# Patient Record
Sex: FEMALE | Race: WHITE | NOT HISPANIC OR LATINO | URBAN - METROPOLITAN AREA
[De-identification: names, ages, dates, MRNs, and addresses within clinical notes are randomized per-mention and may not be internally consistent; named-entity substitution may affect disease eponyms.]

---

## 2017-01-19 ENCOUNTER — OUTPATIENT (OUTPATIENT)
Dept: OUTPATIENT SERVICES | Facility: HOSPITAL | Age: 67
LOS: 1 days | Discharge: HOME | End: 2017-01-19

## 2017-01-19 DIAGNOSIS — C50.919 MALIGNANT NEOPLASM OF UNSPECIFIED SITE OF UNSPECIFIED FEMALE BREAST: ICD-10-CM

## 2017-01-20 ENCOUNTER — APPOINTMENT (OUTPATIENT)
Dept: HEMATOLOGY ONCOLOGY | Facility: CLINIC | Age: 67
End: 2017-01-20

## 2017-01-20 VITALS
SYSTOLIC BLOOD PRESSURE: 170 MMHG | TEMPERATURE: 97.5 F | RESPIRATION RATE: 16 BRPM | OXYGEN SATURATION: 100 % | HEIGHT: 63.58 IN | HEART RATE: 84 BPM | BODY MASS INDEX: 48.42 KG/M2 | DIASTOLIC BLOOD PRESSURE: 85 MMHG | WEIGHT: 276.68 LBS

## 2017-01-20 DIAGNOSIS — M10.9 GOUT, UNSPECIFIED: ICD-10-CM

## 2017-01-20 DIAGNOSIS — F41.9 ANXIETY DISORDER, UNSPECIFIED: ICD-10-CM

## 2017-01-20 DIAGNOSIS — Z80.3 FAMILY HISTORY OF MALIGNANT NEOPLASM OF BREAST: ICD-10-CM

## 2017-01-20 DIAGNOSIS — Z87.09 PERSONAL HISTORY OF OTHER DISEASES OF THE RESPIRATORY SYSTEM: ICD-10-CM

## 2017-01-20 DIAGNOSIS — Z78.9 OTHER SPECIFIED HEALTH STATUS: ICD-10-CM

## 2017-01-21 PROBLEM — Z87.09 HISTORY OF ASTHMA: Status: RESOLVED | Noted: 2017-01-21 | Resolved: 2017-01-21

## 2017-01-21 PROBLEM — M10.9 GOUT, ARTHRITIS: Status: RESOLVED | Noted: 2017-01-21 | Resolved: 2017-01-21

## 2017-01-21 PROBLEM — F41.9 ANXIETY: Status: RESOLVED | Noted: 2017-01-21 | Resolved: 2017-01-21

## 2017-01-21 PROBLEM — Z80.3 FAMILY HISTORY OF MALIGNANT NEOPLASM OF BREAST: Status: ACTIVE | Noted: 2017-01-21

## 2017-01-21 PROBLEM — Z78.9 ALCOHOL INGESTION: Status: ACTIVE | Noted: 2017-01-21

## 2017-01-23 ENCOUNTER — FORM ENCOUNTER (OUTPATIENT)
Age: 67
End: 2017-01-23

## 2017-01-24 ENCOUNTER — APPOINTMENT (OUTPATIENT)
Dept: NUCLEAR MEDICINE | Facility: CLINIC | Age: 67
End: 2017-01-24

## 2017-01-24 ENCOUNTER — OUTPATIENT (OUTPATIENT)
Dept: OUTPATIENT SERVICES | Facility: HOSPITAL | Age: 67
LOS: 1 days | End: 2017-01-24
Payer: MEDICARE

## 2017-01-24 DIAGNOSIS — C50.919 MALIGNANT NEOPLASM OF UNSPECIFIED SITE OF UNSPECIFIED FEMALE BREAST: ICD-10-CM

## 2017-01-24 PROCEDURE — 78815 PET IMAGE W/CT SKULL-THIGH: CPT

## 2017-01-24 PROCEDURE — A9552: CPT

## 2017-01-25 ENCOUNTER — RESULT REVIEW (OUTPATIENT)
Age: 67
End: 2017-01-25

## 2017-01-26 ENCOUNTER — APPOINTMENT (OUTPATIENT)
Dept: MRI IMAGING | Facility: IMAGING CENTER | Age: 67
End: 2017-01-26

## 2017-01-26 ENCOUNTER — OUTPATIENT (OUTPATIENT)
Dept: OUTPATIENT SERVICES | Facility: HOSPITAL | Age: 67
LOS: 1 days | End: 2017-01-26
Payer: MEDICARE

## 2017-01-26 ENCOUNTER — APPOINTMENT (OUTPATIENT)
Dept: ULTRASOUND IMAGING | Facility: IMAGING CENTER | Age: 67
End: 2017-01-26

## 2017-01-26 DIAGNOSIS — Z00.00 ENCOUNTER FOR GENERAL ADULT MEDICAL EXAMINATION WITHOUT ABNORMAL FINDINGS: ICD-10-CM

## 2017-01-26 PROCEDURE — 77065 DX MAMMO INCL CAD UNI: CPT

## 2017-01-26 PROCEDURE — 19083 BX BREAST 1ST LESION US IMAG: CPT | Mod: RT

## 2017-01-26 PROCEDURE — 88305 TISSUE EXAM BY PATHOLOGIST: CPT

## 2017-01-26 PROCEDURE — A4648: CPT

## 2017-01-26 PROCEDURE — 19085 BX BREAST 1ST LESION MR IMAG: CPT | Mod: LT

## 2017-01-26 PROCEDURE — G0206: CPT | Mod: 26,LT

## 2017-01-26 PROCEDURE — A9585: CPT

## 2017-01-26 PROCEDURE — 19083 BX BREAST 1ST LESION US IMAG: CPT

## 2017-01-26 PROCEDURE — 19085 BX BREAST 1ST LESION MR IMAG: CPT

## 2017-01-26 PROCEDURE — 88305 TISSUE EXAM BY PATHOLOGIST: CPT | Mod: 26

## 2017-02-01 ENCOUNTER — OTHER (OUTPATIENT)
Age: 67
End: 2017-02-01

## 2017-02-09 ENCOUNTER — APPOINTMENT (OUTPATIENT)
Dept: CT IMAGING | Facility: HOSPITAL | Age: 67
End: 2017-02-09

## 2017-02-15 ENCOUNTER — RESULT REVIEW (OUTPATIENT)
Age: 67
End: 2017-02-15

## 2017-02-15 ENCOUNTER — FORM ENCOUNTER (OUTPATIENT)
Age: 67
End: 2017-02-15

## 2017-02-16 ENCOUNTER — APPOINTMENT (OUTPATIENT)
Dept: CT IMAGING | Facility: HOSPITAL | Age: 67
End: 2017-02-16

## 2017-02-16 ENCOUNTER — OUTPATIENT (OUTPATIENT)
Dept: OUTPATIENT SERVICES | Facility: HOSPITAL | Age: 67
LOS: 1 days | End: 2017-02-16
Payer: MEDICARE

## 2017-02-16 DIAGNOSIS — C50.919 MALIGNANT NEOPLASM OF UNSPECIFIED SITE OF UNSPECIFIED FEMALE BREAST: ICD-10-CM

## 2017-02-16 PROCEDURE — 88342 IMHCHEM/IMCYTCHM 1ST ANTB: CPT | Mod: 26

## 2017-02-16 PROCEDURE — 88341 IMHCHEM/IMCYTCHM EA ADD ANTB: CPT | Mod: 26

## 2017-02-16 PROCEDURE — 88173 CYTOPATH EVAL FNA REPORT: CPT | Mod: 26

## 2017-02-16 PROCEDURE — 88342 IMHCHEM/IMCYTCHM 1ST ANTB: CPT

## 2017-02-16 PROCEDURE — 88305 TISSUE EXAM BY PATHOLOGIST: CPT

## 2017-02-16 PROCEDURE — 77012 CT SCAN FOR NEEDLE BIOPSY: CPT | Mod: 26

## 2017-02-16 PROCEDURE — 77012 CT SCAN FOR NEEDLE BIOPSY: CPT

## 2017-02-16 PROCEDURE — 88305 TISSUE EXAM BY PATHOLOGIST: CPT | Mod: 26

## 2017-02-16 PROCEDURE — 20225 BONE BIOPSY TROCAR/NDL DEEP: CPT

## 2017-02-16 PROCEDURE — 88173 CYTOPATH EVAL FNA REPORT: CPT

## 2017-02-16 PROCEDURE — 88172 CYTP DX EVAL FNA 1ST EA SITE: CPT

## 2017-02-16 PROCEDURE — 88341 IMHCHEM/IMCYTCHM EA ADD ANTB: CPT

## 2017-02-16 PROCEDURE — C1830: CPT

## 2017-02-22 DIAGNOSIS — Z85.3 PERSONAL HISTORY OF MALIGNANT NEOPLASM OF BREAST: ICD-10-CM

## 2017-02-22 DIAGNOSIS — M89.9 DISORDER OF BONE, UNSPECIFIED: ICD-10-CM

## 2017-02-22 LAB — NON-GYNECOLOGICAL CYTOLOGY STUDY: SIGNIFICANT CHANGE UP

## 2017-03-01 ENCOUNTER — OUTPATIENT (OUTPATIENT)
Dept: OUTPATIENT SERVICES | Facility: HOSPITAL | Age: 67
LOS: 1 days | Discharge: HOME | End: 2017-03-01
Payer: MEDICARE

## 2017-03-01 DIAGNOSIS — C50.919 MALIGNANT NEOPLASM OF UNSPECIFIED SITE OF UNSPECIFIED FEMALE BREAST: ICD-10-CM

## 2017-03-02 ENCOUNTER — RESULT REVIEW (OUTPATIENT)
Age: 67
End: 2017-03-02

## 2017-03-03 ENCOUNTER — APPOINTMENT (OUTPATIENT)
Dept: HEMATOLOGY ONCOLOGY | Facility: CLINIC | Age: 67
End: 2017-03-03

## 2017-03-03 VITALS
OXYGEN SATURATION: 98 % | RESPIRATION RATE: 16 BRPM | SYSTOLIC BLOOD PRESSURE: 156 MMHG | TEMPERATURE: 97.5 F | DIASTOLIC BLOOD PRESSURE: 90 MMHG | HEART RATE: 92 BPM

## 2017-03-03 LAB
BASOPHILS # BLD AUTO: 0 K/UL — SIGNIFICANT CHANGE UP (ref 0–0.2)
BASOPHILS NFR BLD AUTO: 0.4 % — SIGNIFICANT CHANGE UP (ref 0–2)
EOSINOPHIL # BLD AUTO: 0.3 K/UL — SIGNIFICANT CHANGE UP (ref 0–0.5)
EOSINOPHIL NFR BLD AUTO: 4 % — SIGNIFICANT CHANGE UP (ref 0–6)
HCT VFR BLD CALC: 40.4 % — SIGNIFICANT CHANGE UP (ref 34.5–45)
HGB BLD-MCNC: 14 G/DL — SIGNIFICANT CHANGE UP (ref 11.5–15.5)
LYMPHOCYTES # BLD AUTO: 1.7 K/UL — SIGNIFICANT CHANGE UP (ref 1–3.3)
LYMPHOCYTES # BLD AUTO: 25.5 % — SIGNIFICANT CHANGE UP (ref 13–44)
MCHC RBC-ENTMCNC: 32.4 PG — SIGNIFICANT CHANGE UP (ref 27–34)
MCHC RBC-ENTMCNC: 34.8 G/DL — SIGNIFICANT CHANGE UP (ref 32–36)
MCV RBC AUTO: 93.2 FL — SIGNIFICANT CHANGE UP (ref 80–100)
MONOCYTES # BLD AUTO: 0.7 K/UL — SIGNIFICANT CHANGE UP (ref 0–0.9)
MONOCYTES NFR BLD AUTO: 10 % — SIGNIFICANT CHANGE UP (ref 2–14)
NEUTROPHILS # BLD AUTO: 4.1 K/UL — SIGNIFICANT CHANGE UP (ref 1.8–7.4)
NEUTROPHILS NFR BLD AUTO: 60 % — SIGNIFICANT CHANGE UP (ref 43–77)
PLATELET # BLD AUTO: 243 K/UL — SIGNIFICANT CHANGE UP (ref 150–400)
RBC # BLD: 4.33 M/UL — SIGNIFICANT CHANGE UP (ref 3.8–5.2)
RBC # FLD: 11.9 % — SIGNIFICANT CHANGE UP (ref 10.3–14.5)
WBC # BLD: 6.8 K/UL — SIGNIFICANT CHANGE UP (ref 3.8–10.5)
WBC # FLD AUTO: 6.8 K/UL — SIGNIFICANT CHANGE UP (ref 3.8–10.5)

## 2017-03-05 ENCOUNTER — FORM ENCOUNTER (OUTPATIENT)
Age: 67
End: 2017-03-05

## 2017-03-06 ENCOUNTER — APPOINTMENT (OUTPATIENT)
Age: 67
End: 2017-03-06

## 2017-03-14 ENCOUNTER — RESULT REVIEW (OUTPATIENT)
Age: 67
End: 2017-03-14

## 2017-03-15 ENCOUNTER — OTHER (OUTPATIENT)
Age: 67
End: 2017-03-15

## 2017-03-15 ENCOUNTER — LABORATORY RESULT (OUTPATIENT)
Age: 67
End: 2017-03-15

## 2017-03-15 ENCOUNTER — APPOINTMENT (OUTPATIENT)
Dept: INFUSION THERAPY | Facility: HOSPITAL | Age: 67
End: 2017-03-15

## 2017-03-15 LAB
BASOPHILS # BLD AUTO: 0.1 K/UL — SIGNIFICANT CHANGE UP (ref 0–0.2)
EOSINOPHIL # BLD AUTO: 0 K/UL — SIGNIFICANT CHANGE UP (ref 0–0.5)
EOSINOPHIL NFR BLD AUTO: 2 % — SIGNIFICANT CHANGE UP (ref 0–6)
HCT VFR BLD CALC: 39.9 % — SIGNIFICANT CHANGE UP (ref 34.5–45)
HGB BLD-MCNC: 13.6 G/DL — SIGNIFICANT CHANGE UP (ref 11.5–15.5)
LYMPHOCYTES # BLD AUTO: 1.8 K/UL — SIGNIFICANT CHANGE UP (ref 1–3.3)
LYMPHOCYTES # BLD AUTO: 27 % — SIGNIFICANT CHANGE UP (ref 13–44)
MCHC RBC-ENTMCNC: 31.9 PG — SIGNIFICANT CHANGE UP (ref 27–34)
MCHC RBC-ENTMCNC: 34.1 G/DL — SIGNIFICANT CHANGE UP (ref 32–36)
MCV RBC AUTO: 93.6 FL — SIGNIFICANT CHANGE UP (ref 80–100)
MONOCYTES # BLD AUTO: 0.6 K/UL — SIGNIFICANT CHANGE UP (ref 0–0.9)
MONOCYTES NFR BLD AUTO: 7 % — SIGNIFICANT CHANGE UP (ref 2–14)
NEUTROPHILS # BLD AUTO: 4.3 K/UL — SIGNIFICANT CHANGE UP (ref 1.8–7.4)
NEUTROPHILS NFR BLD AUTO: 64 % — SIGNIFICANT CHANGE UP (ref 43–77)
PLAT MORPH BLD: NORMAL — SIGNIFICANT CHANGE UP
PLATELET # BLD AUTO: 215 K/UL — SIGNIFICANT CHANGE UP (ref 150–400)
RBC # BLD: 4.26 M/UL — SIGNIFICANT CHANGE UP (ref 3.8–5.2)
RBC # FLD: 12.1 % — SIGNIFICANT CHANGE UP (ref 10.3–14.5)
RBC BLD AUTO: NORMAL — SIGNIFICANT CHANGE UP
WBC # BLD: 6.8 K/UL — SIGNIFICANT CHANGE UP (ref 3.8–10.5)
WBC # FLD AUTO: 6.8 K/UL — SIGNIFICANT CHANGE UP (ref 3.8–10.5)

## 2017-03-15 PROCEDURE — 93010 ELECTROCARDIOGRAM REPORT: CPT

## 2017-03-16 DIAGNOSIS — R11.2 NAUSEA WITH VOMITING, UNSPECIFIED: ICD-10-CM

## 2017-03-16 DIAGNOSIS — Z51.89 ENCOUNTER FOR OTHER SPECIFIED AFTERCARE: ICD-10-CM

## 2017-03-16 DIAGNOSIS — Z51.11 ENCOUNTER FOR ANTINEOPLASTIC CHEMOTHERAPY: ICD-10-CM

## 2017-03-23 ENCOUNTER — RESULT REVIEW (OUTPATIENT)
Age: 67
End: 2017-03-23

## 2017-03-24 ENCOUNTER — APPOINTMENT (OUTPATIENT)
Dept: HEMATOLOGY ONCOLOGY | Facility: CLINIC | Age: 67
End: 2017-03-24

## 2017-03-24 VITALS
WEIGHT: 272.49 LBS | HEART RATE: 105 BPM | DIASTOLIC BLOOD PRESSURE: 85 MMHG | TEMPERATURE: 97.7 F | OXYGEN SATURATION: 99 % | RESPIRATION RATE: 16 BRPM | SYSTOLIC BLOOD PRESSURE: 131 MMHG | BODY MASS INDEX: 47.39 KG/M2

## 2017-03-24 LAB
EOSINOPHIL # BLD AUTO: 0 K/UL — SIGNIFICANT CHANGE UP (ref 0–0.5)
EOSINOPHIL NFR BLD AUTO: 5 % — SIGNIFICANT CHANGE UP (ref 0–6)
HCT VFR BLD CALC: 38.8 % — SIGNIFICANT CHANGE UP (ref 34.5–45)
HGB BLD-MCNC: 13.2 G/DL — SIGNIFICANT CHANGE UP (ref 11.5–15.5)
LYMPHOCYTES # BLD AUTO: 0.7 K/UL — LOW (ref 1–3.3)
LYMPHOCYTES # BLD AUTO: 45 % — HIGH (ref 13–44)
MCHC RBC-ENTMCNC: 31.8 PG — SIGNIFICANT CHANGE UP (ref 27–34)
MCHC RBC-ENTMCNC: 34 G/DL — SIGNIFICANT CHANGE UP (ref 32–36)
MCV RBC AUTO: 93.4 FL — SIGNIFICANT CHANGE UP (ref 80–100)
MONOCYTES # BLD AUTO: 0.1 K/UL — SIGNIFICANT CHANGE UP (ref 0–0.9)
MONOCYTES NFR BLD AUTO: 4 % — SIGNIFICANT CHANGE UP (ref 2–14)
NEUTROPHILS # BLD AUTO: 0.7 K/UL — LOW (ref 1.8–7.4)
NEUTROPHILS NFR BLD AUTO: 46 % — SIGNIFICANT CHANGE UP (ref 43–77)
PLAT MORPH BLD: NORMAL — SIGNIFICANT CHANGE UP
PLATELET # BLD AUTO: 162 K/UL — SIGNIFICANT CHANGE UP (ref 150–400)
RBC # BLD: 4.15 M/UL — SIGNIFICANT CHANGE UP (ref 3.8–5.2)
RBC # FLD: 11.5 % — SIGNIFICANT CHANGE UP (ref 10.3–14.5)
RBC BLD AUTO: SIGNIFICANT CHANGE UP
WBC # BLD: 1.5 K/UL — LOW (ref 3.8–10.5)
WBC # FLD AUTO: 1.5 K/UL — LOW (ref 3.8–10.5)

## 2017-03-26 ENCOUNTER — TRANSCRIPTION ENCOUNTER (OUTPATIENT)
Age: 67
End: 2017-03-26

## 2017-03-27 RX ORDER — PROCHLORPERAZINE MALEATE 10 MG/1
10 TABLET ORAL
Qty: 30 | Refills: 0 | Status: DISCONTINUED | COMMUNITY
Start: 2017-03-09 | End: 2017-03-27

## 2017-03-28 ENCOUNTER — RESULT REVIEW (OUTPATIENT)
Age: 67
End: 2017-03-28

## 2017-03-28 RX ORDER — METOCLOPRAMIDE 10 MG/1
10 TABLET ORAL EVERY 8 HOURS
Qty: 90 | Refills: 0 | Status: DISCONTINUED | COMMUNITY
Start: 2017-03-27 | End: 2017-03-28

## 2017-03-29 ENCOUNTER — APPOINTMENT (OUTPATIENT)
Dept: INFUSION THERAPY | Facility: HOSPITAL | Age: 67
End: 2017-03-29

## 2017-03-29 ENCOUNTER — LABORATORY RESULT (OUTPATIENT)
Age: 67
End: 2017-03-29

## 2017-03-29 LAB
BASOPHILS # BLD AUTO: 0 K/UL — SIGNIFICANT CHANGE UP (ref 0–0.2)
BASOPHILS NFR BLD AUTO: 3 % — HIGH (ref 0–2)
EOSINOPHIL # BLD AUTO: 0.2 K/UL — SIGNIFICANT CHANGE UP (ref 0–0.5)
EOSINOPHIL NFR BLD AUTO: 4 % — SIGNIFICANT CHANGE UP (ref 0–6)
HCT VFR BLD CALC: 36.6 % — SIGNIFICANT CHANGE UP (ref 34.5–45)
HGB BLD-MCNC: 12.5 G/DL — SIGNIFICANT CHANGE UP (ref 11.5–15.5)
LYMPHOCYTES # BLD AUTO: 0.8 K/UL — LOW (ref 1–3.3)
LYMPHOCYTES # BLD AUTO: 59 % — HIGH (ref 13–44)
MCHC RBC-ENTMCNC: 31.7 PG — SIGNIFICANT CHANGE UP (ref 27–34)
MCHC RBC-ENTMCNC: 34.1 G/DL — SIGNIFICANT CHANGE UP (ref 32–36)
MCV RBC AUTO: 92.8 FL — SIGNIFICANT CHANGE UP (ref 80–100)
METAMYELOCYTES # FLD: 1 % — HIGH (ref 0–0)
MONOCYTES # BLD AUTO: 0.6 K/UL — SIGNIFICANT CHANGE UP (ref 0–0.9)
MONOCYTES NFR BLD AUTO: 22 % — HIGH (ref 2–14)
NEUTROPHILS # BLD AUTO: 0.4 K/UL — LOW (ref 1.8–7.4)
NEUTROPHILS NFR BLD AUTO: 10 % — LOW (ref 43–77)
PLAT MORPH BLD: NORMAL — SIGNIFICANT CHANGE UP
PLATELET # BLD AUTO: 243 K/UL — SIGNIFICANT CHANGE UP (ref 150–400)
PROMYELOCYTES # FLD: 1 % — HIGH (ref 0–0)
RBC # BLD: 3.94 M/UL — SIGNIFICANT CHANGE UP (ref 3.8–5.2)
RBC # FLD: 12.2 % — SIGNIFICANT CHANGE UP (ref 10.3–14.5)
RBC BLD AUTO: SIGNIFICANT CHANGE UP
WBC # BLD: 2 K/UL — LOW (ref 3.8–10.5)
WBC # FLD AUTO: 2 K/UL — LOW (ref 3.8–10.5)

## 2017-04-04 ENCOUNTER — RESULT REVIEW (OUTPATIENT)
Age: 67
End: 2017-04-04

## 2017-04-04 ENCOUNTER — OUTPATIENT (OUTPATIENT)
Dept: OUTPATIENT SERVICES | Facility: HOSPITAL | Age: 67
LOS: 1 days | Discharge: HOME | End: 2017-04-04
Payer: MEDICARE

## 2017-04-04 DIAGNOSIS — C50.919 MALIGNANT NEOPLASM OF UNSPECIFIED SITE OF UNSPECIFIED FEMALE BREAST: ICD-10-CM

## 2017-04-05 ENCOUNTER — LABORATORY RESULT (OUTPATIENT)
Age: 67
End: 2017-04-05

## 2017-04-05 ENCOUNTER — APPOINTMENT (OUTPATIENT)
Dept: INFUSION THERAPY | Facility: HOSPITAL | Age: 67
End: 2017-04-05

## 2017-04-05 LAB
BASOPHILS # BLD AUTO: 0 K/UL — SIGNIFICANT CHANGE UP (ref 0–0.2)
BASOPHILS NFR BLD AUTO: 0.3 % — SIGNIFICANT CHANGE UP (ref 0–2)
EOSINOPHIL # BLD AUTO: 0 K/UL — SIGNIFICANT CHANGE UP (ref 0–0.5)
EOSINOPHIL NFR BLD AUTO: 0 % — SIGNIFICANT CHANGE UP (ref 0–6)
HCT VFR BLD CALC: 38.6 % — SIGNIFICANT CHANGE UP (ref 34.5–45)
HGB BLD-MCNC: 12.9 G/DL — SIGNIFICANT CHANGE UP (ref 11.5–15.5)
LYMPHOCYTES # BLD AUTO: 1.2 K/UL — SIGNIFICANT CHANGE UP (ref 1–3.3)
LYMPHOCYTES # BLD AUTO: 12.7 % — LOW (ref 13–44)
MCHC RBC-ENTMCNC: 31.3 PG — SIGNIFICANT CHANGE UP (ref 27–34)
MCHC RBC-ENTMCNC: 33.5 G/DL — SIGNIFICANT CHANGE UP (ref 32–36)
MCV RBC AUTO: 93.5 FL — SIGNIFICANT CHANGE UP (ref 80–100)
MONOCYTES # BLD AUTO: 1.1 K/UL — HIGH (ref 0–0.9)
MONOCYTES NFR BLD AUTO: 11.4 % — SIGNIFICANT CHANGE UP (ref 2–14)
NEUTROPHILS # BLD AUTO: 7.2 K/UL — SIGNIFICANT CHANGE UP (ref 1.8–7.4)
NEUTROPHILS NFR BLD AUTO: 75.6 % — SIGNIFICANT CHANGE UP (ref 43–77)
PLATELET # BLD AUTO: 330 K/UL — SIGNIFICANT CHANGE UP (ref 150–400)
RBC # BLD: 4.13 M/UL — SIGNIFICANT CHANGE UP (ref 3.8–5.2)
RBC # FLD: 12.6 % — SIGNIFICANT CHANGE UP (ref 10.3–14.5)
WBC # BLD: 9.5 K/UL — SIGNIFICANT CHANGE UP (ref 3.8–10.5)
WBC # FLD AUTO: 9.5 K/UL — SIGNIFICANT CHANGE UP (ref 3.8–10.5)

## 2017-04-06 ENCOUNTER — APPOINTMENT (OUTPATIENT)
Dept: INFUSION THERAPY | Facility: HOSPITAL | Age: 67
End: 2017-04-06

## 2017-04-06 DIAGNOSIS — R11.2 NAUSEA WITH VOMITING, UNSPECIFIED: ICD-10-CM

## 2017-04-06 DIAGNOSIS — Z51.11 ENCOUNTER FOR ANTINEOPLASTIC CHEMOTHERAPY: ICD-10-CM

## 2017-04-07 DIAGNOSIS — Z51.89 ENCOUNTER FOR OTHER SPECIFIED AFTERCARE: ICD-10-CM

## 2017-04-13 ENCOUNTER — RESULT REVIEW (OUTPATIENT)
Age: 67
End: 2017-04-13

## 2017-04-14 ENCOUNTER — APPOINTMENT (OUTPATIENT)
Dept: HEMATOLOGY ONCOLOGY | Facility: CLINIC | Age: 67
End: 2017-04-14

## 2017-04-14 ENCOUNTER — LABORATORY RESULT (OUTPATIENT)
Age: 67
End: 2017-04-14

## 2017-04-14 LAB
BASOPHILS # BLD AUTO: 0 K/UL — SIGNIFICANT CHANGE UP (ref 0–0.2)
BASOPHILS NFR BLD AUTO: 0.2 % — SIGNIFICANT CHANGE UP (ref 0–2)
EOSINOPHIL # BLD AUTO: 0.1 K/UL — SIGNIFICANT CHANGE UP (ref 0–0.5)
EOSINOPHIL NFR BLD AUTO: 1.6 % — SIGNIFICANT CHANGE UP (ref 0–6)
HCT VFR BLD CALC: 33.5 % — LOW (ref 34.5–45)
HGB BLD-MCNC: 11.4 G/DL — LOW (ref 11.5–15.5)
LYMPHOCYTES # BLD AUTO: 0.4 K/UL — LOW (ref 1–3.3)
LYMPHOCYTES # BLD AUTO: 11.2 % — LOW (ref 13–44)
MCHC RBC-ENTMCNC: 31.9 PG — SIGNIFICANT CHANGE UP (ref 27–34)
MCHC RBC-ENTMCNC: 34 G/DL — SIGNIFICANT CHANGE UP (ref 32–36)
MCV RBC AUTO: 93.9 FL — SIGNIFICANT CHANGE UP (ref 80–100)
MONOCYTES # BLD AUTO: 0.3 K/UL — SIGNIFICANT CHANGE UP (ref 0–0.9)
MONOCYTES NFR BLD AUTO: 8.8 % — SIGNIFICANT CHANGE UP (ref 2–14)
NEUTROPHILS # BLD AUTO: 2.6 K/UL — SIGNIFICANT CHANGE UP (ref 1.8–7.4)
NEUTROPHILS NFR BLD AUTO: 78.3 % — HIGH (ref 43–77)
PLATELET # BLD AUTO: 91 K/UL — LOW (ref 150–400)
RBC # BLD: 3.56 M/UL — LOW (ref 3.8–5.2)
RBC # FLD: 12.2 % — SIGNIFICANT CHANGE UP (ref 10.3–14.5)
WBC # BLD: 3.4 K/UL — LOW (ref 3.8–10.5)
WBC # FLD AUTO: 3.4 K/UL — LOW (ref 3.8–10.5)

## 2017-04-14 PROCEDURE — 93010 ELECTROCARDIOGRAM REPORT: CPT

## 2017-04-18 ENCOUNTER — APPOINTMENT (OUTPATIENT)
Dept: CV DIAGNOSITCS | Facility: HOSPITAL | Age: 67
End: 2017-04-18

## 2017-04-18 ENCOUNTER — OUTPATIENT (OUTPATIENT)
Dept: OUTPATIENT SERVICES | Facility: HOSPITAL | Age: 67
LOS: 1 days | End: 2017-04-18

## 2017-04-18 ENCOUNTER — RESULT REVIEW (OUTPATIENT)
Age: 67
End: 2017-04-18

## 2017-04-18 ENCOUNTER — APPOINTMENT (OUTPATIENT)
Dept: HEMATOLOGY ONCOLOGY | Facility: CLINIC | Age: 67
End: 2017-04-18

## 2017-04-18 VITALS
DIASTOLIC BLOOD PRESSURE: 85 MMHG | WEIGHT: 269.4 LBS | RESPIRATION RATE: 16 BRPM | HEART RATE: 116 BPM | TEMPERATURE: 98.7 F | OXYGEN SATURATION: 93 % | BODY MASS INDEX: 46.86 KG/M2 | SYSTOLIC BLOOD PRESSURE: 134 MMHG

## 2017-04-18 DIAGNOSIS — C50.919 MALIGNANT NEOPLASM OF UNSPECIFIED SITE OF UNSPECIFIED FEMALE BREAST: ICD-10-CM

## 2017-04-18 PROCEDURE — 93306 TTE W/DOPPLER COMPLETE: CPT | Mod: 26

## 2017-04-19 ENCOUNTER — INPATIENT (INPATIENT)
Facility: HOSPITAL | Age: 67
LOS: 7 days | Discharge: NURSING, REHAB OR LONG-TERM CARE | End: 2017-04-27
Attending: HOSPITALIST | Admitting: HOSPITALIST
Payer: MEDICARE

## 2017-04-19 ENCOUNTER — APPOINTMENT (OUTPATIENT)
Dept: INFUSION THERAPY | Facility: HOSPITAL | Age: 67
End: 2017-04-19

## 2017-04-19 ENCOUNTER — APPOINTMENT (OUTPATIENT)
Dept: HEMATOLOGY ONCOLOGY | Facility: CLINIC | Age: 67
End: 2017-04-19

## 2017-04-19 VITALS
DIASTOLIC BLOOD PRESSURE: 62 MMHG | WEIGHT: 220.02 LBS | OXYGEN SATURATION: 98 % | HEIGHT: 64 IN | RESPIRATION RATE: 16 BRPM | HEART RATE: 116 BPM | SYSTOLIC BLOOD PRESSURE: 91 MMHG | TEMPERATURE: 98 F

## 2017-04-19 VITALS — TEMPERATURE: 98 F | WEIGHT: 269.4 LBS | HEART RATE: 125 BPM | OXYGEN SATURATION: 99 % | BODY MASS INDEX: 46.86 KG/M2

## 2017-04-19 DIAGNOSIS — Z98.890 OTHER SPECIFIED POSTPROCEDURAL STATES: Chronic | ICD-10-CM

## 2017-04-19 DIAGNOSIS — Z41.8 ENCOUNTER FOR OTHER PROCEDURES FOR PURPOSES OTHER THAN REMEDYING HEALTH STATE: ICD-10-CM

## 2017-04-19 DIAGNOSIS — N17.9 ACUTE KIDNEY FAILURE, UNSPECIFIED: ICD-10-CM

## 2017-04-19 DIAGNOSIS — I10 ESSENTIAL (PRIMARY) HYPERTENSION: ICD-10-CM

## 2017-04-19 DIAGNOSIS — R50.9 FEVER, UNSPECIFIED: ICD-10-CM

## 2017-04-19 DIAGNOSIS — R65.10 SYSTEMIC INFLAMMATORY RESPONSE SYNDROME (SIRS) OF NON-INFECTIOUS ORIGIN WITHOUT ACUTE ORGAN DYSFUNCTION: ICD-10-CM

## 2017-04-19 DIAGNOSIS — C50.919 MALIGNANT NEOPLASM OF UNSPECIFIED SITE OF UNSPECIFIED FEMALE BREAST: ICD-10-CM

## 2017-04-19 LAB
ALBUMIN SERPL ELPH-MCNC: 3.1 G/DL — LOW (ref 3.3–5)
ALP SERPL-CCNC: 130 U/L — HIGH (ref 40–120)
ALT FLD-CCNC: 57 U/L — HIGH (ref 4–33)
APPEARANCE UR: CLEAR — SIGNIFICANT CHANGE UP
AST SERPL-CCNC: 45 U/L — HIGH (ref 4–32)
B PERT DNA SPEC QL NAA+PROBE: SIGNIFICANT CHANGE UP
BACTERIA # UR AUTO: HIGH
BASE EXCESS BLDV CALC-SCNC: 2.7 MMOL/L — SIGNIFICANT CHANGE UP
BASOPHILS NFR SPEC: 0 % — SIGNIFICANT CHANGE UP (ref 0–2)
BILIRUB SERPL-MCNC: 0.5 MG/DL — SIGNIFICANT CHANGE UP (ref 0.2–1.2)
BILIRUB UR-MCNC: NEGATIVE — SIGNIFICANT CHANGE UP
BLOOD GAS VENOUS - CREATININE: 1.47 MG/DL — HIGH (ref 0.5–1.3)
BLOOD UR QL VISUAL: NEGATIVE — SIGNIFICANT CHANGE UP
BUN SERPL-MCNC: 17 MG/DL — SIGNIFICANT CHANGE UP (ref 7–23)
C PNEUM DNA SPEC QL NAA+PROBE: NOT DETECTED — SIGNIFICANT CHANGE UP
CALCIUM SERPL-MCNC: 8.8 MG/DL — SIGNIFICANT CHANGE UP (ref 8.4–10.5)
CHLORIDE BLDV-SCNC: 98 MMOL/L — SIGNIFICANT CHANGE UP (ref 96–108)
CHLORIDE SERPL-SCNC: 92 MMOL/L — LOW (ref 98–107)
CO2 SERPL-SCNC: 22 MMOL/L — SIGNIFICANT CHANGE UP (ref 22–31)
COLOR SPEC: SIGNIFICANT CHANGE UP
CREAT SERPL-MCNC: 1.42 MG/DL — HIGH (ref 0.5–1.3)
EOSINOPHIL # BLD AUTO: 0.1 K/UL — SIGNIFICANT CHANGE UP (ref 0–0.5)
EOSINOPHIL NFR BLD AUTO: 1 % — SIGNIFICANT CHANGE UP (ref 0–6)
EOSINOPHIL NFR FLD: 0 % — SIGNIFICANT CHANGE UP (ref 0–6)
FLUAV H1 2009 PAND RNA SPEC QL NAA+PROBE: NOT DETECTED — SIGNIFICANT CHANGE UP
FLUAV H1 RNA SPEC QL NAA+PROBE: NOT DETECTED — SIGNIFICANT CHANGE UP
FLUAV H3 RNA SPEC QL NAA+PROBE: NOT DETECTED — SIGNIFICANT CHANGE UP
FLUAV SUBTYP SPEC NAA+PROBE: SIGNIFICANT CHANGE UP
FLUBV RNA SPEC QL NAA+PROBE: NOT DETECTED — SIGNIFICANT CHANGE UP
GAS PNL BLDV: 135 MMOL/L — LOW (ref 136–146)
GLUCOSE BLDV-MCNC: 135 — HIGH (ref 70–99)
GLUCOSE SERPL-MCNC: 124 MG/DL — HIGH (ref 70–99)
GLUCOSE UR-MCNC: NEGATIVE — SIGNIFICANT CHANGE UP
HADV DNA SPEC QL NAA+PROBE: NOT DETECTED — SIGNIFICANT CHANGE UP
HCO3 BLDV-SCNC: 27 MMOL/L — SIGNIFICANT CHANGE UP (ref 20–27)
HCOV 229E RNA SPEC QL NAA+PROBE: NOT DETECTED — SIGNIFICANT CHANGE UP
HCOV HKU1 RNA SPEC QL NAA+PROBE: NOT DETECTED — SIGNIFICANT CHANGE UP
HCOV NL63 RNA SPEC QL NAA+PROBE: NOT DETECTED — SIGNIFICANT CHANGE UP
HCOV OC43 RNA SPEC QL NAA+PROBE: NOT DETECTED — SIGNIFICANT CHANGE UP
HCT VFR BLD CALC: 31.3 % — LOW (ref 34.5–45)
HCT VFR BLD CALC: 32.1 % — LOW (ref 34.5–45)
HCT VFR BLDV CALC: 32.9 % — LOW (ref 34.5–45)
HGB BLD-MCNC: 10.6 G/DL — LOW (ref 11.5–15.5)
HGB BLD-MCNC: 10.7 G/DL — LOW (ref 11.5–15.5)
HGB BLDV-MCNC: 10.7 G/DL — LOW (ref 11.5–15.5)
HMPV RNA SPEC QL NAA+PROBE: NOT DETECTED — SIGNIFICANT CHANGE UP
HPIV1 RNA SPEC QL NAA+PROBE: NOT DETECTED — SIGNIFICANT CHANGE UP
HPIV2 RNA SPEC QL NAA+PROBE: NOT DETECTED — SIGNIFICANT CHANGE UP
HPIV3 RNA SPEC QL NAA+PROBE: NOT DETECTED — SIGNIFICANT CHANGE UP
HPIV4 RNA SPEC QL NAA+PROBE: NOT DETECTED — SIGNIFICANT CHANGE UP
KETONES UR-MCNC: NEGATIVE — SIGNIFICANT CHANGE UP
LACTATE BLDV-MCNC: 1.6 MMOL/L — SIGNIFICANT CHANGE UP (ref 0.5–2)
LEUKOCYTE ESTERASE UR-ACNC: NEGATIVE — SIGNIFICANT CHANGE UP
LYMPHOCYTES # BLD AUTO: 0.5 K/UL — LOW (ref 1–3.3)
LYMPHOCYTES # BLD AUTO: 7 % — LOW (ref 13–44)
LYMPHOCYTES NFR SPEC AUTO: 0 % — LOW (ref 13–44)
M PNEUMO DNA SPEC QL NAA+PROBE: NOT DETECTED — SIGNIFICANT CHANGE UP
MANUAL SMEAR VERIFICATION: SIGNIFICANT CHANGE UP
MCHC RBC-ENTMCNC: 30.5 PG — SIGNIFICANT CHANGE UP (ref 27–34)
MCHC RBC-ENTMCNC: 31.7 PG — SIGNIFICANT CHANGE UP (ref 27–34)
MCHC RBC-ENTMCNC: 33 % — SIGNIFICANT CHANGE UP (ref 32–36)
MCHC RBC-ENTMCNC: 34.1 G/DL — SIGNIFICANT CHANGE UP (ref 32–36)
MCV RBC AUTO: 92.2 FL — SIGNIFICANT CHANGE UP (ref 80–100)
MCV RBC AUTO: 92.9 FL — SIGNIFICANT CHANGE UP (ref 80–100)
MONOCYTES # BLD AUTO: 1.7 K/UL — HIGH (ref 0–0.9)
MONOCYTES NFR BLD AUTO: 13 % — SIGNIFICANT CHANGE UP (ref 2–14)
MONOCYTES NFR BLD: 10 % — HIGH (ref 2–9)
NEUTROPHIL AB SER-ACNC: 83 % — HIGH (ref 43–77)
NEUTROPHILS # BLD AUTO: 20.8 K/UL — HIGH (ref 1.8–7.4)
NEUTROPHILS NFR BLD AUTO: 78 % — HIGH (ref 43–77)
NEUTS BAND # BLD: 1 % — SIGNIFICANT CHANGE UP (ref 0–8)
NEUTS BAND # BLD: 7 % — HIGH (ref 0–6)
NITRITE UR-MCNC: NEGATIVE — SIGNIFICANT CHANGE UP
NON-SQ EPI CELLS # UR AUTO: <1 — SIGNIFICANT CHANGE UP
PCO2 BLDV: 41 MMHG — SIGNIFICANT CHANGE UP (ref 41–51)
PH BLDV: 7.43 PH — SIGNIFICANT CHANGE UP (ref 7.32–7.43)
PH UR: 6 — SIGNIFICANT CHANGE UP (ref 4.6–8)
PLAT MORPH BLD: NORMAL — SIGNIFICANT CHANGE UP
PLATELET # BLD AUTO: 253 K/UL — SIGNIFICANT CHANGE UP (ref 150–400)
PLATELET # BLD AUTO: 273 K/UL — SIGNIFICANT CHANGE UP (ref 150–400)
PMV BLD: 10.1 FL — SIGNIFICANT CHANGE UP (ref 7–13)
PO2 BLDV: 47 MMHG — HIGH (ref 35–40)
POLYCHROMASIA BLD QL SMEAR: SLIGHT — SIGNIFICANT CHANGE UP
POTASSIUM BLDV-SCNC: 3 MMOL/L — LOW (ref 3.4–4.5)
POTASSIUM SERPL-MCNC: 3.3 MMOL/L — LOW (ref 3.5–5.3)
POTASSIUM SERPL-SCNC: 3.3 MMOL/L — LOW (ref 3.5–5.3)
PROT SERPL-MCNC: 6.5 G/DL — SIGNIFICANT CHANGE UP (ref 6–8.3)
PROT UR-MCNC: 10 — SIGNIFICANT CHANGE UP
RBC # BLD: 3.37 M/UL — LOW (ref 3.8–5.2)
RBC # BLD: 3.48 M/UL — LOW (ref 3.8–5.2)
RBC # FLD: 13 % — SIGNIFICANT CHANGE UP (ref 10.3–14.5)
RBC # FLD: 14.8 % — HIGH (ref 10.3–14.5)
RBC BLD AUTO: SIGNIFICANT CHANGE UP
RBC CASTS # UR COMP ASSIST: SIGNIFICANT CHANGE UP (ref 0–?)
RSV RNA SPEC QL NAA+PROBE: NOT DETECTED — SIGNIFICANT CHANGE UP
RV+EV RNA SPEC QL NAA+PROBE: NOT DETECTED — SIGNIFICANT CHANGE UP
SAO2 % BLDV: 79 % — SIGNIFICANT CHANGE UP (ref 60–85)
SODIUM SERPL-SCNC: 135 MMOL/L — SIGNIFICANT CHANGE UP (ref 135–145)
SP GR SPEC: 1.01 — SIGNIFICANT CHANGE UP (ref 1–1.03)
SQUAMOUS # UR AUTO: SIGNIFICANT CHANGE UP
TOXIC GRANULES BLD QL SMEAR: PRESENT — SIGNIFICANT CHANGE UP
UROBILINOGEN FLD QL: NORMAL E.U. — SIGNIFICANT CHANGE UP (ref 0.1–0.2)
WBC # BLD: 23.2 K/UL — HIGH (ref 3.8–10.5)
WBC # BLD: 26.37 K/UL — HIGH (ref 3.8–10.5)
WBC # FLD AUTO: 23.2 K/UL — HIGH (ref 3.8–10.5)
WBC # FLD AUTO: 26.37 K/UL — HIGH (ref 3.8–10.5)
WBC UR QL: SIGNIFICANT CHANGE UP (ref 0–?)

## 2017-04-19 PROCEDURE — 71020: CPT | Mod: 26

## 2017-04-19 RX ORDER — SODIUM CHLORIDE 9 MG/ML
1000 INJECTION INTRAMUSCULAR; INTRAVENOUS; SUBCUTANEOUS ONCE
Qty: 0 | Refills: 0 | Status: DISCONTINUED | OUTPATIENT
Start: 2017-04-19 | End: 2017-04-19

## 2017-04-19 RX ORDER — VANCOMYCIN HCL 1 G
1000 VIAL (EA) INTRAVENOUS ONCE
Qty: 0 | Refills: 0 | Status: COMPLETED | OUTPATIENT
Start: 2017-04-19 | End: 2017-04-19

## 2017-04-19 RX ORDER — ONDANSETRON 8 MG/1
4 TABLET, FILM COATED ORAL EVERY 6 HOURS
Qty: 0 | Refills: 0 | Status: DISCONTINUED | OUTPATIENT
Start: 2017-04-19 | End: 2017-04-27

## 2017-04-19 RX ORDER — SODIUM CHLORIDE 9 MG/ML
1000 INJECTION INTRAMUSCULAR; INTRAVENOUS; SUBCUTANEOUS ONCE
Qty: 0 | Refills: 0 | Status: COMPLETED | OUTPATIENT
Start: 2017-04-19 | End: 2017-04-19

## 2017-04-19 RX ORDER — VANCOMYCIN HCL 1 G
1000 VIAL (EA) INTRAVENOUS EVERY 24 HOURS
Qty: 0 | Refills: 0 | Status: DISCONTINUED | OUTPATIENT
Start: 2017-04-20 | End: 2017-04-21

## 2017-04-19 RX ORDER — HEPARIN SODIUM 5000 [USP'U]/ML
5000 INJECTION INTRAVENOUS; SUBCUTANEOUS EVERY 8 HOURS
Qty: 0 | Refills: 0 | Status: DISCONTINUED | OUTPATIENT
Start: 2017-04-19 | End: 2017-04-27

## 2017-04-19 RX ORDER — ACETAMINOPHEN 500 MG
650 TABLET ORAL EVERY 6 HOURS
Qty: 0 | Refills: 0 | Status: DISCONTINUED | OUTPATIENT
Start: 2017-04-19 | End: 2017-04-27

## 2017-04-19 RX ORDER — AZTREONAM 2 G
1000 VIAL (EA) INJECTION EVERY 8 HOURS
Qty: 0 | Refills: 0 | Status: DISCONTINUED | OUTPATIENT
Start: 2017-04-20 | End: 2017-04-25

## 2017-04-19 RX ORDER — AZTREONAM 2 G
1000 VIAL (EA) INJECTION ONCE
Qty: 0 | Refills: 0 | Status: COMPLETED | OUTPATIENT
Start: 2017-04-19 | End: 2017-04-19

## 2017-04-19 RX ORDER — SODIUM CHLORIDE 9 MG/ML
1000 INJECTION INTRAMUSCULAR; INTRAVENOUS; SUBCUTANEOUS
Qty: 0 | Refills: 0 | Status: DISCONTINUED | OUTPATIENT
Start: 2017-04-19 | End: 2017-04-21

## 2017-04-19 RX ADMIN — SODIUM CHLORIDE 1000 MILLILITER(S): 9 INJECTION INTRAMUSCULAR; INTRAVENOUS; SUBCUTANEOUS at 14:54

## 2017-04-19 RX ADMIN — Medication 50 MILLIGRAM(S): at 17:00

## 2017-04-19 RX ADMIN — Medication 250 MILLIGRAM(S): at 17:39

## 2017-04-19 RX ADMIN — Medication 650 MILLIGRAM(S): at 21:00

## 2017-04-19 RX ADMIN — SODIUM CHLORIDE 1000 MILLILITER(S): 9 INJECTION INTRAMUSCULAR; INTRAVENOUS; SUBCUTANEOUS at 19:29

## 2017-04-19 NOTE — H&P ADULT. - ATTENDING COMMENTS
66-year-old female with history left breast infiltrating lobular carcinoma diagnosed in 2003  (ER+/MO+/HER2-) s/p lumpectomy, RT/CT (Adriamycin/Cytoxin), found to have R breast triple + ductal carcinoma in 2016, started on Adriamycin/cytoxan and was due for cycle #3 today, presented to [facility] and was transferred to [facility] instead due to fever and shaking chills.  Per [employer] notes, patient is also on growth factor support (last Neulasta appears to have been administered on April 6, 2017) with no increase in leukocytes on CBC from 4/14/17.  Leukocytosis may be in part related to delayed response to neulasta however given fever and tachycardia, cannot rule out current infection.  Patient denies sick contacts, recent travel, URI symptoms, rashes, cough, chest pain, abdominal pain, dysuria or hematuria.  Non-focal exam.  CXR is clear, RVP negative, UA negative.  Blood cultures are pending.  Will obtain cultures from port as well.  Tylenol PRN fever.  Will treat with broad spectrum antibiotics for now and c/w gentle IVF.  If diarrhea recurs will check C. diff and stool cultures.  Will follow up onc recs in AM.  CAROL unclear etiology at present, BUN wnl, will check urine lytes, hold ARB, gently hydrate and if no improvement will check renal ultrasound.     Will need to verify med list with patient in AM.    Agree with resident H&P/plan above as edited.

## 2017-04-19 NOTE — ED PROVIDER NOTE - MEDICAL DECISION MAKING DETAILS
66F with fever, on chemo. Will evaluate for neutropenic fever vs infectious etiology. Will obtain cbc, cmp, blood and urine cultures, ua, CXR, RVP (as within window to start tamiflu if positive), discuss with oncology when results back.

## 2017-04-19 NOTE — H&P ADULT. - NEGATIVE GASTROINTESTINAL SYMPTOMS
no abdominal pain/no diarrhea/no nausea/no constipation/no change in bowel habits/no flatulence/no steatorrhea/no vomiting/no hematochezia/no melena no abdominal pain/no steatorrhea/no flatulence/no nausea/no change in bowel habits/no hematochezia/no melena/no vomiting/no constipation

## 2017-04-19 NOTE — ED PROVIDER NOTE - NOTES
Agreeable to admission or d/c with rapid f/u. pt still tachy/hypotensive so will admit. Blood cx results pending.

## 2017-04-19 NOTE — H&P ADULT. - PROBLEM SELECTOR PLAN 3
Recurrent breast cancer, recently restarted on chemotherapy with adriamycin/cytoxan  - follow-up oncology recs in the morning  - c/w zofran PRN nausea Baseline Cr appears to be ~0.7-0.8 based on labs in [employer] from 4/2017, admitted with Cr=1.42, likely prerenal in setting of SIRS with potential sepsis with occult infection, received 2L IVF in the ED  - f/u repeat Cr on AM labs  - c/w IVF hydration at 75cc/hr  - check urine electrolytes  - if Cr continuing to uptrend, will check renal US

## 2017-04-19 NOTE — H&P ADULT. - PROBLEM SELECTOR PROBLEM 4
Essential hypertension Malignant neoplasm of female breast, unspecified laterality, unspecified site of breast

## 2017-04-19 NOTE — H&P ADULT. - LYMPHATIC
posterior cervical R/supraclavicular L/posterior cervical L/anterior cervical R/supraclavicular R/anterior cervical L

## 2017-04-19 NOTE — H&P ADULT. - PROBLEM SELECTOR PLAN 1
Patient with reported fever at home, found to be tachycardic, hypotensive with leukocytosis on arrival to ED. Patient has not had recorded fever since arrival. UA, CXR and RVP negative, but given patient currently on chemo and therefore immunosuppresed, remain concerned for underlying occult infection.   - s/p aztreonam and vancomycin in ED, will continue with broad empiric coverage for now  - f/u UCx and BCx   - Tylenol PRN fever  - continue with IV NS @ 75cc/hr for hydration, already received 2L boluses in ED  - zofran PRN nausea  - patient endorsing one episode loose stool at home this morning, after consuming milk, will monitor bowel habits, if further episodes of diarrhea will consider checking C. diff Patient with reported fever at home, found to be tachycardic, hypotensive with leukocytosis on arrival to ED. UA, CXR and RVP negative, but given patient currently on chemo and therefore immunosuppressed, remain concerned for underlying occult infection.   - s/p aztreonam and vancomycin in ED, will continue with broad empiric coverage for now  - f/u UCx and BCx   - Tylenol PRN fever  - continue with IV NS @ 75cc/hr for hydration, already received 2L boluses in ED  - zofran PRN nausea  - patient endorsing one episode loose stool at home this morning, after consuming milk, will monitor bowel habits, if further episodes of diarrhea will consider checking C. diff, stool cultures

## 2017-04-19 NOTE — H&P ADULT. - HISTORY OF PRESENT ILLNESS
67 y/o female with h/o HTN and breast cancer, diagnosed and treated in 2003 and in remission until 12/2016, recently started on chemotherapy, last infusion 2 weeks ago (adriamycin/cytoxan) presenting from [facility] for evaluation of 1 day of fevers, [id number]=[id number] at home, with rigors and fatigue. Per patient, she has had fatigue for the last month since starting chemotherapy, and otherwise had been doing well until this morning. When she woke up she was rigoring and took her temperature, noted it to be 101F. She took tylenol and went to [facility] for anticipated chemotherapy. On arrival to [facility], vitals were checked, patient noted to be tachycardic and advised to proceed to ED for evaluation with concern for infection given fever this morning. Patient denies any cough, rhinorrhea, sore throat, nausea or vomiting. Endorses 1 episode of watery stool this morning, but this was after drinking a milk-based protein drink and she is lactose intolerant. Denies any sick contacts or recent travel.     On arrival to ED, vitals: 98.2, 116, 91/62, 16, 98%. Labs revealed wbc=26.4, Hgb=10.6, Cr=1.42 (baseline Cr=0.7 on Allscripts). UA and RVP were negative and CXR showed clear lungs. Patient was given 2L NS, aztreonam and vancomycin and admitted for management and evaluation of sepsis with unclear etiology, complicated by CAROL.

## 2017-04-19 NOTE — H&P ADULT. - PROBLEM SELECTOR PLAN 4
Goal BP<140/90  - given patient with SIRS and concern for occult infection, holding all anti-HTN for now, will restart as clinically indicated. Recurrent breast cancer, recently restarted on chemotherapy with adriamycin/cytoxan  - follow-up oncology recs in the morning  - c/w zofran PRN nausea

## 2017-04-19 NOTE — H&P ADULT. - MUSCULOSKELETAL
details… detailed exam no calf tenderness/no joint warmth/normal/ROM intact/no joint swelling/no joint erythema

## 2017-04-19 NOTE — H&P ADULT. - NEGATIVE CARDIOVASCULAR SYMPTOMS
no peripheral edema/no orthopnea/no palpitations/no dyspnea on exertion/no chest pain/no paroxysmal nocturnal dyspnea

## 2017-04-19 NOTE — H&P ADULT. - NEUROLOGICAL DETAILS
responds to verbal commands/sensation intact/normal strength/alert and oriented x 3/responds to pain/cranial nerves intact/deep reflexes intact responds to verbal commands/cranial nerves intact/alert and oriented x 3/sensation intact/normal strength

## 2017-04-19 NOTE — H&P ADULT. - EKG AND INTERPRETATION
EKG reviewed and interpreted by me, significant for sinus tachycardia with QW=214, IFs=192. EKG reviewed and interpreted by me, significant for sinus tachycardia with UD=423, SMe=111, LVH.

## 2017-04-19 NOTE — H&P ADULT. - PROBLEM SELECTOR PLAN 5
HSQ for DVT PPX HSQ for DVT PPX given CAROL Goal BP<140/90  - given patient with SIRS and concern for occult infection, holding all anti-HTN for now, will restart as clinically indicated.

## 2017-04-19 NOTE — H&P ADULT. - LAB RESULTS AND INTERPRETATION
Labs reviewed and interpreted by me, significant for wbc=26.4, Cr=1.42 (up from baseline 0.7 from labs 4/5/17 on [employer]), UA and RVP negative. Labs reviewed and interpreted by me, significant for wbc=26.4, Cr=1.42 (up from baseline 0.7 from labs 4/5/17 on [employer], Cr 1.2 on 4/14/17 in Allscripts), UA and RVP negative.  Transaminitis, new since 4/14/17 labs.  Stable hypoalbuminemia.  Approx. stable normocytic anemia.

## 2017-04-19 NOTE — ED PROVIDER NOTE - ATTENDING CONTRIBUTION TO CARE
Case of a 65 y/o female with past medical history of HTN, Breast CA, started on chemo last month, just received 2nd treatment of chemo 2 weeks ago., presenting with fatigue and fever of 101F. Physical exam patient pale, seems ill, r/o infection vs neutropenic fever vs electrolyte abnormality, will do labs IV fluids and reassess.

## 2017-04-19 NOTE — H&P ADULT. - RS GEN PE MLT RESP DETAILS PC
breath sounds equal/no wheezes/good air movement/no rales/no subcutaneous emphysema/no rhonchi/normal/clear to auscultation bilaterally/no intercostal retractions/no chest wall tenderness/respirations non-labored/airway patent

## 2017-04-19 NOTE — ED ADULT TRIAGE NOTE - CHIEF COMPLAINT QUOTE
Pt is on chemo for right breast cancer. Has been having fever/chills since Sunday 4/16/17. Right wall chest port. Was instructed by oncologist to come to ED for hydration

## 2017-04-19 NOTE — ED ADULT NURSE NOTE - OBJECTIVE STATEMENT
received pt A&Ox3 in no apparent distress at this time. #20g IVL to L hand, bloods drawn and sent to the lab. no s/s of infiltration noted at this time. vss. CXR done. dispo pending

## 2017-04-19 NOTE — H&P ADULT. - PROBLEM SELECTOR PROBLEM 3
Malignant neoplasm of female breast, unspecified laterality, unspecified site of breast CAROL (acute kidney injury)

## 2017-04-19 NOTE — H&P ADULT. - GASTROINTESTINAL DETAILS
no guarding/no rebound tenderness/soft/bowel sounds normal/no distention/no bruit/nontender/normal/no masses palpable no rigidity/bowel sounds normal/normal/no rebound tenderness/no guarding/soft/no distention/nontender/no masses palpable

## 2017-04-19 NOTE — H&P ADULT. - ASSESSMENT
65 y/o female with h/o HTN and breast cancer, diagnosed and treated in 2003 and in remission until 12/2016, recently started on chemotherapy, last infusion 2 weeks ago presenting from [facility] for evaluation of 1 day of fevers, [id number] at home, with rigors and fatigue. Per patient, she has had fatigue for the last month since starting chemotherapy, and otherwise had been doing well until this morning. When she woke up she was rigoring and took her temperature, noted it to be 101F. She took tylenol and went to [facility] for anticipated chemotherapy. On arrival to [facility], vitals were checked, patient noted to be tachycardic and advised to proceed to ED for evaluation with concern for infection given fever this morning. Patient denies any cough, rhinorrhea, sore throat, nausea or vomiting. Endorses 1 episode of watery stool this morning, but this was after drinking a milk-based protein drink and she is lactose intolerant. Denies any sick contacts or recent travel.     On arrival to ED, vitals: 98.2, 116, 91/62, 16, 98%. Labs revealed wbc=26.4, Hgb=10.6, Cr=1.42 (baseline Cr=0.7 on Allscripts). UA and RVP were negative and CXR showed clear lungs. Patient was given 2L NS, aztreonam and vancomycin and admitted for management and evaluation of sepsis with unclear etiology, complicated by CAROL. 67 y/o female with h/o HTN and breast cancer, diagnosed and treated in 2003 and in remission until 12/2016, recently started on chemotherapy, last infusion 2 weeks ago presenting with fever at home, admitted with SIRS with concern for occult underlying infection, complicated by CAROL.

## 2017-04-19 NOTE — ED PROVIDER NOTE - OBJECTIVE STATEMENT
66F PMH HTN, Breast CA dx in Dec/Jan of this year, started on chemo last month, just received 2nd treatment of chemo 2 weeks ago. Has been feeling extremely fatigued since then, this morning had a fever of 101F. Seen at [facility] and sent to ED for evaluation. Denies chest pain, SOB, abd pain, NVD. No cough or congestion. Aside from fatigue and decreased PO intake, pt feels well.     Onc: [facility]  PMD: Dr. Alexy Merida

## 2017-04-20 DIAGNOSIS — D72.829 ELEVATED WHITE BLOOD CELL COUNT, UNSPECIFIED: ICD-10-CM

## 2017-04-20 LAB
ALBUMIN SERPL ELPH-MCNC: 2.8 G/DL — LOW (ref 3.3–5)
ALP SERPL-CCNC: 122 U/L — HIGH (ref 40–120)
ALT FLD-CCNC: 48 U/L — HIGH (ref 4–33)
AST SERPL-CCNC: 37 U/L — HIGH (ref 4–32)
BASOPHILS # BLD AUTO: 0.04 K/UL — SIGNIFICANT CHANGE UP (ref 0–0.2)
BASOPHILS NFR BLD AUTO: 0.2 % — SIGNIFICANT CHANGE UP (ref 0–2)
BILIRUB SERPL-MCNC: 0.5 MG/DL — SIGNIFICANT CHANGE UP (ref 0.2–1.2)
BLD GP AB SCN SERPL QL: NEGATIVE — SIGNIFICANT CHANGE UP
BUN SERPL-MCNC: 12 MG/DL — SIGNIFICANT CHANGE UP (ref 7–23)
CALCIUM SERPL-MCNC: 8.4 MG/DL — SIGNIFICANT CHANGE UP (ref 8.4–10.5)
CHLORIDE SERPL-SCNC: 99 MMOL/L — SIGNIFICANT CHANGE UP (ref 98–107)
CHLORIDE UR-SCNC: 118 MMOL/L — SIGNIFICANT CHANGE UP
CO2 SERPL-SCNC: 26 MMOL/L — SIGNIFICANT CHANGE UP (ref 22–31)
CREAT SERPL-MCNC: 0.88 MG/DL — SIGNIFICANT CHANGE UP (ref 0.5–1.3)
EOSINOPHIL # BLD AUTO: 0.01 K/UL — SIGNIFICANT CHANGE UP (ref 0–0.5)
EOSINOPHIL NFR BLD AUTO: 0 % — SIGNIFICANT CHANGE UP (ref 0–6)
GLUCOSE SERPL-MCNC: 114 MG/DL — HIGH (ref 70–99)
HCT VFR BLD CALC: 29.4 % — LOW (ref 34.5–45)
HGB BLD-MCNC: 9.7 G/DL — LOW (ref 11.5–15.5)
IMM GRANULOCYTES NFR BLD AUTO: 1.9 % — HIGH (ref 0–1.5)
LYMPHOCYTES # BLD AUTO: 1.3 K/UL — SIGNIFICANT CHANGE UP (ref 1–3.3)
LYMPHOCYTES # BLD AUTO: 6.1 % — LOW (ref 13–44)
MAGNESIUM SERPL-MCNC: 1.6 MG/DL — SIGNIFICANT CHANGE UP (ref 1.6–2.6)
MCHC RBC-ENTMCNC: 30.6 PG — SIGNIFICANT CHANGE UP (ref 27–34)
MCHC RBC-ENTMCNC: 33 % — SIGNIFICANT CHANGE UP (ref 32–36)
MCV RBC AUTO: 92.7 FL — SIGNIFICANT CHANGE UP (ref 80–100)
MONOCYTES # BLD AUTO: 1.28 K/UL — HIGH (ref 0–0.9)
MONOCYTES NFR BLD AUTO: 6 % — SIGNIFICANT CHANGE UP (ref 2–14)
NEUTROPHILS # BLD AUTO: 18.39 K/UL — HIGH (ref 1.8–7.4)
NEUTROPHILS NFR BLD AUTO: 85.8 % — HIGH (ref 43–77)
OSMOLALITY UR: 612 MOSMO/KG — SIGNIFICANT CHANGE UP (ref 50–1200)
PHOSPHATE SERPL-MCNC: 3.2 MG/DL — SIGNIFICANT CHANGE UP (ref 2.5–4.5)
PLATELET # BLD AUTO: 290 K/UL — SIGNIFICANT CHANGE UP (ref 150–400)
PMV BLD: 9.8 FL — SIGNIFICANT CHANGE UP (ref 7–13)
POTASSIUM SERPL-MCNC: 4 MMOL/L — SIGNIFICANT CHANGE UP (ref 3.5–5.3)
POTASSIUM SERPL-SCNC: 4 MMOL/L — SIGNIFICANT CHANGE UP (ref 3.5–5.3)
POTASSIUM UR-SCNC: 53.9 MEQ/L — SIGNIFICANT CHANGE UP
PROT SERPL-MCNC: 5.9 G/DL — LOW (ref 6–8.3)
RBC # BLD: 3.17 M/UL — LOW (ref 3.8–5.2)
RBC # FLD: 14.7 % — HIGH (ref 10.3–14.5)
RH IG SCN BLD-IMP: NEGATIVE — SIGNIFICANT CHANGE UP
SODIUM SERPL-SCNC: 138 MMOL/L — SIGNIFICANT CHANGE UP (ref 135–145)
SODIUM UR-SCNC: 95 MEQ/L — SIGNIFICANT CHANGE UP
SPECIMEN SOURCE: SIGNIFICANT CHANGE UP
SPECIMEN SOURCE: SIGNIFICANT CHANGE UP
UUN UR-MCNC: 722.8 MG/DL — SIGNIFICANT CHANGE UP
VANCOMYCIN TROUGH SERPL-MCNC: 3 UG/ML — LOW (ref 10–20)
WBC # BLD: 21.42 K/UL — HIGH (ref 3.8–10.5)
WBC # FLD AUTO: 21.42 K/UL — HIGH (ref 3.8–10.5)

## 2017-04-20 PROCEDURE — 99223 1ST HOSP IP/OBS HIGH 75: CPT | Mod: GC

## 2017-04-20 PROCEDURE — 99222 1ST HOSP IP/OBS MODERATE 55: CPT | Mod: GC

## 2017-04-20 RX ORDER — SODIUM CHLORIDE 9 MG/ML
1000 INJECTION INTRAMUSCULAR; INTRAVENOUS; SUBCUTANEOUS ONCE
Qty: 0 | Refills: 0 | Status: COMPLETED | OUTPATIENT
Start: 2017-04-20 | End: 2017-04-20

## 2017-04-20 RX ADMIN — Medication 50 MILLIGRAM(S): at 21:56

## 2017-04-20 RX ADMIN — Medication 50 MILLIGRAM(S): at 05:54

## 2017-04-20 RX ADMIN — Medication 650 MILLIGRAM(S): at 15:15

## 2017-04-20 RX ADMIN — Medication 650 MILLIGRAM(S): at 23:46

## 2017-04-20 RX ADMIN — SODIUM CHLORIDE 75 MILLILITER(S): 9 INJECTION INTRAMUSCULAR; INTRAVENOUS; SUBCUTANEOUS at 01:09

## 2017-04-20 RX ADMIN — Medication 50 MILLIGRAM(S): at 14:12

## 2017-04-20 RX ADMIN — SODIUM CHLORIDE 1000 MILLILITER(S): 9 INJECTION INTRAMUSCULAR; INTRAVENOUS; SUBCUTANEOUS at 00:30

## 2017-04-20 NOTE — PATIENT PROFILE ADULT. - ABILITY TO HEAR (WITH HEARING AID OR HEARING APPLIANCE IF NORMALLY USED):
Mildly to Moderately Impaired: difficulty hearing in some environments or speaker may need to increase volume or speak distinctly/Unable to hear on L ear

## 2017-04-21 LAB
ALBUMIN SERPL ELPH-MCNC: 2.5 G/DL — LOW (ref 3.3–5)
ALP SERPL-CCNC: 138 U/L — HIGH (ref 40–120)
ALT FLD-CCNC: 51 U/L — HIGH (ref 4–33)
AST SERPL-CCNC: 40 U/L — HIGH (ref 4–32)
BACTERIA UR CULT: SIGNIFICANT CHANGE UP
BASOPHILS # BLD AUTO: 0.02 K/UL — SIGNIFICANT CHANGE UP (ref 0–0.2)
BASOPHILS NFR BLD AUTO: 0.1 % — SIGNIFICANT CHANGE UP (ref 0–2)
BILIRUB SERPL-MCNC: 0.5 MG/DL — SIGNIFICANT CHANGE UP (ref 0.2–1.2)
BUN SERPL-MCNC: 10 MG/DL — SIGNIFICANT CHANGE UP (ref 7–23)
CALCIUM SERPL-MCNC: 8.1 MG/DL — LOW (ref 8.4–10.5)
CHLORIDE SERPL-SCNC: 103 MMOL/L — SIGNIFICANT CHANGE UP (ref 98–107)
CO2 SERPL-SCNC: 25 MMOL/L — SIGNIFICANT CHANGE UP (ref 22–31)
CREAT SERPL-MCNC: 0.75 MG/DL — SIGNIFICANT CHANGE UP (ref 0.5–1.3)
EOSINOPHIL # BLD AUTO: 0.01 K/UL — SIGNIFICANT CHANGE UP (ref 0–0.5)
EOSINOPHIL NFR BLD AUTO: 0.1 % — SIGNIFICANT CHANGE UP (ref 0–6)
GLUCOSE SERPL-MCNC: 118 MG/DL — HIGH (ref 70–99)
HCT VFR BLD CALC: 26.3 % — LOW (ref 34.5–45)
HGB BLD-MCNC: 8.8 G/DL — LOW (ref 11.5–15.5)
IMM GRANULOCYTES NFR BLD AUTO: 1.2 % — SIGNIFICANT CHANGE UP (ref 0–1.5)
LYMPHOCYTES # BLD AUTO: 1.05 K/UL — SIGNIFICANT CHANGE UP (ref 1–3.3)
LYMPHOCYTES # BLD AUTO: 5.5 % — LOW (ref 13–44)
MANUAL SMEAR VERIFICATION: SIGNIFICANT CHANGE UP
MCHC RBC-ENTMCNC: 30.8 PG — SIGNIFICANT CHANGE UP (ref 27–34)
MCHC RBC-ENTMCNC: 33.5 % — SIGNIFICANT CHANGE UP (ref 32–36)
MCV RBC AUTO: 92 FL — SIGNIFICANT CHANGE UP (ref 80–100)
MONOCYTES # BLD AUTO: 1.58 K/UL — HIGH (ref 0–0.9)
MONOCYTES NFR BLD AUTO: 8.3 % — SIGNIFICANT CHANGE UP (ref 2–14)
NEUTROPHILS # BLD AUTO: 16.22 K/UL — HIGH (ref 1.8–7.4)
NEUTROPHILS NFR BLD AUTO: 84.8 % — HIGH (ref 43–77)
PLATELET # BLD AUTO: 343 K/UL — SIGNIFICANT CHANGE UP (ref 150–400)
PMV BLD: 10 FL — SIGNIFICANT CHANGE UP (ref 7–13)
POTASSIUM SERPL-MCNC: 3 MMOL/L — LOW (ref 3.5–5.3)
POTASSIUM SERPL-SCNC: 3 MMOL/L — LOW (ref 3.5–5.3)
PROT SERPL-MCNC: 5.5 G/DL — LOW (ref 6–8.3)
RBC # BLD: 2.86 M/UL — LOW (ref 3.8–5.2)
RBC # FLD: 14.8 % — HIGH (ref 10.3–14.5)
SODIUM SERPL-SCNC: 136 MMOL/L — SIGNIFICANT CHANGE UP (ref 135–145)
SPECIMEN SOURCE: SIGNIFICANT CHANGE UP
SPECIMEN SOURCE: SIGNIFICANT CHANGE UP
VANCOMYCIN TROUGH SERPL-MCNC: 5.2 UG/ML — LOW (ref 10–20)
WBC # BLD: 19.1 K/UL — HIGH (ref 3.8–10.5)
WBC # FLD AUTO: 19.1 K/UL — HIGH (ref 3.8–10.5)

## 2017-04-21 PROCEDURE — 99232 SBSQ HOSP IP/OBS MODERATE 35: CPT

## 2017-04-21 PROCEDURE — 99233 SBSQ HOSP IP/OBS HIGH 50: CPT

## 2017-04-21 RX ORDER — VANCOMYCIN HCL 1 G
1000 VIAL (EA) INTRAVENOUS ONCE
Qty: 0 | Refills: 0 | Status: COMPLETED | OUTPATIENT
Start: 2017-04-21 | End: 2017-04-21

## 2017-04-21 RX ORDER — SODIUM CHLORIDE 9 MG/ML
1000 INJECTION INTRAMUSCULAR; INTRAVENOUS; SUBCUTANEOUS
Qty: 0 | Refills: 0 | Status: DISCONTINUED | OUTPATIENT
Start: 2017-04-21 | End: 2017-04-23

## 2017-04-21 RX ORDER — SODIUM CHLORIDE 9 MG/ML
1000 INJECTION INTRAMUSCULAR; INTRAVENOUS; SUBCUTANEOUS
Qty: 0 | Refills: 0 | Status: DISCONTINUED | OUTPATIENT
Start: 2017-04-21 | End: 2017-04-21

## 2017-04-21 RX ORDER — VANCOMYCIN HCL 1 G
750 VIAL (EA) INTRAVENOUS EVERY 12 HOURS
Qty: 0 | Refills: 0 | Status: DISCONTINUED | OUTPATIENT
Start: 2017-04-22 | End: 2017-04-23

## 2017-04-21 RX ORDER — POTASSIUM CHLORIDE 20 MEQ
20 PACKET (EA) ORAL
Qty: 0 | Refills: 0 | Status: COMPLETED | OUTPATIENT
Start: 2017-04-21 | End: 2017-04-21

## 2017-04-21 RX ADMIN — Medication 250 MILLIGRAM(S): at 00:30

## 2017-04-21 RX ADMIN — Medication 50 MILLIGRAM(S): at 05:04

## 2017-04-21 RX ADMIN — Medication 650 MILLIGRAM(S): at 16:19

## 2017-04-21 RX ADMIN — Medication 50 MILLIGRAM(S): at 21:54

## 2017-04-21 RX ADMIN — Medication 20 MILLIEQUIVALENT(S): at 10:14

## 2017-04-21 RX ADMIN — Medication 250 MILLIGRAM(S): at 17:51

## 2017-04-21 RX ADMIN — Medication 20 MILLIEQUIVALENT(S): at 12:55

## 2017-04-21 RX ADMIN — Medication 50 MILLIGRAM(S): at 14:53

## 2017-04-21 RX ADMIN — HEPARIN SODIUM 5000 UNIT(S): 5000 INJECTION INTRAVENOUS; SUBCUTANEOUS at 21:54

## 2017-04-21 RX ADMIN — Medication 20 MILLIEQUIVALENT(S): at 09:11

## 2017-04-22 LAB
APPEARANCE UR: SIGNIFICANT CHANGE UP
BACTERIA # UR AUTO: SIGNIFICANT CHANGE UP
BASOPHILS # BLD AUTO: 0.03 K/UL — SIGNIFICANT CHANGE UP (ref 0–0.2)
BASOPHILS NFR BLD AUTO: 0.2 % — SIGNIFICANT CHANGE UP (ref 0–2)
BILIRUB UR-MCNC: NEGATIVE — SIGNIFICANT CHANGE UP
BLOOD UR QL VISUAL: NEGATIVE — SIGNIFICANT CHANGE UP
BUN SERPL-MCNC: 8 MG/DL — SIGNIFICANT CHANGE UP (ref 7–23)
BUN SERPL-MCNC: 8 MG/DL — SIGNIFICANT CHANGE UP (ref 7–23)
CALCIUM SERPL-MCNC: 8.2 MG/DL — LOW (ref 8.4–10.5)
CALCIUM SERPL-MCNC: 8.2 MG/DL — LOW (ref 8.4–10.5)
CHLORIDE SERPL-SCNC: 102 MMOL/L — SIGNIFICANT CHANGE UP (ref 98–107)
CHLORIDE SERPL-SCNC: 102 MMOL/L — SIGNIFICANT CHANGE UP (ref 98–107)
CO2 SERPL-SCNC: 23 MMOL/L — SIGNIFICANT CHANGE UP (ref 22–31)
CO2 SERPL-SCNC: 23 MMOL/L — SIGNIFICANT CHANGE UP (ref 22–31)
COLOR SPEC: YELLOW — SIGNIFICANT CHANGE UP
CREAT SERPL-MCNC: 0.69 MG/DL — SIGNIFICANT CHANGE UP (ref 0.5–1.3)
CREAT SERPL-MCNC: 0.69 MG/DL — SIGNIFICANT CHANGE UP (ref 0.5–1.3)
EOSINOPHIL # BLD AUTO: 0.02 K/UL — SIGNIFICANT CHANGE UP (ref 0–0.5)
EOSINOPHIL NFR BLD AUTO: 0.1 % — SIGNIFICANT CHANGE UP (ref 0–6)
GLUCOSE SERPL-MCNC: 105 MG/DL — HIGH (ref 70–99)
GLUCOSE SERPL-MCNC: 105 MG/DL — HIGH (ref 70–99)
GLUCOSE UR-MCNC: NEGATIVE — SIGNIFICANT CHANGE UP
HCT VFR BLD CALC: 27.4 % — LOW (ref 34.5–45)
HGB BLD-MCNC: 9 G/DL — LOW (ref 11.5–15.5)
HIV1 AG SER QL: SIGNIFICANT CHANGE UP
HIV1+2 AB SPEC QL: SIGNIFICANT CHANGE UP
IMM GRANULOCYTES NFR BLD AUTO: 1.3 % — SIGNIFICANT CHANGE UP (ref 0–1.5)
KETONES UR-MCNC: NEGATIVE — SIGNIFICANT CHANGE UP
LEUKOCYTE ESTERASE UR-ACNC: HIGH
LYMPHOCYTES # BLD AUTO: 1.06 K/UL — SIGNIFICANT CHANGE UP (ref 1–3.3)
LYMPHOCYTES # BLD AUTO: 6.9 % — LOW (ref 13–44)
MAGNESIUM SERPL-MCNC: 1.5 MG/DL — LOW (ref 1.6–2.6)
MANUAL SMEAR VERIFICATION: SIGNIFICANT CHANGE UP
MCHC RBC-ENTMCNC: 30.2 PG — SIGNIFICANT CHANGE UP (ref 27–34)
MCHC RBC-ENTMCNC: 32.8 % — SIGNIFICANT CHANGE UP (ref 32–36)
MCV RBC AUTO: 91.9 FL — SIGNIFICANT CHANGE UP (ref 80–100)
MONOCYTES # BLD AUTO: 1.25 K/UL — HIGH (ref 0–0.9)
MONOCYTES NFR BLD AUTO: 8.2 % — SIGNIFICANT CHANGE UP (ref 2–14)
MUCOUS THREADS # UR AUTO: SIGNIFICANT CHANGE UP
NEUTROPHILS # BLD AUTO: 12.76 K/UL — HIGH (ref 1.8–7.4)
NEUTROPHILS NFR BLD AUTO: 83.3 % — HIGH (ref 43–77)
NITRITE UR-MCNC: NEGATIVE — SIGNIFICANT CHANGE UP
PH UR: 6 — SIGNIFICANT CHANGE UP (ref 4.6–8)
PHOSPHATE SERPL-MCNC: 2.3 MG/DL — LOW (ref 2.5–4.5)
PLATELET # BLD AUTO: 368 K/UL — SIGNIFICANT CHANGE UP (ref 150–400)
PMV BLD: 9.8 FL — SIGNIFICANT CHANGE UP (ref 7–13)
POTASSIUM SERPL-MCNC: 3.5 MMOL/L — SIGNIFICANT CHANGE UP (ref 3.5–5.3)
POTASSIUM SERPL-MCNC: 3.5 MMOL/L — SIGNIFICANT CHANGE UP (ref 3.5–5.3)
POTASSIUM SERPL-SCNC: 3.5 MMOL/L — SIGNIFICANT CHANGE UP (ref 3.5–5.3)
POTASSIUM SERPL-SCNC: 3.5 MMOL/L — SIGNIFICANT CHANGE UP (ref 3.5–5.3)
PROT UR-MCNC: 30 — HIGH
RBC # BLD: 2.98 M/UL — LOW (ref 3.8–5.2)
RBC # FLD: 14.9 % — HIGH (ref 10.3–14.5)
SODIUM SERPL-SCNC: 138 MMOL/L — SIGNIFICANT CHANGE UP (ref 135–145)
SODIUM SERPL-SCNC: 138 MMOL/L — SIGNIFICANT CHANGE UP (ref 135–145)
SP GR SPEC: 1.02 — SIGNIFICANT CHANGE UP (ref 1–1.03)
SQUAMOUS # UR AUTO: SIGNIFICANT CHANGE UP
UROBILINOGEN FLD QL: NORMAL E.U. — SIGNIFICANT CHANGE UP (ref 0.1–0.2)
VANCOMYCIN FLD-MCNC: 8.3 UG/ML — SIGNIFICANT CHANGE UP
WBC # BLD: 15.32 K/UL — HIGH (ref 3.8–10.5)
WBC # FLD AUTO: 15.32 K/UL — HIGH (ref 3.8–10.5)
WBC UR QL: SIGNIFICANT CHANGE UP (ref 0–?)
YEAST BUDDING # UR COMP ASSIST: SIGNIFICANT CHANGE UP

## 2017-04-22 PROCEDURE — 99233 SBSQ HOSP IP/OBS HIGH 50: CPT

## 2017-04-22 RX ORDER — ESCITALOPRAM OXALATE 10 MG/1
10 TABLET, FILM COATED ORAL AT BEDTIME
Qty: 0 | Refills: 0 | Status: DISCONTINUED | OUTPATIENT
Start: 2017-04-22 | End: 2017-04-27

## 2017-04-22 RX ORDER — MAGNESIUM SULFATE 500 MG/ML
1 VIAL (ML) INJECTION ONCE
Qty: 0 | Refills: 0 | Status: COMPLETED | OUTPATIENT
Start: 2017-04-22 | End: 2017-04-22

## 2017-04-22 RX ORDER — SODIUM,POTASSIUM PHOSPHATES 278-250MG
1 POWDER IN PACKET (EA) ORAL
Qty: 0 | Refills: 0 | Status: COMPLETED | OUTPATIENT
Start: 2017-04-22 | End: 2017-04-22

## 2017-04-22 RX ADMIN — HEPARIN SODIUM 5000 UNIT(S): 5000 INJECTION INTRAVENOUS; SUBCUTANEOUS at 05:40

## 2017-04-22 RX ADMIN — Medication 50 MILLIGRAM(S): at 05:40

## 2017-04-22 RX ADMIN — Medication 1 TABLET(S): at 08:49

## 2017-04-22 RX ADMIN — Medication 150 MILLIGRAM(S): at 07:52

## 2017-04-22 RX ADMIN — Medication 150 MILLIGRAM(S): at 18:17

## 2017-04-22 RX ADMIN — Medication 50 MILLIGRAM(S): at 22:01

## 2017-04-22 RX ADMIN — Medication 1 TABLET(S): at 17:19

## 2017-04-22 RX ADMIN — HEPARIN SODIUM 5000 UNIT(S): 5000 INJECTION INTRAVENOUS; SUBCUTANEOUS at 22:01

## 2017-04-22 RX ADMIN — SODIUM CHLORIDE 75 MILLILITER(S): 9 INJECTION INTRAMUSCULAR; INTRAVENOUS; SUBCUTANEOUS at 08:51

## 2017-04-22 RX ADMIN — Medication 50 MILLIGRAM(S): at 11:10

## 2017-04-22 RX ADMIN — Medication 100 GRAM(S): at 08:49

## 2017-04-22 RX ADMIN — HEPARIN SODIUM 5000 UNIT(S): 5000 INJECTION INTRAVENOUS; SUBCUTANEOUS at 11:11

## 2017-04-22 RX ADMIN — ESCITALOPRAM OXALATE 10 MILLIGRAM(S): 10 TABLET, FILM COATED ORAL at 22:01

## 2017-04-22 RX ADMIN — Medication 1 TABLET(S): at 11:10

## 2017-04-23 LAB
-  CEFTRIAXONE: SIGNIFICANT CHANGE UP
-  CLINDAMYCIN: SIGNIFICANT CHANGE UP
-  ERYTHROMYCIN: SIGNIFICANT CHANGE UP
-  PENICILLIN G: SIGNIFICANT CHANGE UP
-  VANCOMYCIN: SIGNIFICANT CHANGE UP
BACTERIA BLD CULT: SIGNIFICANT CHANGE UP
BACTERIA UR CULT: SIGNIFICANT CHANGE UP
BASOPHILS # BLD AUTO: 0.07 K/UL — SIGNIFICANT CHANGE UP (ref 0–0.2)
BASOPHILS NFR BLD AUTO: 0.5 % — SIGNIFICANT CHANGE UP (ref 0–2)
BASOPHILS NFR SPEC: 0 % — SIGNIFICANT CHANGE UP (ref 0–2)
BLASTS # FLD: 0 % — SIGNIFICANT CHANGE UP (ref 0–0)
BUN SERPL-MCNC: 7 MG/DL — SIGNIFICANT CHANGE UP (ref 7–23)
BUN SERPL-MCNC: 7 MG/DL — SIGNIFICANT CHANGE UP (ref 7–23)
CALCIUM SERPL-MCNC: 8.4 MG/DL — SIGNIFICANT CHANGE UP (ref 8.4–10.5)
CALCIUM SERPL-MCNC: 8.4 MG/DL — SIGNIFICANT CHANGE UP (ref 8.4–10.5)
CHLORIDE SERPL-SCNC: 100 MMOL/L — SIGNIFICANT CHANGE UP (ref 98–107)
CHLORIDE SERPL-SCNC: 100 MMOL/L — SIGNIFICANT CHANGE UP (ref 98–107)
CO2 SERPL-SCNC: 22 MMOL/L — SIGNIFICANT CHANGE UP (ref 22–31)
CO2 SERPL-SCNC: 22 MMOL/L — SIGNIFICANT CHANGE UP (ref 22–31)
CREAT SERPL-MCNC: 0.69 MG/DL — SIGNIFICANT CHANGE UP (ref 0.5–1.3)
CREAT SERPL-MCNC: 0.69 MG/DL — SIGNIFICANT CHANGE UP (ref 0.5–1.3)
EOSINOPHIL # BLD AUTO: 0.01 K/UL — SIGNIFICANT CHANGE UP (ref 0–0.5)
EOSINOPHIL NFR BLD AUTO: 0.1 % — SIGNIFICANT CHANGE UP (ref 0–6)
EOSINOPHIL NFR FLD: 0 % — SIGNIFICANT CHANGE UP (ref 0–6)
GIANT PLATELETS BLD QL SMEAR: PRESENT — SIGNIFICANT CHANGE UP
GLUCOSE SERPL-MCNC: 133 MG/DL — HIGH (ref 70–99)
GLUCOSE SERPL-MCNC: 133 MG/DL — HIGH (ref 70–99)
HBV CORE AB SER-ACNC: NONREACTIVE — SIGNIFICANT CHANGE UP
HBV SURFACE AB SER-ACNC: NONREACTIVE — SIGNIFICANT CHANGE UP
HBV SURFACE AG SER-ACNC: NONREACTIVE — SIGNIFICANT CHANGE UP
HCT VFR BLD CALC: 27 % — LOW (ref 34.5–45)
HCV AB S/CO SERPL IA: 0.15 S/CO — SIGNIFICANT CHANGE UP
HCV AB SERPL-IMP: SIGNIFICANT CHANGE UP
HCV RNA SERPL NAA DL=5-ACNC: NOT DETECTED — SIGNIFICANT CHANGE UP
HCV RNA SPEC NAA+PROBE-LOG IU: SIGNIFICANT CHANGE UP LOGIU/ML
HGB BLD-MCNC: 8.9 G/DL — LOW (ref 11.5–15.5)
IMM GRANULOCYTES NFR BLD AUTO: 1.7 % — HIGH (ref 0–1.5)
LYMPHOCYTES # BLD AUTO: 0.73 K/UL — LOW (ref 1–3.3)
LYMPHOCYTES # BLD AUTO: 5.2 % — LOW (ref 13–44)
LYMPHOCYTES NFR SPEC AUTO: 0.9 % — LOW (ref 13–44)
MAGNESIUM SERPL-MCNC: 1.6 MG/DL — SIGNIFICANT CHANGE UP (ref 1.6–2.6)
MANUAL SMEAR VERIFICATION: SIGNIFICANT CHANGE UP
MCHC RBC-ENTMCNC: 30.1 PG — SIGNIFICANT CHANGE UP (ref 27–34)
MCHC RBC-ENTMCNC: 33 % — SIGNIFICANT CHANGE UP (ref 32–36)
MCV RBC AUTO: 91.2 FL — SIGNIFICANT CHANGE UP (ref 80–100)
METAMYELOCYTES # FLD: 0 % — SIGNIFICANT CHANGE UP (ref 0–1)
METHOD TYPE: SIGNIFICANT CHANGE UP
MONOCYTES # BLD AUTO: 2.01 K/UL — HIGH (ref 0–0.9)
MONOCYTES NFR BLD AUTO: 14.3 % — HIGH (ref 2–14)
MONOCYTES NFR BLD: 13 % — HIGH (ref 2–9)
MORPHOLOGY BLD-IMP: NORMAL — SIGNIFICANT CHANGE UP
MYELOCYTES NFR BLD: 0.9 % — HIGH (ref 0–0)
NEUTROPHIL AB SER-ACNC: 84.3 % — HIGH (ref 43–77)
NEUTROPHILS # BLD AUTO: 10.95 K/UL — HIGH (ref 1.8–7.4)
NEUTROPHILS NFR BLD AUTO: 78.2 % — HIGH (ref 43–77)
NEUTS BAND # BLD: 0 % — SIGNIFICANT CHANGE UP (ref 0–6)
ORGANISM # SPEC MICROSCOPIC CNT: SIGNIFICANT CHANGE UP
ORGANISM # SPEC MICROSCOPIC CNT: SIGNIFICANT CHANGE UP
OTHER - HEMATOLOGY %: 0 — SIGNIFICANT CHANGE UP
PHOSPHATE SERPL-MCNC: 3.1 MG/DL — SIGNIFICANT CHANGE UP (ref 2.5–4.5)
PLATELET # BLD AUTO: 471 K/UL — HIGH (ref 150–400)
PLATELET COUNT - ESTIMATE: SIGNIFICANT CHANGE UP
PMV BLD: 9.5 FL — SIGNIFICANT CHANGE UP (ref 7–13)
POTASSIUM SERPL-MCNC: 3.4 MMOL/L — LOW (ref 3.5–5.3)
POTASSIUM SERPL-MCNC: 3.4 MMOL/L — LOW (ref 3.5–5.3)
POTASSIUM SERPL-SCNC: 3.4 MMOL/L — LOW (ref 3.5–5.3)
POTASSIUM SERPL-SCNC: 3.4 MMOL/L — LOW (ref 3.5–5.3)
PROMYELOCYTES # FLD: 0 % — SIGNIFICANT CHANGE UP (ref 0–0)
RBC # BLD: 2.96 M/UL — LOW (ref 3.8–5.2)
RBC # FLD: 14.9 % — HIGH (ref 10.3–14.5)
SODIUM SERPL-SCNC: 135 MMOL/L — SIGNIFICANT CHANGE UP (ref 135–145)
SODIUM SERPL-SCNC: 135 MMOL/L — SIGNIFICANT CHANGE UP (ref 135–145)
SPECIMEN SOURCE: SIGNIFICANT CHANGE UP
VANCOMYCIN TROUGH SERPL-MCNC: 8.5 UG/ML — LOW (ref 10–20)
VARIANT LYMPHS # BLD: 0 % — SIGNIFICANT CHANGE UP
WBC # BLD: 14.01 K/UL — HIGH (ref 3.8–10.5)
WBC # FLD AUTO: 14.01 K/UL — HIGH (ref 3.8–10.5)

## 2017-04-23 PROCEDURE — 99233 SBSQ HOSP IP/OBS HIGH 50: CPT

## 2017-04-23 PROCEDURE — 73562 X-RAY EXAM OF KNEE 3: CPT | Mod: 26,RT

## 2017-04-23 RX ORDER — ACETAMINOPHEN 500 MG
650 TABLET ORAL ONCE
Qty: 0 | Refills: 0 | Status: COMPLETED | OUTPATIENT
Start: 2017-04-23 | End: 2017-04-23

## 2017-04-23 RX ORDER — VANCOMYCIN HCL 1 G
1000 VIAL (EA) INTRAVENOUS EVERY 12 HOURS
Qty: 0 | Refills: 0 | Status: DISCONTINUED | OUTPATIENT
Start: 2017-04-23 | End: 2017-04-24

## 2017-04-23 RX ORDER — MAGNESIUM SULFATE 500 MG/ML
1 VIAL (ML) INJECTION ONCE
Qty: 0 | Refills: 0 | Status: COMPLETED | OUTPATIENT
Start: 2017-04-23 | End: 2017-04-23

## 2017-04-23 RX ORDER — POTASSIUM CHLORIDE 20 MEQ
40 PACKET (EA) ORAL ONCE
Qty: 0 | Refills: 0 | Status: COMPLETED | OUTPATIENT
Start: 2017-04-23 | End: 2017-04-23

## 2017-04-23 RX ADMIN — Medication 100 GRAM(S): at 11:33

## 2017-04-23 RX ADMIN — ESCITALOPRAM OXALATE 10 MILLIGRAM(S): 10 TABLET, FILM COATED ORAL at 22:43

## 2017-04-23 RX ADMIN — Medication 650 MILLIGRAM(S): at 06:30

## 2017-04-23 RX ADMIN — HEPARIN SODIUM 5000 UNIT(S): 5000 INJECTION INTRAVENOUS; SUBCUTANEOUS at 14:05

## 2017-04-23 RX ADMIN — Medication 50 MILLIGRAM(S): at 06:03

## 2017-04-23 RX ADMIN — HEPARIN SODIUM 5000 UNIT(S): 5000 INJECTION INTRAVENOUS; SUBCUTANEOUS at 22:43

## 2017-04-23 RX ADMIN — Medication 650 MILLIGRAM(S): at 07:00

## 2017-04-23 RX ADMIN — HEPARIN SODIUM 5000 UNIT(S): 5000 INJECTION INTRAVENOUS; SUBCUTANEOUS at 06:03

## 2017-04-23 RX ADMIN — Medication 50 MILLIGRAM(S): at 22:43

## 2017-04-23 RX ADMIN — Medication 50 MILLIGRAM(S): at 14:05

## 2017-04-23 RX ADMIN — Medication 150 MILLIGRAM(S): at 06:03

## 2017-04-23 RX ADMIN — Medication 40 MILLIEQUIVALENT(S): at 11:33

## 2017-04-23 RX ADMIN — Medication 250 MILLIGRAM(S): at 19:03

## 2017-04-23 NOTE — PHYSICAL THERAPY INITIAL EVALUATION ADULT - PERTINENT HX OF CURRENT PROBLEM, REHAB EVAL
fatigue for the last month since starting chemotherapy, and otherwise had been doing well until this morning. When she woke up she was rigoring and took her temperature, noted it to be 101F. She took tylenol and went to [facility] for anticipated chemotherapy. On arrival to [facility], vitals were checked, patient noted to be tachycardic and advised to proceed to ED for evaluation with concern for infection

## 2017-04-24 LAB
BACTERIA BLD CULT: SIGNIFICANT CHANGE UP
BASOPHILS # BLD AUTO: 0.06 K/UL — SIGNIFICANT CHANGE UP (ref 0–0.2)
BASOPHILS NFR BLD AUTO: 0.4 % — SIGNIFICANT CHANGE UP (ref 0–2)
BUN SERPL-MCNC: 7 MG/DL — SIGNIFICANT CHANGE UP (ref 7–23)
CALCIUM SERPL-MCNC: 9.1 MG/DL — SIGNIFICANT CHANGE UP (ref 8.4–10.5)
CHLORIDE SERPL-SCNC: 95 MMOL/L — LOW (ref 98–107)
CO2 SERPL-SCNC: 23 MMOL/L — SIGNIFICANT CHANGE UP (ref 22–31)
CREAT SERPL-MCNC: 0.62 MG/DL — SIGNIFICANT CHANGE UP (ref 0.5–1.3)
EOSINOPHIL # BLD AUTO: 0.03 K/UL — SIGNIFICANT CHANGE UP (ref 0–0.5)
EOSINOPHIL NFR BLD AUTO: 0.2 % — SIGNIFICANT CHANGE UP (ref 0–6)
GLUCOSE SERPL-MCNC: 116 MG/DL — HIGH (ref 70–99)
HCT VFR BLD CALC: 27.9 % — LOW (ref 34.5–45)
HGB BLD-MCNC: 9.1 G/DL — LOW (ref 11.5–15.5)
IMM GRANULOCYTES NFR BLD AUTO: 2.1 % — HIGH (ref 0–1.5)
LYMPHOCYTES # BLD AUTO: 1.53 K/UL — SIGNIFICANT CHANGE UP (ref 1–3.3)
LYMPHOCYTES # BLD AUTO: 10.5 % — LOW (ref 13–44)
MAGNESIUM SERPL-MCNC: 1.8 MG/DL — SIGNIFICANT CHANGE UP (ref 1.6–2.6)
MANUAL SMEAR VERIFICATION: SIGNIFICANT CHANGE UP
MCHC RBC-ENTMCNC: 30.2 PG — SIGNIFICANT CHANGE UP (ref 27–34)
MCHC RBC-ENTMCNC: 32.6 % — SIGNIFICANT CHANGE UP (ref 32–36)
MCV RBC AUTO: 92.7 FL — SIGNIFICANT CHANGE UP (ref 80–100)
MONOCYTES # BLD AUTO: 1.75 K/UL — HIGH (ref 0–0.9)
MONOCYTES NFR BLD AUTO: 12 % — SIGNIFICANT CHANGE UP (ref 2–14)
NEUTROPHILS # BLD AUTO: 10.91 K/UL — HIGH (ref 1.8–7.4)
NEUTROPHILS NFR BLD AUTO: 74.8 % — SIGNIFICANT CHANGE UP (ref 43–77)
PHOSPHATE SERPL-MCNC: 3.2 MG/DL — SIGNIFICANT CHANGE UP (ref 2.5–4.5)
PLATELET # BLD AUTO: 528 K/UL — HIGH (ref 150–400)
PMV BLD: 9.9 FL — SIGNIFICANT CHANGE UP (ref 7–13)
POTASSIUM SERPL-MCNC: 4 MMOL/L — SIGNIFICANT CHANGE UP (ref 3.5–5.3)
POTASSIUM SERPL-SCNC: 4 MMOL/L — SIGNIFICANT CHANGE UP (ref 3.5–5.3)
RBC # BLD: 3.01 M/UL — LOW (ref 3.8–5.2)
RBC # FLD: 15.2 % — HIGH (ref 10.3–14.5)
SODIUM SERPL-SCNC: 135 MMOL/L — SIGNIFICANT CHANGE UP (ref 135–145)
WBC # BLD: 14.59 K/UL — HIGH (ref 3.8–10.5)
WBC # FLD AUTO: 14.59 K/UL — HIGH (ref 3.8–10.5)

## 2017-04-24 PROCEDURE — 93010 ELECTROCARDIOGRAM REPORT: CPT

## 2017-04-24 PROCEDURE — 99233 SBSQ HOSP IP/OBS HIGH 50: CPT

## 2017-04-24 RX ORDER — VANCOMYCIN HCL 1 G
1250 VIAL (EA) INTRAVENOUS EVERY 12 HOURS
Qty: 0 | Refills: 0 | Status: DISCONTINUED | OUTPATIENT
Start: 2017-04-24 | End: 2017-04-27

## 2017-04-24 RX ORDER — METOPROLOL TARTRATE 50 MG
12.5 TABLET ORAL EVERY 12 HOURS
Qty: 0 | Refills: 0 | Status: DISCONTINUED | OUTPATIENT
Start: 2017-04-24 | End: 2017-04-25

## 2017-04-24 RX ORDER — SODIUM CHLORIDE 9 MG/ML
1000 INJECTION, SOLUTION INTRAVENOUS
Qty: 0 | Refills: 0 | Status: DISCONTINUED | OUTPATIENT
Start: 2017-04-24 | End: 2017-04-27

## 2017-04-24 RX ADMIN — HEPARIN SODIUM 5000 UNIT(S): 5000 INJECTION INTRAVENOUS; SUBCUTANEOUS at 13:36

## 2017-04-24 RX ADMIN — Medication 50 MILLIGRAM(S): at 06:45

## 2017-04-24 RX ADMIN — HEPARIN SODIUM 5000 UNIT(S): 5000 INJECTION INTRAVENOUS; SUBCUTANEOUS at 21:20

## 2017-04-24 RX ADMIN — HEPARIN SODIUM 5000 UNIT(S): 5000 INJECTION INTRAVENOUS; SUBCUTANEOUS at 06:45

## 2017-04-24 RX ADMIN — Medication 12.5 MILLIGRAM(S): at 17:48

## 2017-04-24 RX ADMIN — Medication 50 MILLIGRAM(S): at 13:36

## 2017-04-24 RX ADMIN — Medication 50 MILLIGRAM(S): at 21:21

## 2017-04-24 RX ADMIN — Medication 250 MILLIGRAM(S): at 06:46

## 2017-04-24 RX ADMIN — Medication 166.67 MILLIGRAM(S): at 17:42

## 2017-04-25 ENCOUNTER — TRANSCRIPTION ENCOUNTER (OUTPATIENT)
Age: 67
End: 2017-04-25

## 2017-04-25 LAB
BASOPHILS # BLD AUTO: 0.1 K/UL — SIGNIFICANT CHANGE UP (ref 0–0.2)
BASOPHILS NFR BLD AUTO: 0.9 % — SIGNIFICANT CHANGE UP (ref 0–2)
BUN SERPL-MCNC: 8 MG/DL — SIGNIFICANT CHANGE UP (ref 7–23)
CALCIUM SERPL-MCNC: 8.6 MG/DL — SIGNIFICANT CHANGE UP (ref 8.4–10.5)
CHLORIDE SERPL-SCNC: 98 MMOL/L — SIGNIFICANT CHANGE UP (ref 98–107)
CO2 SERPL-SCNC: 22 MMOL/L — SIGNIFICANT CHANGE UP (ref 22–31)
CREAT SERPL-MCNC: 0.57 MG/DL — SIGNIFICANT CHANGE UP (ref 0.5–1.3)
EOSINOPHIL # BLD AUTO: 0.03 K/UL — SIGNIFICANT CHANGE UP (ref 0–0.5)
EOSINOPHIL NFR BLD AUTO: 0.3 % — SIGNIFICANT CHANGE UP (ref 0–6)
GLUCOSE SERPL-MCNC: 97 MG/DL — SIGNIFICANT CHANGE UP (ref 70–99)
HCT VFR BLD CALC: 26.7 % — LOW (ref 34.5–45)
HGB BLD-MCNC: 8.6 G/DL — LOW (ref 11.5–15.5)
IMM GRANULOCYTES NFR BLD AUTO: 3.2 % — HIGH (ref 0–1.5)
LYMPHOCYTES # BLD AUTO: 1.02 K/UL — SIGNIFICANT CHANGE UP (ref 1–3.3)
LYMPHOCYTES # BLD AUTO: 8.8 % — LOW (ref 13–44)
MAGNESIUM SERPL-MCNC: 1.7 MG/DL — SIGNIFICANT CHANGE UP (ref 1.6–2.6)
MANUAL SMEAR VERIFICATION: SIGNIFICANT CHANGE UP
MCHC RBC-ENTMCNC: 29.8 PG — SIGNIFICANT CHANGE UP (ref 27–34)
MCHC RBC-ENTMCNC: 32.2 % — SIGNIFICANT CHANGE UP (ref 32–36)
MCV RBC AUTO: 92.4 FL — SIGNIFICANT CHANGE UP (ref 80–100)
MONOCYTES # BLD AUTO: 2.04 K/UL — HIGH (ref 0–0.9)
MONOCYTES NFR BLD AUTO: 17.7 % — HIGH (ref 2–14)
NEUTROPHILS # BLD AUTO: 7.99 K/UL — HIGH (ref 1.8–7.4)
NEUTROPHILS NFR BLD AUTO: 69.1 % — SIGNIFICANT CHANGE UP (ref 43–77)
PHOSPHATE SERPL-MCNC: 3.5 MG/DL — SIGNIFICANT CHANGE UP (ref 2.5–4.5)
PLATELET # BLD AUTO: 531 K/UL — HIGH (ref 150–400)
PMV BLD: 9.3 FL — SIGNIFICANT CHANGE UP (ref 7–13)
POTASSIUM SERPL-MCNC: 4 MMOL/L — SIGNIFICANT CHANGE UP (ref 3.5–5.3)
POTASSIUM SERPL-SCNC: 4 MMOL/L — SIGNIFICANT CHANGE UP (ref 3.5–5.3)
RBC # BLD: 2.89 M/UL — LOW (ref 3.8–5.2)
RBC # FLD: 15.2 % — HIGH (ref 10.3–14.5)
SODIUM SERPL-SCNC: 138 MMOL/L — SIGNIFICANT CHANGE UP (ref 135–145)
VANCOMYCIN TROUGH SERPL-MCNC: 14.7 UG/ML — SIGNIFICANT CHANGE UP (ref 10–20)
WBC # BLD: 11.55 K/UL — HIGH (ref 3.8–10.5)
WBC # FLD AUTO: 11.55 K/UL — HIGH (ref 3.8–10.5)

## 2017-04-25 PROCEDURE — 99232 SBSQ HOSP IP/OBS MODERATE 35: CPT

## 2017-04-25 PROCEDURE — 99233 SBSQ HOSP IP/OBS HIGH 50: CPT

## 2017-04-25 RX ORDER — VANCOMYCIN HCL 1 G
1250 VIAL (EA) INTRAVENOUS
Qty: 0 | Refills: 0 | COMMUNITY
Start: 2017-04-25

## 2017-04-25 RX ORDER — METOPROLOL TARTRATE 50 MG
25 TABLET ORAL
Qty: 0 | Refills: 0 | Status: DISCONTINUED | OUTPATIENT
Start: 2017-04-25 | End: 2017-04-27

## 2017-04-25 RX ORDER — ACETAMINOPHEN 500 MG
650 TABLET ORAL ONCE
Qty: 0 | Refills: 0 | Status: COMPLETED | OUTPATIENT
Start: 2017-04-25 | End: 2017-04-25

## 2017-04-25 RX ORDER — ESCITALOPRAM OXALATE 10 MG/1
1 TABLET, FILM COATED ORAL
Qty: 0 | Refills: 0 | COMMUNITY
Start: 2017-04-25

## 2017-04-25 RX ORDER — METOPROLOL TARTRATE 50 MG
12.5 TABLET ORAL ONCE
Qty: 0 | Refills: 0 | Status: COMPLETED | OUTPATIENT
Start: 2017-04-25 | End: 2017-04-25

## 2017-04-25 RX ADMIN — Medication 650 MILLIGRAM(S): at 23:20

## 2017-04-25 RX ADMIN — Medication 166.67 MILLIGRAM(S): at 06:57

## 2017-04-25 RX ADMIN — HEPARIN SODIUM 5000 UNIT(S): 5000 INJECTION INTRAVENOUS; SUBCUTANEOUS at 21:44

## 2017-04-25 RX ADMIN — HEPARIN SODIUM 5000 UNIT(S): 5000 INJECTION INTRAVENOUS; SUBCUTANEOUS at 05:38

## 2017-04-25 RX ADMIN — Medication 12.5 MILLIGRAM(S): at 10:43

## 2017-04-25 RX ADMIN — Medication 25 MILLIGRAM(S): at 18:24

## 2017-04-25 RX ADMIN — ESCITALOPRAM OXALATE 10 MILLIGRAM(S): 10 TABLET, FILM COATED ORAL at 21:44

## 2017-04-25 RX ADMIN — Medication 50 MILLIGRAM(S): at 13:56

## 2017-04-25 RX ADMIN — Medication 650 MILLIGRAM(S): at 22:23

## 2017-04-25 RX ADMIN — Medication 166.67 MILLIGRAM(S): at 18:24

## 2017-04-25 RX ADMIN — Medication 12.5 MILLIGRAM(S): at 05:38

## 2017-04-25 RX ADMIN — ESCITALOPRAM OXALATE 10 MILLIGRAM(S): 10 TABLET, FILM COATED ORAL at 00:26

## 2017-04-25 RX ADMIN — Medication 50 MILLIGRAM(S): at 05:38

## 2017-04-25 RX ADMIN — HEPARIN SODIUM 5000 UNIT(S): 5000 INJECTION INTRAVENOUS; SUBCUTANEOUS at 13:56

## 2017-04-25 NOTE — DISCHARGE NOTE ADULT - SECONDARY DIAGNOSIS.
HTN (hypertension) Malignant neoplasm of female breast, unspecified laterality, unspecified site of breast

## 2017-04-25 NOTE — DISCHARGE NOTE ADULT - HOSPITAL COURSE
65 y/o female with h/o HTN and breast cancer, diagnosed and treated in 2003 and in remission until 12/2016, recently started on chemotherapy, last infusion 2 weeks ago presenting with fever at home, admitted with SIRS with concern for occult underlying infection, complicated by CAROL.  Blood cultures from [facility] grew GPC in chains.  Sensitivity with Strep intermedius.  Was placed on Vanco and Azactam as per ID.  Repeat BC 4/22 NTD.  Azactam d/c'd on 4/24 and patient to continue with Vancomycin until 5/5/17. CAROL resolved w/IVF.  Patient to follow up with Oncology once discharged from rehab for further treatment. Medically cleared for d/c to rehab. 67 y/o female with h/o HTN and breast cancer, diagnosed and treated in 2003 and in remission until 12/2016, recently started on chemotherapy, last infusion 2 weeks ago (adriamycin/cytoxan) presenting from [facility] for evaluation of 1 day of fevers, Fgbt=140P at home, with rigors and fatigue. Per patient, she has had fatigue for the last month since starting chemotherapy, and otherwise had been doing well until this morning. When she woke up she was rigoring and took her temperature, noted it to be 101F. She took tylenol and went to [facility] for anticipated chemotherapy. On arrival to [facility], vitals were checked, patient noted to be tachycardic and advised to proceed to ED for evaluation with concern for infection given fever this morning. Patient denies any cough, rhinorrhea, sore throat, nausea or vomiting. Endorses 1 episode of watery stool this morning, but this was after drinking a milk-based protein drink and she is lactose intolerant. Denies any sick contacts or recent travel.     On arrival to ED, vitals: 98.2, 116, 91/62, 16, 98%. Labs revealed wbc=26.4, Hgb=10.6, Cr=1.42 (baseline Cr=0.7 on Allscripts). UA and RVP were negative and CXR showed clear lungs. Patient was given 2L NS, aztreonam and vancomycin and admitted for management and evaluation of sepsis with unclear etiology, complicated by CAROL.  HOSPITAL COURSE:  Patient was treated with iv vanco and iv aztreonam for suspected infection with sepsis. Urine analysis/CXR/ RVP- negative. ID was consulted  Blood cultures from 4/19 and R port cultures from 4/19 grew Strep intermedius. ID rec d/c Aztreonam.  Patient had low vanco troug and vanco wass increased to 1250 mg q12 iv.  [facility]ance culture fro 4/22/17 were negative in blood and port with resolution alomst of leukocytosis from 21 to 11.and no more fevers since admissionID rec treat through port of vanco iv x14 days totatal today 4/25/17 day 4/14.  PT rehab.  Tranfer to rehab with vanco via piort with CBC / bmp q week to Dr Cardenas- ID.  Then out patient follow up with Oncology Dr Mcgrath.  I      67 y/o female with h/o HTN and breast cancer, diagnosed and treated in 2003 and in remission until 12/2016, recently started on chemotherapy, last infusion 2 weeks ago presenting with fever at home, admitted with SIRS with concern for occult underlying infection, complicated by CAROL.  Blood cultures from [facility] grew GPC in chains.  Sensitivity with Strep intermedius.  Was placed on Vanco and Azactam as per ID.  Repeat BC 4/22 NTD.  Azactam d/c'd on 4/24 and patient to continue with Vancomycin until 5/5/17. CAROL resolved w/IVF.  Patient to follow up with Oncology once discharged from rehab for further treatment. Medically cleared for d/c to rehab. 67 y/o female with h/o HTN and breast cancer, diagnosed and treated in 2003 and in remission until 12/2016, recently started on chemotherapy, last infusion 2 weeks ago (adriamycin/cytoxan) presenting from [facility] for evaluation of 1 day of fevers, Baga=[id number] at home, with rigors and fatigue. Per patient, she has had fatigue for the last month since starting chemotherapy, and otherwise had been doing well until this morning. When she woke up she was rigoring and took her temperature, noted it to be 101F. She took tylenol and went to [facility] for anticipated chemotherapy. On arrival to [facility], vitals were checked, patient noted to be tachycardic and advised to proceed to ED for evaluation with concern for infection given fever this morning. Patient denies any cough, rhinorrhea, sore throat, nausea or vomiting. Endorses 1 episode of watery stool this morning, but this was after drinking a milk-based protein drink and she is lactose intolerant. Denies any sick contacts or recent travel.     On arrival to ED, vitals: 98.2, 116, 91/62, 16, 98%. Labs revealed wbc=26.4, Hgb=10.6, Cr=1.42 (baseline Cr=0.7 on Allscripts). UA and RVP were negative and CXR showed clear lungs. Patient was given 2L NS, aztreonam and vancomycin and admitted for management and evaluation of sepsis with unclear etiology, complicated by CAROL.  HOSPITAL COURSE:  Patient was treated with iv vanco and iv aztreonam for suspected infection with sepsis. Urine analysis/CXR/ RVP- negative. ID was consulted  Blood cultures from 4/19 and R port cultures from 4/19 grew Strep intermedius. ID rec d/c Aztreonam.  Patient had low vanco troug and vanco wass increased to 1250 mg q12 iv.  Survellance culture fro 4/22/17 were negative in blood and port with resolution alomst of leukocytosis from 21 to 11.and no more fevers since admissionID rec treat through port of vanco iv x14 days totatal today 4/25/17 day 4/14.  PT rehab.  Tranfer to rehab with vanco via piort with CBC / bmp q week to Dr Cardenas- ID.  Then out patient follow up with Oncology Dr Mcgrath.  I      67 y/o female with h/o HTN and breast cancer, diagnosed and treated in 2003 and in remission until 12/2016, recently started on chemotherapy, last infusion 2 weeks ago presenting with fever at home, admitted with SIRS with concern for occult underlying infection, complicated by CAROL.  Blood cultures from [facility] grew GPC in chains.  Sensitivity with Strep intermedius.  Was placed on Vanco and Azactam as per ID.  Repeat BC 4/22 NTD.  Azactam d/c'd on 4/24 and patient to continue with Vancomycin until 5/5/17. CAROL resolved w/IVF.  Patient to follow up with Oncology once discharged from rehab for further treatment. Medically cleared for d/c to rehab.

## 2017-04-25 NOTE — DISCHARGE NOTE ADULT - ADDITIONAL INSTRUCTIONS
Complete antibiotics.  Follow up with Dr. Gupta within one week of discharge from Rehab.  Follow up with your PMD Dr. Rogers within one week of discharge from rehab to discuss your admission.

## 2017-04-25 NOTE — DISCHARGE NOTE ADULT - PLAN OF CARE
To be free of signs/symptoms of infection Continue with Vancomycin IV as ordered.  Last date of antibiotics 5/5/17.  Follow up with your PMD within one week of discharge from rehab to discuss your admission Continue with medication as prescribed.  Follow up with PMD within one week of discharge from rehab. Follow up with your Oncologist within one week of discharge from rehab for further treatment plan. Resolved sepsis  Continue with Vancomycin IV as ordered.  Last date of antibiotics 5/5/17.  CBC and BMP q week while on antbiotics and result to ID Dr Cardenas [phone]  Follow up with your PMD within one week of discharge from rehab to discuss your admission

## 2017-04-25 NOTE — DISCHARGE NOTE ADULT - CARE PROVIDERS DIRECT ADDRESSES
,DirectAddress_Unknown,[email or web address],DirectAddress_Unknown

## 2017-04-25 NOTE — DISCHARGE NOTE ADULT - CARE PROVIDER_API CALL
Shubham Rogers), Internal Medicine  [address]  [place] [address]  Phone: [phone]  Fax: [phone]    Severo Dunaway), Hematology; Internal Medicine; Medical Oncology  [address] [place] [address]  Phone: [phone]  Fax: [phone]

## 2017-04-25 NOTE — DISCHARGE NOTE ADULT - PATIENT PORTAL LINK FT
“You can access the Follow[employer] Patient Portal, offered by [facility], by registering with the following website: [email or web address]”

## 2017-04-25 NOTE — DISCHARGE NOTE ADULT - CARE PLAN
Principal Discharge DX:	Sepsis  Goal:	To be free of signs/symptoms of infection  Instructions for follow-up, activity and diet:	Continue with Vancomycin IV as ordered.  Last date of antibiotics 5/5/17.  Follow up with your PMD within one week of discharge from rehab to discuss your admission  Secondary Diagnosis:	HTN (hypertension)  Instructions for follow-up, activity and diet:	Continue with medication as prescribed.  Follow up with PMD within one week of discharge from rehab.  Secondary Diagnosis:	Malignant neoplasm of female breast, unspecified laterality, unspecified site of breast  Instructions for follow-up, activity and diet:	Follow up with your Oncologist within one week of discharge from rehab for further treatment plan. Principal Discharge DX:	Sepsis  Goal:	To be free of signs/symptoms of infection  Instructions for follow-up, activity and diet:	Resolved sepsis  Continue with Vancomycin IV as ordered.  Last date of antibiotics 5/5/17.  CBC and BMP q week while on antbiotics and result to ID Dr Cardenas [phone]  Follow up with your PMD within one week of discharge from rehab to discuss your admission  Secondary Diagnosis:	HTN (hypertension)  Instructions for follow-up, activity and diet:	Continue with medication as prescribed.  Follow up with PMD within one week of discharge from rehab.  Secondary Diagnosis:	Malignant neoplasm of female breast, unspecified laterality, unspecified site of breast  Instructions for follow-up, activity and diet:	Follow up with your Oncologist within one week of discharge from rehab for further treatment plan. Principal Discharge DX:	Sepsis  Goal:	To be free of signs/symptoms of infection  Instructions for follow-up, activity and diet:	Resolved sepsis  Continue with Vancomycin IV as ordered.  Last date of antibiotics 5/5/17.  CBC and BMP q week while on antbiotics and result to ID Dr Cardenas [phone]  Follow up with your PMD within one week of discharge from rehab to discuss your admission  Secondary Diagnosis:	HTN (hypertension)  Instructions for follow-up, activity and diet:	Continue with medication as prescribed.  Follow up with PMD within one week of discharge from rehab.  Secondary Diagnosis:	Malignant neoplasm of female breast, unspecified laterality, unspecified site of breast  Instructions for follow-up, activity and diet:	Follow up with your Oncologist within one week of discharge from rehab for further treatment plan. Principal Discharge DX:	Sepsis  Goal:	To be free of signs/symptoms of infection  Instructions for follow-up, activity and diet:	Resolved sepsis  Continue with Vancomycin IV as ordered.  Last date of antibiotics 5/5/17.  CBC and BMP q week while on antbiotics and result to ID Dr Cardenas [phone]  Follow up with your PMD within one week of discharge from rehab to discuss your admission  Secondary Diagnosis:	HTN (hypertension)  Instructions for follow-up, activity and diet:	Continue with medication as prescribed.  Follow up with PMD within one week of discharge from rehab.  Secondary Diagnosis:	Malignant neoplasm of female breast, unspecified laterality, unspecified site of breast  Instructions for follow-up, activity and diet:	Follow up with your Oncologist within one week of discharge from rehab for further treatment plan. Principal Discharge DX:	Sepsis  Goal:	To be free of signs/symptoms of infection  Instructions for follow-up, activity and diet:	Resolved sepsis  Continue with Vancomycin IV as ordered.  Last date of antibiotics 5/5/17.  CBC and BMP q week while on antbiotics and result to ID Dr Cardenas [phone]  Follow up with your PMD within one week of discharge from rehab to discuss your admission  Secondary Diagnosis:	HTN (hypertension)  Instructions for follow-up, activity and diet:	Continue with medication as prescribed.  Follow up with PMD within one week of discharge from rehab.  Secondary Diagnosis:	Malignant neoplasm of female breast, unspecified laterality, unspecified site of breast  Instructions for follow-up, activity and diet:	Follow up with your Oncologist within one week of discharge from rehab for further treatment plan. Principal Discharge DX:	Sepsis  Goal:	To be free of signs/symptoms of infection  Instructions for follow-up, activity and diet:	Resolved sepsis  Continue with Vancomycin IV as ordered.  Last date of antibiotics 5/5/17.  CBC and BMP q week while on antbiotics and result to ID Dr Cardenas [phone]  Follow up with your PMD within one week of discharge from rehab to discuss your admission  Secondary Diagnosis:	HTN (hypertension)  Instructions for follow-up, activity and diet:	Continue with medication as prescribed.  Follow up with PMD within one week of discharge from rehab.  Secondary Diagnosis:	Malignant neoplasm of female breast, unspecified laterality, unspecified site of breast  Instructions for follow-up, activity and diet:	Follow up with your Oncologist within one week of discharge from rehab for further treatment plan.

## 2017-04-25 NOTE — DISCHARGE NOTE ADULT - COMMUNITY RESOURCES
transfer to rehab:[facility], Address: [facility] [address], [place] [address]  Phone: [phone]. senior ride ambulette [phone]

## 2017-04-25 NOTE — DISCHARGE NOTE ADULT - REASON FOR ADMISSION
Fever and Chills secondary to bacteremia Fever and Chills secondary to bacteremia with Strep Intermedius with sepsis., In patient  with R chest port in setting of recent Chemotherapy for Breast Cancer.

## 2017-04-25 NOTE — DISCHARGE NOTE ADULT - MEDICATION SUMMARY - MEDICATIONS TO TAKE
I will START or STAY ON the medications listed below when I get home from the hospital:    valsartan 40 mg oral tablet  -- 1 tab(s) by mouth 2 times a day  -- Indication: For Blood Pressure    escitalopram 10 mg oral tablet  -- 1 tab(s) by mouth once a day (at bedtime)  -- Indication: For Depression    metoprolol tartrate 25 mg oral tablet  -- 1 tab(s) by mouth 2 times a day  -- Indication: For Blood Pressure    amLODIPine 2.5 mg oral tablet  -- 1 tab(s) by mouth once a day  -- Indication: For Blood Pressure    vancomycin  -- 1250 milligram(s) intravenous 2 times a day.  Last date of administration on 5/5/17  -- Indication: For Bacteremia I will START or STAY ON the medications listed below when I get home from the hospital:    predniSONE 10 mg oral tablet  -- 1 tab(s) by mouth once a day x 1 day  -- Indication: For gout    valsartan 40 mg oral tablet  -- 1 tab(s) by mouth 2 times a day  -- Indication: For Blood Pressure    escitalopram 10 mg oral tablet  -- 1 tab(s) by mouth once a day (at bedtime)  -- Indication: For Depression    metoprolol tartrate 25 mg oral tablet  -- 1 tab(s) by mouth 2 times a day  -- Indication: For Blood Pressure    amLODIPine 2.5 mg oral tablet  -- 1 tab(s) by mouth once a day  -- Indication: For Blood Pressure    vancomycin  -- 1250 milligram(s) intravenous 2 times a day.  Last date of administration on 5/5/17  -- Indication: For Bacteremia I will START or STAY ON the medications listed below when I get home from the hospital:    predniSONE 10 mg oral tablet  -- 1 tab(s) by mouth once a day x 1 day  -- Indication: For gout    valsartan 40 mg oral tablet  -- 1 tab(s) by mouth 2 times a day  -- Indication: For Blood Pressure    escitalopram 10 mg oral tablet  -- 1 tab(s) by mouth once a day (at bedtime)  -- Indication: For Depression    metoprolol tartrate 50 mg oral tablet  -- 1 tab(s) by mouth 2 times a day  -- Indication: For High blood pressure    amLODIPine 2.5 mg oral tablet  -- 1 tab(s) by mouth once a day  -- Indication: For Blood Pressure    vancomycin  -- 1250 milligram(s) intravenous 2 times a day.  Last date of administration on 5/5/17  -- Indication: For Bacteremia

## 2017-04-26 PROCEDURE — 99232 SBSQ HOSP IP/OBS MODERATE 35: CPT

## 2017-04-26 PROCEDURE — 99233 SBSQ HOSP IP/OBS HIGH 50: CPT

## 2017-04-26 RX ADMIN — Medication 10 MILLIGRAM(S): at 11:59

## 2017-04-26 RX ADMIN — HEPARIN SODIUM 5000 UNIT(S): 5000 INJECTION INTRAVENOUS; SUBCUTANEOUS at 06:24

## 2017-04-26 RX ADMIN — Medication 166.67 MILLIGRAM(S): at 18:54

## 2017-04-26 RX ADMIN — ESCITALOPRAM OXALATE 10 MILLIGRAM(S): 10 TABLET, FILM COATED ORAL at 21:27

## 2017-04-26 RX ADMIN — Medication 166.67 MILLIGRAM(S): at 06:24

## 2017-04-26 RX ADMIN — HEPARIN SODIUM 5000 UNIT(S): 5000 INJECTION INTRAVENOUS; SUBCUTANEOUS at 15:50

## 2017-04-26 RX ADMIN — HEPARIN SODIUM 5000 UNIT(S): 5000 INJECTION INTRAVENOUS; SUBCUTANEOUS at 21:27

## 2017-04-26 RX ADMIN — Medication 25 MILLIGRAM(S): at 18:53

## 2017-04-26 RX ADMIN — Medication 25 MILLIGRAM(S): at 06:23

## 2017-04-26 NOTE — DIETITIAN INITIAL EVALUATION ADULT. - PROBLEM SELECTOR PLAN 1
Patient with reported fever at home, found to be tachycardic, hypotensive with leukocytosis on arrival to ED. UA, CXR and RVP negative, but given patient currently on chemo and therefore immunosuppressed, remain concerned for underlying occult infection.   - s/p aztreonam and vancomycin in ED, will continue with broad empiric coverage for now  - f/u UCx and BCx   - Tylenol PRN fever  - continue with IV NS @ 75cc/hr for hydration, already received 2L boluses in ED  - zofran PRN nausea  - patient endorsing one episode loose stool at home this morning, after consuming milk, will monitor bowel habits, if further episodes of diarrhea will consider checking C. diff, stool cultures

## 2017-04-26 NOTE — DIETITIAN INITIAL EVALUATION ADULT. - PROBLEM SELECTOR PLAN 2
last G-CSF received 4/06/17, would be a little outside of window to fully explain patient's leukocytosis at present  trend   given fever/tachycardia on presentation, cannot r/o infectious etiology of yet, plan as above

## 2017-04-26 NOTE — DIETITIAN INITIAL EVALUATION ADULT. - NS AS NUTRI INTERV MEALS SNACK
Mineral - modified diet/Fat - modified diet/1. Continue current diet order, which remains appropriate at this time. 2. Monitor weights, labs, BM's, skin integrity, p.o. intake./General/healthful diet

## 2017-04-26 NOTE — DIETITIAN INITIAL EVALUATION ADULT. - PROBLEM SELECTOR PLAN 4
Recurrent breast cancer, recently restarted on chemotherapy with adriamycin/cytoxan  - follow-up oncology recs in the morning  - c/w zofran PRN nausea

## 2017-04-26 NOTE — DIETITIAN INITIAL EVALUATION ADULT. - PROBLEM SELECTOR PLAN 3
Baseline Cr appears to be ~0.7-0.8 based on labs in [employer] from 4/2017, admitted with Cr=1.42, likely prerenal in setting of SIRS with potential sepsis with occult infection, received 2L IVF in the ED  - f/u repeat Cr on AM labs  - c/w IVF hydration at 75cc/hr  - check urine electrolytes  - if Cr continuing to uptrend, will check renal US

## 2017-04-26 NOTE — DIETITIAN INITIAL EVALUATION ADULT. - OTHER INFO
Pt seen for LOS Day 7. 65y/o F with h/o HTN and breast cancer, diagnosed and treated in 2003 and in remission until 12/2016, recently started on chemotherapy. She reports good appetite and PO intake (75%). Patient denies any nausea/vomiting/diarrhea/constipation or difficulty chewing and swallowing. NKFA. Food preferences reported and implemented. She is currently on DASH diet, recommendations reviewed with pt. No questions or concerns reported at this time. Pt made aware RDN remains available.

## 2017-04-27 VITALS — SYSTOLIC BLOOD PRESSURE: 138 MMHG | DIASTOLIC BLOOD PRESSURE: 81 MMHG | HEART RATE: 104 BPM

## 2017-04-27 LAB
BACTERIA BLD CULT: SIGNIFICANT CHANGE UP
BACTERIA BLD CULT: SIGNIFICANT CHANGE UP
BUN SERPL-MCNC: 9 MG/DL — SIGNIFICANT CHANGE UP (ref 7–23)
CALCIUM SERPL-MCNC: 9 MG/DL — SIGNIFICANT CHANGE UP (ref 8.4–10.5)
CHLORIDE SERPL-SCNC: 103 MMOL/L — SIGNIFICANT CHANGE UP (ref 98–107)
CO2 SERPL-SCNC: 26 MMOL/L — SIGNIFICANT CHANGE UP (ref 22–31)
CREAT SERPL-MCNC: 0.64 MG/DL — SIGNIFICANT CHANGE UP (ref 0.5–1.3)
GLUCOSE SERPL-MCNC: 90 MG/DL — SIGNIFICANT CHANGE UP (ref 70–99)
HCT VFR BLD CALC: 27.4 % — LOW (ref 34.5–45)
HGB BLD-MCNC: 8.5 G/DL — LOW (ref 11.5–15.5)
MAGNESIUM SERPL-MCNC: 1.8 MG/DL — SIGNIFICANT CHANGE UP (ref 1.6–2.6)
MCHC RBC-ENTMCNC: 29.3 PG — SIGNIFICANT CHANGE UP (ref 27–34)
MCHC RBC-ENTMCNC: 31 % — LOW (ref 32–36)
MCV RBC AUTO: 94.5 FL — SIGNIFICANT CHANGE UP (ref 80–100)
PHOSPHATE SERPL-MCNC: 3.7 MG/DL — SIGNIFICANT CHANGE UP (ref 2.5–4.5)
PLATELET # BLD AUTO: 534 K/UL — HIGH (ref 150–400)
PMV BLD: 9.4 FL — SIGNIFICANT CHANGE UP (ref 7–13)
POTASSIUM SERPL-MCNC: 3.6 MMOL/L — SIGNIFICANT CHANGE UP (ref 3.5–5.3)
POTASSIUM SERPL-SCNC: 3.6 MMOL/L — SIGNIFICANT CHANGE UP (ref 3.5–5.3)
RBC # BLD: 2.9 M/UL — LOW (ref 3.8–5.2)
RBC # FLD: 15.6 % — HIGH (ref 10.3–14.5)
SODIUM SERPL-SCNC: 141 MMOL/L — SIGNIFICANT CHANGE UP (ref 135–145)
WBC # BLD: 10.12 K/UL — SIGNIFICANT CHANGE UP (ref 3.8–10.5)
WBC # FLD AUTO: 10.12 K/UL — SIGNIFICANT CHANGE UP (ref 3.8–10.5)

## 2017-04-27 PROCEDURE — 99232 SBSQ HOSP IP/OBS MODERATE 35: CPT

## 2017-04-27 PROCEDURE — 99239 HOSP IP/OBS DSCHRG MGMT >30: CPT

## 2017-04-27 RX ORDER — METOPROLOL TARTRATE 50 MG
1 TABLET ORAL
Qty: 0 | Refills: 0 | COMMUNITY
Start: 2017-04-27

## 2017-04-27 RX ORDER — METOPROLOL TARTRATE 50 MG
50 TABLET ORAL
Qty: 0 | Refills: 0 | Status: DISCONTINUED | OUTPATIENT
Start: 2017-04-27 | End: 2017-04-27

## 2017-04-27 RX ADMIN — Medication 166.67 MILLIGRAM(S): at 06:25

## 2017-04-27 RX ADMIN — Medication 166.67 MILLIGRAM(S): at 18:00

## 2017-04-27 RX ADMIN — Medication 10 MILLIGRAM(S): at 06:25

## 2017-04-27 RX ADMIN — Medication 25 MILLIGRAM(S): at 06:25

## 2017-04-27 RX ADMIN — HEPARIN SODIUM 5000 UNIT(S): 5000 INJECTION INTRAVENOUS; SUBCUTANEOUS at 15:27

## 2017-04-27 RX ADMIN — HEPARIN SODIUM 5000 UNIT(S): 5000 INJECTION INTRAVENOUS; SUBCUTANEOUS at 06:25

## 2017-04-27 RX ADMIN — Medication 50 MILLIGRAM(S): at 18:59

## 2017-04-27 NOTE — PROVIDER CONTACT NOTE (OTHER) - SITUATION
Pt  had been in pain. Received norvasc and lopressor earlier this evening
electronic /53, manual /50
manual /50
Pt'

## 2017-04-27 NOTE — PROVIDER CONTACT NOTE (OTHER) - ACTION/TREATMENT ORDERED:
Patient on Vancomycin Iv
Provider aware, palpated 
Will place in orders.
Given 50mg metoprolol oral, medication given as ordered, will continue to monitor
Palpated HR 90, provider aware
Recheck in an hour or so.
Rectal temperature
Tylenol 650mg PO
Provider aware
Provider aware, palpated

## 2017-04-27 NOTE — PROVIDER CONTACT NOTE (OTHER) - DATE AND TIME:
25-Apr-2017 22:49
21-Apr-2017 16:18
22-Apr-2017 22:58
20-Apr-2017 00:26
20-Apr-2017 23:27
21-Apr-2017 02:00
21-Apr-2017 02:02
21-Apr-2017 15:45
22-Apr-2017 07:10
22-Apr-2017 11:06
26-Apr-2017 23:30
27-Apr-2017 06:40
27-Apr-2017 16:30
21-Apr-2017 07:20
21-Apr-2017 23:30

## 2017-04-27 NOTE — PROVIDER CONTACT NOTE (OTHER) - NAME OF MD/NP/PA/DO NOTIFIED:
ANNIE Ulloa ,NP
ANNIE Ulloa, NP
BRENNEN Ariza NP
Chloe, NP
Dr Ramires [id number] aware
Kay Hurtado, NP
Kay NGUYEN ADS
Kia Ariza, NP
Kia Ariza, NP
SINAN Goddard, NP
ZAYRA Jordan
Kay NGUYEN ADS
ANNIE Ulloa, NP
Tika Jarvis,NP

## 2017-05-05 ENCOUNTER — OUTPATIENT (OUTPATIENT)
Dept: OUTPATIENT SERVICES | Facility: HOSPITAL | Age: 67
LOS: 1 days | Discharge: HOME | End: 2017-05-05

## 2017-05-05 DIAGNOSIS — C50.919 MALIGNANT NEOPLASM OF UNSPECIFIED SITE OF UNSPECIFIED FEMALE BREAST: ICD-10-CM

## 2017-05-05 DIAGNOSIS — Z98.890 OTHER SPECIFIED POSTPROCEDURAL STATES: Chronic | ICD-10-CM

## 2017-05-08 ENCOUNTER — APPOINTMENT (OUTPATIENT)
Dept: HEMATOLOGY ONCOLOGY | Facility: CLINIC | Age: 67
End: 2017-05-08

## 2017-05-08 ENCOUNTER — LABORATORY RESULT (OUTPATIENT)
Age: 67
End: 2017-05-08

## 2017-05-08 ENCOUNTER — RESULT REVIEW (OUTPATIENT)
Age: 67
End: 2017-05-08

## 2017-05-08 ENCOUNTER — APPOINTMENT (OUTPATIENT)
Dept: INFUSION THERAPY | Facility: HOSPITAL | Age: 67
End: 2017-05-08

## 2017-05-08 VITALS
BODY MASS INDEX: 46.78 KG/M2 | TEMPERATURE: 97.6 F | HEART RATE: 96 BPM | SYSTOLIC BLOOD PRESSURE: 160 MMHG | RESPIRATION RATE: 17 BRPM | DIASTOLIC BLOOD PRESSURE: 86 MMHG | OXYGEN SATURATION: 98 % | WEIGHT: 268.96 LBS

## 2017-05-08 LAB
BASOPHILS # BLD AUTO: 0 K/UL — SIGNIFICANT CHANGE UP (ref 0–0.2)
BASOPHILS NFR BLD AUTO: 0.5 % — SIGNIFICANT CHANGE UP (ref 0–2)
EOSINOPHIL # BLD AUTO: 0.3 K/UL — SIGNIFICANT CHANGE UP (ref 0–0.5)
EOSINOPHIL NFR BLD AUTO: 4.3 % — SIGNIFICANT CHANGE UP (ref 0–6)
HCT VFR BLD CALC: 30.3 % — LOW (ref 34.5–45)
HGB BLD-MCNC: 10.3 G/DL — LOW (ref 11.5–15.5)
LYMPHOCYTES # BLD AUTO: 0.9 K/UL — LOW (ref 1–3.3)
LYMPHOCYTES # BLD AUTO: 12.1 % — LOW (ref 13–44)
MCHC RBC-ENTMCNC: 32.9 PG — SIGNIFICANT CHANGE UP (ref 27–34)
MCHC RBC-ENTMCNC: 34.1 G/DL — SIGNIFICANT CHANGE UP (ref 32–36)
MCV RBC AUTO: 96.4 FL — SIGNIFICANT CHANGE UP (ref 80–100)
MONOCYTES # BLD AUTO: 0.8 K/UL — SIGNIFICANT CHANGE UP (ref 0–0.9)
MONOCYTES NFR BLD AUTO: 10.9 % — SIGNIFICANT CHANGE UP (ref 2–14)
NEUTROPHILS # BLD AUTO: 5.4 K/UL — SIGNIFICANT CHANGE UP (ref 1.8–7.4)
NEUTROPHILS NFR BLD AUTO: 72.3 % — SIGNIFICANT CHANGE UP (ref 43–77)
PLATELET # BLD AUTO: 389 K/UL — SIGNIFICANT CHANGE UP (ref 150–400)
RBC # BLD: 3.15 M/UL — LOW (ref 3.8–5.2)
RBC # FLD: 14.9 % — HIGH (ref 10.3–14.5)
WBC # BLD: 7.4 K/UL — SIGNIFICANT CHANGE UP (ref 3.8–10.5)
WBC # FLD AUTO: 7.4 K/UL — SIGNIFICANT CHANGE UP (ref 3.8–10.5)

## 2017-05-10 ENCOUNTER — RESULT REVIEW (OUTPATIENT)
Age: 67
End: 2017-05-10

## 2017-05-10 ENCOUNTER — APPOINTMENT (OUTPATIENT)
Dept: INFUSION THERAPY | Facility: HOSPITAL | Age: 67
End: 2017-05-10

## 2017-05-10 ENCOUNTER — LABORATORY RESULT (OUTPATIENT)
Age: 67
End: 2017-05-10

## 2017-05-10 LAB
BASOPHILS # BLD AUTO: 0 K/UL — SIGNIFICANT CHANGE UP (ref 0–0.2)
BASOPHILS NFR BLD AUTO: 0.2 % — SIGNIFICANT CHANGE UP (ref 0–2)
EOSINOPHIL # BLD AUTO: 0.5 K/UL — SIGNIFICANT CHANGE UP (ref 0–0.5)
EOSINOPHIL NFR BLD AUTO: 6.5 % — HIGH (ref 0–6)
HCT VFR BLD CALC: 31.8 % — LOW (ref 34.5–45)
HGB BLD-MCNC: 10.6 G/DL — LOW (ref 11.5–15.5)
LYMPHOCYTES # BLD AUTO: 1.1 K/UL — SIGNIFICANT CHANGE UP (ref 1–3.3)
LYMPHOCYTES # BLD AUTO: 14.2 % — SIGNIFICANT CHANGE UP (ref 13–44)
MCHC RBC-ENTMCNC: 31.9 PG — SIGNIFICANT CHANGE UP (ref 27–34)
MCHC RBC-ENTMCNC: 33.4 G/DL — SIGNIFICANT CHANGE UP (ref 32–36)
MCV RBC AUTO: 95.5 FL — SIGNIFICANT CHANGE UP (ref 80–100)
MONOCYTES # BLD AUTO: 0.9 K/UL — SIGNIFICANT CHANGE UP (ref 0–0.9)
MONOCYTES NFR BLD AUTO: 12.4 % — SIGNIFICANT CHANGE UP (ref 2–14)
NEUTROPHILS # BLD AUTO: 5 K/UL — SIGNIFICANT CHANGE UP (ref 1.8–7.4)
NEUTROPHILS NFR BLD AUTO: 66.8 % — SIGNIFICANT CHANGE UP (ref 43–77)
PLATELET # BLD AUTO: 331 K/UL — SIGNIFICANT CHANGE UP (ref 150–400)
RBC # BLD: 3.33 M/UL — LOW (ref 3.8–5.2)
RBC # FLD: 14.8 % — HIGH (ref 10.3–14.5)
WBC # BLD: 7.5 K/UL — SIGNIFICANT CHANGE UP (ref 3.8–10.5)
WBC # FLD AUTO: 7.5 K/UL — SIGNIFICANT CHANGE UP (ref 3.8–10.5)

## 2017-05-11 ENCOUNTER — APPOINTMENT (OUTPATIENT)
Dept: INFUSION THERAPY | Facility: HOSPITAL | Age: 67
End: 2017-05-11

## 2017-05-11 DIAGNOSIS — R11.2 NAUSEA WITH VOMITING, UNSPECIFIED: ICD-10-CM

## 2017-05-11 DIAGNOSIS — Z51.11 ENCOUNTER FOR ANTINEOPLASTIC CHEMOTHERAPY: ICD-10-CM

## 2017-05-12 DIAGNOSIS — Z51.89 ENCOUNTER FOR OTHER SPECIFIED AFTERCARE: ICD-10-CM

## 2017-05-17 ENCOUNTER — OTHER (OUTPATIENT)
Age: 67
End: 2017-05-17

## 2017-05-17 ENCOUNTER — INPATIENT (INPATIENT)
Facility: HOSPITAL | Age: 67
LOS: 7 days | Discharge: HOME | DRG: 871 | End: 2017-05-25
Attending: HOSPITALIST | Admitting: HOSPITALIST
Payer: MEDICARE

## 2017-05-17 ENCOUNTER — MESSAGE (OUTPATIENT)
Age: 67
End: 2017-05-17

## 2017-05-17 VITALS
SYSTOLIC BLOOD PRESSURE: 142 MMHG | HEART RATE: 130 BPM | RESPIRATION RATE: 22 BRPM | OXYGEN SATURATION: 95 % | TEMPERATURE: 103 F | DIASTOLIC BLOOD PRESSURE: 70 MMHG

## 2017-05-17 DIAGNOSIS — Z98.890 OTHER SPECIFIED POSTPROCEDURAL STATES: Chronic | ICD-10-CM

## 2017-05-17 LAB
ALBUMIN SERPL ELPH-MCNC: 3.5 G/DL — SIGNIFICANT CHANGE UP (ref 3.3–5)
ALP SERPL-CCNC: 96 U/L — SIGNIFICANT CHANGE UP (ref 40–120)
ALT FLD-CCNC: 28 U/L RC — SIGNIFICANT CHANGE UP (ref 10–45)
ANION GAP SERPL CALC-SCNC: 14 MMOL/L — SIGNIFICANT CHANGE UP (ref 5–17)
AST SERPL-CCNC: 20 U/L — SIGNIFICANT CHANGE UP (ref 10–40)
BASE EXCESS BLDV CALC-SCNC: 3 MMOL/L — HIGH (ref -2–2)
BILIRUB SERPL-MCNC: 1.3 MG/DL — HIGH (ref 0.2–1.2)
BUN SERPL-MCNC: 25 MG/DL — HIGH (ref 7–23)
CA-I SERPL-SCNC: 1.15 MMOL/L — SIGNIFICANT CHANGE UP (ref 1.12–1.3)
CALCIUM SERPL-MCNC: 8.9 MG/DL — SIGNIFICANT CHANGE UP (ref 8.4–10.5)
CHLORIDE BLDV-SCNC: 93 MMOL/L — LOW (ref 96–108)
CHLORIDE SERPL-SCNC: 91 MMOL/L — LOW (ref 96–108)
CO2 BLDV-SCNC: 29 MMOL/L — SIGNIFICANT CHANGE UP (ref 22–30)
CO2 SERPL-SCNC: 24 MMOL/L — SIGNIFICANT CHANGE UP (ref 22–31)
CREAT SERPL-MCNC: 1.1 MG/DL — SIGNIFICANT CHANGE UP (ref 0.5–1.3)
GAS PNL BLDV: 124 MMOL/L — LOW (ref 136–145)
GAS PNL BLDV: SIGNIFICANT CHANGE UP
GAS PNL BLDV: SIGNIFICANT CHANGE UP
GLUCOSE BLDV-MCNC: 132 MG/DL — HIGH (ref 70–99)
GLUCOSE SERPL-MCNC: 135 MG/DL — HIGH (ref 70–99)
HCO3 BLDV-SCNC: 27 MMOL/L — SIGNIFICANT CHANGE UP (ref 21–29)
HCT VFR BLD CALC: 27.8 % — LOW (ref 34.5–45)
HCT VFR BLDA CALC: 29 % — LOW (ref 39–50)
HGB BLD CALC-MCNC: 9.2 G/DL — LOW (ref 11.5–15.5)
HGB BLD-MCNC: 9.4 G/DL — LOW (ref 11.5–15.5)
HOROWITZ INDEX BLDV+IHG-RTO: SIGNIFICANT CHANGE UP
LACTATE BLDV-MCNC: 1.3 MMOL/L — SIGNIFICANT CHANGE UP (ref 0.7–2)
MCHC RBC-ENTMCNC: 32.8 PG — SIGNIFICANT CHANGE UP (ref 27–34)
MCHC RBC-ENTMCNC: 33.8 GM/DL — SIGNIFICANT CHANGE UP (ref 32–36)
MCV RBC AUTO: 97.1 FL — SIGNIFICANT CHANGE UP (ref 80–100)
OTHER CELLS CSF MANUAL: 4 ML/DL — LOW (ref 18–22)
PCO2 BLDV: 43 MMHG — SIGNIFICANT CHANGE UP (ref 35–50)
PH BLDV: 7.42 — SIGNIFICANT CHANGE UP (ref 7.35–7.45)
PLATELET # BLD AUTO: 54 K/UL — LOW (ref 150–400)
PO2 BLDV: 23 MMHG — LOW (ref 25–45)
POTASSIUM BLDV-SCNC: 3.8 MMOL/L — SIGNIFICANT CHANGE UP (ref 3.5–5)
POTASSIUM SERPL-MCNC: 4.1 MMOL/L — SIGNIFICANT CHANGE UP (ref 3.5–5.3)
POTASSIUM SERPL-SCNC: 4.1 MMOL/L — SIGNIFICANT CHANGE UP (ref 3.5–5.3)
PROT SERPL-MCNC: 7.2 G/DL — SIGNIFICANT CHANGE UP (ref 6–8.3)
RBC # BLD: 2.87 M/UL — LOW (ref 3.8–5.2)
RBC # FLD: 14.6 % — HIGH (ref 10.3–14.5)
SAO2 % BLDV: 29 % — LOW (ref 67–88)
SODIUM SERPL-SCNC: 129 MMOL/L — LOW (ref 135–145)
WBC # BLD: 0.2 K/UL — CRITICAL LOW (ref 3.8–10.5)
WBC # FLD AUTO: 0.2 K/UL — CRITICAL LOW (ref 3.8–10.5)

## 2017-05-17 PROCEDURE — 99285 EMERGENCY DEPT VISIT HI MDM: CPT | Mod: 25

## 2017-05-17 PROCEDURE — 93010 ELECTROCARDIOGRAM REPORT: CPT

## 2017-05-17 RX ORDER — CEFEPIME 1 G/1
2000 INJECTION, POWDER, FOR SOLUTION INTRAMUSCULAR; INTRAVENOUS ONCE
Qty: 0 | Refills: 0 | Status: COMPLETED | OUTPATIENT
Start: 2017-05-17 | End: 2017-05-18

## 2017-05-17 RX ORDER — CEFEPIME 1 G/1
INJECTION, POWDER, FOR SOLUTION INTRAMUSCULAR; INTRAVENOUS
Qty: 0 | Refills: 0 | Status: DISCONTINUED | OUTPATIENT
Start: 2017-05-18 | End: 2017-05-18

## 2017-05-17 RX ORDER — SODIUM CHLORIDE 9 MG/ML
500 INJECTION INTRAMUSCULAR; INTRAVENOUS; SUBCUTANEOUS
Qty: 0 | Refills: 0 | Status: COMPLETED | OUTPATIENT
Start: 2017-05-17 | End: 2017-05-17

## 2017-05-17 RX ORDER — CEFEPIME 1 G/1
2000 INJECTION, POWDER, FOR SOLUTION INTRAMUSCULAR; INTRAVENOUS EVERY 12 HOURS
Qty: 0 | Refills: 0 | Status: DISCONTINUED | OUTPATIENT
Start: 2017-05-18 | End: 2017-05-18

## 2017-05-17 RX ORDER — SODIUM CHLORIDE 9 MG/ML
3 INJECTION INTRAMUSCULAR; INTRAVENOUS; SUBCUTANEOUS ONCE
Qty: 0 | Refills: 0 | Status: COMPLETED | OUTPATIENT
Start: 2017-05-17 | End: 2017-05-17

## 2017-05-17 RX ADMIN — SODIUM CHLORIDE 2000 MILLILITER(S): 9 INJECTION INTRAMUSCULAR; INTRAVENOUS; SUBCUTANEOUS at 23:40

## 2017-05-17 RX ADMIN — SODIUM CHLORIDE 2000 MILLILITER(S): 9 INJECTION INTRAMUSCULAR; INTRAVENOUS; SUBCUTANEOUS at 23:10

## 2017-05-17 RX ADMIN — SODIUM CHLORIDE 2000 MILLILITER(S): 9 INJECTION INTRAMUSCULAR; INTRAVENOUS; SUBCUTANEOUS at 23:55

## 2017-05-17 RX ADMIN — SODIUM CHLORIDE 3 MILLILITER(S): 9 INJECTION INTRAMUSCULAR; INTRAVENOUS; SUBCUTANEOUS at 23:30

## 2017-05-17 NOTE — ED PROVIDER NOTE - NS ED MD SCRIBE ATTENDING SCRIBE SECTIONS
HISTORY OF PRESENT ILLNESS/PHYSICAL EXAM/HIV/REVIEW OF SYSTEMS/VITAL SIGNS( Pullset)/PAST MEDICAL/SURGICAL/SOCIAL HISTORY/INTAKE ASSESSMENT/SCREENINGS

## 2017-05-17 NOTE — ED PROVIDER NOTE - OBJECTIVE STATEMENT
66 year old female with PMHx of breast CA on chemotherapy, presents with fever of 102F that began around noon today. Also with generalized fatigue. Endorses N/V. Had similar episode during first course of chemotherapy. No recent urinary symptoms, SOB, CP. No recent travel or sick contacts. Allergic to PCN.

## 2017-05-17 NOTE — ED PROVIDER NOTE - PHYSICAL EXAMINATION
NAD, NCAT, dry mucous membranes, Trachea midline, Normal conjunctiva, CTAB, Tachycardic to 120's-130's, Normal perfusion, Soft, NTND, No rebound/guarding, No edema, No deformity of extremities, Appropriate, Cooperative, With capacity and insight, No rashes, CN grossly intact, Normal coordination, No focal motor or sensory deficits NAD, NCAT, dry mucous membranes, Trachea midline, Pale conjunctiva, CTAB, Tachycardic to 120's-130's, Normal perfusion, Soft, NTND, No rebound/guarding, No edema, No deformity of extremities, Appropriate, Cooperative, With capacity and insight, No rashes, CN grossly intact, Normal coordination, No focal motor or sensory deficits NAD, NCAT, MMM, Trachea midline, Pale conjunctiva, CTAB, Tachycardic to 120's-130's, Normal perfusion, Soft, NTND, No rebound/guarding, No edema, No deformity of extremities, Appropriate, Cooperative, With capacity and insight, No rashes, No petechia, CN grossly intact, Normal coordination, No focal motor or sensory deficits

## 2017-05-17 NOTE — ED PROVIDER NOTE - MEDICAL DECISION MAKING DETAILS
Will obtain IV x 2, cbc, cmp, +/-ce, VBG with lactate at time 0, fluid bolus @ 30cc/kg initiated upon departure from the room on initial assessment within 30 minutes of arrival, urine analysis and blood cultures x 2 are obtained prior to antibiotics =>prior to administration of antibiotics goal within 3 hours of arrival, and will repeat lactate with VBG if the original is elevated and do so within 6 hours of initial lactate.

## 2017-05-18 ENCOUNTER — APPOINTMENT (OUTPATIENT)
Dept: HEMATOLOGY ONCOLOGY | Facility: CLINIC | Age: 67
End: 2017-05-18

## 2017-05-18 DIAGNOSIS — Z29.9 ENCOUNTER FOR PROPHYLACTIC MEASURES, UNSPECIFIED: ICD-10-CM

## 2017-05-18 DIAGNOSIS — D70.9 NEUTROPENIA, UNSPECIFIED: ICD-10-CM

## 2017-05-18 DIAGNOSIS — N17.9 ACUTE KIDNEY FAILURE, UNSPECIFIED: ICD-10-CM

## 2017-05-18 DIAGNOSIS — C50.919 MALIGNANT NEOPLASM OF UNSPECIFIED SITE OF UNSPECIFIED FEMALE BREAST: ICD-10-CM

## 2017-05-18 DIAGNOSIS — E87.1 HYPO-OSMOLALITY AND HYPONATREMIA: ICD-10-CM

## 2017-05-18 DIAGNOSIS — Z71.89 OTHER SPECIFIED COUNSELING: ICD-10-CM

## 2017-05-18 LAB
ANION GAP SERPL CALC-SCNC: 11 MMOL/L — SIGNIFICANT CHANGE UP (ref 5–17)
ANION GAP SERPL CALC-SCNC: 12 MMOL/L — SIGNIFICANT CHANGE UP (ref 5–17)
APPEARANCE UR: CLEAR — SIGNIFICANT CHANGE UP
BILIRUB UR-MCNC: NEGATIVE — SIGNIFICANT CHANGE UP
BUN SERPL-MCNC: 19 MG/DL — SIGNIFICANT CHANGE UP (ref 7–23)
BUN SERPL-MCNC: 24 MG/DL — HIGH (ref 7–23)
CALCIUM SERPL-MCNC: 7.9 MG/DL — LOW (ref 8.4–10.5)
CALCIUM SERPL-MCNC: 8 MG/DL — LOW (ref 8.4–10.5)
CHLORIDE SERPL-SCNC: 95 MMOL/L — LOW (ref 96–108)
CHLORIDE SERPL-SCNC: 97 MMOL/L — SIGNIFICANT CHANGE UP (ref 96–108)
CO2 SERPL-SCNC: 22 MMOL/L — SIGNIFICANT CHANGE UP (ref 22–31)
CO2 SERPL-SCNC: 23 MMOL/L — SIGNIFICANT CHANGE UP (ref 22–31)
COLOR SPEC: YELLOW — SIGNIFICANT CHANGE UP
COMMENT - URINE: SIGNIFICANT CHANGE UP
CREAT SERPL-MCNC: 0.97 MG/DL — SIGNIFICANT CHANGE UP (ref 0.5–1.3)
CREAT SERPL-MCNC: 1.05 MG/DL — SIGNIFICANT CHANGE UP (ref 0.5–1.3)
DIFF PNL FLD: ABNORMAL
EPI CELLS # UR: SIGNIFICANT CHANGE UP /HPF
GLUCOSE SERPL-MCNC: 106 MG/DL — HIGH (ref 70–99)
GLUCOSE SERPL-MCNC: 130 MG/DL — HIGH (ref 70–99)
GLUCOSE UR QL: NEGATIVE — SIGNIFICANT CHANGE UP
GRAM STN FLD: SIGNIFICANT CHANGE UP
HCT VFR BLD CALC: 24.2 % — LOW (ref 34.5–45)
HGB BLD-MCNC: 8.3 G/DL — LOW (ref 11.5–15.5)
KETONES UR-MCNC: NEGATIVE — SIGNIFICANT CHANGE UP
LEUKOCYTE ESTERASE UR-ACNC: NEGATIVE — SIGNIFICANT CHANGE UP
MAGNESIUM SERPL-MCNC: 1.1 MG/DL — LOW (ref 1.6–2.6)
MAGNESIUM SERPL-MCNC: 1.9 MG/DL — SIGNIFICANT CHANGE UP (ref 1.6–2.6)
MCHC RBC-ENTMCNC: 32.9 PG — SIGNIFICANT CHANGE UP (ref 27–34)
MCHC RBC-ENTMCNC: 34.2 GM/DL — SIGNIFICANT CHANGE UP (ref 32–36)
MCV RBC AUTO: 96.3 FL — SIGNIFICANT CHANGE UP (ref 80–100)
NEUTROPHILS NFR BLD AUTO: SIGNIFICANT CHANGE UP % (ref 43–77)
NITRITE UR-MCNC: NEGATIVE — SIGNIFICANT CHANGE UP
OSMOLALITY SERPL: 267 MOS/KG — LOW (ref 275–300)
PH UR: 5.5 — SIGNIFICANT CHANGE UP (ref 5–8)
PHOSPHATE SERPL-MCNC: 2.2 MG/DL — LOW (ref 2.5–4.5)
PHOSPHATE SERPL-MCNC: 2.5 MG/DL — SIGNIFICANT CHANGE UP (ref 2.5–4.5)
PLATELET # BLD AUTO: 41 K/UL — LOW (ref 150–400)
POTASSIUM SERPL-MCNC: 3.5 MMOL/L — SIGNIFICANT CHANGE UP (ref 3.5–5.3)
POTASSIUM SERPL-MCNC: 3.6 MMOL/L — SIGNIFICANT CHANGE UP (ref 3.5–5.3)
POTASSIUM SERPL-SCNC: 3.5 MMOL/L — SIGNIFICANT CHANGE UP (ref 3.5–5.3)
POTASSIUM SERPL-SCNC: 3.6 MMOL/L — SIGNIFICANT CHANGE UP (ref 3.5–5.3)
PROT UR-MCNC: 30 MG/DL
RAPID RVP RESULT: SIGNIFICANT CHANGE UP
RBC # BLD: 2.51 M/UL — LOW (ref 3.8–5.2)
RBC # FLD: 14.5 % — SIGNIFICANT CHANGE UP (ref 10.3–14.5)
RBC CASTS # UR COMP ASSIST: SIGNIFICANT CHANGE UP /HPF (ref 0–2)
SODIUM SERPL-SCNC: 129 MMOL/L — LOW (ref 135–145)
SODIUM SERPL-SCNC: 131 MMOL/L — LOW (ref 135–145)
SP GR SPEC: 1.02 — SIGNIFICANT CHANGE UP (ref 1.01–1.02)
SPECIMEN SOURCE: SIGNIFICANT CHANGE UP
UROBILINOGEN FLD QL: NEGATIVE — SIGNIFICANT CHANGE UP
WBC # BLD: 0.2 K/UL — CRITICAL LOW (ref 3.8–10.5)
WBC # FLD AUTO: 0.2 K/UL — CRITICAL LOW (ref 3.8–10.5)
WBC UR QL: SIGNIFICANT CHANGE UP /HPF (ref 0–5)

## 2017-05-18 PROCEDURE — 99497 ADVNCD CARE PLAN 30 MIN: CPT | Mod: 25

## 2017-05-18 PROCEDURE — 71010: CPT | Mod: 26

## 2017-05-18 PROCEDURE — 12345: CPT | Mod: GC,NC

## 2017-05-18 PROCEDURE — 99223 1ST HOSP IP/OBS HIGH 75: CPT

## 2017-05-18 PROCEDURE — 99222 1ST HOSP IP/OBS MODERATE 55: CPT | Mod: GC

## 2017-05-18 PROCEDURE — 99223 1ST HOSP IP/OBS HIGH 75: CPT | Mod: GC

## 2017-05-18 RX ORDER — VANCOMYCIN HCL 1 G
1250 VIAL (EA) INTRAVENOUS EVERY 12 HOURS
Qty: 0 | Refills: 0 | Status: DISCONTINUED | OUTPATIENT
Start: 2017-05-18 | End: 2017-05-19

## 2017-05-18 RX ORDER — MAGNESIUM SULFATE 500 MG/ML
2 VIAL (ML) INJECTION ONCE
Qty: 0 | Refills: 0 | Status: COMPLETED | OUTPATIENT
Start: 2017-05-18 | End: 2017-05-18

## 2017-05-18 RX ORDER — ACETAMINOPHEN 500 MG
1000 TABLET ORAL ONCE
Qty: 0 | Refills: 0 | Status: COMPLETED | OUTPATIENT
Start: 2017-05-18 | End: 2017-05-18

## 2017-05-18 RX ORDER — VANCOMYCIN HCL 1 G
1000 VIAL (EA) INTRAVENOUS EVERY 12 HOURS
Qty: 0 | Refills: 0 | Status: DISCONTINUED | OUTPATIENT
Start: 2017-05-18 | End: 2017-05-18

## 2017-05-18 RX ORDER — CALCIUM GLUCONATE 100 MG/ML
1 VIAL (ML) INTRAVENOUS ONCE
Qty: 0 | Refills: 0 | Status: DISCONTINUED | OUTPATIENT
Start: 2017-05-18 | End: 2017-05-18

## 2017-05-18 RX ORDER — VANCOMYCIN HCL 1 G
VIAL (EA) INTRAVENOUS
Qty: 0 | Refills: 0 | Status: DISCONTINUED | OUTPATIENT
Start: 2017-05-18 | End: 2017-05-18

## 2017-05-18 RX ORDER — VANCOMYCIN HCL 1 G
1000 VIAL (EA) INTRAVENOUS ONCE
Qty: 0 | Refills: 0 | Status: COMPLETED | OUTPATIENT
Start: 2017-05-18 | End: 2017-05-18

## 2017-05-18 RX ORDER — ESCITALOPRAM OXALATE 10 MG/1
10 TABLET, FILM COATED ORAL DAILY
Qty: 0 | Refills: 0 | Status: DISCONTINUED | OUTPATIENT
Start: 2017-05-18 | End: 2017-05-18

## 2017-05-18 RX ORDER — ACETAMINOPHEN 500 MG
650 TABLET ORAL EVERY 6 HOURS
Qty: 0 | Refills: 0 | Status: DISCONTINUED | OUTPATIENT
Start: 2017-05-18 | End: 2017-05-25

## 2017-05-18 RX ORDER — BUDESONIDE AND FORMOTEROL FUMARATE DIHYDRATE 160; 4.5 UG/1; UG/1
1 AEROSOL RESPIRATORY (INHALATION)
Qty: 0 | Refills: 0 | Status: DISCONTINUED | OUTPATIENT
Start: 2017-05-18 | End: 2017-05-25

## 2017-05-18 RX ORDER — HEPARIN SODIUM 5000 [USP'U]/ML
5000 INJECTION INTRAVENOUS; SUBCUTANEOUS EVERY 8 HOURS
Qty: 0 | Refills: 0 | Status: DISCONTINUED | OUTPATIENT
Start: 2017-05-18 | End: 2017-05-18

## 2017-05-18 RX ORDER — CEFEPIME 1 G/1
2000 INJECTION, POWDER, FOR SOLUTION INTRAMUSCULAR; INTRAVENOUS EVERY 8 HOURS
Qty: 0 | Refills: 0 | Status: DISCONTINUED | OUTPATIENT
Start: 2017-05-18 | End: 2017-05-25

## 2017-05-18 RX ORDER — SODIUM CHLORIDE 9 MG/ML
1000 INJECTION INTRAMUSCULAR; INTRAVENOUS; SUBCUTANEOUS
Qty: 0 | Refills: 0 | Status: DISCONTINUED | OUTPATIENT
Start: 2017-05-18 | End: 2017-05-18

## 2017-05-18 RX ORDER — SODIUM CHLORIDE 9 MG/ML
1000 INJECTION INTRAMUSCULAR; INTRAVENOUS; SUBCUTANEOUS
Qty: 0 | Refills: 0 | Status: DISCONTINUED | OUTPATIENT
Start: 2017-05-18 | End: 2017-05-19

## 2017-05-18 RX ORDER — SODIUM,POTASSIUM PHOSPHATES 278-250MG
1 POWDER IN PACKET (EA) ORAL
Qty: 0 | Refills: 0 | Status: COMPLETED | OUTPATIENT
Start: 2017-05-18 | End: 2017-05-19

## 2017-05-18 RX ADMIN — CEFEPIME 100 MILLIGRAM(S): 1 INJECTION, POWDER, FOR SOLUTION INTRAMUSCULAR; INTRAVENOUS at 01:33

## 2017-05-18 RX ADMIN — Medication 400 MILLIGRAM(S): at 02:18

## 2017-05-18 RX ADMIN — Medication 166.67 MILLIGRAM(S): at 21:11

## 2017-05-18 RX ADMIN — Medication 1 TABLET(S): at 21:12

## 2017-05-18 RX ADMIN — Medication 650 MILLIGRAM(S): at 16:16

## 2017-05-18 RX ADMIN — Medication 250 MILLIGRAM(S): at 09:15

## 2017-05-18 RX ADMIN — CEFEPIME 100 MILLIGRAM(S): 1 INJECTION, POWDER, FOR SOLUTION INTRAMUSCULAR; INTRAVENOUS at 17:09

## 2017-05-18 RX ADMIN — BUDESONIDE AND FORMOTEROL FUMARATE DIHYDRATE 1 PUFF(S): 160; 4.5 AEROSOL RESPIRATORY (INHALATION) at 21:12

## 2017-05-18 RX ADMIN — SODIUM CHLORIDE 75 MILLILITER(S): 9 INJECTION INTRAMUSCULAR; INTRAVENOUS; SUBCUTANEOUS at 11:04

## 2017-05-18 RX ADMIN — Medication 3 MILLILITER(S): at 01:02

## 2017-05-18 RX ADMIN — BUDESONIDE AND FORMOTEROL FUMARATE DIHYDRATE 1 PUFF(S): 160; 4.5 AEROSOL RESPIRATORY (INHALATION) at 09:15

## 2017-05-18 RX ADMIN — CEFEPIME 100 MILLIGRAM(S): 1 INJECTION, POWDER, FOR SOLUTION INTRAMUSCULAR; INTRAVENOUS at 10:47

## 2017-05-18 RX ADMIN — SODIUM CHLORIDE 2000 MILLILITER(S): 9 INJECTION INTRAMUSCULAR; INTRAVENOUS; SUBCUTANEOUS at 01:15

## 2017-05-18 RX ADMIN — Medication 50 GRAM(S): at 08:50

## 2017-05-18 RX ADMIN — Medication 1000 MILLIGRAM(S): at 04:10

## 2017-05-18 NOTE — H&P ADULT. - LAB RESULTS AND INTERPRETATION
Labs reviewed:   CBC: WBC 0.2 with likely ANC of 0. hgb 9.4 (stble), plt 54 suggesting pancytopenia 2/2 bone marrow suppression from chemotherapy  BMP showed: hyponatremia, hypochloremia and CAROL Cr 0.6 at baseline.

## 2017-05-18 NOTE — H&P ADULT. - PROBLEM SELECTOR PLAN 3
- Na 129, along with chloride of 98. Previous electrolytes have been stable.   - s/p 2L NS boluses in the ED. Likely to be hypovolemic hyponatremia  - check urine Na, urine osm, serum osm  - continue to monitor BMP to prevent a rapid correction of the sodium - Likely to be prerenal  - Cr at baseline is 0.6-0.7. Cr on admission 1.1.   - avoid nephrotoxic agents   - Continue to monitor bmp daily

## 2017-05-18 NOTE — H&P ADULT. - NEGATIVE GASTROINTESTINAL SYMPTOMS
no diarrhea/no abdominal pain/no nausea/no constipation/no vomiting no vomiting/no abdominal pain/no diarrhea/no constipation/no nausea/no melena

## 2017-05-18 NOTE — H&P ADULT. - OTHER
Old records reviewed:  TTE April 2017: normal LV systolic function  EKG April 2017: sinus tachycardia  CXR April 2017: clear, mild spinal degenerative changes  May 10, 2017 WBC 7.5, Hb 10.6, platelet 331; creatinine 0.5-0.7

## 2017-05-18 NOTE — H&P ADULT. - PROBLEM SELECTOR PLAN 1
- WBC 0.2 on admission with thrombocytopenia likely 2/2 chemotherapy  - Given the absence of symptoms, it is likely 2/2 translocation of gut bacteria vs chemotherapy (although last chemo a week ago) vs tumor burden   - RVP, UA, CXR negative; patient does not have a Mediport   - c/w cefepime awaiting culture results (PCN allegy is a rash)

## 2017-05-18 NOTE — ED ADULT NURSE NOTE - OBJECTIVE STATEMENT
66 year old female presented to the ED via EMS complaining of fever and chills. Pt is one chemo with last treatment one week ago  AS per Pt the fever began today with increased fatigue. Code sepsis called with, IV placed, labs drawn, fluids began (see code sepsis in binder). MD ordered port accessed for culture and then be discontinues, delay of one hour to get port accessed, initial attempt failed, MD aware. Pt arrived tachycardic in the 130's, normo tensive, febrile to 102.8. Pt is A&O x 4, ambulates independently at home. Pt has allergies to penicillin. Pt has Hx of breast CA, DO NOT USE RIGHT ARM. Pt has hx of infected port. Port accessed x 2, once successfully with 2 RNs present, under sterile conditions, pt tolerated well. Port discontinued and flushed as ordered with heparin as per MD orders. Pt straight cathed at 01:10am prior to antibiotic administration, with 2 RNs present, 500 CC dark yellow urine drained - pt tolerated well. 66 year old female presented to the ED via EMS complaining of fever and chills. Pt is one chemo with last treatment one week ago  AS per Pt the fever began today with increased fatigue. Code sepsis called with, IV placed, labs drawn, fluids began (see code sepsis in binder). MD ordered port accessed for culture and then be discontinues, delay of one hour to get port accessed, initial attempt failed, MD aware. Pt arrived tachycardic in the 130's, normo tensive, febrile to 102.8. Pt is A&O x 4, ambulates independently at home. Pt has allergies to penicillin. Pt has Hx of breast CA, DO NOT USE RIGHT ARM. Pt has hx of infected port. Port accessed x 2, once successfully with 2 RNs present, under sterile conditions, pt tolerated well. Port discontinued and flushed as ordered with heparin as per MD orders. Pt straight cathed at 01:10am prior to antibiotic administration, with 2 RNs present, 500 CC dark yellow urine drained - pt tolerated well. Pt in room, mask on nurse during care neutropenic precautions in place. .

## 2017-05-18 NOTE — H&P ADULT. - HISTORY OF PRESENT ILLNESS
65 y/o female with h/o HTN and R breast cancer, s/p lumpectomy and radiation in 2003 and in remission until 12/2016, recently started on chemo (on cycle 3 of adriamycin/cytoxan, last infusion 5/10, q2wks on Wednesdays) presented to [facility] ED from home with fevers x 1 day (Tmax 101F at home) and fatigue.    She was recently hospitalized 4/19-4/25 for bacteremia 2/2 Mediport infection, now removed. Patient states feeling well on discharge. She was in her usual state of health until today when she woke up feeling more fatigued, weak, and was unable to walk up her stairs at home. She fell this afternoon because her "legs gave out". She did not hit her head. Her sister proceeded to call her oncologist. She was told to go to the ER. Her temperature at home at the time was 101F. She denies SOB, cough, sore throat, N/V/D, constipation, abdominal pain, chest pain, dysuria.   Patient lives alone and her siblings live close to home. She does not have any children. Regarding the chemotherapy, she states that her weakness has overall improved since she's been on the regimen    In the ED,  patient was febrile, tachycardic (HR 120s-130s), RR, SpO2 . UA, RVP, CXR were negative. Received cefepime x 1 and 2L NS. Patient was admitted for further management.

## 2017-05-18 NOTE — H&P ADULT. - RS GEN PE MLT RESP DETAILS PC
respirations non-labored/no rhonchi/breath sounds equal/good air movement/no rales/no wheezes/airway patent/normal/clear to auscultation bilaterally

## 2017-05-18 NOTE — H&P ADULT. - PROBLEM SELECTOR PLAN 4
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

## 2017-05-18 NOTE — H&P ADULT. - PROBLEM SELECTOR PLAN 2
- c/w IVF, likely to be prerenal  - Cr at baseline is 0.6-0.7. Cr on admission 1.1.   - avoid nephrotoxic agents   - Continue to monitor bmp daily - Na 129, along with chloride of 98. Previous electrolytes have been stable. Unclear etiology: patient however is on escitalopram outpatient which could potentially give a side effect of hyponatremia. Hold lexapro for now  - s/p 2L NS boluses in the ED. Patient appears euvolemic on exam following fluids.   - check urine Na, urine osm, serum osm to r/o SIADH.   - continue to monitor BMP to prevent a rapid correction of the sodium  - If pre-renal, can continue with IVF

## 2017-05-18 NOTE — H&P ADULT. - PROBLEM SELECTOR PLAN 6
I had a 20 minute discussion with patient regarding goals of care.  All steps of cardiopulonary resuscitation measure were explained and patient verbalized understanding.  At this point she would like to discuss with her family.  Currently she remains full code.

## 2017-05-18 NOTE — H&P ADULT. - FAMILY HISTORY
No pertinent family history in first degree relatives Sibling  Still living? Unknown  Family history of breast cancer, Age at diagnosis: Age Unknown  Family history of breast cancer, Age at diagnosis: Age Unknown

## 2017-05-19 ENCOUNTER — APPOINTMENT (OUTPATIENT)
Dept: CV DIAGNOSITCS | Facility: HOSPITAL | Age: 67
End: 2017-05-19

## 2017-05-19 ENCOUNTER — OUTPATIENT (OUTPATIENT)
Dept: OUTPATIENT SERVICES | Facility: HOSPITAL | Age: 67
LOS: 1 days | End: 2017-05-19
Payer: MEDICARE

## 2017-05-19 DIAGNOSIS — Z98.890 OTHER SPECIFIED POSTPROCEDURAL STATES: Chronic | ICD-10-CM

## 2017-05-19 DIAGNOSIS — C50.919 MALIGNANT NEOPLASM OF UNSPECIFIED SITE OF UNSPECIFIED FEMALE BREAST: ICD-10-CM

## 2017-05-19 LAB
ALBUMIN SERPL ELPH-MCNC: 2.6 G/DL — LOW (ref 3.3–5)
ALP SERPL-CCNC: 73 U/L — SIGNIFICANT CHANGE UP (ref 40–120)
ALT FLD-CCNC: 25 U/L — SIGNIFICANT CHANGE UP (ref 10–45)
ANION GAP SERPL CALC-SCNC: 13 MMOL/L — SIGNIFICANT CHANGE UP (ref 5–17)
APTT BLD: 26.2 SEC — LOW (ref 27.5–37.4)
AST SERPL-CCNC: 24 U/L — SIGNIFICANT CHANGE UP (ref 10–40)
BASOPHILS # BLD AUTO: 0 K/UL — SIGNIFICANT CHANGE UP (ref 0–0.2)
BASOPHILS NFR BLD AUTO: 0 % — SIGNIFICANT CHANGE UP (ref 0–2)
BILIRUB SERPL-MCNC: 0.6 MG/DL — SIGNIFICANT CHANGE UP (ref 0.2–1.2)
BUN SERPL-MCNC: 13 MG/DL — SIGNIFICANT CHANGE UP (ref 7–23)
CALCIUM SERPL-MCNC: 7.8 MG/DL — LOW (ref 8.4–10.5)
CHLORIDE SERPL-SCNC: 96 MMOL/L — SIGNIFICANT CHANGE UP (ref 96–108)
CO2 SERPL-SCNC: 20 MMOL/L — LOW (ref 22–31)
CREAT SERPL-MCNC: 0.75 MG/DL — SIGNIFICANT CHANGE UP (ref 0.5–1.3)
CULTURE RESULTS: NO GROWTH — SIGNIFICANT CHANGE UP
EOSINOPHIL # BLD AUTO: 0.06 K/UL — SIGNIFICANT CHANGE UP (ref 0–0.5)
EOSINOPHIL NFR BLD AUTO: 8 % — HIGH (ref 0–6)
GLUCOSE SERPL-MCNC: 106 MG/DL — HIGH (ref 70–99)
INR BLD: 1.4 RATIO — HIGH (ref 0.88–1.16)
LYMPHOCYTES # BLD AUTO: 0.2 K/UL — LOW (ref 1–3.3)
LYMPHOCYTES # BLD AUTO: 27 % — SIGNIFICANT CHANGE UP (ref 13–44)
MAGNESIUM SERPL-MCNC: 2 MG/DL — SIGNIFICANT CHANGE UP (ref 1.6–2.6)
MONOCYTES # BLD AUTO: 0.21 K/UL — SIGNIFICANT CHANGE UP (ref 0–0.9)
MONOCYTES NFR BLD AUTO: 28 % — HIGH (ref 2–14)
NEUTROPHILS # BLD AUTO: 0.28 K/UL — LOW (ref 1.8–7.4)
NEUTROPHILS NFR BLD AUTO: 37 % — LOW (ref 43–77)
PHOSPHATE SERPL-MCNC: 1.9 MG/DL — LOW (ref 2.5–4.5)
POTASSIUM SERPL-MCNC: 3.3 MMOL/L — LOW (ref 3.5–5.3)
POTASSIUM SERPL-SCNC: 3.3 MMOL/L — LOW (ref 3.5–5.3)
PROT SERPL-MCNC: 5.9 G/DL — LOW (ref 6–8.3)
PROTHROM AB SERPL-ACNC: 15.9 SEC — HIGH (ref 10–13.1)
SODIUM SERPL-SCNC: 129 MMOL/L — LOW (ref 135–145)
SPECIMEN SOURCE: SIGNIFICANT CHANGE UP

## 2017-05-19 PROCEDURE — 99232 SBSQ HOSP IP/OBS MODERATE 35: CPT

## 2017-05-19 PROCEDURE — 74177 CT ABD & PELVIS W/CONTRAST: CPT | Mod: 26

## 2017-05-19 PROCEDURE — 99233 SBSQ HOSP IP/OBS HIGH 50: CPT | Mod: GC

## 2017-05-19 RX ORDER — POTASSIUM CHLORIDE 20 MEQ
40 PACKET (EA) ORAL ONCE
Qty: 0 | Refills: 0 | Status: COMPLETED | OUTPATIENT
Start: 2017-05-19 | End: 2017-05-19

## 2017-05-19 RX ADMIN — CEFEPIME 100 MILLIGRAM(S): 1 INJECTION, POWDER, FOR SOLUTION INTRAMUSCULAR; INTRAVENOUS at 17:08

## 2017-05-19 RX ADMIN — Medication 1 TABLET(S): at 12:31

## 2017-05-19 RX ADMIN — Medication 63.75 MILLIMOLE(S): at 12:32

## 2017-05-19 RX ADMIN — Medication 1 TABLET(S): at 08:50

## 2017-05-19 RX ADMIN — BUDESONIDE AND FORMOTEROL FUMARATE DIHYDRATE 1 PUFF(S): 160; 4.5 AEROSOL RESPIRATORY (INHALATION) at 21:04

## 2017-05-19 RX ADMIN — Medication 166.67 MILLIGRAM(S): at 08:49

## 2017-05-19 RX ADMIN — CEFEPIME 100 MILLIGRAM(S): 1 INJECTION, POWDER, FOR SOLUTION INTRAMUSCULAR; INTRAVENOUS at 08:49

## 2017-05-19 RX ADMIN — Medication 40 MILLIEQUIVALENT(S): at 12:32

## 2017-05-19 RX ADMIN — CEFEPIME 100 MILLIGRAM(S): 1 INJECTION, POWDER, FOR SOLUTION INTRAMUSCULAR; INTRAVENOUS at 00:03

## 2017-05-19 RX ADMIN — SODIUM CHLORIDE 75 MILLILITER(S): 9 INJECTION INTRAMUSCULAR; INTRAVENOUS; SUBCUTANEOUS at 08:49

## 2017-05-19 RX ADMIN — BUDESONIDE AND FORMOTEROL FUMARATE DIHYDRATE 1 PUFF(S): 160; 4.5 AEROSOL RESPIRATORY (INHALATION) at 08:10

## 2017-05-20 LAB
-  AMIKACIN: SIGNIFICANT CHANGE UP
-  AZTREONAM: SIGNIFICANT CHANGE UP
-  CEFEPIME: SIGNIFICANT CHANGE UP
-  CEFTAZIDIME: SIGNIFICANT CHANGE UP
-  CIPROFLOXACIN: SIGNIFICANT CHANGE UP
-  GENTAMICIN: SIGNIFICANT CHANGE UP
-  IMIPENEM: SIGNIFICANT CHANGE UP
-  LEVOFLOXACIN: SIGNIFICANT CHANGE UP
-  MEROPENEM: SIGNIFICANT CHANGE UP
-  PIPERACILLIN/TAZOBACTAM: SIGNIFICANT CHANGE UP
-  TOBRAMYCIN: SIGNIFICANT CHANGE UP
ANION GAP SERPL CALC-SCNC: 11 MMOL/L — SIGNIFICANT CHANGE UP (ref 5–17)
APTT BLD: 40.6 SEC — HIGH (ref 27.5–37.4)
BASOPHILS # BLD AUTO: 0 K/UL — SIGNIFICANT CHANGE UP (ref 0–0.2)
BASOPHILS NFR BLD AUTO: 0 % — SIGNIFICANT CHANGE UP (ref 0–2)
BUN SERPL-MCNC: 9 MG/DL — SIGNIFICANT CHANGE UP (ref 7–23)
C DIFF GDH STL QL: NEGATIVE — SIGNIFICANT CHANGE UP
C DIFF GDH STL QL: SIGNIFICANT CHANGE UP
CALCIUM SERPL-MCNC: 8.7 MG/DL — SIGNIFICANT CHANGE UP (ref 8.4–10.5)
CHLORIDE SERPL-SCNC: 100 MMOL/L — SIGNIFICANT CHANGE UP (ref 96–108)
CO2 SERPL-SCNC: 23 MMOL/L — SIGNIFICANT CHANGE UP (ref 22–31)
CREAT SERPL-MCNC: 0.71 MG/DL — SIGNIFICANT CHANGE UP (ref 0.5–1.3)
CULTURE RESULTS: SIGNIFICANT CHANGE UP
EOSINOPHIL # BLD AUTO: 0.11 K/UL — SIGNIFICANT CHANGE UP (ref 0–0.5)
EOSINOPHIL NFR BLD AUTO: 3 % — SIGNIFICANT CHANGE UP (ref 0–6)
GLUCOSE SERPL-MCNC: 91 MG/DL — SIGNIFICANT CHANGE UP (ref 70–99)
HCT VFR BLD CALC: 21.7 % — LOW (ref 34.5–45)
HCT VFR BLD CALC: 22.6 % — LOW (ref 34.5–45)
HCT VFR BLD CALC: 24.3 % — LOW (ref 34.5–45)
HGB BLD-MCNC: 7.3 G/DL — LOW (ref 11.5–15.5)
HGB BLD-MCNC: 7.6 G/DL — LOW (ref 11.5–15.5)
HGB BLD-MCNC: 7.8 G/DL — LOW (ref 11.5–15.5)
LYMPHOCYTES # BLD AUTO: 0.18 K/UL — LOW (ref 1–3.3)
LYMPHOCYTES # BLD AUTO: 5 % — LOW (ref 13–44)
MAGNESIUM SERPL-MCNC: 1.8 MG/DL — SIGNIFICANT CHANGE UP (ref 1.6–2.6)
MCHC RBC-ENTMCNC: 30.5 PG — SIGNIFICANT CHANGE UP (ref 27–34)
MCHC RBC-ENTMCNC: 30.9 PG — SIGNIFICANT CHANGE UP (ref 27–34)
MCHC RBC-ENTMCNC: 31.8 PG — SIGNIFICANT CHANGE UP (ref 27–34)
MCHC RBC-ENTMCNC: 32 GM/DL — SIGNIFICANT CHANGE UP (ref 32–36)
MCHC RBC-ENTMCNC: 33.6 GM/DL — SIGNIFICANT CHANGE UP (ref 32–36)
MCHC RBC-ENTMCNC: 33.6 GM/DL — SIGNIFICANT CHANGE UP (ref 32–36)
MCV RBC AUTO: 90.8 FL — SIGNIFICANT CHANGE UP (ref 80–100)
MCV RBC AUTO: 91.9 FL — SIGNIFICANT CHANGE UP (ref 80–100)
MCV RBC AUTO: 99.3 FL — SIGNIFICANT CHANGE UP (ref 80–100)
METHOD TYPE: SIGNIFICANT CHANGE UP
MONOCYTES # BLD AUTO: 0.15 K/UL — SIGNIFICANT CHANGE UP (ref 0–0.9)
MONOCYTES NFR BLD AUTO: 4 % — SIGNIFICANT CHANGE UP (ref 2–14)
NEUTROPHILS # BLD AUTO: 2.95 K/UL — SIGNIFICANT CHANGE UP (ref 1.8–7.4)
NEUTROPHILS NFR BLD AUTO: 79 % — HIGH (ref 43–77)
ORGANISM # SPEC MICROSCOPIC CNT: SIGNIFICANT CHANGE UP
ORGANISM # SPEC MICROSCOPIC CNT: SIGNIFICANT CHANGE UP
PHOSPHATE SERPL-MCNC: 3.2 MG/DL — SIGNIFICANT CHANGE UP (ref 2.5–4.5)
PLATELET # BLD AUTO: 50 K/UL — LOW (ref 150–400)
PLATELET # BLD AUTO: 79 K/UL — LOW (ref 150–400)
PLATELET # BLD AUTO: 80 K/UL — LOW (ref 150–400)
POTASSIUM SERPL-MCNC: 3.9 MMOL/L — SIGNIFICANT CHANGE UP (ref 3.5–5.3)
POTASSIUM SERPL-SCNC: 3.9 MMOL/L — SIGNIFICANT CHANGE UP (ref 3.5–5.3)
RBC # BLD: 2.39 M/UL — LOW (ref 3.8–5.2)
RBC # BLD: 2.45 M/UL — LOW (ref 3.8–5.2)
RBC # BLD: 2.46 M/UL — LOW (ref 3.8–5.2)
RBC # FLD: 14.8 % — HIGH (ref 10.3–14.5)
RBC # FLD: 16.4 % — HIGH (ref 10.3–14.5)
RBC # FLD: 16.5 % — HIGH (ref 10.3–14.5)
SODIUM SERPL-SCNC: 134 MMOL/L — LOW (ref 135–145)
SPECIMEN SOURCE: SIGNIFICANT CHANGE UP
WBC # BLD: 0.75 K/UL — CRITICAL LOW (ref 3.8–10.5)
WBC # BLD: 3.64 K/UL — LOW (ref 3.8–10.5)
WBC # BLD: 4.6 K/UL — SIGNIFICANT CHANGE UP (ref 3.8–10.5)
WBC # FLD AUTO: 0.75 K/UL — CRITICAL LOW (ref 3.8–10.5)
WBC # FLD AUTO: 3.64 K/UL — LOW (ref 3.8–10.5)
WBC # FLD AUTO: 4.6 K/UL — SIGNIFICANT CHANGE UP (ref 3.8–10.5)

## 2017-05-20 PROCEDURE — 99232 SBSQ HOSP IP/OBS MODERATE 35: CPT

## 2017-05-20 PROCEDURE — 87324 CLOSTRIDIUM AG IA: CPT

## 2017-05-20 PROCEDURE — 99233 SBSQ HOSP IP/OBS HIGH 50: CPT | Mod: GC

## 2017-05-20 PROCEDURE — 93010 ELECTROCARDIOGRAM REPORT: CPT

## 2017-05-20 RX ORDER — METOPROLOL TARTRATE 50 MG
25 TABLET ORAL
Qty: 0 | Refills: 0 | Status: DISCONTINUED | OUTPATIENT
Start: 2017-05-20 | End: 2017-05-21

## 2017-05-20 RX ORDER — HEPARIN SODIUM 5000 [USP'U]/ML
INJECTION INTRAVENOUS; SUBCUTANEOUS
Qty: 25000 | Refills: 0 | Status: DISCONTINUED | OUTPATIENT
Start: 2017-05-20 | End: 2017-05-21

## 2017-05-20 RX ORDER — HEPARIN SODIUM 5000 [USP'U]/ML
5000 INJECTION INTRAVENOUS; SUBCUTANEOUS EVERY 6 HOURS
Qty: 0 | Refills: 0 | Status: DISCONTINUED | OUTPATIENT
Start: 2017-05-20 | End: 2017-05-21

## 2017-05-20 RX ORDER — SODIUM CHLORIDE 9 MG/ML
500 INJECTION INTRAMUSCULAR; INTRAVENOUS; SUBCUTANEOUS ONCE
Qty: 0 | Refills: 0 | Status: COMPLETED | OUTPATIENT
Start: 2017-05-20 | End: 2017-05-20

## 2017-05-20 RX ORDER — METOPROLOL TARTRATE 50 MG
25 TABLET ORAL
Qty: 0 | Refills: 0 | Status: DISCONTINUED | OUTPATIENT
Start: 2017-05-20 | End: 2017-05-20

## 2017-05-20 RX ORDER — HEPARIN SODIUM 5000 [USP'U]/ML
10000 INJECTION INTRAVENOUS; SUBCUTANEOUS EVERY 6 HOURS
Qty: 0 | Refills: 0 | Status: DISCONTINUED | OUTPATIENT
Start: 2017-05-20 | End: 2017-05-21

## 2017-05-20 RX ADMIN — CEFEPIME 100 MILLIGRAM(S): 1 INJECTION, POWDER, FOR SOLUTION INTRAMUSCULAR; INTRAVENOUS at 09:13

## 2017-05-20 RX ADMIN — CEFEPIME 100 MILLIGRAM(S): 1 INJECTION, POWDER, FOR SOLUTION INTRAMUSCULAR; INTRAVENOUS at 01:15

## 2017-05-20 RX ADMIN — BUDESONIDE AND FORMOTEROL FUMARATE DIHYDRATE 1 PUFF(S): 160; 4.5 AEROSOL RESPIRATORY (INHALATION) at 20:47

## 2017-05-20 RX ADMIN — Medication 25 MILLIGRAM(S): at 09:13

## 2017-05-20 RX ADMIN — CEFEPIME 100 MILLIGRAM(S): 1 INJECTION, POWDER, FOR SOLUTION INTRAMUSCULAR; INTRAVENOUS at 18:30

## 2017-05-20 RX ADMIN — HEPARIN SODIUM 2500 UNIT(S)/HR: 5000 INJECTION INTRAVENOUS; SUBCUTANEOUS at 20:27

## 2017-05-20 RX ADMIN — Medication 25 MILLIGRAM(S): at 18:41

## 2017-05-20 RX ADMIN — HEPARIN SODIUM 2200 UNIT(S)/HR: 5000 INJECTION INTRAVENOUS; SUBCUTANEOUS at 14:06

## 2017-05-20 RX ADMIN — BUDESONIDE AND FORMOTEROL FUMARATE DIHYDRATE 1 PUFF(S): 160; 4.5 AEROSOL RESPIRATORY (INHALATION) at 09:13

## 2017-05-20 RX ADMIN — SODIUM CHLORIDE 1000 MILLILITER(S): 9 INJECTION INTRAMUSCULAR; INTRAVENOUS; SUBCUTANEOUS at 08:15

## 2017-05-20 RX ADMIN — HEPARIN SODIUM 5000 UNIT(S): 5000 INJECTION INTRAVENOUS; SUBCUTANEOUS at 20:30

## 2017-05-20 NOTE — PROVIDER CONTACT NOTE (CRITICAL VALUE NOTIFICATION) - SITUATION
WBC resulted 0.2
Patient's lab work from 5/19/17 ,WBC came back 0.75,which the lab called the critical @ 0150 on 5/20/17
Pt observed with positive blood cultures: gram positive in both the aerobic anaerobic bottles

## 2017-05-20 NOTE — DISCHARGE NOTE ADULT - MEDICATION SUMMARY - MEDICATIONS TO STOP TAKING
I will STOP taking the medications listed below when I get home from the hospital:    amLODIPine 2.5 mg oral tablet  -- 1 tab(s) by mouth once a day

## 2017-05-20 NOTE — DISCHARGE NOTE ADULT - CARE PLAN
Principal Discharge DX:	Bacteremia  Goal:	Follow up with your oncologist within 1 week of discharge.  Instructions for follow-up, activity and diet:	You were treated for bacteremia (pseudomonas bacteria in your blood) with IV antibiotics. Follow up with your oncologist within 1 week of discharge.  Secondary Diagnosis:	Breast cancer  Goal:	Follow up with your oncologist within 1 week of discharge.  Instructions for follow-up, activity and diet:	You were not treated with additional chemotherapy during this admission. The oncology team was following you during your stay. Follow up with your oncologist on discharge. Principal Discharge DX:	Bacteremia  Goal:	Follow up with your oncologist within 1 week of discharge.  Instructions for follow-up, activity and diet:	You were treated for bacteremia (pseudomonas bacteria in your blood) with IV antibiotics. Follow up with your oncologist within 1 week of discharge.  Secondary Diagnosis:	Breast cancer  Goal:	Follow up with your oncologist within 1 week of discharge.  Instructions for follow-up, activity and diet:	You were not treated with additional chemotherapy during this admission. The oncology team was following you during your stay. Follow up with your oncologist on discharge.  Secondary Diagnosis:	Portal vein thrombosis  Goal:	Follow up with your oncologist within 1 week of discharge.  Instructions for follow-up, activity and diet:	A CT scan showed new portal vein thrombosis. You were treated with anticoagulation. Continue to take all medications as prescribed and follow up with your oncologist. Principal Discharge DX:	Bacteremia  Goal:	Follow up with your oncologist within 1 week of discharge.  Instructions for follow-up, activity and diet:	You were treated for bacteremia (pseudomonas bacteria in your blood) with IV antibiotics. Plan to continue cefepime until May 31. Following that, take ciprofloxacin 750 two times a day for a total of two weeks.  Follow up with your oncologist within 1 week of discharge.  Secondary Diagnosis:	Breast cancer  Goal:	Follow up with your oncologist within 1 week of discharge.  Instructions for follow-up, activity and diet:	You were not treated with additional chemotherapy during this admission. The oncology team was following you during your stay. Follow up with your oncologist on discharge.  Secondary Diagnosis:	Portal vein thrombosis  Goal:	Follow up with your oncologist within 1 week of discharge.  Instructions for follow-up, activity and diet:	A CT scan showed new portal vein thrombosis. You were treated with anticoagulation. Continue to take all medications as prescribed and follow up with your oncologist. Principal Discharge DX:	Bacteremia  Goal:	Follow up with your oncologist within 1 week of discharge.  Instructions for follow-up, activity and diet:	You were treated for bacteremia (pseudomonas bacteria in your blood) with IV antibiotics. Plan to continue cefepime until June1 . Following that, take ciprofloxacin 750 two times a day for a total of two weeks, ending Alaina 15.  Follow up with your oncologist within 1 week of discharge.  Secondary Diagnosis:	Breast cancer  Goal:	Follow up with your oncologist within 1 week of discharge.  Instructions for follow-up, activity and diet:	You were not treated with additional chemotherapy during this admission. The oncology team was following you during your stay. Follow up with your oncologist on discharge.  Secondary Diagnosis:	Portal vein thrombosis  Goal:	Follow up with your oncologist within 1 week of discharge.  Instructions for follow-up, activity and diet:	A CT scan showed new portal vein thrombosis. You were treated with anticoagulation. Continue to take all medications as prescribed and follow up with your oncologist.

## 2017-05-20 NOTE — PROVIDER CONTACT NOTE (OTHER) - ACTION/TREATMENT ORDERED:
EKG done, 500cc NS bolus given. Metoprolol 25 mg BID added to med regimen as per home meds. Will continue to monitor.

## 2017-05-20 NOTE — DISCHARGE NOTE ADULT - PATIENT PORTAL LINK FT
“You can access the [facility] Patient Portal, offered by [facility], by registering with the following website: [email or web address]”

## 2017-05-20 NOTE — DISCHARGE NOTE ADULT - HOSPITAL COURSE
HPI:  65 y/o female with h/o HTN and R breast cancer, s/p lumpectomy and radiation in 2003 and in remission until 12/2016, recently started on chemo (on cycle 3 of  adriamycin/cytoxan, last infusion 5/10, q2wks on Wednesdays) presented to [facility] ED from home with fevers x 1 day (Tmax 101F at home) and fatigue.    She was recently hospitalized 4/19-4/25 for bacteremia 2/2 Mediport infection; per patient the Mediport was not removed or exchanged. Patient was feeling well on discharge. She was in her usual state of health until day of admission when she woke up feeling more fatigued, weak, and was unable to walk up her stairs at home. She fell this afternoon because her "legs gave out". She did not hit her head. Her sister proceeded to call her oncologist. She was told to go to the ER. Her temperature at home at the time was 101F. She denies SOB, cough, sore throat, N/V/D, constipation, abdominal pain, chest pain, dysuria.   Patient lives alone and her siblings live close to home. She does not have any children. Regarding the chemotherapy, she states that her weakness has overall improved since she's been on the regimen    In the ED,  patient was febrile, tachycardic (HR 120s-130s), RR, SpO2 . UA, RVP, CXR were negative. Received cefepime x 1 and 2L NS. Patient was admitted for further management. HPI:  65 y/o female with h/o HTN and R breast cancer, s/p lumpectomy and radiation in 2003 and in remission until 12/2016, recently started on chemo (on cycle 3 of  adriamycin/cytoxan, last infusion 5/10, q2wks on Wednesdays) presented to [facility] ED from home with fevers x 1 day (Tmax 101F at home) and fatigue.    She was recently hospitalized 4/19-4/25 for bacteremia 2/2 Mediport infection; per patient the Mediport was not removed or exchanged. Patient was feeling well on discharge. She was in her usual state of health until day of admission when she woke up feeling more fatigued, weak, and was unable to walk up her stairs at home. She fell afternoon prior to admission because her "legs gave out". She did not hit her head. Her sister proceeded to call her oncologist. She was told to go to the ER. Her temperature at home at the time was 101F. She denies SOB, cough, sore throat, N/V/D, constipation, abdominal pain, chest pain, dysuria.   Patient lives alone and her siblings live close to home. She does not have any children. Regarding the chemotherapy, she states that her weakness has overall improved since she's been on the regimen    In the ED,  patient was febrile, tachycardic (HR 120s-130s), RR, SpO2 . UA, RVP, CXR were negative. Received cefepime x 1 and 2L NS. Patient was admitted for further management.     Hospital Course:    #Sepsis 2/2 Pseudomonas bactermia: pt was hypotensive and febrile in the ED with hypotension resolved after IV abx and 2L NS bolus. Two peripheral blood cultures and one port blood culture grew out pseudomonas. Pt was initially on Vancomycin and cefepime, narrowed to cefepime by ID when GNR's initially resulted on cultures. On 5/20 pt was tachycardic to 140's-170's on standing to go to the bathroom. She was given an additional 500 cc bolus and restarted on home metoprolol at half dose 25mg BID with subsequent improvement. She was completely asymptomatic, feeling well while tachycardic during this episode. Per ID, she was continued on cefepime **************.    #R breast cancer: chemotherapy was held through admission. Oncology was following and will follow outpatient.    #Hypokalemia, hyponatremia, hypophosphatemia: pt was hypokalemic to 3.3, repleted with good response. She was also hyponatremic to 129, initially improved to 131 with IV fluids. IV fluids were held, and Na fell to 129. BMP's were trended to monitor hyponatremia. By day of discharge ************. Hypophosphatemia was treated with K Phos oral tablets with meals.     #Physical therapy: pt was seen by physical therapy who recommended ******** HPI:  67 y/o female with h/o HTN and R breast cancer, s/p lumpectomy and radiation in 2003 and in remission until 12/2016, recently started on chemo (on cycle 3 of  adriamycin/cytoxan, last infusion 5/10, q2wks on Wednesdays) presented to [facility] ED from home with fevers x 1 day (Tmax 101F at home) and fatigue.    She was recently hospitalized 4/19-4/25 for bacteremia 2/2 Mediport infection; per patient the Mediport was not removed or exchanged. Patient was feeling well on discharge. She was in her usual state of health until day of admission when she woke up feeling more fatigued, weak, and was unable to walk up her stairs at home. She fell afternoon prior to admission because her "legs gave out". She did not hit her head. Her sister proceeded to call her oncologist. She was told to go to the ER. Her temperature at home at the time was 101F. She denies SOB, cough, sore throat, N/V/D, constipation, abdominal pain, chest pain, dysuria.   Patient lives alone and her siblings live close to home. She does not have any children. Regarding the chemotherapy, she states that her weakness has overall improved since she's been on the regimen    In the ED,  patient was febrile, tachycardic (HR 120s-130s), RR, SpO2 . UA, RVP, CXR were negative. Received cefepime x 1 and 2L NS. Patient was admitted for further management.     Hospital Course:    #Sepsis 2/2 Pseudomonas bactermia: pt was hypotensive and febrile in the ED with hypotension resolved after IV abx and 2L NS bolus. Two peripheral blood cultures and one port blood culture grew out pseudomonas. Pt was initially on Vancomycin and cefepime, narrowed to cefepime by ID when GNR's initially resulted on cultures. On 5/20 pt was tachycardic to 140's-170's on standing to go to the bathroom. She was given an additional 500 cc bolus and restarted on home metoprolol at half dose 25mg BID with subsequent improvement. She was completely asymptomatic, feeling well while tachycardic during this episode. Per ID, she was continued on cefepime **************.    #R breast cancer: chemotherapy was held through admission. Oncology was following and will follow outpatient.    #Hypokalemia, hyponatremia, hypophosphatemia: pt was hypokalemic to 3.3, repleted with good response. She was also hyponatremic to 129, initially improved to 131 with IV fluids. IV fluids were held, and Na fell to 129. BMP's were trended to monitor hyponatremia. By day of discharge ************. Hypophosphatemia was treated with K Phos oral tablets with meals.     #Physical therapy: pt was seen by physical therapy who recommended ******** HPI:  65 y/o female with h/o HTN and R breast cancer, s/p lumpectomy and radiation in 2003 and in remission until 12/2016, recently started on chemo (on cycle 3 of  adriamycin/cytoxan, last infusion 5/10, q2wks on Wednesdays) presented to [facility] ED from home with fevers x 1 day (Tmax 101F at home) and fatigue.    She was recently hospitalized 4/19-4/25 for bacteremia 2/2 Mediport infection; per patient the Mediport was not removed or exchanged. Patient was feeling well on discharge. She was in her usual state of health until day of admission when she woke up feeling more fatigued, weak, and was unable to walk up her stairs at home. She fell afternoon prior to admission because her "legs gave out". She did not hit her head. Her sister proceeded to call her oncologist. She was told to go to the ER. Her temperature at home at the time was 101F. She denies SOB, cough, sore throat, N/V/D, constipation, abdominal pain, chest pain, dysuria.   Patient lives alone and her siblings live close to home. She does not have any children. Regarding the chemotherapy, she states that her weakness has overall improved since she's been on the regimen    In the ED,  patient was febrile, tachycardic (HR 120s-130s), RR, SpO2 . UA, RVP, CXR were negative. Received cefepime x 1 and 2L NS. Patient was admitted for further management.     Hospital Course:    #Sepsis 2/2 Pseudomonas bactermia: pt was hypotensive and febrile in the ED with hypotension resolved after IV abx and 2L NS bolus. Two peripheral blood cultures and one port blood culture grew out pseudomonas. Pt was initially on Vancomycin and cefepime, narrowed to cefepime by ID when GNR's initially resulted on cultures. On 5/20 pt was tachycardic to 140's-170's on standing to go to the bathroom. She was given an additional 500 cc bolus and restarted on home metoprolol at half dose 25mg BID with subsequent improvement. She was completely asymptomatic, feeling well while tachycardic during this episode. Repeat blood cultures were negative x 2. Per ID, she was continued on cefepime **************.     #Portal vein thrombosis: CT abd/pelvis with contrast on 5/20/17 (ordered to look for possible abdominal source of bacteremia) was negative for abscess/infectious source but did show portal vein thrombosis: "Focal portal vein thrombosis involving the medial left and anterior right hepatic lobes." She was initially started on heparin gtt then transitioned to Lovenox 180mg daily.     #R breast cancer: chemotherapy was held through admission. Oncology was following and will follow outpatient.    #Hypokalemia, hyponatremia, hypophosphatemia: pt was hypokalemic to 3.3, repleted with good response. She was also hyponatremic to 129, initially improved to 131 with IV fluids. IV fluids were held, and Na fell to 129. BMP's were trended to monitor hyponatremia. By day of discharge ************. Hypophosphatemia was treated with K Phos oral tablets with meals.     #Physical therapy: pt was seen by physical therapy who recommended ******** HPI:  67 y/o female with h/o HTN and R breast cancer, s/p lumpectomy and radiation in 2003 and in remission until 12/2016, recently started on chemo (on cycle 3 of  adriamycin/cytoxan, last infusion 5/10, q2wks on Wednesdays) presented to [facility] ED from home with fevers x 1 day (Tmax 101F at home) and fatigue.    She was recently hospitalized 4/19-4/25 for bacteremia 2/2 Mediport infection; per patient the Mediport was not removed or exchanged. Patient was feeling well on discharge. She was in her usual state of health until day of admission when she woke up feeling more fatigued, weak, and was unable to walk up her stairs at home. She fell afternoon prior to admission because her "legs gave out". She did not hit her head. Her sister proceeded to call her oncologist. She was told to go to the ER. Her temperature at home at the time was 101F. She denies SOB, cough, sore throat, N/V/D, constipation, abdominal pain, chest pain, dysuria.   Patient lives alone and her siblings live close to home. She does not have any children. Regarding the chemotherapy, she states that her weakness has overall improved since she's been on the regimen    In the ED,  patient was febrile, tachycardic (HR 120s-130s), RR, SpO2 . UA, RVP, CXR were negative. Received cefepime x 1 and 2L NS. Patient was admitted for further management.     Hospital Course:    #Sepsis 2/2 Pseudomonas bactermia: pt was hypotensive and febrile in the ED with hypotension resolved after IV abx and 2L NS bolus. Two peripheral blood cultures and one port blood culture grew out pseudomonas. Pt was initially on Vancomycin and cefepime, narrowed to cefepime by ID when GNR's initially resulted on cultures. On 5/20 pt was tachycardic to 140's-170's on standing to go to the bathroom. She was given an additional 500 cc bolus and restarted on home metoprolol at half dose 25mg BID with subsequent improvement. She was completely asymptomatic, feeling well while tachycardic during this episode. Repeat blood cultures were negative x 2. Per ID, she was continued on cefepime until 06/01. Plan to start ciprofloxacin for two weeks.     #Portal vein thrombosis: CT abd/pelvis with contrast on 5/20/17 (ordered to look for possible abdominal source of bacteremia) was negative for abscess/infectious source but did show portal vein thrombosis: "Focal portal vein thrombosis involving the medial left and anterior right hepatic lobes." She was initially started on heparin gtt then transitioned to Lovenox 180mg daily.     #R breast cancer: chemotherapy was held through admission. Oncology was following and will follow outpatient.    #Hypokalemia, hyponatremia, hypophosphatemia: pt was hypokalemic to 3.3, repleted with good response. She was also hyponatremic to 129, initially improved to 131 with IV fluids. IV fluids were held, and Na fell to 129. BMP's were trended to monitor hyponatremia. Hypophosphatemia was treated with K Phos oral tablets with meals.     #Physical therapy: pt was seen by physical therapy who recommended home however, patient wanted to go to rehab. HPI:  65 y/o female with h/o HTN and R breast cancer, s/p lumpectomy and radiation in 2003 and in remission until 12/2016, recently started on chemo (on cycle 3 of  adriamycin/cytoxan, last infusion 5/10, q2wks on Wednesdays) presented to [facility] ED from home with fevers x 1 day (Tmax 101F at home) and fatigue.    She was recently hospitalized 4/19-4/25 for bacteremia 2/2 Mediport infection; per patient the Mediport was not removed or exchanged. Patient was feeling well on discharge. She was in her usual state of health until day of admission when she woke up feeling more fatigued, weak, and was unable to walk up her stairs at home. She fell afternoon prior to admission because her "legs gave out". She did not hit her head. Her sister proceeded to call her oncologist. She was told to go to the ER. Her temperature at home at the time was 101F. She denies SOB, cough, sore throat, N/V/D, constipation, abdominal pain, chest pain, dysuria.   Patient lives alone and her siblings live close to home. She does not have any children. Regarding the chemotherapy, she states that her weakness has overall improved since she's been on the regimen    In the ED,  patient was febrile, tachycardic (HR 120s-130s), RR, SpO2 . UA, RVP, CXR were negative. Received cefepime x 1 and 2L NS. Patient was admitted for further management.     Hospital Course:    #Sepsis 2/2 Pseudomonas bactermia: pt was hypotensive and febrile in the ED with hypotension resolved after IV abx and 2L NS bolus. Two peripheral blood cultures and one port blood culture grew out pseudomonas. Pt was initially on Vancomycin and cefepime, narrowed to cefepime by ID when GNR's initially resulted on cultures. On 5/20 pt was tachycardic to 140's-170's on standing to go to the bathroom. She was given an additional 500 cc bolus and restarted on home metoprolol at half dose 25mg BID with subsequent improvement. She was completely asymptomatic, feeling well while tachycardic during this episode. Repeat blood cultures were negative x 2. Per ID, she was continued on cefepime until 06/01. Plan to start ciprofloxacin for two weeks. PICC line was placed.     #Portal vein thrombosis: CT abd/pelvis with contrast on 5/20/17 (ordered to look for possible abdominal source of bacteremia) was negative for abscess/infectious source but did show portal vein thrombosis: "Focal portal vein thrombosis involving the medial left and anterior right hepatic lobes." She was initially started on heparin gtt then transitioned to Lovenox 180mg daily.     #R breast cancer: chemotherapy was held through admission. Oncology was following and will follow outpatient.    #Hypokalemia, hyponatremia, hypophosphatemia: pt was hypokalemic to 3.3, repleted with good response. She was also hyponatremic to 129, initially improved to 131 with IV fluids. IV fluids were held, and Na fell to 129. BMP's were trended to monitor hyponatremia. Hypophosphatemia was treated with K Phos oral tablets with meals.     #Physical therapy: pt was seen by physical therapy who recommended home however, patient wanted to go to rehab. HPI:  67 y/o female with h/o HTN and R breast cancer, s/p lumpectomy and radiation in 2003 and in remission until 12/2016, recently started on chemo (on cycle 3 of  adriamycin/cytoxan, last infusion 5/10, q2wks on Wednesdays) presented to [facility] ED from home with fevers x 1 day (Tmax 101F at home) and fatigue.    She was recently hospitalized 4/19-4/25 for bacteremia 2/2 Mediport infection; per patient the Mediport was not removed or exchanged. Patient was feeling well on discharge. She was in her usual state of health until day of admission when she woke up feeling more fatigued, weak, and was unable to walk up her stairs at home. She fell afternoon prior to admission because her "legs gave out". She did not hit her head. Her sister proceeded to call her oncologist. She was told to go to the ER. Her temperature at home at the time was 101F. She denies SOB, cough, sore throat, N/V/D, constipation, abdominal pain, chest pain, dysuria.   Patient lives alone and her siblings live close to home. She does not have any children. Regarding the chemotherapy, she states that her weakness has overall improved since she's been on the regimen    In the ED,  patient was febrile, tachycardic (HR 120s-130s), RR, SpO2 . UA, RVP, CXR were negative. Received cefepime x 1 and 2L NS. Patient was admitted for further management.     Hospital Course:    #Sepsis 2/2 Pseudomonas bactermia: pt was hypotensive and febrile in the ED with hypotension resolved after IV abx and 2L NS bolus. Two peripheral blood cultures and one port blood culture grew out pseudomonas. Pt was initially on Vancomycin and cefepime, narrowed to cefepime by ID when GNR's initially resulted on cultures. On 5/20 pt was tachycardic to 140's-170's on standing to go to the bathroom. She was given an additional 500 cc bolus and restarted on home metoprolol at half dose 25mg BID with subsequent improvement. She was completely asymptomatic, feeling well while tachycardic during this episode. Repeat blood cultures were negative x 2. Per ID, she was continued on cefepime until 06/01. Plan to start ciprofloxacin for two weeks. PICC line was placed.     #Portal vein thrombosis: CT abd/pelvis with contrast on 5/20/17 (ordered to look for possible abdominal source of bacteremia) was negative for abscess/infectious source but did show portal vein thrombosis: "Focal portal vein thrombosis involving the medial left and anterior right hepatic lobes." She was initially started on heparin gtt then transitioned to Lovenox 180mg daily.     #R breast cancer: chemotherapy was held through admission. Oncology was following and will follow outpatient.    #Hypokalemia, hyponatremia, hypophosphatemia: pt was hypokalemic to 3.3, repleted with good response. She was also hyponatremic to 129, initially improved to 131 with IV fluids. IV fluids were held, and Na fell to 129. BMP's were trended to monitor hyponatremia. Hypophosphatemia was treated with K Phos oral tablets with meals.     #Physical therapy: pt was seen by physical therapy who recommended home however, patient wanted to go to rehab.     # Tachycardia likely multifactorial - BB initially held for hypotension. Also with infection and deconditioning.  Improved once BB restarted. Patient s/p chemo may be side effect d/w onc no need for MUGA at this time

## 2017-05-20 NOTE — DISCHARGE NOTE ADULT - MEDICATION SUMMARY - MEDICATIONS TO TAKE
I will START or STAY ON the medications listed below when I get home from the hospital:    valsartan 40 mg oral tablet  -- 1 tab(s) by mouth once a day  -- Indication: For High blood pressure    enoxaparin  -- 180 milligram(s) subcutaneous once a day  -- Indication: For Portal vein thrombosis    escitalopram 10 mg oral tablet  -- 1 tab(s) by mouth once a day (at bedtime)  -- Indication: For Sleep    metoprolol tartrate 50 mg oral tablet  -- 1 tab(s) by mouth 2 times a day  -- Indication: For High Heart rate    Advair Diskus 250 mcg-50 mcg inhalation powder  -- 1 puff(s) inhaled 2 times a day  -- Indication: For Improve breathing     cefepime  -- 2000 milligram(s) intravenous every 8 hours  -- Indication: For Bacteremia    ciprofloxacin 750 mg oral tablet  -- 1 tab(s) by mouth 2 times a day from June 2-15  -- Avoid prolonged or excessive exposure to direct and/or artificial sunlight while taking this medication.  Check with your doctor before becoming pregnant.  Do not take dairy products, antacids, or iron preparations within one hour of this medication.  Finish all this medication unless otherwise directed by prescriber.  Medication should be taken with plenty of water.    -- Indication: For Bacteremia

## 2017-05-20 NOTE — PROVIDER CONTACT NOTE (OTHER) - ASSESSMENT
Pt asymptomatic, denies chest pain, SOB, palpitations. /70, temp 97.9. Pt ambulated to bathroom, instructed to sit in bed.

## 2017-05-20 NOTE — DISCHARGE NOTE ADULT - PLAN OF CARE
You were not treated with additional chemotherapy during this admission. The oncology team was following you during your stay. Follow up with your oncologist on discharge. Follow up with your oncologist within 1 week of discharge. You were treated for bacteremia (pseudomonas bacteria in your blood) with IV antibiotics. Follow up with your oncologist within 1 week of discharge. A CT scan showed new portal vein thrombosis. You were treated with anticoagulation. Continue to take all medications as prescribed and follow up with your oncologist. You were treated for bacteremia (pseudomonas bacteria in your blood) with IV antibiotics. Plan to continue cefepime until May 31. Following that, take ciprofloxacin 750 two times a day for a total of two weeks.  Follow up with your oncologist within 1 week of discharge. You were treated for bacteremia (pseudomonas bacteria in your blood) with IV antibiotics. Plan to continue cefepime until June1 . Following that, take ciprofloxacin 750 two times a day for a total of two weeks, ending Alaina 15.  Follow up with your oncologist within 1 week of discharge.

## 2017-05-21 LAB
ALBUMIN SERPL ELPH-MCNC: 2.6 G/DL — LOW (ref 3.3–5)
ALP SERPL-CCNC: 91 U/L — SIGNIFICANT CHANGE UP (ref 40–120)
ALT FLD-CCNC: 28 U/L RC — SIGNIFICANT CHANGE UP (ref 10–45)
ANION GAP SERPL CALC-SCNC: 10 MMOL/L — SIGNIFICANT CHANGE UP (ref 5–17)
APTT BLD: 108.5 SEC — HIGH (ref 27.5–37.4)
APTT BLD: 84.6 SEC — HIGH (ref 27.5–37.4)
AST SERPL-CCNC: 17 U/L — SIGNIFICANT CHANGE UP (ref 10–40)
BILIRUB SERPL-MCNC: 0.3 MG/DL — SIGNIFICANT CHANGE UP (ref 0.2–1.2)
BLD GP AB SCN SERPL QL: NEGATIVE — SIGNIFICANT CHANGE UP
BUN SERPL-MCNC: 7 MG/DL — SIGNIFICANT CHANGE UP (ref 7–23)
CALCIUM SERPL-MCNC: 8.3 MG/DL — LOW (ref 8.4–10.5)
CHLORIDE SERPL-SCNC: 99 MMOL/L — SIGNIFICANT CHANGE UP (ref 96–108)
CO2 SERPL-SCNC: 24 MMOL/L — SIGNIFICANT CHANGE UP (ref 22–31)
CREAT SERPL-MCNC: 0.64 MG/DL — SIGNIFICANT CHANGE UP (ref 0.5–1.3)
GLUCOSE SERPL-MCNC: 122 MG/DL — HIGH (ref 70–99)
HCT VFR BLD CALC: 23.7 % — LOW (ref 34.5–45)
HGB BLD-MCNC: 8 G/DL — LOW (ref 11.5–15.5)
MAGNESIUM SERPL-MCNC: 1.6 MG/DL — SIGNIFICANT CHANGE UP (ref 1.6–2.6)
MCHC RBC-ENTMCNC: 32.9 PG — SIGNIFICANT CHANGE UP (ref 27–34)
MCHC RBC-ENTMCNC: 33.5 GM/DL — SIGNIFICANT CHANGE UP (ref 32–36)
MCV RBC AUTO: 98.2 FL — SIGNIFICANT CHANGE UP (ref 80–100)
PHOSPHATE SERPL-MCNC: 2.3 MG/DL — LOW (ref 2.5–4.5)
PLATELET # BLD AUTO: 98 K/UL — LOW (ref 150–400)
POTASSIUM SERPL-MCNC: 3.6 MMOL/L — SIGNIFICANT CHANGE UP (ref 3.5–5.3)
POTASSIUM SERPL-SCNC: 3.6 MMOL/L — SIGNIFICANT CHANGE UP (ref 3.5–5.3)
PROT SERPL-MCNC: 5.7 G/DL — LOW (ref 6–8.3)
RBC # BLD: 2.42 M/UL — LOW (ref 3.8–5.2)
RBC # FLD: 14.9 % — HIGH (ref 10.3–14.5)
RH IG SCN BLD-IMP: NEGATIVE — SIGNIFICANT CHANGE UP
SODIUM SERPL-SCNC: 133 MMOL/L — LOW (ref 135–145)
WBC # BLD: 7.6 K/UL — SIGNIFICANT CHANGE UP (ref 3.8–10.5)
WBC # FLD AUTO: 7.6 K/UL — SIGNIFICANT CHANGE UP (ref 3.8–10.5)

## 2017-05-21 PROCEDURE — 93010 ELECTROCARDIOGRAM REPORT: CPT

## 2017-05-21 PROCEDURE — 99233 SBSQ HOSP IP/OBS HIGH 50: CPT | Mod: GC

## 2017-05-21 PROCEDURE — 99232 SBSQ HOSP IP/OBS MODERATE 35: CPT

## 2017-05-21 RX ORDER — ENOXAPARIN SODIUM 100 MG/ML
180 INJECTION SUBCUTANEOUS DAILY
Qty: 0 | Refills: 0 | Status: DISCONTINUED | OUTPATIENT
Start: 2017-05-21 | End: 2017-05-25

## 2017-05-21 RX ORDER — METOPROLOL TARTRATE 50 MG
25 TABLET ORAL ONCE
Qty: 0 | Refills: 0 | Status: COMPLETED | OUTPATIENT
Start: 2017-05-21 | End: 2017-05-21

## 2017-05-21 RX ORDER — METOPROLOL TARTRATE 50 MG
50 TABLET ORAL
Qty: 0 | Refills: 0 | Status: DISCONTINUED | OUTPATIENT
Start: 2017-05-21 | End: 2017-05-21

## 2017-05-21 RX ORDER — ENOXAPARIN SODIUM 100 MG/ML
182 INJECTION SUBCUTANEOUS DAILY
Qty: 0 | Refills: 0 | Status: DISCONTINUED | OUTPATIENT
Start: 2017-05-21 | End: 2017-05-21

## 2017-05-21 RX ORDER — SODIUM CHLORIDE 9 MG/ML
1000 INJECTION INTRAMUSCULAR; INTRAVENOUS; SUBCUTANEOUS
Qty: 0 | Refills: 0 | Status: DISCONTINUED | OUTPATIENT
Start: 2017-05-21 | End: 2017-05-23

## 2017-05-21 RX ORDER — SODIUM CHLORIDE 9 MG/ML
500 INJECTION INTRAMUSCULAR; INTRAVENOUS; SUBCUTANEOUS ONCE
Qty: 0 | Refills: 0 | Status: COMPLETED | OUTPATIENT
Start: 2017-05-21 | End: 2017-05-21

## 2017-05-21 RX ADMIN — HEPARIN SODIUM 2200 UNIT(S)/HR: 5000 INJECTION INTRAVENOUS; SUBCUTANEOUS at 10:44

## 2017-05-21 RX ADMIN — CEFEPIME 100 MILLIGRAM(S): 1 INJECTION, POWDER, FOR SOLUTION INTRAMUSCULAR; INTRAVENOUS at 02:38

## 2017-05-21 RX ADMIN — ENOXAPARIN SODIUM 180 MILLIGRAM(S): 100 INJECTION SUBCUTANEOUS at 11:46

## 2017-05-21 RX ADMIN — HEPARIN SODIUM 2200 UNIT(S)/HR: 5000 INJECTION INTRAVENOUS; SUBCUTANEOUS at 04:13

## 2017-05-21 RX ADMIN — BUDESONIDE AND FORMOTEROL FUMARATE DIHYDRATE 1 PUFF(S): 160; 4.5 AEROSOL RESPIRATORY (INHALATION) at 21:23

## 2017-05-21 RX ADMIN — Medication 50 MILLIGRAM(S): at 17:19

## 2017-05-21 RX ADMIN — BUDESONIDE AND FORMOTEROL FUMARATE DIHYDRATE 1 PUFF(S): 160; 4.5 AEROSOL RESPIRATORY (INHALATION) at 09:45

## 2017-05-21 RX ADMIN — CEFEPIME 100 MILLIGRAM(S): 1 INJECTION, POWDER, FOR SOLUTION INTRAMUSCULAR; INTRAVENOUS at 17:19

## 2017-05-21 RX ADMIN — CEFEPIME 100 MILLIGRAM(S): 1 INJECTION, POWDER, FOR SOLUTION INTRAMUSCULAR; INTRAVENOUS at 09:45

## 2017-05-21 RX ADMIN — SODIUM CHLORIDE 1000 MILLILITER(S): 9 INJECTION INTRAMUSCULAR; INTRAVENOUS; SUBCUTANEOUS at 21:22

## 2017-05-21 RX ADMIN — Medication 25 MILLIGRAM(S): at 07:26

## 2017-05-21 RX ADMIN — Medication 25 MILLIGRAM(S): at 06:39

## 2017-05-21 NOTE — PHYSICAL THERAPY INITIAL EVALUATION ADULT - DISCHARGE DISPOSITION, PT EVAL
home w/ assist/home w/ home PT/anticipate Home with Home PT for strengthening, bed mob, transfer, gait and balance training, home with assist as needed/home TBD

## 2017-05-21 NOTE — PHYSICAL THERAPY INITIAL EVALUATION ADULT - PERTINENT HX OF CURRENT PROBLEM, REHAB EVAL
66yoF with recent admission for bacteremia 2/2 Mediport infection, removed. p/w neutropenic fever and fatigue x1 day, s/p fall, 5/19/17 CT ABD, focal portal vein thrombosis involving the medial L and anterior R hepatic lobes. 5/17/17 ECG, tachy

## 2017-05-22 LAB
ALBUMIN SERPL ELPH-MCNC: 2.3 G/DL — LOW (ref 3.3–5)
ALP SERPL-CCNC: 128 U/L — HIGH (ref 40–120)
ALT FLD-CCNC: 29 U/L — SIGNIFICANT CHANGE UP (ref 10–45)
ANION GAP SERPL CALC-SCNC: 15 MMOL/L — SIGNIFICANT CHANGE UP (ref 5–17)
APTT BLD: 28.4 SEC — SIGNIFICANT CHANGE UP (ref 27.5–37.4)
AST SERPL-CCNC: 19 U/L — SIGNIFICANT CHANGE UP (ref 10–40)
BASOPHILS # BLD AUTO: 0.17 K/UL — SIGNIFICANT CHANGE UP (ref 0–0.2)
BASOPHILS NFR BLD AUTO: 1.8 % — SIGNIFICANT CHANGE UP (ref 0–2)
BILIRUB SERPL-MCNC: 0.4 MG/DL — SIGNIFICANT CHANGE UP (ref 0.2–1.2)
BLD GP AB SCN SERPL QL: NEGATIVE — SIGNIFICANT CHANGE UP
BUN SERPL-MCNC: 9 MG/DL — SIGNIFICANT CHANGE UP (ref 7–23)
CALCIUM SERPL-MCNC: 8.3 MG/DL — LOW (ref 8.4–10.5)
CHLORIDE SERPL-SCNC: 103 MMOL/L — SIGNIFICANT CHANGE UP (ref 96–108)
CO2 SERPL-SCNC: 20 MMOL/L — LOW (ref 22–31)
CREAT SERPL-MCNC: 0.73 MG/DL — SIGNIFICANT CHANGE UP (ref 0.5–1.3)
EOSINOPHIL # BLD AUTO: 0 K/UL — SIGNIFICANT CHANGE UP (ref 0–0.5)
EOSINOPHIL NFR BLD AUTO: 0 % — SIGNIFICANT CHANGE UP (ref 0–6)
GLUCOSE SERPL-MCNC: 102 MG/DL — HIGH (ref 70–99)
HCT VFR BLD CALC: 21.1 % — LOW (ref 34.5–45)
HCT VFR BLD CALC: 25.1 % — LOW (ref 34.5–45)
HGB BLD-MCNC: 7.1 G/DL — LOW (ref 11.5–15.5)
HGB BLD-MCNC: 8.3 G/DL — LOW (ref 11.5–15.5)
INR BLD: 1.49 RATIO — HIGH (ref 0.88–1.16)
LYMPHOCYTES # BLD AUTO: 0.52 K/UL — LOW (ref 1–3.3)
LYMPHOCYTES # BLD AUTO: 5.4 % — LOW (ref 13–44)
MAGNESIUM SERPL-MCNC: 1.5 MG/DL — LOW (ref 1.6–2.6)
MCHC RBC-ENTMCNC: 31.3 PG — SIGNIFICANT CHANGE UP (ref 27–34)
MCHC RBC-ENTMCNC: 33.1 GM/DL — SIGNIFICANT CHANGE UP (ref 32–36)
MCHC RBC-ENTMCNC: 33.2 PG — SIGNIFICANT CHANGE UP (ref 27–34)
MCHC RBC-ENTMCNC: 33.6 GM/DL — SIGNIFICANT CHANGE UP (ref 32–36)
MCV RBC AUTO: 100 FL — SIGNIFICANT CHANGE UP (ref 80–100)
MCV RBC AUTO: 93 FL — SIGNIFICANT CHANGE UP (ref 80–100)
MONOCYTES # BLD AUTO: 0.95 K/UL — HIGH (ref 0–0.9)
MONOCYTES NFR BLD AUTO: 9.9 % — SIGNIFICANT CHANGE UP (ref 2–14)
NEUTROPHILS # BLD AUTO: 7.6 K/UL — HIGH (ref 1.8–7.4)
NEUTROPHILS NFR BLD AUTO: 74.8 % — SIGNIFICANT CHANGE UP (ref 43–77)
PHOSPHATE SERPL-MCNC: 2.9 MG/DL — SIGNIFICANT CHANGE UP (ref 2.5–4.5)
PLATELET # BLD AUTO: 101 K/UL — LOW (ref 150–400)
PLATELET # BLD AUTO: 121 K/UL — LOW (ref 150–400)
POTASSIUM SERPL-MCNC: 3.5 MMOL/L — SIGNIFICANT CHANGE UP (ref 3.5–5.3)
POTASSIUM SERPL-SCNC: 3.5 MMOL/L — SIGNIFICANT CHANGE UP (ref 3.5–5.3)
PROT SERPL-MCNC: 5.7 G/DL — LOW (ref 6–8.3)
PROTHROM AB SERPL-ACNC: 17 SEC — HIGH (ref 10–13.1)
RBC # BLD: 2.27 M/UL — LOW (ref 3.8–5.2)
RBC # BLD: 2.5 M/UL — LOW (ref 3.8–5.2)
RBC # FLD: 15.3 % — HIGH (ref 10.3–14.5)
RBC # FLD: 17.1 % — HIGH (ref 10.3–14.5)
RH IG SCN BLD-IMP: NEGATIVE — SIGNIFICANT CHANGE UP
SODIUM SERPL-SCNC: 138 MMOL/L — SIGNIFICANT CHANGE UP (ref 135–145)
WBC # BLD: 10 K/UL — SIGNIFICANT CHANGE UP (ref 3.8–10.5)
WBC # BLD: 9.58 K/UL — SIGNIFICANT CHANGE UP (ref 3.8–10.5)
WBC # FLD AUTO: 10 K/UL — SIGNIFICANT CHANGE UP (ref 3.8–10.5)
WBC # FLD AUTO: 9.58 K/UL — SIGNIFICANT CHANGE UP (ref 3.8–10.5)

## 2017-05-22 PROCEDURE — 99232 SBSQ HOSP IP/OBS MODERATE 35: CPT

## 2017-05-22 PROCEDURE — 99233 SBSQ HOSP IP/OBS HIGH 50: CPT

## 2017-05-22 PROCEDURE — 99233 SBSQ HOSP IP/OBS HIGH 50: CPT | Mod: GC

## 2017-05-22 RX ORDER — MAGNESIUM SULFATE 500 MG/ML
1 VIAL (ML) INJECTION ONCE
Qty: 0 | Refills: 0 | Status: COMPLETED | OUTPATIENT
Start: 2017-05-22 | End: 2017-05-22

## 2017-05-22 RX ORDER — POTASSIUM CHLORIDE 20 MEQ
40 PACKET (EA) ORAL ONCE
Qty: 0 | Refills: 0 | Status: COMPLETED | OUTPATIENT
Start: 2017-05-22 | End: 2017-05-22

## 2017-05-22 RX ADMIN — Medication 40 MILLIEQUIVALENT(S): at 13:05

## 2017-05-22 RX ADMIN — BUDESONIDE AND FORMOTEROL FUMARATE DIHYDRATE 1 PUFF(S): 160; 4.5 AEROSOL RESPIRATORY (INHALATION) at 21:12

## 2017-05-22 RX ADMIN — CEFEPIME 100 MILLIGRAM(S): 1 INJECTION, POWDER, FOR SOLUTION INTRAMUSCULAR; INTRAVENOUS at 01:52

## 2017-05-22 RX ADMIN — BUDESONIDE AND FORMOTEROL FUMARATE DIHYDRATE 1 PUFF(S): 160; 4.5 AEROSOL RESPIRATORY (INHALATION) at 10:45

## 2017-05-22 RX ADMIN — CEFEPIME 100 MILLIGRAM(S): 1 INJECTION, POWDER, FOR SOLUTION INTRAMUSCULAR; INTRAVENOUS at 10:44

## 2017-05-22 RX ADMIN — Medication 100 GRAM(S): at 10:44

## 2017-05-22 RX ADMIN — CEFEPIME 100 MILLIGRAM(S): 1 INJECTION, POWDER, FOR SOLUTION INTRAMUSCULAR; INTRAVENOUS at 18:17

## 2017-05-22 RX ADMIN — ENOXAPARIN SODIUM 180 MILLIGRAM(S): 100 INJECTION SUBCUTANEOUS at 13:05

## 2017-05-22 RX ADMIN — SODIUM CHLORIDE 500 MILLILITER(S): 9 INJECTION INTRAMUSCULAR; INTRAVENOUS; SUBCUTANEOUS at 01:51

## 2017-05-22 NOTE — PROVIDER CONTACT NOTE (OTHER) - ACTION/TREATMENT ORDERED:
MD Sanchez was present and order IVF NS 0.9% 500ml bolus. Recheck V/S in 30 min. Will continue monitor

## 2017-05-22 NOTE — PROVIDER CONTACT NOTE (OTHER) - ASSESSMENT
Pt. is AOX4, V/S B/P 110/67 . on cardiac monitor 150 sinus rhythm Res. 17 O2 sat 95% room air Temp 100.3. Pt. denies pain or discomfort. skin is intact.

## 2017-05-23 LAB
ALBUMIN SERPL ELPH-MCNC: 2.3 G/DL — LOW (ref 3.3–5)
ALP SERPL-CCNC: 115 U/L — SIGNIFICANT CHANGE UP (ref 40–120)
ALT FLD-CCNC: 36 U/L — SIGNIFICANT CHANGE UP (ref 10–45)
ANION GAP SERPL CALC-SCNC: 15 MMOL/L — SIGNIFICANT CHANGE UP (ref 5–17)
AST SERPL-CCNC: 32 U/L — SIGNIFICANT CHANGE UP (ref 10–40)
BILIRUB SERPL-MCNC: 0.3 MG/DL — SIGNIFICANT CHANGE UP (ref 0.2–1.2)
BUN SERPL-MCNC: 8 MG/DL — SIGNIFICANT CHANGE UP (ref 7–23)
CALCIUM SERPL-MCNC: 8.2 MG/DL — LOW (ref 8.4–10.5)
CHLORIDE SERPL-SCNC: 100 MMOL/L — SIGNIFICANT CHANGE UP (ref 96–108)
CO2 SERPL-SCNC: 22 MMOL/L — SIGNIFICANT CHANGE UP (ref 22–31)
CREAT SERPL-MCNC: 0.65 MG/DL — SIGNIFICANT CHANGE UP (ref 0.5–1.3)
GLUCOSE SERPL-MCNC: 104 MG/DL — HIGH (ref 70–99)
HCT VFR BLD CALC: 22.3 % — LOW (ref 34.5–45)
HCT VFR BLD CALC: 22.9 % — LOW (ref 34.5–45)
HGB BLD-MCNC: 7.2 G/DL — LOW (ref 11.5–15.5)
HGB BLD-MCNC: 8.3 G/DL — LOW (ref 11.5–15.5)
MAGNESIUM SERPL-MCNC: 1.9 MG/DL — SIGNIFICANT CHANGE UP (ref 1.6–2.6)
MCHC RBC-ENTMCNC: 31.3 PG — SIGNIFICANT CHANGE UP (ref 27–34)
MCHC RBC-ENTMCNC: 32.3 GM/DL — SIGNIFICANT CHANGE UP (ref 32–36)
MCHC RBC-ENTMCNC: 34.9 PG — HIGH (ref 27–34)
MCHC RBC-ENTMCNC: 36.2 GM/DL — HIGH (ref 32–36)
MCV RBC AUTO: 96.5 FL — SIGNIFICANT CHANGE UP (ref 80–100)
MCV RBC AUTO: 97 FL — SIGNIFICANT CHANGE UP (ref 80–100)
PHOSPHATE SERPL-MCNC: 2.6 MG/DL — SIGNIFICANT CHANGE UP (ref 2.5–4.5)
PLATELET # BLD AUTO: 166 K/UL — SIGNIFICANT CHANGE UP (ref 150–400)
PLATELET # BLD AUTO: 177 K/UL — SIGNIFICANT CHANGE UP (ref 150–400)
POTASSIUM SERPL-MCNC: 4.1 MMOL/L — SIGNIFICANT CHANGE UP (ref 3.5–5.3)
POTASSIUM SERPL-SCNC: 4.1 MMOL/L — SIGNIFICANT CHANGE UP (ref 3.5–5.3)
PROT SERPL-MCNC: 5.8 G/DL — LOW (ref 6–8.3)
RBC # BLD: 2.3 M/UL — LOW (ref 3.8–5.2)
RBC # BLD: 2.37 M/UL — LOW (ref 3.8–5.2)
RBC # FLD: 15.2 % — HIGH (ref 10.3–14.5)
RBC # FLD: 17.6 % — HIGH (ref 10.3–14.5)
SODIUM SERPL-SCNC: 137 MMOL/L — SIGNIFICANT CHANGE UP (ref 135–145)
WBC # BLD: 11.51 K/UL — HIGH (ref 3.8–10.5)
WBC # BLD: 14.2 K/UL — HIGH (ref 3.8–10.5)
WBC # FLD AUTO: 11.51 K/UL — HIGH (ref 3.8–10.5)
WBC # FLD AUTO: 14.2 K/UL — HIGH (ref 3.8–10.5)

## 2017-05-23 PROCEDURE — 99233 SBSQ HOSP IP/OBS HIGH 50: CPT | Mod: GC

## 2017-05-23 PROCEDURE — 99232 SBSQ HOSP IP/OBS MODERATE 35: CPT

## 2017-05-23 RX ORDER — METOPROLOL TARTRATE 50 MG
50 TABLET ORAL
Qty: 0 | Refills: 0 | Status: DISCONTINUED | OUTPATIENT
Start: 2017-05-23 | End: 2017-05-25

## 2017-05-23 RX ADMIN — CEFEPIME 100 MILLIGRAM(S): 1 INJECTION, POWDER, FOR SOLUTION INTRAMUSCULAR; INTRAVENOUS at 00:30

## 2017-05-23 RX ADMIN — CEFEPIME 100 MILLIGRAM(S): 1 INJECTION, POWDER, FOR SOLUTION INTRAMUSCULAR; INTRAVENOUS at 09:48

## 2017-05-23 RX ADMIN — Medication 50 MILLIGRAM(S): at 20:54

## 2017-05-23 RX ADMIN — CEFEPIME 100 MILLIGRAM(S): 1 INJECTION, POWDER, FOR SOLUTION INTRAMUSCULAR; INTRAVENOUS at 18:40

## 2017-05-23 RX ADMIN — BUDESONIDE AND FORMOTEROL FUMARATE DIHYDRATE 1 PUFF(S): 160; 4.5 AEROSOL RESPIRATORY (INHALATION) at 20:54

## 2017-05-23 RX ADMIN — ENOXAPARIN SODIUM 180 MILLIGRAM(S): 100 INJECTION SUBCUTANEOUS at 11:56

## 2017-05-23 RX ADMIN — BUDESONIDE AND FORMOTEROL FUMARATE DIHYDRATE 1 PUFF(S): 160; 4.5 AEROSOL RESPIRATORY (INHALATION) at 09:48

## 2017-05-24 ENCOUNTER — APPOINTMENT (OUTPATIENT)
Dept: INFUSION THERAPY | Facility: HOSPITAL | Age: 67
End: 2017-05-24

## 2017-05-24 LAB
ALBUMIN SERPL ELPH-MCNC: 2.5 G/DL — LOW (ref 3.3–5)
ALP SERPL-CCNC: 120 U/L — SIGNIFICANT CHANGE UP (ref 40–120)
ALT FLD-CCNC: 34 U/L — SIGNIFICANT CHANGE UP (ref 10–45)
ANION GAP SERPL CALC-SCNC: 16 MMOL/L — SIGNIFICANT CHANGE UP (ref 5–17)
AST SERPL-CCNC: 37 U/L — SIGNIFICANT CHANGE UP (ref 10–40)
BILIRUB SERPL-MCNC: 0.4 MG/DL — SIGNIFICANT CHANGE UP (ref 0.2–1.2)
BUN SERPL-MCNC: 9 MG/DL — SIGNIFICANT CHANGE UP (ref 7–23)
CALCIUM SERPL-MCNC: 8.4 MG/DL — SIGNIFICANT CHANGE UP (ref 8.4–10.5)
CHLORIDE SERPL-SCNC: 100 MMOL/L — SIGNIFICANT CHANGE UP (ref 96–108)
CO2 SERPL-SCNC: 20 MMOL/L — LOW (ref 22–31)
CREAT SERPL-MCNC: 0.63 MG/DL — SIGNIFICANT CHANGE UP (ref 0.5–1.3)
GLUCOSE SERPL-MCNC: 97 MG/DL — SIGNIFICANT CHANGE UP (ref 70–99)
HCT VFR BLD CALC: 25.1 % — LOW (ref 34.5–45)
HGB BLD-MCNC: 8 G/DL — LOW (ref 11.5–15.5)
MAGNESIUM SERPL-MCNC: 1.8 MG/DL — SIGNIFICANT CHANGE UP (ref 1.6–2.6)
MCHC RBC-ENTMCNC: 30.3 PG — SIGNIFICANT CHANGE UP (ref 27–34)
MCHC RBC-ENTMCNC: 31.9 GM/DL — LOW (ref 32–36)
MCV RBC AUTO: 95.1 FL — SIGNIFICANT CHANGE UP (ref 80–100)
PHOSPHATE SERPL-MCNC: 2.8 MG/DL — SIGNIFICANT CHANGE UP (ref 2.5–4.5)
PLATELET # BLD AUTO: 219 K/UL — SIGNIFICANT CHANGE UP (ref 150–400)
POTASSIUM SERPL-MCNC: 3.7 MMOL/L — SIGNIFICANT CHANGE UP (ref 3.5–5.3)
POTASSIUM SERPL-SCNC: 3.7 MMOL/L — SIGNIFICANT CHANGE UP (ref 3.5–5.3)
PROT SERPL-MCNC: 5.9 G/DL — LOW (ref 6–8.3)
RBC # BLD: 2.64 M/UL — LOW (ref 3.8–5.2)
RBC # FLD: 17.5 % — HIGH (ref 10.3–14.5)
SODIUM SERPL-SCNC: 136 MMOL/L — SIGNIFICANT CHANGE UP (ref 135–145)
WBC # BLD: 12.76 K/UL — HIGH (ref 3.8–10.5)
WBC # FLD AUTO: 12.76 K/UL — HIGH (ref 3.8–10.5)

## 2017-05-24 PROCEDURE — 99233 SBSQ HOSP IP/OBS HIGH 50: CPT | Mod: GC

## 2017-05-24 RX ADMIN — CEFEPIME 100 MILLIGRAM(S): 1 INJECTION, POWDER, FOR SOLUTION INTRAMUSCULAR; INTRAVENOUS at 17:33

## 2017-05-24 RX ADMIN — BUDESONIDE AND FORMOTEROL FUMARATE DIHYDRATE 1 PUFF(S): 160; 4.5 AEROSOL RESPIRATORY (INHALATION) at 21:17

## 2017-05-24 RX ADMIN — ENOXAPARIN SODIUM 180 MILLIGRAM(S): 100 INJECTION SUBCUTANEOUS at 13:03

## 2017-05-24 RX ADMIN — Medication 50 MILLIGRAM(S): at 05:30

## 2017-05-24 RX ADMIN — CEFEPIME 100 MILLIGRAM(S): 1 INJECTION, POWDER, FOR SOLUTION INTRAMUSCULAR; INTRAVENOUS at 01:10

## 2017-05-24 RX ADMIN — CEFEPIME 100 MILLIGRAM(S): 1 INJECTION, POWDER, FOR SOLUTION INTRAMUSCULAR; INTRAVENOUS at 09:59

## 2017-05-24 RX ADMIN — BUDESONIDE AND FORMOTEROL FUMARATE DIHYDRATE 1 PUFF(S): 160; 4.5 AEROSOL RESPIRATORY (INHALATION) at 09:59

## 2017-05-24 RX ADMIN — Medication 50 MILLIGRAM(S): at 17:33

## 2017-05-25 ENCOUNTER — APPOINTMENT (OUTPATIENT)
Dept: INFUSION THERAPY | Facility: HOSPITAL | Age: 67
End: 2017-05-25

## 2017-05-25 VITALS — DIASTOLIC BLOOD PRESSURE: 70 MMHG | HEART RATE: 100 BPM | SYSTOLIC BLOOD PRESSURE: 134 MMHG

## 2017-05-25 LAB
CULTURE RESULTS: SIGNIFICANT CHANGE UP
HCT VFR BLD CALC: 24.2 % — LOW (ref 34.5–45)
HGB BLD-MCNC: 7.9 G/DL — LOW (ref 11.5–15.5)
MCHC RBC-ENTMCNC: 30.7 PG — SIGNIFICANT CHANGE UP (ref 27–34)
MCHC RBC-ENTMCNC: 32.6 GM/DL — SIGNIFICANT CHANGE UP (ref 32–36)
MCV RBC AUTO: 94.2 FL — SIGNIFICANT CHANGE UP (ref 80–100)
PLATELET # BLD AUTO: 250 K/UL — SIGNIFICANT CHANGE UP (ref 150–400)
RBC # BLD: 2.57 M/UL — LOW (ref 3.8–5.2)
RBC # FLD: 17.4 % — HIGH (ref 10.3–14.5)
SPECIMEN SOURCE: SIGNIFICANT CHANGE UP
WBC # BLD: 12.37 K/UL — HIGH (ref 3.8–10.5)
WBC # FLD AUTO: 12.37 K/UL — HIGH (ref 3.8–10.5)

## 2017-05-25 PROCEDURE — 97161 PT EVAL LOW COMPLEX 20 MIN: CPT

## 2017-05-25 PROCEDURE — 85027 COMPLETE CBC AUTOMATED: CPT

## 2017-05-25 PROCEDURE — 86901 BLOOD TYPING SEROLOGIC RH(D): CPT

## 2017-05-25 PROCEDURE — 87186 SC STD MICRODIL/AGAR DIL: CPT

## 2017-05-25 PROCEDURE — C1751: CPT

## 2017-05-25 PROCEDURE — 80053 COMPREHEN METABOLIC PANEL: CPT

## 2017-05-25 PROCEDURE — 80048 BASIC METABOLIC PNL TOTAL CA: CPT

## 2017-05-25 PROCEDURE — 82435 ASSAY OF BLOOD CHLORIDE: CPT

## 2017-05-25 PROCEDURE — 99239 HOSP IP/OBS DSCHRG MGMT >30: CPT

## 2017-05-25 PROCEDURE — 82803 BLOOD GASES ANY COMBINATION: CPT

## 2017-05-25 PROCEDURE — 81001 URINALYSIS AUTO W/SCOPE: CPT

## 2017-05-25 PROCEDURE — 84100 ASSAY OF PHOSPHORUS: CPT

## 2017-05-25 PROCEDURE — 85730 THROMBOPLASTIN TIME PARTIAL: CPT

## 2017-05-25 PROCEDURE — 94640 AIRWAY INHALATION TREATMENT: CPT

## 2017-05-25 PROCEDURE — 82330 ASSAY OF CALCIUM: CPT

## 2017-05-25 PROCEDURE — 97110 THERAPEUTIC EXERCISES: CPT

## 2017-05-25 PROCEDURE — 82947 ASSAY GLUCOSE BLOOD QUANT: CPT

## 2017-05-25 PROCEDURE — 85610 PROTHROMBIN TIME: CPT

## 2017-05-25 PROCEDURE — 71045 X-RAY EXAM CHEST 1 VIEW: CPT

## 2017-05-25 PROCEDURE — C1894: CPT

## 2017-05-25 PROCEDURE — 83605 ASSAY OF LACTIC ACID: CPT

## 2017-05-25 PROCEDURE — 96374 THER/PROPH/DIAG INJ IV PUSH: CPT

## 2017-05-25 PROCEDURE — 36430 TRANSFUSION BLD/BLD COMPNT: CPT

## 2017-05-25 PROCEDURE — 87798 DETECT AGENT NOS DNA AMP: CPT

## 2017-05-25 PROCEDURE — 87633 RESP VIRUS 12-25 TARGETS: CPT

## 2017-05-25 PROCEDURE — 36569 INSJ PICC 5 YR+ W/O IMAGING: CPT

## 2017-05-25 PROCEDURE — 74177 CT ABD & PELVIS W/CONTRAST: CPT

## 2017-05-25 PROCEDURE — 86850 RBC ANTIBODY SCREEN: CPT

## 2017-05-25 PROCEDURE — 84132 ASSAY OF SERUM POTASSIUM: CPT

## 2017-05-25 PROCEDURE — 93005 ELECTROCARDIOGRAM TRACING: CPT

## 2017-05-25 PROCEDURE — 99285 EMERGENCY DEPT VISIT HI MDM: CPT | Mod: 25

## 2017-05-25 PROCEDURE — 86900 BLOOD TYPING SEROLOGIC ABO: CPT

## 2017-05-25 PROCEDURE — 84295 ASSAY OF SERUM SODIUM: CPT

## 2017-05-25 PROCEDURE — 87040 BLOOD CULTURE FOR BACTERIA: CPT

## 2017-05-25 PROCEDURE — 83930 ASSAY OF BLOOD OSMOLALITY: CPT

## 2017-05-25 PROCEDURE — 83735 ASSAY OF MAGNESIUM: CPT

## 2017-05-25 PROCEDURE — P9016: CPT

## 2017-05-25 PROCEDURE — 96375 TX/PRO/DX INJ NEW DRUG ADDON: CPT

## 2017-05-25 PROCEDURE — 87581 M.PNEUMON DNA AMP PROBE: CPT

## 2017-05-25 PROCEDURE — 71010: CPT | Mod: 26

## 2017-05-25 PROCEDURE — 87086 URINE CULTURE/COLONY COUNT: CPT

## 2017-05-25 PROCEDURE — 86923 COMPATIBILITY TEST ELECTRIC: CPT

## 2017-05-25 PROCEDURE — 85014 HEMATOCRIT: CPT

## 2017-05-25 PROCEDURE — 87486 CHLMYD PNEUM DNA AMP PROBE: CPT

## 2017-05-25 PROCEDURE — 97116 GAIT TRAINING THERAPY: CPT

## 2017-05-25 RX ORDER — ENOXAPARIN SODIUM 100 MG/ML
180 INJECTION SUBCUTANEOUS
Qty: 0 | Refills: 0 | COMMUNITY
Start: 2017-05-25

## 2017-05-25 RX ORDER — VALSARTAN 80 MG/1
1 TABLET ORAL
Qty: 0 | Refills: 0 | DISCHARGE
Start: 2017-05-25

## 2017-05-25 RX ORDER — CIPROFLOXACIN LACTATE 400MG/40ML
1 VIAL (ML) INTRAVENOUS
Qty: 28 | Refills: 0 | OUTPATIENT
Start: 2017-05-25 | End: 2017-06-08

## 2017-05-25 RX ORDER — METOPROLOL TARTRATE 50 MG
1 TABLET ORAL
Qty: 0 | Refills: 0 | DISCHARGE
Start: 2017-05-25

## 2017-05-25 RX ORDER — VALSARTAN 80 MG/1
1 TABLET ORAL
Qty: 0 | Refills: 0 | COMMUNITY
Start: 2017-05-25

## 2017-05-25 RX ORDER — ENOXAPARIN SODIUM 100 MG/ML
150 INJECTION SUBCUTANEOUS
Qty: 0 | Refills: 0 | DISCHARGE
Start: 2017-05-25

## 2017-05-25 RX ORDER — CEFEPIME 1 G/1
2000 INJECTION, POWDER, FOR SOLUTION INTRAMUSCULAR; INTRAVENOUS
Qty: 0 | Refills: 0 | COMMUNITY
Start: 2017-05-25 | End: 2017-06-01

## 2017-05-25 RX ORDER — VALSARTAN 80 MG/1
40 TABLET ORAL DAILY
Qty: 0 | Refills: 0 | Status: DISCONTINUED | OUTPATIENT
Start: 2017-05-25 | End: 2017-05-25

## 2017-05-25 RX ADMIN — Medication 50 MILLIGRAM(S): at 05:15

## 2017-05-25 RX ADMIN — VALSARTAN 40 MILLIGRAM(S): 80 TABLET ORAL at 13:07

## 2017-05-25 RX ADMIN — ENOXAPARIN SODIUM 180 MILLIGRAM(S): 100 INJECTION SUBCUTANEOUS at 16:35

## 2017-05-25 RX ADMIN — BUDESONIDE AND FORMOTEROL FUMARATE DIHYDRATE 1 PUFF(S): 160; 4.5 AEROSOL RESPIRATORY (INHALATION) at 08:53

## 2017-05-25 RX ADMIN — CEFEPIME 100 MILLIGRAM(S): 1 INJECTION, POWDER, FOR SOLUTION INTRAMUSCULAR; INTRAVENOUS at 09:47

## 2017-05-25 RX ADMIN — CEFEPIME 100 MILLIGRAM(S): 1 INJECTION, POWDER, FOR SOLUTION INTRAMUSCULAR; INTRAVENOUS at 01:11

## 2017-06-05 ENCOUNTER — OUTPATIENT (OUTPATIENT)
Dept: OUTPATIENT SERVICES | Facility: HOSPITAL | Age: 67
LOS: 1 days | Discharge: HOME | End: 2017-06-05

## 2017-06-05 DIAGNOSIS — C50.919 MALIGNANT NEOPLASM OF UNSPECIFIED SITE OF UNSPECIFIED FEMALE BREAST: ICD-10-CM

## 2017-06-05 DIAGNOSIS — Z98.890 OTHER SPECIFIED POSTPROCEDURAL STATES: Chronic | ICD-10-CM

## 2017-06-06 ENCOUNTER — APPOINTMENT (OUTPATIENT)
Dept: HEMATOLOGY ONCOLOGY | Facility: CLINIC | Age: 67
End: 2017-06-06

## 2017-06-06 ENCOUNTER — LABORATORY RESULT (OUTPATIENT)
Age: 67
End: 2017-06-06

## 2017-06-06 ENCOUNTER — RESULT REVIEW (OUTPATIENT)
Age: 67
End: 2017-06-06

## 2017-06-06 VITALS
RESPIRATION RATE: 16 BRPM | HEART RATE: 88 BPM | OXYGEN SATURATION: 98 % | SYSTOLIC BLOOD PRESSURE: 156 MMHG | DIASTOLIC BLOOD PRESSURE: 88 MMHG | BODY MASS INDEX: 46.28 KG/M2 | WEIGHT: 266.1 LBS | TEMPERATURE: 97.9 F

## 2017-06-06 LAB
BASOPHILS # BLD AUTO: 0 K/UL — SIGNIFICANT CHANGE UP (ref 0–0.2)
BASOPHILS NFR BLD AUTO: 0.8 % — SIGNIFICANT CHANGE UP (ref 0–2)
EOSINOPHIL # BLD AUTO: 0.1 K/UL — SIGNIFICANT CHANGE UP (ref 0–0.5)
EOSINOPHIL NFR BLD AUTO: 1.3 % — SIGNIFICANT CHANGE UP (ref 0–6)
HCT VFR BLD CALC: 28.1 % — LOW (ref 34.5–45)
HGB BLD-MCNC: 9.3 G/DL — LOW (ref 11.5–15.5)
LYMPHOCYTES # BLD AUTO: 1 K/UL — SIGNIFICANT CHANGE UP (ref 1–3.3)
LYMPHOCYTES # BLD AUTO: 18.2 % — SIGNIFICANT CHANGE UP (ref 13–44)
MCHC RBC-ENTMCNC: 32.6 PG — SIGNIFICANT CHANGE UP (ref 27–34)
MCHC RBC-ENTMCNC: 33.2 G/DL — SIGNIFICANT CHANGE UP (ref 32–36)
MCV RBC AUTO: 98 FL — SIGNIFICANT CHANGE UP (ref 80–100)
MONOCYTES # BLD AUTO: 0.9 K/UL — SIGNIFICANT CHANGE UP (ref 0–0.9)
MONOCYTES NFR BLD AUTO: 15.7 % — HIGH (ref 2–14)
NEUTROPHILS # BLD AUTO: 3.5 K/UL — SIGNIFICANT CHANGE UP (ref 1.8–7.4)
NEUTROPHILS NFR BLD AUTO: 64 % — SIGNIFICANT CHANGE UP (ref 43–77)
PLATELET # BLD AUTO: 394 K/UL — SIGNIFICANT CHANGE UP (ref 150–400)
RBC # BLD: 2.87 M/UL — LOW (ref 3.8–5.2)
RBC # FLD: 15.8 % — HIGH (ref 10.3–14.5)
WBC # BLD: 5.5 K/UL — SIGNIFICANT CHANGE UP (ref 3.8–10.5)
WBC # FLD AUTO: 5.5 K/UL — SIGNIFICANT CHANGE UP (ref 3.8–10.5)

## 2017-06-14 ENCOUNTER — APPOINTMENT (OUTPATIENT)
Dept: INFUSION THERAPY | Facility: HOSPITAL | Age: 67
End: 2017-06-14

## 2017-06-14 ENCOUNTER — RESULT REVIEW (OUTPATIENT)
Age: 67
End: 2017-06-14

## 2017-06-14 ENCOUNTER — LABORATORY RESULT (OUTPATIENT)
Age: 67
End: 2017-06-14

## 2017-06-14 LAB
BASOPHILS # BLD AUTO: 0 K/UL — SIGNIFICANT CHANGE UP (ref 0–0.2)
BASOPHILS NFR BLD AUTO: 0 % — SIGNIFICANT CHANGE UP (ref 0–2)
EOSINOPHIL # BLD AUTO: 0 K/UL — SIGNIFICANT CHANGE UP (ref 0–0.5)
EOSINOPHIL NFR BLD AUTO: 0.4 % — SIGNIFICANT CHANGE UP (ref 0–6)
HCT VFR BLD CALC: 33.7 % — LOW (ref 34.5–45)
HGB BLD-MCNC: 11.4 G/DL — LOW (ref 11.5–15.5)
LYMPHOCYTES # BLD AUTO: 0.5 K/UL — LOW (ref 1–3.3)
LYMPHOCYTES # BLD AUTO: 6.8 % — LOW (ref 13–44)
MCHC RBC-ENTMCNC: 33.1 PG — SIGNIFICANT CHANGE UP (ref 27–34)
MCHC RBC-ENTMCNC: 34 G/DL — SIGNIFICANT CHANGE UP (ref 32–36)
MCV RBC AUTO: 97.4 FL — SIGNIFICANT CHANGE UP (ref 80–100)
MONOCYTES # BLD AUTO: 0.1 K/UL — SIGNIFICANT CHANGE UP (ref 0–0.9)
MONOCYTES NFR BLD AUTO: 1.2 % — LOW (ref 2–14)
NEUTROPHILS # BLD AUTO: 6.1 K/UL — SIGNIFICANT CHANGE UP (ref 1.8–7.4)
NEUTROPHILS NFR BLD AUTO: 91.6 % — HIGH (ref 43–77)
PLATELET # BLD AUTO: 312 K/UL — SIGNIFICANT CHANGE UP (ref 150–400)
RBC # BLD: 3.46 M/UL — LOW (ref 3.8–5.2)
RBC # FLD: 15.6 % — HIGH (ref 10.3–14.5)
WBC # BLD: 6.7 K/UL — SIGNIFICANT CHANGE UP (ref 3.8–10.5)
WBC # FLD AUTO: 6.7 K/UL — SIGNIFICANT CHANGE UP (ref 3.8–10.5)

## 2017-06-15 DIAGNOSIS — Z51.11 ENCOUNTER FOR ANTINEOPLASTIC CHEMOTHERAPY: ICD-10-CM

## 2017-06-15 DIAGNOSIS — R11.2 NAUSEA WITH VOMITING, UNSPECIFIED: ICD-10-CM

## 2017-06-18 ENCOUNTER — INPATIENT (INPATIENT)
Facility: HOSPITAL | Age: 67
LOS: 2 days | Discharge: HOME | DRG: 871 | End: 2017-06-21
Attending: INTERNAL MEDICINE | Admitting: HOSPITALIST
Payer: MEDICARE

## 2017-06-18 VITALS
HEART RATE: 127 BPM | DIASTOLIC BLOOD PRESSURE: 43 MMHG | RESPIRATION RATE: 20 BRPM | TEMPERATURE: 103 F | OXYGEN SATURATION: 94 % | SYSTOLIC BLOOD PRESSURE: 80 MMHG

## 2017-06-18 DIAGNOSIS — A41.9 SEPSIS, UNSPECIFIED ORGANISM: ICD-10-CM

## 2017-06-18 DIAGNOSIS — Z98.890 OTHER SPECIFIED POSTPROCEDURAL STATES: Chronic | ICD-10-CM

## 2017-06-18 LAB
ALBUMIN SERPL ELPH-MCNC: 3.5 G/DL — SIGNIFICANT CHANGE UP (ref 3.3–5)
ALP SERPL-CCNC: 77 U/L — SIGNIFICANT CHANGE UP (ref 40–120)
ALT FLD-CCNC: 45 U/L RC — SIGNIFICANT CHANGE UP (ref 10–45)
ANION GAP SERPL CALC-SCNC: 16 MMOL/L — SIGNIFICANT CHANGE UP (ref 5–17)
APTT BLD: 31.5 SEC — SIGNIFICANT CHANGE UP (ref 27.5–37.4)
AST SERPL-CCNC: 39 U/L — SIGNIFICANT CHANGE UP (ref 10–40)
BASE EXCESS BLDV CALC-SCNC: 3.5 MMOL/L — HIGH (ref -2–2)
BASOPHILS # BLD AUTO: 0.1 K/UL — SIGNIFICANT CHANGE UP (ref 0–0.2)
BASOPHILS NFR BLD AUTO: 1 % — SIGNIFICANT CHANGE UP (ref 0–2)
BILIRUB SERPL-MCNC: 0.7 MG/DL — SIGNIFICANT CHANGE UP (ref 0.2–1.2)
BUN SERPL-MCNC: 12 MG/DL — SIGNIFICANT CHANGE UP (ref 7–23)
CA-I SERPL-SCNC: 1.09 MMOL/L — LOW (ref 1.12–1.3)
CALCIUM SERPL-MCNC: 8.9 MG/DL — SIGNIFICANT CHANGE UP (ref 8.4–10.5)
CHLORIDE BLDV-SCNC: 97 MMOL/L — SIGNIFICANT CHANGE UP (ref 96–108)
CHLORIDE SERPL-SCNC: 93 MMOL/L — LOW (ref 96–108)
CO2 BLDV-SCNC: 29 MMOL/L — SIGNIFICANT CHANGE UP (ref 22–30)
CO2 SERPL-SCNC: 23 MMOL/L — SIGNIFICANT CHANGE UP (ref 22–31)
CREAT SERPL-MCNC: 0.73 MG/DL — SIGNIFICANT CHANGE UP (ref 0.5–1.3)
EOSINOPHIL # BLD AUTO: 0 K/UL — SIGNIFICANT CHANGE UP (ref 0–0.5)
GAS PNL BLDV: 128 MMOL/L — LOW (ref 136–145)
GAS PNL BLDV: SIGNIFICANT CHANGE UP
GAS PNL BLDV: SIGNIFICANT CHANGE UP
GLUCOSE BLDV-MCNC: 119 MG/DL — HIGH (ref 70–99)
GLUCOSE SERPL-MCNC: 121 MG/DL — HIGH (ref 70–99)
HCO3 BLDV-SCNC: 27 MMOL/L — SIGNIFICANT CHANGE UP (ref 21–29)
HCT VFR BLD CALC: 32.4 % — LOW (ref 34.5–45)
HCT VFR BLDA CALC: 34 % — LOW (ref 39–50)
HGB BLD CALC-MCNC: 10.9 G/DL — LOW (ref 11.5–15.5)
HGB BLD-MCNC: 11 G/DL — LOW (ref 11.5–15.5)
HYPOSEGMENTATION: PRESENT — SIGNIFICANT CHANGE UP
INR BLD: 1.19 RATIO — HIGH (ref 0.88–1.16)
LACTATE BLDV-MCNC: 1.4 MMOL/L — SIGNIFICANT CHANGE UP (ref 0.7–2)
LYMPHOCYTES # BLD AUTO: 0.3 K/UL — LOW (ref 1–3.3)
LYMPHOCYTES # BLD AUTO: 4 % — LOW (ref 13–44)
MCHC RBC-ENTMCNC: 33.7 PG — SIGNIFICANT CHANGE UP (ref 27–34)
MCHC RBC-ENTMCNC: 34.1 GM/DL — SIGNIFICANT CHANGE UP (ref 32–36)
MCV RBC AUTO: 98.8 FL — SIGNIFICANT CHANGE UP (ref 80–100)
MONOCYTES # BLD AUTO: 0.1 K/UL — SIGNIFICANT CHANGE UP (ref 0–0.9)
MONOCYTES NFR BLD AUTO: 1 % — LOW (ref 2–14)
NEUTROPHILS # BLD AUTO: 5.2 K/UL — SIGNIFICANT CHANGE UP (ref 1.8–7.4)
NEUTROPHILS NFR BLD AUTO: 94 % — HIGH (ref 43–77)
PCO2 BLDV: 40 MMHG — SIGNIFICANT CHANGE UP (ref 35–50)
PH BLDV: 7.45 — SIGNIFICANT CHANGE UP (ref 7.35–7.45)
PLAT MORPH BLD: NORMAL — SIGNIFICANT CHANGE UP
PLATELET # BLD AUTO: 244 K/UL — SIGNIFICANT CHANGE UP (ref 150–400)
PO2 BLDV: 35 MMHG — SIGNIFICANT CHANGE UP (ref 25–45)
POTASSIUM BLDV-SCNC: 4.3 MMOL/L — SIGNIFICANT CHANGE UP (ref 3.5–5)
POTASSIUM SERPL-MCNC: 3.8 MMOL/L — SIGNIFICANT CHANGE UP (ref 3.5–5.3)
POTASSIUM SERPL-SCNC: 3.8 MMOL/L — SIGNIFICANT CHANGE UP (ref 3.5–5.3)
PROT SERPL-MCNC: 7.7 G/DL — SIGNIFICANT CHANGE UP (ref 6–8.3)
PROTHROM AB SERPL-ACNC: 13 SEC — HIGH (ref 9.8–12.7)
RAPID RVP RESULT: SIGNIFICANT CHANGE UP
RBC # BLD: 3.28 M/UL — LOW (ref 3.8–5.2)
RBC # FLD: 15.1 % — HIGH (ref 10.3–14.5)
RBC BLD AUTO: SIGNIFICANT CHANGE UP
SAO2 % BLDV: 61 % — LOW (ref 67–88)
SODIUM SERPL-SCNC: 132 MMOL/L — LOW (ref 135–145)
WBC # BLD: 5.6 K/UL — SIGNIFICANT CHANGE UP (ref 3.8–10.5)
WBC # FLD AUTO: 5.6 K/UL — SIGNIFICANT CHANGE UP (ref 3.8–10.5)

## 2017-06-18 PROCEDURE — 71010: CPT | Mod: 26

## 2017-06-18 PROCEDURE — 93010 ELECTROCARDIOGRAM REPORT: CPT

## 2017-06-18 PROCEDURE — 99285 EMERGENCY DEPT VISIT HI MDM: CPT | Mod: 25,GC

## 2017-06-18 PROCEDURE — 71250 CT THORAX DX C-: CPT | Mod: 26

## 2017-06-18 RX ORDER — ACETAMINOPHEN 500 MG
1000 TABLET ORAL ONCE
Qty: 0 | Refills: 0 | Status: COMPLETED | OUTPATIENT
Start: 2017-06-18 | End: 2017-06-18

## 2017-06-18 RX ORDER — VANCOMYCIN HCL 1 G
1000 VIAL (EA) INTRAVENOUS EVERY 12 HOURS
Qty: 0 | Refills: 0 | Status: DISCONTINUED | OUTPATIENT
Start: 2017-06-18 | End: 2017-06-19

## 2017-06-18 RX ORDER — SODIUM CHLORIDE 9 MG/ML
3 INJECTION INTRAMUSCULAR; INTRAVENOUS; SUBCUTANEOUS ONCE
Qty: 0 | Refills: 0 | Status: COMPLETED | OUTPATIENT
Start: 2017-06-18 | End: 2017-06-18

## 2017-06-18 RX ORDER — SODIUM CHLORIDE 9 MG/ML
500 INJECTION INTRAMUSCULAR; INTRAVENOUS; SUBCUTANEOUS
Qty: 0 | Refills: 0 | Status: COMPLETED | OUTPATIENT
Start: 2017-06-18 | End: 2017-06-18

## 2017-06-18 RX ORDER — CEFEPIME 1 G/1
2000 INJECTION, POWDER, FOR SOLUTION INTRAMUSCULAR; INTRAVENOUS ONCE
Qty: 0 | Refills: 0 | Status: COMPLETED | OUTPATIENT
Start: 2017-06-18 | End: 2017-06-18

## 2017-06-18 RX ADMIN — Medication 400 MILLIGRAM(S): at 20:29

## 2017-06-18 RX ADMIN — Medication 250 MILLIGRAM(S): at 21:12

## 2017-06-18 RX ADMIN — SODIUM CHLORIDE 2000 MILLILITER(S): 9 INJECTION INTRAMUSCULAR; INTRAVENOUS; SUBCUTANEOUS at 20:30

## 2017-06-18 RX ADMIN — SODIUM CHLORIDE 2000 MILLILITER(S): 9 INJECTION INTRAMUSCULAR; INTRAVENOUS; SUBCUTANEOUS at 21:12

## 2017-06-18 RX ADMIN — SODIUM CHLORIDE 3 MILLILITER(S): 9 INJECTION INTRAMUSCULAR; INTRAVENOUS; SUBCUTANEOUS at 20:14

## 2017-06-18 RX ADMIN — SODIUM CHLORIDE 2000 MILLILITER(S): 9 INJECTION INTRAMUSCULAR; INTRAVENOUS; SUBCUTANEOUS at 20:29

## 2017-06-18 RX ADMIN — CEFEPIME 100 MILLIGRAM(S): 1 INJECTION, POWDER, FOR SOLUTION INTRAMUSCULAR; INTRAVENOUS at 20:30

## 2017-06-18 NOTE — ED PROVIDER NOTE - PROGRESS NOTE DETAILS
Nirmal Barr MD (resident): SBP of 80 improved to 130-140 spontaneously. D/w [facility] fellow on call Dr. Haq, who will call hospitalist for admission. D/w Dr. Valles, requested a CT chest to better evaluate for source of infection. Agrees w/ plan for admission.

## 2017-06-18 NOTE — ED PROVIDER NOTE - MEDICAL DECISION MAKING DETAILS
Nirmal Barr MD (resident): 66 F w/ breast cancer w/ recurrence on chemo, recently d/c'd within 1 month after pseudomonas septicemia, who p/w fever, cough and fatigue, and tachycardia, concerning for recurrence of pseudomonas sepsis vs HCAP. Will get labs w/ cultures, lactate, CXR, and Tx w/ IVF 30 cc/kg, and broad spectrum atbx w/ double pseudomonas coverage

## 2017-06-18 NOTE — ED PROVIDER NOTE - OBJECTIVE STATEMENT
Fever with cough, nasal congestion since yesterday. Highest fever was 103'F. Reports general fatigue. Has been taking tylenol. Last chemo on Wednesday.   Hx of HTN, R breast cancer s/p lumpectomy and XRT 2003 with remission until 12/2016 recurrence, recently started on chemo (on cycle 3 of  adriamycin/cytoxan, last on Wednesday). Mediport in place  Hospitalized for pseudomonas septicemia  No CP, SOB, cough, sore throat, N/V/D, constipation, abdominal pain, chest pain, dysuria.   Patient lives alone and her siblings live close to home. She does not have any children. Regarding the chemotherapy, she states that her weakness has overall improved since she's been on the regimen    In the ED,  patient was febrile, tachycardic (HR 120s-130s), RR, SpO2 . UA, RVP, CXR were negative. Received cefepime x 1 and 2L NS. Patient was admitted for further management.     Hospital Course:    #Sepsis 2/2 Pseudomonas bactermia: pt was hypotensive and febrile in the ED with hypotension resolved after IV abx and 2L NS bolus. Two peripheral blood cultures and one port blood culture grew out pseudomonas. Pt was initially on Vancomycin and cefepime, narrowed to cefepime by ID when GNR's initially resulted on cultures. On 5/20 pt was tachycardic to 140's-170's on standing to go to the bathroom. She was given an additional 500 cc bolus and restarted on home metoprolol at half dose 25mg BID with subsequent improvement. She was completely asymptomatic, feeling well while tachycardic during this episode. Repeat blood cultures were negative x 2. Per ID, she was continued on cefepime until 06/01. Plan to start ciprofloxacin for two weeks. PICC line was placed.     #Portal vein thrombosis: CT abd/pelvis with contrast on 5/20/17 (ordered to look for possible abdominal source of bacteremia) was negative for abscess/infectious source but did show portal vein thrombosis: "Focal portal vein thrombosis involving the medial left and anterior right hepatic lobes." She was initially started on heparin gtt then transitioned to Lovenox 180mg daily.     #R breast cancer: chemotherapy was held through admission. Oncology was following and will follow outpatient.    #Hypokalemia, hyponatremia, hypophosphatemia: pt was hypokalemic to 3.3, repleted with good response. She was also hyponatremic to 129, initially improved to 131 with IV fluids. IV fluids were held, and Na fell to 129. BMP's were trended to monitor hyponatremia. Hypophosphatemia was treated with K Phos oral tablets with meals.     #Physical therapy: pt was seen by physical therapy who recommended home however, patient wanted to go to rehab.     # Tachycardia likely multifactorial - BB initially held for hypotension. Also with infection and deconditioning.  Improved once BB restarted. Patient s/p chemo may be side effect d/w onc no need for MUGA at this time Fever with nonproductive cough, nasal congestion since yesterday. Highest fever was 103'F. Reports general fatigue. Has been taking tylenol, last at 2 pm. Last chemo on Wednesday. No CP, SOB, sore throat, N/V/D, dysuria, hematuria, flank or back pain, rash. No rectal bleeding, easy bleeding or bruising.  Hx of HTN, R breast cancer s/p lumpectomy and XRT 2003 with remission until 12/2016 recurrence, recently started on chemo (on cycle 3 of  adriamycin/cytoxan, last on Wednesday). Mediport in place in R upper chest wall, no issues with this.   Hospitalized 5/18-25 for pseudomonas septicemia, and had portal vein thrombosis, currently on lovenox 180 mg daily. Went to rehab w/ IV atbx via PICC line. 3 days ago finished PO Cipro    PMD: Sue  Onc: Severo Dunaway

## 2017-06-18 NOTE — ED ADULT NURSE NOTE - CHPI ED SYMPTOMS NEG
no vomiting/no decreased eating/drinking/no shortness of breath/no abdominal pain/no rash/no diarrhea/no headache

## 2017-06-18 NOTE — ED ADULT NURSE NOTE - OBJECTIVE STATEMENT
pt states, "I feel like I have a cold and I was recently d/c from the hospital for sepsis and I feel similar to how I felt the last time. I also have a moist cough for the past couple of days and feel general fatigue." port present to [facility]

## 2017-06-18 NOTE — ED PROVIDER NOTE - NS ED ROS FT
+ fever, no chills, no change in vision, no change in hearing, no chest pain, no shortness of breath, + cough, no abdominal pain, no vomiting, no dysuria, no muscle pain, no rashes, no loss of consciousness. ~ Nirmal Barr MD

## 2017-06-18 NOTE — ED PROVIDER NOTE - SKIN, MLM
Mediport in R upper chest w/o erythema, swelling or TTP. Skin normal color for race, warm, dry and intact. No evidence of rash.

## 2017-06-18 NOTE — ED PROVIDER NOTE - ATTENDING CONTRIBUTION TO CARE
Fever with nonproductive cough, nasal congestion since yesterday. Highest fever was 103'F. Reports general fatigue. Has been taking Tylenol, last at 2 pm. Last chemo on Wednesday. No CP, SOB, sore throat, N/V/D, dysuria, hematuria, flank or back pain, rash. No rectal bleeding, easy bleeding or bruising.  Hx of HTN, R breast cancer s/p lumpectomy and XRT 2003 with remission until 12/2016 recurrence, recently started on chemo (on cycle 3 of  adriamycin/cytoxan, last on Wednesday). Mediport in place in R upper chest wall, no issues with this.   Hospitalized 5/18-25 for pseudomonas septicemia, and had portal vein thrombosis, currently on lovenox 180 mg daily. Went to rehab w/ IV atbx via PICC line. 3 days ago finished PO Cipro  tachycardic, warm to touch, cooperative, with capacity and insight, alert, perrl, normal conjunctiva, soft, ntnd, no rebound/guarding  Will obtain IV x 2, cbc, cmp, +/-ce, VBG with lactate at time 0, fluid bolus @ 30cc/kg initiated upon departure from the room on initial assessment within 30 minutes of arrival, urine analysis and blood cultures x 2 are obtained prior to antibiotics =>prior to administration of antibiotics goal within 3 hours of arrival, and will repeat lactate with VBG if the original is elevated and do so within 6 hours of initial lactate.  will cover for double pseudomonas coverage for MDR infection.

## 2017-06-19 DIAGNOSIS — C50.919 MALIGNANT NEOPLASM OF UNSPECIFIED SITE OF UNSPECIFIED FEMALE BREAST: ICD-10-CM

## 2017-06-19 DIAGNOSIS — J18.9 PNEUMONIA, UNSPECIFIED ORGANISM: ICD-10-CM

## 2017-06-19 DIAGNOSIS — I81 PORTAL VEIN THROMBOSIS: ICD-10-CM

## 2017-06-19 DIAGNOSIS — A41.9 SEPSIS, UNSPECIFIED ORGANISM: ICD-10-CM

## 2017-06-19 DIAGNOSIS — Z29.9 ENCOUNTER FOR PROPHYLACTIC MEASURES, UNSPECIFIED: ICD-10-CM

## 2017-06-19 DIAGNOSIS — I10 ESSENTIAL (PRIMARY) HYPERTENSION: ICD-10-CM

## 2017-06-19 LAB
ANION GAP SERPL CALC-SCNC: 15 MMOL/L — SIGNIFICANT CHANGE UP (ref 5–17)
APPEARANCE UR: CLEAR — SIGNIFICANT CHANGE UP
BASOPHILS # BLD AUTO: 0.01 K/UL — SIGNIFICANT CHANGE UP (ref 0–0.2)
BASOPHILS NFR BLD AUTO: 0.2 % — SIGNIFICANT CHANGE UP (ref 0–2)
BILIRUB UR-MCNC: NEGATIVE — SIGNIFICANT CHANGE UP
BUN SERPL-MCNC: 11 MG/DL — SIGNIFICANT CHANGE UP (ref 7–23)
CALCIUM SERPL-MCNC: 8.5 MG/DL — SIGNIFICANT CHANGE UP (ref 8.4–10.5)
CHLORIDE SERPL-SCNC: 100 MMOL/L — SIGNIFICANT CHANGE UP (ref 96–108)
CO2 SERPL-SCNC: 22 MMOL/L — SIGNIFICANT CHANGE UP (ref 22–31)
COLOR SPEC: SIGNIFICANT CHANGE UP
CREAT SERPL-MCNC: 0.77 MG/DL — SIGNIFICANT CHANGE UP (ref 0.5–1.3)
DIFF PNL FLD: NEGATIVE — SIGNIFICANT CHANGE UP
EOSINOPHIL # BLD AUTO: 0.01 K/UL — SIGNIFICANT CHANGE UP (ref 0–0.5)
EOSINOPHIL NFR BLD AUTO: 0.2 % — SIGNIFICANT CHANGE UP (ref 0–6)
EPI CELLS # UR: SIGNIFICANT CHANGE UP /HPF
GLUCOSE SERPL-MCNC: 108 MG/DL — HIGH (ref 70–99)
GLUCOSE UR QL: NEGATIVE — SIGNIFICANT CHANGE UP
HCT VFR BLD CALC: 27.5 % — LOW (ref 34.5–45)
HGB BLD-MCNC: 9.1 G/DL — LOW (ref 11.5–15.5)
IMM GRANULOCYTES NFR BLD AUTO: 1 % — SIGNIFICANT CHANGE UP (ref 0–1.5)
KETONES UR-MCNC: NEGATIVE — SIGNIFICANT CHANGE UP
LEUKOCYTE ESTERASE UR-ACNC: NEGATIVE — SIGNIFICANT CHANGE UP
LYMPHOCYTES # BLD AUTO: 0.33 K/UL — LOW (ref 1–3.3)
LYMPHOCYTES # BLD AUTO: 7.9 % — LOW (ref 13–44)
MAGNESIUM SERPL-MCNC: 1.4 MG/DL — LOW (ref 1.6–2.6)
MCHC RBC-ENTMCNC: 31.3 PG — SIGNIFICANT CHANGE UP (ref 27–34)
MCHC RBC-ENTMCNC: 33.1 GM/DL — SIGNIFICANT CHANGE UP (ref 32–36)
MCV RBC AUTO: 94.5 FL — SIGNIFICANT CHANGE UP (ref 80–100)
MONOCYTES # BLD AUTO: 0.11 K/UL — SIGNIFICANT CHANGE UP (ref 0–0.9)
MONOCYTES NFR BLD AUTO: 2.6 % — SIGNIFICANT CHANGE UP (ref 2–14)
NEUTROPHILS # BLD AUTO: 3.7 K/UL — SIGNIFICANT CHANGE UP (ref 1.8–7.4)
NEUTROPHILS NFR BLD AUTO: 88.1 % — HIGH (ref 43–77)
NITRITE UR-MCNC: NEGATIVE — SIGNIFICANT CHANGE UP
PH UR: 6.5 — SIGNIFICANT CHANGE UP (ref 5–8)
PHOSPHATE SERPL-MCNC: 2.7 MG/DL — SIGNIFICANT CHANGE UP (ref 2.5–4.5)
PLATELET # BLD AUTO: 240 K/UL — SIGNIFICANT CHANGE UP (ref 150–400)
POTASSIUM SERPL-MCNC: 3.3 MMOL/L — LOW (ref 3.5–5.3)
POTASSIUM SERPL-SCNC: 3.3 MMOL/L — LOW (ref 3.5–5.3)
PROT UR-MCNC: NEGATIVE — SIGNIFICANT CHANGE UP
RBC # BLD: 2.91 M/UL — LOW (ref 3.8–5.2)
RBC # FLD: 16.9 % — HIGH (ref 10.3–14.5)
RBC CASTS # UR COMP ASSIST: SIGNIFICANT CHANGE UP /HPF (ref 0–2)
SODIUM SERPL-SCNC: 137 MMOL/L — SIGNIFICANT CHANGE UP (ref 135–145)
SP GR SPEC: 1.01 — LOW (ref 1.01–1.02)
UROBILINOGEN FLD QL: NEGATIVE — SIGNIFICANT CHANGE UP
WBC # BLD: 4.2 K/UL — SIGNIFICANT CHANGE UP (ref 3.8–10.5)
WBC # FLD AUTO: 4.2 K/UL — SIGNIFICANT CHANGE UP (ref 3.8–10.5)
WBC UR QL: SIGNIFICANT CHANGE UP /HPF (ref 0–5)

## 2017-06-19 PROCEDURE — 12345: CPT | Mod: NC

## 2017-06-19 PROCEDURE — 99223 1ST HOSP IP/OBS HIGH 75: CPT | Mod: AI,GC

## 2017-06-19 PROCEDURE — 99222 1ST HOSP IP/OBS MODERATE 55: CPT

## 2017-06-19 RX ORDER — ENOXAPARIN SODIUM 100 MG/ML
180 INJECTION SUBCUTANEOUS DAILY
Qty: 0 | Refills: 0 | Status: DISCONTINUED | OUTPATIENT
Start: 2017-06-19 | End: 2017-06-21

## 2017-06-19 RX ORDER — VANCOMYCIN HCL 1 G
1250 VIAL (EA) INTRAVENOUS EVERY 12 HOURS
Qty: 0 | Refills: 0 | Status: DISCONTINUED | OUTPATIENT
Start: 2017-06-19 | End: 2017-06-20

## 2017-06-19 RX ORDER — ESCITALOPRAM OXALATE 10 MG/1
10 TABLET, FILM COATED ORAL DAILY
Qty: 0 | Refills: 0 | Status: DISCONTINUED | OUTPATIENT
Start: 2017-06-19 | End: 2017-06-21

## 2017-06-19 RX ORDER — ACETAMINOPHEN 500 MG
650 TABLET ORAL EVERY 6 HOURS
Qty: 0 | Refills: 0 | Status: DISCONTINUED | OUTPATIENT
Start: 2017-06-19 | End: 2017-06-21

## 2017-06-19 RX ORDER — BUDESONIDE AND FORMOTEROL FUMARATE DIHYDRATE 160; 4.5 UG/1; UG/1
1 AEROSOL RESPIRATORY (INHALATION)
Qty: 0 | Refills: 0 | Status: DISCONTINUED | OUTPATIENT
Start: 2017-06-19 | End: 2017-06-21

## 2017-06-19 RX ORDER — CEFEPIME 1 G/1
2000 INJECTION, POWDER, FOR SOLUTION INTRAMUSCULAR; INTRAVENOUS EVERY 8 HOURS
Qty: 0 | Refills: 0 | Status: DISCONTINUED | OUTPATIENT
Start: 2017-06-19 | End: 2017-06-21

## 2017-06-19 RX ORDER — SODIUM CHLORIDE 9 MG/ML
2000 INJECTION INTRAMUSCULAR; INTRAVENOUS; SUBCUTANEOUS
Qty: 0 | Refills: 0 | Status: COMPLETED | OUTPATIENT
Start: 2017-06-19 | End: 2017-06-19

## 2017-06-19 RX ORDER — MAGNESIUM OXIDE 400 MG ORAL TABLET 241.3 MG
400 TABLET ORAL
Qty: 0 | Refills: 0 | Status: DISCONTINUED | OUTPATIENT
Start: 2017-06-19 | End: 2017-06-21

## 2017-06-19 RX ADMIN — MAGNESIUM OXIDE 400 MG ORAL TABLET 400 MILLIGRAM(S): 241.3 TABLET ORAL at 18:25

## 2017-06-19 RX ADMIN — CEFEPIME 100 MILLIGRAM(S): 1 INJECTION, POWDER, FOR SOLUTION INTRAMUSCULAR; INTRAVENOUS at 21:10

## 2017-06-19 RX ADMIN — CEFEPIME 100 MILLIGRAM(S): 1 INJECTION, POWDER, FOR SOLUTION INTRAMUSCULAR; INTRAVENOUS at 06:01

## 2017-06-19 RX ADMIN — SODIUM CHLORIDE 2000 MILLILITER(S): 9 INJECTION INTRAMUSCULAR; INTRAVENOUS; SUBCUTANEOUS at 00:58

## 2017-06-19 RX ADMIN — CEFEPIME 100 MILLIGRAM(S): 1 INJECTION, POWDER, FOR SOLUTION INTRAMUSCULAR; INTRAVENOUS at 14:14

## 2017-06-19 RX ADMIN — SODIUM CHLORIDE 100 MILLILITER(S): 9 INJECTION INTRAMUSCULAR; INTRAVENOUS; SUBCUTANEOUS at 21:05

## 2017-06-19 RX ADMIN — BUDESONIDE AND FORMOTEROL FUMARATE DIHYDRATE 1 PUFF(S): 160; 4.5 AEROSOL RESPIRATORY (INHALATION) at 21:11

## 2017-06-19 RX ADMIN — BUDESONIDE AND FORMOTEROL FUMARATE DIHYDRATE 1 PUFF(S): 160; 4.5 AEROSOL RESPIRATORY (INHALATION) at 06:01

## 2017-06-19 RX ADMIN — Medication 166.67 MILLIGRAM(S): at 06:49

## 2017-06-19 RX ADMIN — Medication 650 MILLIGRAM(S): at 03:11

## 2017-06-19 RX ADMIN — ESCITALOPRAM OXALATE 10 MILLIGRAM(S): 10 TABLET, FILM COATED ORAL at 11:28

## 2017-06-19 RX ADMIN — Medication 166.67 MILLIGRAM(S): at 18:26

## 2017-06-19 RX ADMIN — ENOXAPARIN SODIUM 180 MILLIGRAM(S): 100 INJECTION SUBCUTANEOUS at 11:27

## 2017-06-19 RX ADMIN — SODIUM CHLORIDE 2000 MILLILITER(S): 9 INJECTION INTRAMUSCULAR; INTRAVENOUS; SUBCUTANEOUS at 00:50

## 2017-06-19 NOTE — H&P ADULT - PROBLEM SELECTOR PLAN 1
Fevers concerning for possible HCAP PNA given clinical symptoms. CXR of limited value and chest CT pending, however. UA/ UCx pending as well. RVP negative. Another potential source is clot burden 2/2 Portal vein thrombosis .     - would c/w empiric vancomycin/ cefepime and levaquine for double anti-pseduomonal coverage   - f/u UA/UCx and blood cultures, and titrate antibiotics accordingly   - should urine return negative and pt c/t to spike fevers, may need to consider CT A/P to further evaluate for source   - draw BCx from [facility] Fevers concerning for possible HCAP PNA given the only clinical symptom of cough. CXR of limited value and chest CT pending, however. UA/ UCx pending as well. RVP negative. Another potential source is clot burden 2/2 Portal vein thrombosis .     - will treat with vancomycin/ cefepime and levaquine for double anti-pseduomonal coverage especially with recent pseudomonal bacteremia   - f/u UA/UCx and blood cultures, and titrate antibiotics accordingly   - should urine return negative and pt c/t to spike fevers, may need to consider CT A/P to further evaluate for source   - draw BCx from [facility]

## 2017-06-19 NOTE — H&P ADULT - PROBLEM SELECTOR PLAN 4
H/o breast CA s/p lumpectomy with initial diagnosis in 2003, s/p completion of chemo and RTx with remission followed by disease recurrent in 12/2016. Pt currently on chemotherapy and tamoxifen (which has been held since prior hospitalization, w/ plans to resume following resolution of sepsis infection).   - consult heme/onc team in the a.m.   - trend CBC w/ diff and montior for fevers H/o breast CA s/p lumpectomy with initial diagnosis in 2003, s/p completion of chemo and RTx with remission followed by disease recurrent in 12/2016. Pt currently on chemotherapy and tamoxifen (which has been held since prior hospitalization, w/ plans to resume following resolution of sepsis infection).   - consult heme/onc team in the a.m.   - trend CBC w/ diff and monitor for fevers

## 2017-06-19 NOTE — CONSULT NOTE ADULT - PROBLEM SELECTOR RECOMMENDATION 2
Chemotherapy on hold pending clinical recovery and resolution of infection.  Monitor CBC with diff daily to monitor ANC. No evidence of neutropenia at this time.   No need for any transfusions at this time

## 2017-06-19 NOTE — H&P ADULT - NSHPREVIEWOFSYSTEMS_GEN_ALL_CORE
CONSTITUTIONAL: No weakness, fevers or chills  EYES/ENT: No visual changes;  No dysphagia  NECK: No pain or stiffness  RESPIRATORY: No cough, wheezing, hemoptysis; No shortness of breath  CARDIOVASCULAR: No chest pain or palpitations; No lower extremity edema  GASTROINTESTINAL: No abdominal or epigastric pain. No nausea, vomiting, or hematemesis; No diarrhea or constipation. No melena or hematochezia.  GENITOURINARY: No dysuria, frequency or hematuria  NEUROLOGICAL: No numbness or weakness  SKIN: No itching, burning, rashes, or lesions   All other review of systems is negative unless indicated above. CONSTITUTIONAL: + weakness, + fevers and chills  EYES/ENT: No visual changes;  No dysphagia  NECK: No pain or stiffness  RESPIRATORY: + cough, no wheezing, no hemoptysis; No shortness of breath, no BABCOCK  CARDIOVASCULAR: No chest pain or palpitations; No lower extremity edema  GASTROINTESTINAL: + decreased appetite, no abdominal or epigastric pain. No nausea, vomiting, or hematemesis; No diarrhea or constipation. No melena or hematochezia.  GENITOURINARY: No dysuria, frequency or hematuria  NEUROLOGICAL: No numbness or weakness  SKIN: No itching, burning, rashes, or lesions   All other review of systems is negative unless indicated above. CONSTITUTIONAL: + weakness, + fevers and chills  EYES/ENT: No visual changes;  No dysphagia  NECK: No pain or stiffness  RESPIRATORY: + cough, no wheezing, no hemoptysis; No shortness of breath, no BABCOCK  CARDIOVASCULAR: No chest pain or palpitations; No lower extremity edema  GASTROINTESTINAL: + decreased appetite, no abdominal or epigastric pain. No nausea, vomiting, or hematemesis; No diarrhea or constipation. No melena or hematochezia.  GENITOURINARY: No dysuria, frequency or hematuria  NEUROLOGICAL: No numbness or weakness  SKIN: No itching, burning, rashes, or lesions   ALLERGY: + allergy to meds  All other review of systems is negative unless indicated above.

## 2017-06-19 NOTE — H&P ADULT - NSHPPHYSICALEXAM_GEN_ALL_CORE
GENERAL: No acute distress, well-developed  HEAD:  Atraumatic, Normocephalic  ENT: EOMI, PERRLA, conjunctiva and sclera clear, Neck supple, No JVD, moist mucosa, no pharynageal erythema, no tonsillar enlargement or exudate  CHEST/LUNG: Clear to auscultation bilaterally; No wheeze, equal breath sounds bilaterally   HEART: Regular rate and rhythm; No murmurs, rubs, or gallops  ABDOMEN: Soft, Nontender, Nondistended; Bowel sounds present, no organomegaly  EXTREMITIES:  No clubbing, cyanosis, or edema  PSYCH: Nl behavior, nl affect  NEUROLOGY: AAOx3, non-focal, cranial nerves intact  SKIN: Normal color, No rashes or lesions GENERAL: middle-aged F , resting comfortably, in no apparent distress,  well-developed  HEAD:  Atraumatic, Normocephalic  ENT: PERRLA, conjunctiva and sclera clear, Neck supple, moist mucosa, no pharynageal erythema  CHEST/LUNG: coarse bibasilar rhochi, good aeration bilaterally, no crackles/rales/wheezes, no increased WOB, no intercostal retractions   HEART: Regular rate and rhythm; No murmurs, rubs, or gallops  ABDOMEN: Soft, Nontender, Nondistended; Bowel sounds present, no organomegaly  EXTREMITIES:  No clubbing, cyanosis, or edema; mediport intact on right upper chest wall, no surrounding edema/erythema, no tenderness on palpation   PSYCH: Nl behavior, nl affect  NEUROLOGY: AAOx3, non-focal, cranial nerves intact  SKIN: Normal color, No rashes or lesions GENERAL: middle-aged obese F , resting comfortably, in no apparent distress,  well-developed  HEAD:  Atraumatic, Normocephalic  EYES: EOMI, conjunctiva and sclera clear,   NECK: supple,   MOUTH: moist mucosa, no pharynageal erythema  CHEST/LUNG: coarse bibasilar rhonchi, good aeration bilaterally, no crackles/rales/wheezes, no increased WOB, no intercostal retractions   HEART: Regular rate and rhythm; s1s2+  ABDOMEN: Soft, Nontender, Nondistended; Bowel sounds present, no organomegaly  EXTREMITIES:  No clubbing, cyanosis, or edema; mediport intact on right upper chest wall, no surrounding edema/erythema, no tenderness on palpation   PSYCH: Nl behavior, nl affect  NEUROLOGY: AAOx3, moves all extremities, cranial nerves intact  SKIN: Normal color, No rashes or lesions

## 2017-06-19 NOTE — H&P ADULT - HISTORY OF PRESENT ILLNESS
66 F w/ breast cancer  w/ recurrence (initially dx in 2003 (infiltrating lobular carcinoma, ER/[place] +, HER-2/marvin -, s/p lumpectomy and chemotherapy with 4 cycles of adriamycin cytoxan followed by radation therapy) w/ disease recurrence in 12/2016 (on chemo and taxol weekly for 12 weeks, last chemo on 6/2014) along with HTN,  presenting from home with fever (Tm of 103), nonproductive cough and generalized fatigue for the past day. Has been taking  tylenol at home.   Receieved chemo via a R upper chest wall mediport.   Recent admission to [facility] (5/18-5/25/17) for psuedomonas septicemia (of unclear source) and found to have a protal vein thrombosis , currently on lovenox 180mg qd; pt d/c'ed to rehab with IV antibiotics via PICC line and finished PO ciprofloxacin 3 days prior (6/15/17). Pt had recent admission priro to that (4/19-4/25/17) for bacteremia 2/2 mediport infection; per pt, meidport was not removed or exchanged.   Pt denies any associated CP, no palpitations, no SOB/BABCOCK. Denies any abdominal pain, nausea/vomiting/diarrhea no myalgias/arthralgias, no dysuria/urgency/frequency or hematuria.   Of note, recent TTE (performed 4/18/17) revealed: normal LV systolic function, EF of 60%, normal RV systolic function and minimal mitral regurgitation.     In the ED, VS: T: 103 (Tm: 103.1 orally), HR: 122-127, BP: 80/43 --> 143/93, RR: 20, SpO2: 94% on RA, 95% on 2L NS. In the ED, pt recieved: tylenol 1gm IV x 1, cefepime 2gm IV x 1, levofloxacin 750mg IV x 1, vancomycin 1gm IV x 1 and a 500cc NS bolus x 1.    Labs on admission: WBC of 5.6 (94% neutrophils), Hgb of 11 (baseline ~ 8), INR of 1.19. RVP negative.     CXR: low lung volumes limiting evaluation, no gross focal consolidations.  CT chest : performed, read pending. 66 F w/ breast CA (dx in 2003 (infiltrating lobular carcinoma, ER/[place] +, HER-2/marvin -) s/p lumpectomy and chemotherapy with 4 cycles of adriamycin cytoxan followed by radation therapy) w/ disease recurrence in 12/2016 (on chemo and taxol weekly for 12 weeks, last chemo on 6/2014 via R upper chest wall mediport) along with HTN presenting from home with fever (Tm of 103), nonproductive cough and generalized fatigue for the past day. Has been taking  tylenol at home with only modest improvement prompting her to come to ER for further evaluation. Pt felt fine following her last dose of chemotherapy last Wednesday; this morning, however, she woke up with generalized malaise and fatigue prompting her to come to ER for further evaluation. Pt denies any associated shaking rigors, no CP, no palpitations, no SOB/BABCOCK, denies any abdominal pain, no nausea/vomiting/diarrhea no dysuria, denies increased frequency/urgency or hematuria. Pt does admit to decreased PO intake 2/2 poor appetite which she attributes to her chemotherapy. No sick contacts, no recent travel.      Recent admission to [facility] (5/18-5/25/17) for pseudomonas septicemia (of unclear source) and found to have a portal vein thrombosis , currently on Lovenox 180mg qd; pt d/c'ed to rehab with IV antibiotics via PICC line and finished PO ciprofloxacin 3 days prior (6/15/17). Pt had recent admission prior to that (4/19-4/25/17) for bacteremia 2/2 Mediport infection; per pt, Mediport was not removed or exchanged.   Pt denies any associated CP, no palpitations, no SOB/BABCOCK. Denies any abdominal pain, nausea/vomiting/diarrhea no myalgias/arthralgias, no dysuria/urgency/frequency or hematuria.     Of note, recent TTE (performed 4/18/17) revealed: normal LV systolic function, EF of 60%, normal RV systolic function and minimal mitral regurgitation.     In the ED, VS: T: 103 (Tm: 103.1 orally), HR: 122-127, BP: 80/43 --> 143/93, RR: 20, SpO2: 94% on RA, 95% on 2L NS. In the ED, pt recieved: tylenol 1gm IV x 1, cefepime 2gm IV x 1, levofloxacin 750mg IV x 1, vancomycin 1gm IV x 1 and a 500cc NS bolus x 1.    Labs on admission: WBC of 5.6 (94% neutrophils), Hgb of 11 (baseline ~ 8), INR of 1.19. RVP negative.     CXR: low lung volumes limiting evaluation, no gross focal consolidations.  CT chest : performed, read pending. 66 F w/ breast CA (dx in 2003 (infiltrating lobular carcinoma, ER/[place] +, HER-2/marvin -) s/p lumpectomy and chemotherapy with 4 cycles of adriamycin cytoxan followed by radation therapy) w/ disease recurrence in 12/2016 (on chemo and taxol weekly for 12 weeks, last chemo on 6/2014 via R upper chest wall mediport) along with HTN presenting from home with fever (Tm of 103), nonproductive cough and generalized fatigue for the past day. Has been taking  tylenol at home with only modest improvement prompting her to come to ER for further evaluation. Pt felt fine following her last dose of chemotherapy last Wednesday; this morning, however, she woke up with generalized malaise and fatigue prompting her to come to ER for further evaluation. Pt denies any associated shaking rigors, no CP, no palpitations, no SOB/BABCOCK, denies any abdominal pain, no nausea/vomiting/diarrhea no dysuria, denies increased frequency/urgency or hematuria. Pt does admit to decreased PO intake 2/2 poor appetite which she attributes to her chemotherapy. No sick contacts, no recent travel.      Recent admission to [facility] (5/18-5/25/17) for pseudomonas septicemia (of unclear source) and found to have a portal vein thrombosis , currently on Lovenox 180mg qd; pt d/c'ed to rehab with IV antibiotics via PICC line and finished PO ciprofloxacin 3 days prior (6/15/17). Pt had recent admission prior to that (4/19-4/25/17) for bacteremia 2/2 Mediport infection; per pt, Mediport was not removed or exchanged.     Of note, recent TTE (performed 4/18/17) revealed: normal LV systolic function, EF of 60%, normal RV systolic function and minimal mitral regurgitation.     In the ED, VS: T: 103 (Tm: 103.1 orally), HR: 122-127, BP: 80/43 --> 143/93, RR: 20, SpO2: 94% on RA, 95% on 2L NS. In the ED, pt recieved: tylenol 1gm IV x 1, cefepime 2gm IV x 1, levofloxacin 750mg IV x 1, vancomycin 1gm IV x 1 and a 500cc NS bolus x 1.    Labs on admission: WBC of 5.6 (94% neutrophils), Hgb of 11 (baseline ~ 8), INR of 1.19. RVP negative.     CXR: low lung volumes limiting evaluation, no gross focal consolidations.  CT chest : performed, read pending.

## 2017-06-19 NOTE — PROGRESS NOTE ADULT - SUBJECTIVE AND OBJECTIVE BOX
CC:     HPI:     Vital Signs Last 24 Hrs  T(C): 37.5, Max: 39.5 (06-18 @ 20:03)  T(F): 99.5, Max: 103.1 (06-18 @ 20:03)  HR: 114 (114 - 130)  BP: 119/63 (80/43 - 159/90)  BP(mean): --  RR: 18 (18 - 20)  SpO2: 94% (94% - 100%)                          9.1    4.20  )-----------( 240      ( 19 Jun 2017 07:40 )             27.5     06-19    137  |  100  |  11  ----------------------------<  108<H>  3.3<L>   |  22  |  0.77    Ca    8.5      19 Jun 2017 07:44  Phos  2.7     06-19  Mg     1.4     06-19    TPro  7.7  /  Alb  3.5  /  TBili  0.7  /  DBili  x   /  AST  39  /  ALT  45  /  AlkPhos  77  06-18

## 2017-06-19 NOTE — H&P ADULT - PROBLEM SELECTOR PLAN 3
CT abd/pelvis with contrast during prior admission (5/20/17) showed portal vein thrombosis  involving the medial left and anterior right hepatic lobes. during last hospitalization, pt initially started on heparin gtt then transitioned to Lovenox 180mg qd, which she is currently still on at home.   - c/w lovenox 180SQ qd for now CT abd/pelvis with contrast during prior admission (5/20/17) showed portal vein thrombosis  involving the medial left and anterior right hepatic lobes. during last hospitalization, pt initially started on heparin gtt then transitioned to Lovenox 180mg qd, which she is currently still on at home.   - will treat with lovenox 180SQ qd for now

## 2017-06-19 NOTE — H&P ADULT - NSHPSOCIALHISTORY_GEN_ALL_CORE
Pt lives alone. She does not have  HHA. Her family (siblings live near by). Denies any h/o cigarette smoking, no EtOH or illicit drug use. single, lives alone. currently not working.  She does not have  HHA. Her family (siblings live near by).   Denies any h/o cigarette smoking, no EtOH or illicit drug use.

## 2017-06-19 NOTE — H&P ADULT - ASSESSMENT
66 F w/ breast CA on chemo and taxol (last chemo 6/14/17) , HTN and recent pseudomonas septicemia of unclear source presenting from home with fever, nonproductive cough and generalized fatigue x 1 day, admitted with sepsis likely 2/2 HCAP , awaiting chest CT for further evaluation. 66 F w/ breast CA on chemo and taxol (last chemo 6/14/17) , HTN and recent pseudomonas septicemia of unclear source presenting from home with fever, nonproductive cough and generalized fatigue x 1 day, admitted with sepsis likely due to HCAP, awaiting chest CT for further evaluation.

## 2017-06-19 NOTE — H&P ADULT - FAMILY HISTORY
Sibling  Still living? Unknown  Family history of breast cancer, Age at diagnosis: Age Unknown  Family history of breast cancer, Age at diagnosis: Age Unknown

## 2017-06-19 NOTE — CONSULT NOTE ADULT - PROBLEM SELECTOR RECOMMENDATION 9
CT chest suggest infectious process. BCx pending.  Agree with broad spectrum coverage.   IVF as needed  Monitor vitals

## 2017-06-19 NOTE — CONSULT NOTE ADULT - ASSESSMENT
66 F w/ recurrent breast ca currently on chemotherapy, c/b recurrent admission for sepsis, currently admitted with fever, likely 2/2 PNA

## 2017-06-19 NOTE — H&P ADULT - NSHPLABSRESULTS_GEN_ALL_CORE
Labs on admission:     I have personally reviewed all lab work.   I have personally reviewed all imaging.   I have personally reviewed the EKG.                         11.0   5.6   )-----------( 244      ( 18 Jun 2017 20:15 )             32.4     132<L>  |  93<L>  |  12  ----------------------------<  121<H>  3.8   |  23  |  0.73    Ca    8.9      18 Jun 2017 20:15    TPro  7.7  /  Alb  3.5  /  TBili  0.7  /  DBili  x   /  AST  39  /  ALT  45  /  AlkPhos  77  06-18  PT/INR - ( 18 Jun 2017 20:15 )   PT: 13.0 sec;   INR: 1.19 ratio    PTT - ( 18 Jun 2017 20:15 )  PTT:31.5 sec      CXR: low lung volumes limiting evaluation, no gross focal consolidations.  CT chest : performed, read pending.      Lactate Trend    Culture Results:   No growth at 5 days. (05-20 @ 15:47)  Culture Results:   No growth at 5 days. (05-20 @ 10:45) Labs on admission:     I have personally reviewed all lab work.   I have personally reviewed all imaging.   I have personally reviewed the EKG.                         11.0   5.6   )-----------( 244      ( 18 Jun 2017 20:15 )             32.4     132<L>  |  93<L>  |  12  ----------------------------<  121<H>  3.8   |  23  |  0.73    Ca    8.9      18 Jun 2017 20:15    TPro  7.7  /  Alb  3.5  /  TBili  0.7  /  DBili  x   /  AST  39  /  ALT  45  /  AlkPhos  77  06-18  PT/INR - ( 18 Jun 2017 20:15 )   PT: 13.0 sec;   INR: 1.19 ratio    PTT - ( 18 Jun 2017 20:15 )  PTT:31.5 sec    Culture Results:   No growth at 5 days. (05-20 @ 15:47)  Culture Results:   No growth at 5 days. (05-20 @ 10:45)      CXR: low lung volumes limiting evaluation, no gross focal consolidations.  CT chest : prelim read - tree in bud opacities in the RLL concerning for bronchiolectatic changes or atelectasis but no focal consolidations; formal read pending Labs on admissions personally reviewed:                        11.0   5.6   )-----------( 244      ( 18 Jun 2017 20:15 )             32.4     132<L>  |  93<L>  |  12  ----------------------------<  121<H>  3.8   |  23  |  0.73    Ca    8.9      18 Jun 2017 20:15    TPro  7.7  /  Alb  3.5  /  TBili  0.7  /  DBili  x   /  AST  39  /  ALT  45  /  AlkPhos  77  06-18  PT/INR - ( 18 Jun 2017 20:15 )   PT: 13.0 sec;   INR: 1.19 ratio    PTT - ( 18 Jun 2017 20:15 )  PTT:31.5 sec    Culture Results:   No growth at 5 days. (05-20 @ 15:47)  Culture Results:   No growth at 5 days. (05-20 @ 10:45)      CXR film personally reviewed: low lung volumes limiting evaluation, no gross focal consolidations.  CT chest : prelim read - tree in bud opacities in the RLL concerning for bronchiolectatic changes or atelectasis but no focal consolidations; formal read pending    EKG tracing personally reviewed:

## 2017-06-19 NOTE — CONSULT NOTE ADULT - SUBJECTIVE AND OBJECTIVE BOX
66 F w/ breast CA (dx in 2003 (infiltrating lobular carcinoma, ER/AK +, HER-2/marvin -) s/p lumpectomy and chemotherapy with 4 cycles of adriamycin cytoxan followed by radation therapy) w/ disease recurrence in 12/2016 (s/p 3 cycles of adriamycin/cytoxan, now on weekly taxol, first dose on 6/14/17). Most recent treatment course has been complicated by recurrent episodes of sepsis 2/2 bacteremia. Pt now p/w fever (Tm of 103), nonproductive cough and generalized fatigue for the past day. + generalized weakness. No rigors, no CP, no palpitations, no SOB/BABCOCK, denies any abdominal pain, no nausea/vomiting/diarrhea no dysuria, denies increased frequency/urgency or hematuria. Pt does admit to decreased PO intake 2/2 poor appetite which she attributes to her chemotherapy. No sick contacts, no recent travel.      Recent admission to [facility] (5/18-5/25/17) for pseudomonas septicemia (of unclear source) and found to have a portal vein thrombosis , currently on Lovenox 180mg qd; pt d/c'ed to rehab with IV antibiotics via PICC line and finished PO ciprofloxacin 3 days prior (6/15/17). Pt had recent admission prior to that (4/19-4/25/17) for bacteremia 2/2 Mediport infection; per pt, Mediport was not removed or exchanged.     In the ED found to be febrile to 103, received: tylenol 1gm IV x 1, cefepime 2gm IV x 1, levofloxacin 750mg IV x 1, vancomycin 1gm IV x 1 and a 500cc NS bolus x 1.    ROS: as above. All others are negative.       PAST MEDICAL & SURGICAL HISTORY:  HTN (hypertension)  Breast cancer  S/P breast lumpectomy      SOCIAL HISTORY: no alcohol, no drug use, lives with     FAMILY HISTORY:  Family history of breast cancer - first cousin   Family history of breast cancer - sister    MEDICATIONS  (STANDING):  vancomycin  IVPB 1000milliGRAM(s) IV Intermittent every 12 hours  enoxaparin Injectable 180milliGRAM(s) SubCutaneous daily  escitalopram 10milliGRAM(s) Oral daily  buDESOnide 160 MICROgram(s)/formoterol 4.5 MICROgram(s) Inhaler 1Puff(s) Inhalation two times a day  cefepime  IVPB 2000milliGRAM(s) IV Intermittent every 8 hours  vancomycin  IVPB 1250milliGRAM(s) IV Intermittent every 12 hours  levoFLOXacin IVPB 750milliGRAM(s) IV Intermittent every 24 hours  sodium chloride 0.9%. 2000milliLiter(s) IV Continuous <Continuous>    MEDICATIONS  (PRN):  acetaminophen   Tablet 650milliGRAM(s) Oral every 6 hours PRN For Temp greater than 38 C (100.4 F)      Allergies    Pen-Vee K (Rash)  penicillin (Unknown)    Intolerances        Vital Signs Last 24 Hrs  T(C): 37.5, Max: 39.5 (06-18 @ 20:03)  T(F): 99.5, Max: 103.1 (06-18 @ 20:03)  HR: 114 (114 - 130)  BP: 119/63 (80/43 - 159/90)  BP(mean): --  RR: 18 (18 - 20)  SpO2: 94% (94% - 100%)    PHYSICAL EXAM  General: adult in NAD  HEENT: clear oropharynx, anicteric sclera, pink conjunctiva  Neck: supple  CV: normal S1/S2 with no murmur rubs or gallops  Lungs: positive air movement b/l ant lungs,clear to auscultation, no wheezes, no rales  Abdomen: soft non-tender non-distended, no hepatosplenomegaly  Ext: no clubbing cyanosis or edema  Skin: no rashes and no petechiae  Neuro: alert and oriented X 4, no focal deficits      LABS:                          9.1    4.20  )-----------( 240      ( 19 Jun 2017 07:40 )             27.5         Mean Cell Volume : 94.5 fl  Mean Cell Hemoglobin : 31.3 pg  Mean Cell Hemoglobin Concentration : 33.1 gm/dL  Auto Neutrophil # : 3.70 K/uL  Auto Lymphocyte # : 0.33 K/uL  Auto Monocyte # : 0.11 K/uL  Auto Eosinophil # : 0.01 K/uL  Auto Basophil # : 0.01 K/uL  Auto Neutrophil % : 88.1 %  Auto Lymphocyte % : 7.9 %  Auto Monocyte % : 2.6 %  Auto Eosinophil % : 0.2 %  Auto Basophil % : 0.2 %      Serial CBC's  06-19 @ 07:40  Hct-27.5 / Hgb-9.1 / Plat-240 / RBC-2.91 / WBC-4.20  Serial CBC's  06-18 @ 20:15  Hct-32.4 / Hgb-11.0 / Plat-244 / RBC-3.28 / WBC-5.6      06-19    137  |  100  |  11  ----------------------------<  108<H>  3.3<L>   |  22  |  0.77    Ca    8.5      19 Jun 2017 07:44  Phos  2.7     06-19  Mg     1.4     06-19    TPro  7.7  /  Alb  3.5  /  TBili  0.7  /  DBili  x   /  AST  39  /  ALT  45  /  AlkPhos  77  06-18      PT/INR - ( 18 Jun 2017 20:15 )   PT: 13.0 sec;   INR: 1.19 ratio         PTT - ( 18 Jun 2017 20:15 )  PTT:31.5 sec      RADIOLOGY & ADDITIONAL STUDIES: EXAM:  CT CHEST                            PROCEDURE DATE:  06/18/2017            INTERPRETATION:  CLINICAL INFORMATION: Cough. Fever. History of breast   cancer.    PROCEDURE:   CT of the Chest was performed without intravenous contrast.   Intravenous contrast: None.    Reconstructions were performed in axial thin, axial maximum intensity   projection, sagittal and coronal planes.    COMPARISON: Frontal radiograph of the chest 5/25/2017. CT abdomen and   pelvis 5/19/2017. PET/CT of January 24,2017.    FINDINGS:    LUNGS AND LARGE AIRWAYS: Patent central airways. Cluster of small nodular   opacities or tree-in-bud representing impacted distal airways within the   right lower lobe given superimposed respiratory motion artifact. There 2   adjacent 2 mm right upper lobe pulmonary nodule on image 33 of series 2   which may also be related to foci of impacted distal airways..  PLEURA: No pleural effusion.  VESSELS: Mild atherosclerotic calcifications of the aorta. Right IJ   central line with tip in the SVC.  HEART: Heart size is normal. No pericardial effusion.  MEDIASTINUM AND ZOE: There are a few small subcentimeter right axillary   lymph nodes with mild overall interval decrease in size since January 24, 2017. For reference thereis a right axillary lymph node on image 7 of   series 2 measuring about 6 mm which previously measured about 1.1 cm.   Small nonspecific mediastinal lymph nodes are noted with the largest in   the subcarinal location measuring about 9 mm in short axis with slight   interval increase in size..  CHEST WALL AND LOWER NECK: Previously noted hypermetabolic right breast   mass is predominantly unchanged since January 24, 2017. There is a 2 cm   fluid collection within the left breast which is predominantly unchanged.   Superimposed left breast thickening is again noted.. 1.7 cm hypodense   nodule within the left lobe of the thyroid gland is unchanged since   January 24, 2017.  UPPER ABDOMEN: Cholelithiasis. Hepatic steatosis. Moderate hiatal hernia.  BONES: Degenerative changes of the spine. Previously noted hypermetabolic   focus within the T4 vertebral bodies not clearly appreciated on this CT.    IMPRESSION:    Tree-in-bud opacities or 2 mm nodules within the right lower lobe   representing impacted distal airways which may be of infectious etiology.   A three-month follow-up CT can be performed to ensure resolution. Small   mediastinal lymph nodes with slight interval increase in size may be   reactive. This can be monitored on the follow-up CT.    Subcentimeter right axillary lymph nodes with interval decrease in size.                ISIAH BECK M.D., RADIOLOGY RESIDENT  This document has been electronically signed.  DORITA KERNS M.D. ATTENDING RADIOLOGIST  This document has been electronically signed. Jun 19 2017 10:22AM 66 F w/ breast CA (dx in 2003 (infiltrating lobular carcinoma, ER/ME +, HER-2/marvin -) s/p lumpectomy and chemotherapy with 4 cycles of adriamycin cytoxan followed by radation therapy) w/ disease recurrence in 12/2016 (s/p 3 cycles of adriamycin/cytoxan, now on weekly taxol, first dose on 6/14/17). Most recent treatment course has been complicated by recurrent episodes of sepsis 2/2 bacteremia. Pt now p/w fever (Tm of 103), nonproductive cough and generalized fatigue for the past day. + generalized weakness. No rigors, no CP, no palpitations, no SOB/BABCOCK, denies any abdominal pain, no nausea/vomiting/diarrhea no dysuria, denies increased frequency/urgency or hematuria. Pt does admit to decreased PO intake 2/2 poor appetite which she attributes to her chemotherapy. No sick contacts, no recent travel.      Recent admission to [facility] (5/18-5/25/17) for pseudomonas septicemia (of unclear source) and found to have a portal vein thrombosis , currently on Lovenox 180mg qd; pt d/c'ed to rehab with IV antibiotics via PICC line and finished PO ciprofloxacin 3 days prior (6/15/17). Pt had recent admission prior to that (4/19-4/25/17) for bacteremia 2/2 Mediport infection; per pt, Mediport was not removed or exchanged.     In the ED found to be febrile to 103, received: tylenol 1gm IV x 1, cefepime 2gm IV x 1, levofloxacin 750mg IV x 1, vancomycin 1gm IV x 1 and a 500cc NS bolus x 1.    ROS: as above. All others are negative.       PAST MEDICAL & SURGICAL HISTORY:  HTN (hypertension)  Breast cancer  S/P breast lumpectomy      SOCIAL HISTORY: no alcohol, no drug use, lives with     FAMILY HISTORY:  Family history of breast cancer - first cousin   Family history of breast cancer - sister    MEDICATIONS  (STANDING):  vancomycin  IVPB 1000milliGRAM(s) IV Intermittent every 12 hours  enoxaparin Injectable 180milliGRAM(s) SubCutaneous daily  escitalopram 10milliGRAM(s) Oral daily  buDESOnide 160 MICROgram(s)/formoterol 4.5 MICROgram(s) Inhaler 1Puff(s) Inhalation two times a day  cefepime  IVPB 2000milliGRAM(s) IV Intermittent every 8 hours  vancomycin  IVPB 1250milliGRAM(s) IV Intermittent every 12 hours  levoFLOXacin IVPB 750milliGRAM(s) IV Intermittent every 24 hours  sodium chloride 0.9%. 2000milliLiter(s) IV Continuous <Continuous>    MEDICATIONS  (PRN):  acetaminophen   Tablet 650milliGRAM(s) Oral every 6 hours PRN For Temp greater than 38 C (100.4 F)      Allergies    Pen-Vee K (Rash)  penicillin (Unknown)    Intolerances        Vital Signs Last 24 Hrs  T(C): 37.5, Max: 39.5 (06-18 @ 20:03)  T(F): 99.5, Max: 103.1 (06-18 @ 20:03)  HR: 114 (114 - 130)  BP: 119/63 (80/43 - 159/90)  BP(mean): --  RR: 18 (18 - 20)  SpO2: 94% (94% - 100%)    PHYSICAL EXAM  General: adult in NAD  HEENT: clear oropharynx, anicteric sclera, pink conjunctiva  Neck: supple  CV: normal S1/S2 with no murmur rubs or gallops  Lungs: positive air movement b/l ant lungs,scattered rhonchi b/l  Abdomen: soft non-tender non-distended, no hepatosplenomegaly  Ext: no clubbing cyanosis or edema  Skin: no rashes and no petechiae  Neuro: alert and oriented X 4, no focal deficits      LABS:                          9.1    4.20  )-----------( 240      ( 19 Jun 2017 07:40 )             27.5         Mean Cell Volume : 94.5 fl  Mean Cell Hemoglobin : 31.3 pg  Mean Cell Hemoglobin Concentration : 33.1 gm/dL  Auto Neutrophil # : 3.70 K/uL  Auto Lymphocyte # : 0.33 K/uL  Auto Monocyte # : 0.11 K/uL  Auto Eosinophil # : 0.01 K/uL  Auto Basophil # : 0.01 K/uL  Auto Neutrophil % : 88.1 %  Auto Lymphocyte % : 7.9 %  Auto Monocyte % : 2.6 %  Auto Eosinophil % : 0.2 %  Auto Basophil % : 0.2 %      Serial CBC's  06-19 @ 07:40  Hct-27.5 / Hgb-9.1 / Plat-240 / RBC-2.91 / WBC-4.20  Serial CBC's  06-18 @ 20:15  Hct-32.4 / Hgb-11.0 / Plat-244 / RBC-3.28 / WBC-5.6      06-19    137  |  100  |  11  ----------------------------<  108<H>  3.3<L>   |  22  |  0.77    Ca    8.5      19 Jun 2017 07:44  Phos  2.7     06-19  Mg     1.4     06-19    TPro  7.7  /  Alb  3.5  /  TBili  0.7  /  DBili  x   /  AST  39  /  ALT  45  /  AlkPhos  77  06-18      PT/INR - ( 18 Jun 2017 20:15 )   PT: 13.0 sec;   INR: 1.19 ratio         PTT - ( 18 Jun 2017 20:15 )  PTT:31.5 sec      RADIOLOGY & ADDITIONAL STUDIES: EXAM:  CT CHEST                            PROCEDURE DATE:  06/18/2017            INTERPRETATION:  CLINICAL INFORMATION: Cough. Fever. History of breast   cancer.    PROCEDURE:   CT of the Chest was performed without intravenous contrast.   Intravenous contrast: None.    Reconstructions were performed in axial thin, axial maximum intensity   projection, sagittal and coronal planes.    COMPARISON: Frontal radiograph of the chest 5/25/2017. CT abdomen and   pelvis 5/19/2017. PET/CT of January 24,2017.    FINDINGS:    LUNGS AND LARGE AIRWAYS: Patent central airways. Cluster of small nodular   opacities or tree-in-bud representing impacted distal airways within the   right lower lobe given superimposed respiratory motion artifact. There 2   adjacent 2 mm right upper lobe pulmonary nodule on image 33 of series 2   which may also be related to foci of impacted distal airways..  PLEURA: No pleural effusion.  VESSELS: Mild atherosclerotic calcifications of the aorta. Right IJ   central line with tip in the SVC.  HEART: Heart size is normal. No pericardial effusion.  MEDIASTINUM AND ZOE: There are a few small subcentimeter right axillary   lymph nodes with mild overall interval decrease in size since January 24, 2017. For reference thereis a right axillary lymph node on image 7 of   series 2 measuring about 6 mm which previously measured about 1.1 cm.   Small nonspecific mediastinal lymph nodes are noted with the largest in   the subcarinal location measuring about 9 mm in short axis with slight   interval increase in size..  CHEST WALL AND LOWER NECK: Previously noted hypermetabolic right breast   mass is predominantly unchanged since January 24, 2017. There is a 2 cm   fluid collection within the left breast which is predominantly unchanged.   Superimposed left breast thickening is again noted.. 1.7 cm hypodense   nodule within the left lobe of the thyroid gland is unchanged since   January 24, 2017.  UPPER ABDOMEN: Cholelithiasis. Hepatic steatosis. Moderate hiatal hernia.  BONES: Degenerative changes of the spine. Previously noted hypermetabolic   focus within the T4 vertebral bodies not clearly appreciated on this CT.    IMPRESSION:    Tree-in-bud opacities or 2 mm nodules within the right lower lobe   representing impacted distal airways which may be of infectious etiology.   A three-month follow-up CT can be performed to ensure resolution. Small   mediastinal lymph nodes with slight interval increase in size may be   reactive. This can be monitored on the follow-up CT.    Subcentimeter right axillary lymph nodes with interval decrease in size.                ISIAH BECK M.D., RADIOLOGY RESIDENT  This document has been electronically signed.  DORITA KERNS M.D. ATTENDING RADIOLOGIST  This document has been electronically signed. Jun 19 2017 10:22AM

## 2017-06-19 NOTE — H&P ADULT - PROBLEM SELECTOR PLAN 2
Pt admitted with sepsis presumed to be 2/2 HCAP . Recent hospitalization 5/18-5/25/17 for pseudomonal septicemia of unclear source. Pt completed roughly 4 weeks of antibiotics (2 wks of IV cefepime through 6/1, then 2 weeks of Ciprofloxacin though 6/15/17). Concern for sepsis 2/2 HCAP given clinical symptoms; tachycardial and fever on admission- sepsis presumed to be 2/2 HCAP . Recent hospitalization 5/18-5/25/17 for pseudomonal septicemia of unclear source. Pt completed roughly 4 weeks of antibiotics (2 wks of IV cefepime through 6/1, then 2 weeks of Ciprofloxacin though 6/15/17). Concern for sepsis 2/2 HCAP given clinical symptoms  - will treat with IV Abx as above and IVF

## 2017-06-20 LAB
ANION GAP SERPL CALC-SCNC: 17 MMOL/L — SIGNIFICANT CHANGE UP (ref 5–17)
BASOPHILS # BLD AUTO: 0.02 K/UL — SIGNIFICANT CHANGE UP (ref 0–0.2)
BASOPHILS NFR BLD AUTO: 0.6 % — SIGNIFICANT CHANGE UP (ref 0–2)
BUN SERPL-MCNC: 7 MG/DL — SIGNIFICANT CHANGE UP (ref 7–23)
CALCIUM SERPL-MCNC: 8.7 MG/DL — SIGNIFICANT CHANGE UP (ref 8.4–10.5)
CHLORIDE SERPL-SCNC: 99 MMOL/L — SIGNIFICANT CHANGE UP (ref 96–108)
CO2 SERPL-SCNC: 23 MMOL/L — SIGNIFICANT CHANGE UP (ref 22–31)
CREAT SERPL-MCNC: 0.57 MG/DL — SIGNIFICANT CHANGE UP (ref 0.5–1.3)
CULTURE RESULTS: NO GROWTH — SIGNIFICANT CHANGE UP
EOSINOPHIL # BLD AUTO: 0.15 K/UL — SIGNIFICANT CHANGE UP (ref 0–0.5)
EOSINOPHIL NFR BLD AUTO: 4.7 % — SIGNIFICANT CHANGE UP (ref 0–6)
GLUCOSE SERPL-MCNC: 97 MG/DL — SIGNIFICANT CHANGE UP (ref 70–99)
HCT VFR BLD CALC: 29.1 % — LOW (ref 34.5–45)
HGB BLD-MCNC: 9.6 G/DL — LOW (ref 11.5–15.5)
IMM GRANULOCYTES NFR BLD AUTO: 0.3 % — SIGNIFICANT CHANGE UP (ref 0–1.5)
LYMPHOCYTES # BLD AUTO: 0.59 K/UL — LOW (ref 1–3.3)
LYMPHOCYTES # BLD AUTO: 18.6 % — SIGNIFICANT CHANGE UP (ref 13–44)
MCHC RBC-ENTMCNC: 31.3 PG — SIGNIFICANT CHANGE UP (ref 27–34)
MCHC RBC-ENTMCNC: 33 GM/DL — SIGNIFICANT CHANGE UP (ref 32–36)
MCV RBC AUTO: 94.8 FL — SIGNIFICANT CHANGE UP (ref 80–100)
MONOCYTES # BLD AUTO: 0.21 K/UL — SIGNIFICANT CHANGE UP (ref 0–0.9)
MONOCYTES NFR BLD AUTO: 6.6 % — SIGNIFICANT CHANGE UP (ref 2–14)
NEUTROPHILS # BLD AUTO: 2.2 K/UL — SIGNIFICANT CHANGE UP (ref 1.8–7.4)
NEUTROPHILS NFR BLD AUTO: 69.2 % — SIGNIFICANT CHANGE UP (ref 43–77)
PLATELET # BLD AUTO: 262 K/UL — SIGNIFICANT CHANGE UP (ref 150–400)
POTASSIUM SERPL-MCNC: 3.1 MMOL/L — LOW (ref 3.5–5.3)
POTASSIUM SERPL-SCNC: 3.1 MMOL/L — LOW (ref 3.5–5.3)
RBC # BLD: 3.07 M/UL — LOW (ref 3.8–5.2)
RBC # FLD: 16.5 % — HIGH (ref 10.3–14.5)
SODIUM SERPL-SCNC: 139 MMOL/L — SIGNIFICANT CHANGE UP (ref 135–145)
SPECIMEN SOURCE: SIGNIFICANT CHANGE UP
WBC # BLD: 3.18 K/UL — LOW (ref 3.8–10.5)
WBC # FLD AUTO: 3.18 K/UL — LOW (ref 3.8–10.5)

## 2017-06-20 PROCEDURE — 99233 SBSQ HOSP IP/OBS HIGH 50: CPT

## 2017-06-20 PROCEDURE — 99232 SBSQ HOSP IP/OBS MODERATE 35: CPT

## 2017-06-20 RX ORDER — POTASSIUM CHLORIDE 20 MEQ
40 PACKET (EA) ORAL EVERY 4 HOURS
Qty: 0 | Refills: 0 | Status: COMPLETED | OUTPATIENT
Start: 2017-06-20 | End: 2017-06-20

## 2017-06-20 RX ORDER — SODIUM CHLORIDE 9 MG/ML
1000 INJECTION INTRAMUSCULAR; INTRAVENOUS; SUBCUTANEOUS
Qty: 0 | Refills: 0 | Status: DISCONTINUED | OUTPATIENT
Start: 2017-06-20 | End: 2017-06-21

## 2017-06-20 RX ADMIN — ESCITALOPRAM OXALATE 10 MILLIGRAM(S): 10 TABLET, FILM COATED ORAL at 11:41

## 2017-06-20 RX ADMIN — ENOXAPARIN SODIUM 180 MILLIGRAM(S): 100 INJECTION SUBCUTANEOUS at 11:40

## 2017-06-20 RX ADMIN — Medication 166.67 MILLIGRAM(S): at 06:05

## 2017-06-20 RX ADMIN — CEFEPIME 100 MILLIGRAM(S): 1 INJECTION, POWDER, FOR SOLUTION INTRAMUSCULAR; INTRAVENOUS at 21:07

## 2017-06-20 RX ADMIN — CEFEPIME 100 MILLIGRAM(S): 1 INJECTION, POWDER, FOR SOLUTION INTRAMUSCULAR; INTRAVENOUS at 14:11

## 2017-06-20 RX ADMIN — MAGNESIUM OXIDE 400 MG ORAL TABLET 400 MILLIGRAM(S): 241.3 TABLET ORAL at 17:07

## 2017-06-20 RX ADMIN — MAGNESIUM OXIDE 400 MG ORAL TABLET 400 MILLIGRAM(S): 241.3 TABLET ORAL at 08:12

## 2017-06-20 RX ADMIN — Medication 40 MILLIEQUIVALENT(S): at 11:41

## 2017-06-20 RX ADMIN — MAGNESIUM OXIDE 400 MG ORAL TABLET 400 MILLIGRAM(S): 241.3 TABLET ORAL at 11:41

## 2017-06-20 RX ADMIN — CEFEPIME 100 MILLIGRAM(S): 1 INJECTION, POWDER, FOR SOLUTION INTRAMUSCULAR; INTRAVENOUS at 06:04

## 2017-06-20 RX ADMIN — BUDESONIDE AND FORMOTEROL FUMARATE DIHYDRATE 1 PUFF(S): 160; 4.5 AEROSOL RESPIRATORY (INHALATION) at 06:05

## 2017-06-20 RX ADMIN — Medication 40 MILLIEQUIVALENT(S): at 15:06

## 2017-06-20 RX ADMIN — SODIUM CHLORIDE 75 MILLILITER(S): 9 INJECTION INTRAMUSCULAR; INTRAVENOUS; SUBCUTANEOUS at 19:13

## 2017-06-20 RX ADMIN — BUDESONIDE AND FORMOTEROL FUMARATE DIHYDRATE 1 PUFF(S): 160; 4.5 AEROSOL RESPIRATORY (INHALATION) at 17:08

## 2017-06-20 NOTE — PROGRESS NOTE ADULT - SUBJECTIVE AND OBJECTIVE BOX
Chief Complaint: breast ca    INTERVAL HPI/OVERNIGHT EVENTS: afebrile, feeling much better this am. cough improved.     MEDICATIONS  (STANDING):  enoxaparin Injectable 180milliGRAM(s) SubCutaneous daily  escitalopram 10milliGRAM(s) Oral daily  buDESOnide 160 MICROgram(s)/formoterol 4.5 MICROgram(s) Inhaler 1Puff(s) Inhalation two times a day  cefepime  IVPB 2000milliGRAM(s) IV Intermittent every 8 hours  levoFLOXacin IVPB 750milliGRAM(s) IV Intermittent every 24 hours  magnesium oxide 400milliGRAM(s) Oral three times a day with meals  sodium chloride 0.9%. 1000milliLiter(s) IV Continuous <Continuous>    MEDICATIONS  (PRN):  acetaminophen   Tablet 650milliGRAM(s) Oral every 6 hours PRN For Temp greater than 38 C (100.4 F)      Allergies    Pen-Vee K (Rash)  penicillin (Unknown)    Intolerances        ROS: as above     Vital Signs Last 24 Hrs  T(C): 36.9, Max: 36.9 ( @ 21:54)  T(F): 98.4, Max: 98.4 (-20 @ 21:54)  HR: 109 (105 - 109)  BP: 155/88 (138/80 - 155/88)  BP(mean): --  RR: 18 (18 - 18)  SpO2: 92% (91% - 92%)    Physical Exam:   AAO x 3, NAD   RRR S1S2  CTA b/l   soft NTNDBS+  no c/c/e    LABS:                        9.6    3.18  )-----------( 262      ( 2017 08:19 )             29.1     06-20    139  |  99  |  7   ----------------------------<  97  3.1<L>   |  23  |  0.57    Ca    8.7      2017 08:19  Phos  2.7       Mg     1.4             Urinalysis Basic - ( 2017 03:36 )    Color: PL Yellow / Appearance: Clear / S.007 / pH: x  Gluc: x / Ketone: Negative  / Bili: Negative / Urobili: Negative   Blood: x / Protein: Negative / Nitrite: Negative   Leuk Esterase: Negative / RBC: 0-2 /HPF / WBC 0-2 /HPF   Sq Epi: x / Non Sq Epi: OCC /HPF / Bacteria: x

## 2017-06-20 NOTE — DISCHARGE NOTE ADULT - MEDICATION SUMMARY - MEDICATIONS TO TAKE
I will START or STAY ON the medications listed below when I get home from the hospital:    valsartan 40 mg oral tablet  -- 1 tab(s) by mouth once a day  -- Indication: For HTN (hypertension)    enoxaparin  -- 180 milligram(s) subcutaneous once a day  -- Indication: For Portal vein thrombosis    escitalopram 10 mg oral tablet  -- 1 tab(s) by mouth once a day (at bedtime)  -- Indication: For Depression     metoprolol tartrate 50 mg oral tablet  -- 1 tab(s) by mouth 2 times a day  -- Indication: For HTN (hypertension)    Advair Diskus 250 mcg-50 mcg inhalation powder  -- 1 puff(s) inhaled 2 times a day  -- Indication: For Bronchodilator     Levaquin 500 mg oral tablet  -- 1 tab(s) by mouth every 24 hours for 5 more days to complete 6/25/2017  -- Avoid prolonged or excessive exposure to direct and/or artificial sunlight while taking this medication.  Do not take dairy products, antacids, or iron preparations within one hour of this medication.  Finish all this medication unless otherwise directed by prescriber.  May cause drowsiness or dizziness.  Medication should be taken with plenty of water.    -- Indication: For Prophylactic measure

## 2017-06-20 NOTE — DISCHARGE NOTE ADULT - CARE PROVIDER_API CALL
Severo Dunaway), Hematology; Internal Medicine; Medical Oncology  [address] [place] [id number]  Phone: [phone]  Fax: [phone]

## 2017-06-20 NOTE — PROGRESS NOTE ADULT - SUBJECTIVE AND OBJECTIVE BOX
Patient is a 66y old  Female who presents with a chief complaint of fever, cough (20 Jun 2017 17:59)    HPI: Pt reports feeling significantly better. Afebrile overnight    Vital Signs Last 24 Hrs  T(C): 36.8, Max: 37.2 (06-19 @ 21:25)  T(F): 98.3, Max: 99 (06-19 @ 21:25)  HR: 107 (105 - 107)  BP: 138/80 (138/80 - 153/81)  BP(mean): --  RR: 18 (18 - 18)  SpO2: 92% (91% - 93%)    06-20    139  |  99  |  7   ----------------------------<  97  3.1<L>   |  23  |  0.57    Ca    8.7      20 Jun 2017 08:19  Phos  2.7     06-19  Mg     1.4     06-19    TPro  7.7  /  Alb  3.5  /  TBili  0.7  /  DBili  x   /  AST  39  /  ALT  45  /  AlkPhos  77  06-18                          9.6    3.18  )-----------( 262      ( 20 Jun 2017 08:19 )             29.1

## 2017-06-20 NOTE — DISCHARGE NOTE ADULT - HOSPITAL COURSE
By attending **To be completed by attending** 66 F w/ breast CA on Taxol (last 6/14/17), recent pseudomonas septicemia of unclear source pw fever, nonproductive cough and generalized fatigue x 1 day.     Hypotensive at 80/43 in the ER. Fever of 103 and sepsis present on admission.     Given aggressive iv fluids. Started on broad spectrum coverage with cefepime, levaquin and vancomycin.    CT chest revealed tree-in-bud opacities or 2 mm nodules within the right lower lobe representing impacted distal airways which may be of infectious etiology.     Pt continued on abx to treat presumed gram negative and gram positive bacterial pneumonia. BP improved, fever slowly defervesced. Blood cultures remained negative.     D/georgi on above abx.     Diagnoses: Sepsis; Hypotension; Bacterial pneumonia; Leukopenia due to chemotherapy; Breast cancer; Pre-existing portal vein thrombosis; Hypokalemia; Hypomagnesemia

## 2017-06-20 NOTE — DISCHARGE NOTE ADULT - PLAN OF CARE
Resolved Viral vs. Bacteria   - Please complete full course of oral Levaquin 500mg until 6/25/2014 Follow up with Dr. Garcia within 1 week for follow up with treatment Low salt diet  Activity as tolerated.  Take all medication as prescribed.  Follow up with your medical doctor for routine blood pressure monitoring at your next visit.  Notify your doctor if you have any of the following symptoms:   Dizziness, Lightheadedness, Blurry vision, Headache, Chest pain, Shortness of breath Viral vs. Bacteria   - Please complete full course of oral Levaquin 500mg until 6/25/2014  Pneumonia is a lung infection that can cause a fever, cough, and trouble breathing.  Nutrition is important, eat small frequent meals.  Get lots of rest and drink fluids.  Call your health care provider upon arrival home from hospital and make a follow up appointment for one week.  If your cough worsens, you develop fever greater than 101', you have shaking chills, a fast heartbeat, trouble breathing and/or feel your are breathing much faster than usual, call your healthcare provider.  Make sure you wash your hands frequently. - Please complete full course of oral Levaquin 500mg until 6/25/2014  Pneumonia is a lung infection that can cause a fever, cough, and trouble breathing.  Nutrition is important, eat small frequent meals.  Get lots of rest and drink fluids.  Call your health care provider upon arrival home from hospital and make a follow up appointment for one week.  If your cough worsens, you develop fever greater than 101', you have shaking chills, a fast heartbeat, trouble breathing and/or feel your are breathing much faster than usual, call your healthcare provider.  Make sure you wash your hands frequently.

## 2017-06-20 NOTE — DISCHARGE NOTE ADULT - CARE PLAN
Principal Discharge DX:	HCAP (healthcare-associated pneumonia)  Secondary Diagnosis:	Breast cancer  Secondary Diagnosis:	HTN (hypertension) Principal Discharge DX:	HCAP (healthcare-associated pneumonia)  Goal:	Resolved  Instructions for follow-up, activity and diet:	Viral vs. Bacteria   - Please complete full course of oral Levaquin 500mg until 6/25/2014  Secondary Diagnosis:	Breast cancer  Secondary Diagnosis:	HTN (hypertension) Principal Discharge DX:	HCAP (healthcare-associated pneumonia)  Goal:	Resolved  Instructions for follow-up, activity and diet:	Viral vs. Bacteria   - Please complete full course of oral Levaquin 500mg until 6/25/2014  Pneumonia is a lung infection that can cause a fever, cough, and trouble breathing.  Nutrition is important, eat small frequent meals.  Get lots of rest and drink fluids.  Call your health care provider upon arrival home from hospital and make a follow up appointment for one week.  If your cough worsens, you develop fever greater than 101', you have shaking chills, a fast heartbeat, trouble breathing and/or feel your are breathing much faster than usual, call your healthcare provider.  Make sure you wash your hands frequently.  Secondary Diagnosis:	Breast cancer  Instructions for follow-up, activity and diet:	Follow up with Dr. Garcia within 1 week for follow up with treatment  Secondary Diagnosis:	HTN (hypertension)  Instructions for follow-up, activity and diet:	Low salt diet  Activity as tolerated.  Take all medication as prescribed.  Follow up with your medical doctor for routine blood pressure monitoring at your next visit.  Notify your doctor if you have any of the following symptoms:   Dizziness, Lightheadedness, Blurry vision, Headache, Chest pain, Shortness of breath Principal Discharge DX:	Bacterial pneumonia  Goal:	Resolved  Instructions for follow-up, activity and diet:	- Please complete full course of oral Levaquin 500mg until 6/25/2014  Pneumonia is a lung infection that can cause a fever, cough, and trouble breathing.  Nutrition is important, eat small frequent meals.  Get lots of rest and drink fluids.  Call your health care provider upon arrival home from hospital and make a follow up appointment for one week.  If your cough worsens, you develop fever greater than 101', you have shaking chills, a fast heartbeat, trouble breathing and/or feel your are breathing much faster than usual, call your healthcare provider.  Make sure you wash your hands frequently.  Secondary Diagnosis:	Breast cancer  Instructions for follow-up, activity and diet:	Follow up with Dr. Garcia within 1 week for follow up with treatment  Secondary Diagnosis:	HTN (hypertension)  Instructions for follow-up, activity and diet:	Low salt diet  Activity as tolerated.  Take all medication as prescribed.  Follow up with your medical doctor for routine blood pressure monitoring at your next visit.  Notify your doctor if you have any of the following symptoms:   Dizziness, Lightheadedness, Blurry vision, Headache, Chest pain, Shortness of breath

## 2017-06-20 NOTE — DISCHARGE NOTE ADULT - CARE PROVIDERS DIRECT ADDRESSES
[email or web address]

## 2017-06-20 NOTE — DISCHARGE NOTE ADULT - PATIENT PORTAL LINK FT
“You can access the Follow[facility] Patient Portal, offered by [facility], by registering with the following website: [email or web address]”

## 2017-06-21 ENCOUNTER — APPOINTMENT (OUTPATIENT)
Dept: INFUSION THERAPY | Facility: HOSPITAL | Age: 67
End: 2017-06-21

## 2017-06-21 VITALS
TEMPERATURE: 98 F | HEART RATE: 100 BPM | RESPIRATION RATE: 18 BRPM | DIASTOLIC BLOOD PRESSURE: 88 MMHG | SYSTOLIC BLOOD PRESSURE: 141 MMHG | OXYGEN SATURATION: 95 %

## 2017-06-21 DIAGNOSIS — J15.9 UNSPECIFIED BACTERIAL PNEUMONIA: ICD-10-CM

## 2017-06-21 LAB
ALBUMIN SERPL ELPH-MCNC: 4.3 G/DL
ALP BLD-CCNC: 108 U/L
ALT SERPL-CCNC: 24 U/L
ANION GAP SERPL CALC-SCNC: 11 MMOL/L — SIGNIFICANT CHANGE UP (ref 5–17)
ANION GAP SERPL CALC-SCNC: 17 MMOL/L
APTT BLD: 30.2 SEC
AST SERPL-CCNC: 25 U/L
BILIRUB SERPL-MCNC: 0.5 MG/DL
BUN SERPL-MCNC: 10 MG/DL — SIGNIFICANT CHANGE UP (ref 7–23)
BUN SERPL-MCNC: 16 MG/DL
CALCIUM SERPL-MCNC: 10.8 MG/DL
CALCIUM SERPL-MCNC: 8.8 MG/DL — SIGNIFICANT CHANGE UP (ref 8.4–10.5)
CHLORIDE SERPL-SCNC: 100 MMOL/L
CHLORIDE SERPL-SCNC: 105 MMOL/L — SIGNIFICANT CHANGE UP (ref 96–108)
CO2 SERPL-SCNC: 24 MMOL/L — SIGNIFICANT CHANGE UP (ref 22–31)
CO2 SERPL-SCNC: 28 MMOL/L
CREAT SERPL-MCNC: 0.72 MG/DL — SIGNIFICANT CHANGE UP (ref 0.5–1.3)
CREAT SERPL-MCNC: 0.93 MG/DL
GLUCOSE SERPL-MCNC: 102 MG/DL — HIGH (ref 70–99)
GLUCOSE SERPL-MCNC: 111 MG/DL
HBV CORE IGG+IGM SER QL: NONREACTIVE
HBV SURFACE AB SER QL: NONREACTIVE
HBV SURFACE AG SER QL: NONREACTIVE
HCT VFR BLD CALC: 30.7 % — LOW (ref 34.5–45)
HCV AB SER QL: NONREACTIVE
HCV S/CO RATIO: 0.25 S/CO
HGB BLD-MCNC: 10 G/DL — LOW (ref 11.5–15.5)
INR PPP: 1.05 RATIO
MAGNESIUM SERPL-MCNC: 1.7 MG/DL — SIGNIFICANT CHANGE UP (ref 1.6–2.6)
MCHC RBC-ENTMCNC: 32.7 GM/DL — SIGNIFICANT CHANGE UP (ref 32–36)
MCHC RBC-ENTMCNC: 32.7 PG — SIGNIFICANT CHANGE UP (ref 27–34)
MCV RBC AUTO: 99.9 FL — SIGNIFICANT CHANGE UP (ref 80–100)
PLATELET # BLD AUTO: 271 K/UL — SIGNIFICANT CHANGE UP (ref 150–400)
POTASSIUM SERPL-MCNC: 3.9 MMOL/L — SIGNIFICANT CHANGE UP (ref 3.5–5.3)
POTASSIUM SERPL-SCNC: 3.9 MMOL/L — SIGNIFICANT CHANGE UP (ref 3.5–5.3)
POTASSIUM SERPL-SCNC: 5.3 MMOL/L
PROT SERPL-MCNC: 7.9 G/DL
PT BLD: 11.9 SEC
RBC # BLD: 3.07 M/UL — LOW (ref 3.8–5.2)
RBC # FLD: 15.2 % — HIGH (ref 10.3–14.5)
SODIUM SERPL-SCNC: 140 MMOL/L — SIGNIFICANT CHANGE UP (ref 135–145)
SODIUM SERPL-SCNC: 145 MMOL/L
WBC # BLD: 3.4 K/UL — LOW (ref 3.8–10.5)
WBC # FLD AUTO: 3.4 K/UL — LOW (ref 3.8–10.5)

## 2017-06-21 PROCEDURE — 99239 HOSP IP/OBS DSCHRG MGMT >30: CPT

## 2017-06-21 PROCEDURE — 93005 ELECTROCARDIOGRAM TRACING: CPT

## 2017-06-21 PROCEDURE — 80053 COMPREHEN METABOLIC PANEL: CPT

## 2017-06-21 PROCEDURE — 82435 ASSAY OF BLOOD CHLORIDE: CPT

## 2017-06-21 PROCEDURE — 82330 ASSAY OF CALCIUM: CPT

## 2017-06-21 PROCEDURE — 83605 ASSAY OF LACTIC ACID: CPT

## 2017-06-21 PROCEDURE — 71250 CT THORAX DX C-: CPT

## 2017-06-21 PROCEDURE — 94640 AIRWAY INHALATION TREATMENT: CPT

## 2017-06-21 PROCEDURE — 87086 URINE CULTURE/COLONY COUNT: CPT

## 2017-06-21 PROCEDURE — 84295 ASSAY OF SERUM SODIUM: CPT

## 2017-06-21 PROCEDURE — 96375 TX/PRO/DX INJ NEW DRUG ADDON: CPT

## 2017-06-21 PROCEDURE — 87040 BLOOD CULTURE FOR BACTERIA: CPT

## 2017-06-21 PROCEDURE — 83735 ASSAY OF MAGNESIUM: CPT

## 2017-06-21 PROCEDURE — 87633 RESP VIRUS 12-25 TARGETS: CPT

## 2017-06-21 PROCEDURE — 82803 BLOOD GASES ANY COMBINATION: CPT

## 2017-06-21 PROCEDURE — 82947 ASSAY GLUCOSE BLOOD QUANT: CPT

## 2017-06-21 PROCEDURE — 84132 ASSAY OF SERUM POTASSIUM: CPT

## 2017-06-21 PROCEDURE — 85027 COMPLETE CBC AUTOMATED: CPT

## 2017-06-21 PROCEDURE — 87486 CHLMYD PNEUM DNA AMP PROBE: CPT

## 2017-06-21 PROCEDURE — 96374 THER/PROPH/DIAG INJ IV PUSH: CPT

## 2017-06-21 PROCEDURE — 99285 EMERGENCY DEPT VISIT HI MDM: CPT | Mod: 25

## 2017-06-21 PROCEDURE — 84100 ASSAY OF PHOSPHORUS: CPT

## 2017-06-21 PROCEDURE — 80048 BASIC METABOLIC PNL TOTAL CA: CPT

## 2017-06-21 PROCEDURE — 87798 DETECT AGENT NOS DNA AMP: CPT

## 2017-06-21 PROCEDURE — 87581 M.PNEUMON DNA AMP PROBE: CPT

## 2017-06-21 PROCEDURE — 85730 THROMBOPLASTIN TIME PARTIAL: CPT

## 2017-06-21 PROCEDURE — 85014 HEMATOCRIT: CPT

## 2017-06-21 PROCEDURE — 85610 PROTHROMBIN TIME: CPT

## 2017-06-21 PROCEDURE — 71045 X-RAY EXAM CHEST 1 VIEW: CPT

## 2017-06-21 PROCEDURE — 97162 PT EVAL MOD COMPLEX 30 MIN: CPT

## 2017-06-21 PROCEDURE — 81001 URINALYSIS AUTO W/SCOPE: CPT

## 2017-06-21 RX ORDER — CIPROFLOXACIN LACTATE 400MG/40ML
1 VIAL (ML) INTRAVENOUS
Qty: 5 | Refills: 0 | OUTPATIENT
Start: 2017-06-21 | End: 2017-06-26

## 2017-06-21 RX ADMIN — BUDESONIDE AND FORMOTEROL FUMARATE DIHYDRATE 1 PUFF(S): 160; 4.5 AEROSOL RESPIRATORY (INHALATION) at 05:41

## 2017-06-21 RX ADMIN — ENOXAPARIN SODIUM 180 MILLIGRAM(S): 100 INJECTION SUBCUTANEOUS at 12:17

## 2017-06-21 RX ADMIN — CEFEPIME 100 MILLIGRAM(S): 1 INJECTION, POWDER, FOR SOLUTION INTRAMUSCULAR; INTRAVENOUS at 05:41

## 2017-06-21 RX ADMIN — SODIUM CHLORIDE 75 MILLILITER(S): 9 INJECTION INTRAMUSCULAR; INTRAVENOUS; SUBCUTANEOUS at 08:16

## 2017-06-21 RX ADMIN — MAGNESIUM OXIDE 400 MG ORAL TABLET 400 MILLIGRAM(S): 241.3 TABLET ORAL at 08:14

## 2017-06-21 RX ADMIN — MAGNESIUM OXIDE 400 MG ORAL TABLET 400 MILLIGRAM(S): 241.3 TABLET ORAL at 12:19

## 2017-06-21 NOTE — PROGRESS NOTE ADULT - PROBLEM SELECTOR PLAN 2
Resolved
Taxol dose to be skipped this week.   Resume next week if clinically stable  f/u with Dr. Dunaway
On lovenox

## 2017-06-21 NOTE — PROGRESS NOTE ADULT - ASSESSMENT
66 F w/ breast CA on taxol (last 6/14/17), recent pseudomonas septicemia of unclear source pw fever, cough, fatigue. CT chest with tree-in-bud opacities or 2 mm nodules within the right lower lobe representing impacted distal airways which may be of infectious etiology. Pt improving in abx
66 F w/ recurrent breast ca currently on chemotherapy, c/b recurrent admission for sepsis, currently admitted with fever, likely 2/2 PNA
66 F w/ breast CA on taxol (last 6/14/17), recent pseudomonas septicemia of unclear source pw fever, cough, fatigue. CT chest with tree-in-bud opacities or 2 mm nodules within the right lower lobe representing impacted distal airways which may be of infectious etiology. Pt improving in abx
66 F w/ breast CA on taxol (last 6/14/17), recent pseudomonas septicemia of unclear source pw fever, cough, fatigue. CT chest with tree-in-bud opacities or 2 mm nodules within the right lower lobe representing impacted distal airways which may be of infectious etiology.

## 2017-06-21 NOTE — PROGRESS NOTE ADULT - PSYCHIATRIC
Affect and characteristics of appearance, verbalizations, behaviors are appropriate
Affect and characteristics of appearance, verbalizations, behaviors are appropriate

## 2017-06-21 NOTE — PROGRESS NOTE ADULT - PROBLEM SELECTOR PLAN 1
Likely bacterial pneumonia. Clinically improving on abx. Bld cx negative. Will d/c vanco
Clinically improved. Change abx to po levaquin
cultures negative to date. Vitals stable.   Likely source is PNA  If cultures neg x 48 hrs, would d/c home tomorrow on PO Ab
CT findings as above. Pt started on broad spectrum abx coverage in the ER. Continue to monitor. Follow up blood cultures. Vitals stable.

## 2017-06-21 NOTE — PHYSICAL THERAPY INITIAL EVALUATION ADULT - PERTINENT HX OF CURRENT PROBLEM, REHAB EVAL
66 F w/ breast CA on chemo and taxol (last chemo 6/14/17) , HTN and recent pseudomonas septicemia of unclear source presenting from home with fever, nonproductive cough and generalized fatigue x 1 day, admitted with sepsis likely secondary to PNA. CT chest with tree-in-bud opacities or 2 mm nodules within the right lower lobe representing impacted distal airways which may be of infectious etiology.

## 2017-06-21 NOTE — PROGRESS NOTE ADULT - GASTROINTESTINAL
Soft, non-tender, no hepatosplenomegaly, normal bowel sounds

## 2017-06-21 NOTE — PROGRESS NOTE ADULT - NEUROLOGICAL
Alert & oriented; no sensory, motor or coordination deficits, normal reflexes

## 2017-06-21 NOTE — PROGRESS NOTE ADULT - SUBJECTIVE AND OBJECTIVE BOX
Patient is a 66y old  Female who presents with a chief complaint of fever, cough (20 Jun 2017 17:59)    Vital Signs Last 24 Hrs  T(C): 36.6, Max: 36.9 (06-20 @ 21:54)  T(F): 97.8, Max: 98.5 (06-21 @ 05:32)  HR: 100 (100 - 109)  BP: 141/88 (122/71 - 155/88)  BP(mean): --  RR: 18 (18 - 18)  SpO2: 95% (92% - 95%)    06-21    140  |  105  |  10  ----------------------------<  102<H>  3.9   |  24  |  0.72    Ca    8.8      21 Jun 2017 08:31  Mg     1.7     06-21                          10.0   3.4   )-----------( 271      ( 21 Jun 2017 08:31 )             30.7     MEDICATIONS  (STANDING):  enoxaparin Injectable 180milliGRAM(s) SubCutaneous daily  escitalopram 10milliGRAM(s) Oral daily  buDESOnide 160 MICROgram(s)/formoterol 4.5 MICROgram(s) Inhaler 1Puff(s) Inhalation two times a day  cefepime  IVPB 2000milliGRAM(s) IV Intermittent every 8 hours  levoFLOXacin IVPB 750milliGRAM(s) IV Intermittent every 24 hours  magnesium oxide 400milliGRAM(s) Oral three times a day with meals  sodium chloride 0.9%. 1000milliLiter(s) IV Continuous <Continuous>

## 2017-06-24 LAB
CULTURE RESULTS: SIGNIFICANT CHANGE UP
SPECIMEN SOURCE: SIGNIFICANT CHANGE UP

## 2017-06-28 ENCOUNTER — LABORATORY RESULT (OUTPATIENT)
Age: 67
End: 2017-06-28

## 2017-06-28 ENCOUNTER — RESULT REVIEW (OUTPATIENT)
Age: 67
End: 2017-06-28

## 2017-06-28 ENCOUNTER — APPOINTMENT (OUTPATIENT)
Dept: INFUSION THERAPY | Facility: HOSPITAL | Age: 67
End: 2017-06-28

## 2017-06-28 LAB
BASOPHILS # BLD AUTO: 0 K/UL — SIGNIFICANT CHANGE UP (ref 0–0.2)
BASOPHILS NFR BLD AUTO: 0.4 % — SIGNIFICANT CHANGE UP (ref 0–2)
EOSINOPHIL # BLD AUTO: 0.2 K/UL — SIGNIFICANT CHANGE UP (ref 0–0.5)
EOSINOPHIL NFR BLD AUTO: 5.4 % — SIGNIFICANT CHANGE UP (ref 0–6)
HCT VFR BLD CALC: 33 % — LOW (ref 34.5–45)
HGB BLD-MCNC: 11.3 G/DL — LOW (ref 11.5–15.5)
LYMPHOCYTES # BLD AUTO: 1.1 K/UL — SIGNIFICANT CHANGE UP (ref 1–3.3)
LYMPHOCYTES # BLD AUTO: 25.6 % — SIGNIFICANT CHANGE UP (ref 13–44)
MCHC RBC-ENTMCNC: 33.2 PG — SIGNIFICANT CHANGE UP (ref 27–34)
MCHC RBC-ENTMCNC: 34.2 G/DL — SIGNIFICANT CHANGE UP (ref 32–36)
MCV RBC AUTO: 97.2 FL — SIGNIFICANT CHANGE UP (ref 80–100)
MONOCYTES # BLD AUTO: 0.7 K/UL — SIGNIFICANT CHANGE UP (ref 0–0.9)
MONOCYTES NFR BLD AUTO: 16.4 % — HIGH (ref 2–14)
NEUTROPHILS # BLD AUTO: 2.3 K/UL — SIGNIFICANT CHANGE UP (ref 1.8–7.4)
NEUTROPHILS NFR BLD AUTO: 52.1 % — SIGNIFICANT CHANGE UP (ref 43–77)
PLATELET # BLD AUTO: 318 K/UL — SIGNIFICANT CHANGE UP (ref 150–400)
RBC # BLD: 3.39 M/UL — LOW (ref 3.8–5.2)
RBC # FLD: 14.5 % — SIGNIFICANT CHANGE UP (ref 10.3–14.5)
WBC # BLD: 4.4 K/UL — SIGNIFICANT CHANGE UP (ref 3.8–10.5)
WBC # FLD AUTO: 4.4 K/UL — SIGNIFICANT CHANGE UP (ref 3.8–10.5)

## 2017-07-05 ENCOUNTER — RESULT REVIEW (OUTPATIENT)
Age: 67
End: 2017-07-05

## 2017-07-05 ENCOUNTER — LABORATORY RESULT (OUTPATIENT)
Age: 67
End: 2017-07-05

## 2017-07-05 ENCOUNTER — APPOINTMENT (OUTPATIENT)
Dept: INFUSION THERAPY | Facility: HOSPITAL | Age: 67
End: 2017-07-05

## 2017-07-05 ENCOUNTER — APPOINTMENT (OUTPATIENT)
Dept: HEMATOLOGY ONCOLOGY | Facility: CLINIC | Age: 67
End: 2017-07-05

## 2017-07-05 VITALS
DIASTOLIC BLOOD PRESSURE: 87 MMHG | BODY MASS INDEX: 44.75 KG/M2 | OXYGEN SATURATION: 100 % | SYSTOLIC BLOOD PRESSURE: 133 MMHG | RESPIRATION RATE: 16 BRPM | WEIGHT: 257.28 LBS | HEART RATE: 98 BPM | TEMPERATURE: 98.6 F

## 2017-07-05 LAB
BASOPHILS # BLD AUTO: 0.1 K/UL — SIGNIFICANT CHANGE UP (ref 0–0.2)
BASOPHILS NFR BLD AUTO: 2.4 % — HIGH (ref 0–2)
EOSINOPHIL # BLD AUTO: 0.2 K/UL — SIGNIFICANT CHANGE UP (ref 0–0.5)
EOSINOPHIL NFR BLD AUTO: 3.8 % — SIGNIFICANT CHANGE UP (ref 0–6)
HCT VFR BLD CALC: 32.1 % — LOW (ref 34.5–45)
HGB BLD-MCNC: 10.8 G/DL — LOW (ref 11.5–15.5)
LYMPHOCYTES # BLD AUTO: 1.2 K/UL — SIGNIFICANT CHANGE UP (ref 1–3.3)
LYMPHOCYTES # BLD AUTO: 26.1 % — SIGNIFICANT CHANGE UP (ref 13–44)
MCHC RBC-ENTMCNC: 32.5 PG — SIGNIFICANT CHANGE UP (ref 27–34)
MCHC RBC-ENTMCNC: 33.6 G/DL — SIGNIFICANT CHANGE UP (ref 32–36)
MCV RBC AUTO: 96.5 FL — SIGNIFICANT CHANGE UP (ref 80–100)
MONOCYTES # BLD AUTO: 0.4 K/UL — SIGNIFICANT CHANGE UP (ref 0–0.9)
MONOCYTES NFR BLD AUTO: 9 % — SIGNIFICANT CHANGE UP (ref 2–14)
NEUTROPHILS # BLD AUTO: 2.6 K/UL — SIGNIFICANT CHANGE UP (ref 1.8–7.4)
NEUTROPHILS NFR BLD AUTO: 58.8 % — SIGNIFICANT CHANGE UP (ref 43–77)
PLATELET # BLD AUTO: 278 K/UL — SIGNIFICANT CHANGE UP (ref 150–400)
RBC # BLD: 3.32 M/UL — LOW (ref 3.8–5.2)
RBC # FLD: 14 % — SIGNIFICANT CHANGE UP (ref 10.3–14.5)
WBC # BLD: 4.4 K/UL — SIGNIFICANT CHANGE UP (ref 3.8–10.5)
WBC # FLD AUTO: 4.4 K/UL — SIGNIFICANT CHANGE UP (ref 3.8–10.5)

## 2017-07-05 RX ORDER — APREPITANT 80 MG/1
80 CAPSULE ORAL
Qty: 2 | Refills: 3 | Status: DISCONTINUED | COMMUNITY
Start: 2017-03-09 | End: 2017-07-05

## 2017-07-05 RX ORDER — DEXAMETHASONE 4 MG/1
4 TABLET ORAL
Qty: 10 | Refills: 0 | Status: DISCONTINUED | COMMUNITY
Start: 2017-06-13 | End: 2017-07-05

## 2017-07-07 ENCOUNTER — OUTPATIENT (OUTPATIENT)
Dept: OUTPATIENT SERVICES | Facility: HOSPITAL | Age: 67
LOS: 1 days | Discharge: HOME | End: 2017-07-07

## 2017-07-07 DIAGNOSIS — Z98.890 OTHER SPECIFIED POSTPROCEDURAL STATES: Chronic | ICD-10-CM

## 2017-07-07 DIAGNOSIS — C50.919 MALIGNANT NEOPLASM OF UNSPECIFIED SITE OF UNSPECIFIED FEMALE BREAST: ICD-10-CM

## 2017-07-12 ENCOUNTER — APPOINTMENT (OUTPATIENT)
Dept: INFUSION THERAPY | Facility: HOSPITAL | Age: 67
End: 2017-07-12

## 2017-07-12 ENCOUNTER — RESULT REVIEW (OUTPATIENT)
Age: 67
End: 2017-07-12

## 2017-07-12 LAB
BASOPHILS # BLD AUTO: 0 K/UL — SIGNIFICANT CHANGE UP (ref 0–0.2)
BASOPHILS NFR BLD AUTO: 0.5 % — SIGNIFICANT CHANGE UP (ref 0–2)
EOSINOPHIL # BLD AUTO: 0.2 K/UL — SIGNIFICANT CHANGE UP (ref 0–0.5)
EOSINOPHIL NFR BLD AUTO: 5.8 % — SIGNIFICANT CHANGE UP (ref 0–6)
HCT VFR BLD CALC: 32.4 % — LOW (ref 34.5–45)
HGB BLD-MCNC: 11 G/DL — LOW (ref 11.5–15.5)
LYMPHOCYTES # BLD AUTO: 0.9 K/UL — LOW (ref 1–3.3)
LYMPHOCYTES # BLD AUTO: 27.6 % — SIGNIFICANT CHANGE UP (ref 13–44)
MCHC RBC-ENTMCNC: 32.1 PG — SIGNIFICANT CHANGE UP (ref 27–34)
MCHC RBC-ENTMCNC: 33.9 G/DL — SIGNIFICANT CHANGE UP (ref 32–36)
MCV RBC AUTO: 94.6 FL — SIGNIFICANT CHANGE UP (ref 80–100)
MONOCYTES # BLD AUTO: 0.4 K/UL — SIGNIFICANT CHANGE UP (ref 0–0.9)
MONOCYTES NFR BLD AUTO: 10.5 % — SIGNIFICANT CHANGE UP (ref 2–14)
NEUTROPHILS # BLD AUTO: 1.9 K/UL — SIGNIFICANT CHANGE UP (ref 1.8–7.4)
NEUTROPHILS NFR BLD AUTO: 55.6 % — SIGNIFICANT CHANGE UP (ref 43–77)
PLATELET # BLD AUTO: 318 K/UL — SIGNIFICANT CHANGE UP (ref 150–400)
RBC # BLD: 3.42 M/UL — LOW (ref 3.8–5.2)
RBC # FLD: 15.9 % — HIGH (ref 10.3–14.5)
WBC # BLD: 3.4 K/UL — LOW (ref 3.8–10.5)
WBC # FLD AUTO: 3.4 K/UL — LOW (ref 3.8–10.5)

## 2017-07-13 DIAGNOSIS — R11.2 NAUSEA WITH VOMITING, UNSPECIFIED: ICD-10-CM

## 2017-07-13 DIAGNOSIS — Z51.11 ENCOUNTER FOR ANTINEOPLASTIC CHEMOTHERAPY: ICD-10-CM

## 2017-07-19 ENCOUNTER — RESULT REVIEW (OUTPATIENT)
Age: 67
End: 2017-07-19

## 2017-07-19 ENCOUNTER — APPOINTMENT (OUTPATIENT)
Dept: INFUSION THERAPY | Facility: HOSPITAL | Age: 67
End: 2017-07-19

## 2017-07-19 LAB
BASOPHILS # BLD AUTO: 0 K/UL — SIGNIFICANT CHANGE UP (ref 0–0.2)
BASOPHILS NFR BLD AUTO: 0.5 % — SIGNIFICANT CHANGE UP (ref 0–2)
EOSINOPHIL # BLD AUTO: 0.1 K/UL — SIGNIFICANT CHANGE UP (ref 0–0.5)
EOSINOPHIL NFR BLD AUTO: 2.9 % — SIGNIFICANT CHANGE UP (ref 0–6)
HCT VFR BLD CALC: 31.4 % — LOW (ref 34.5–45)
HGB BLD-MCNC: 11.1 G/DL — LOW (ref 11.5–15.5)
LYMPHOCYTES # BLD AUTO: 0.9 K/UL — LOW (ref 1–3.3)
LYMPHOCYTES # BLD AUTO: 25.4 % — SIGNIFICANT CHANGE UP (ref 13–44)
MCHC RBC-ENTMCNC: 33.5 PG — SIGNIFICANT CHANGE UP (ref 27–34)
MCHC RBC-ENTMCNC: 35.2 G/DL — SIGNIFICANT CHANGE UP (ref 32–36)
MCV RBC AUTO: 95.3 FL — SIGNIFICANT CHANGE UP (ref 80–100)
MONOCYTES # BLD AUTO: 0.4 K/UL — SIGNIFICANT CHANGE UP (ref 0–0.9)
MONOCYTES NFR BLD AUTO: 10.6 % — SIGNIFICANT CHANGE UP (ref 2–14)
NEUTROPHILS # BLD AUTO: 2 K/UL — SIGNIFICANT CHANGE UP (ref 1.8–7.4)
NEUTROPHILS NFR BLD AUTO: 60.7 % — SIGNIFICANT CHANGE UP (ref 43–77)
PLATELET # BLD AUTO: 329 K/UL — SIGNIFICANT CHANGE UP (ref 150–400)
RBC # BLD: 3.3 M/UL — LOW (ref 3.8–5.2)
RBC # FLD: 14.5 % — SIGNIFICANT CHANGE UP (ref 10.3–14.5)
WBC # BLD: 3.4 K/UL — LOW (ref 3.8–10.5)
WBC # FLD AUTO: 3.4 K/UL — LOW (ref 3.8–10.5)

## 2017-07-26 ENCOUNTER — RESULT REVIEW (OUTPATIENT)
Age: 67
End: 2017-07-26

## 2017-07-26 ENCOUNTER — APPOINTMENT (OUTPATIENT)
Dept: INFUSION THERAPY | Facility: HOSPITAL | Age: 67
End: 2017-07-26

## 2017-07-26 LAB
BASOPHILS # BLD AUTO: 0.4 K/UL — HIGH (ref 0–0.2)
BASOPHILS NFR BLD AUTO: 2 % — SIGNIFICANT CHANGE UP (ref 0–2)
EOSINOPHIL # BLD AUTO: 0.1 K/UL — SIGNIFICANT CHANGE UP (ref 0–0.5)
EOSINOPHIL NFR BLD AUTO: 2 % — SIGNIFICANT CHANGE UP (ref 0–6)
HCT VFR BLD CALC: 33.8 % — LOW (ref 34.5–45)
HGB BLD-MCNC: 11.9 G/DL — SIGNIFICANT CHANGE UP (ref 11.5–15.5)
LYMPHOCYTES # BLD AUTO: 0.8 K/UL — LOW (ref 1–3.3)
LYMPHOCYTES # BLD AUTO: 36 % — SIGNIFICANT CHANGE UP (ref 13–44)
MCHC RBC-ENTMCNC: 33.3 PG — SIGNIFICANT CHANGE UP (ref 27–34)
MCHC RBC-ENTMCNC: 35.2 G/DL — SIGNIFICANT CHANGE UP (ref 32–36)
MCV RBC AUTO: 94.7 FL — SIGNIFICANT CHANGE UP (ref 80–100)
MONOCYTES # BLD AUTO: 0.4 K/UL — SIGNIFICANT CHANGE UP (ref 0–0.9)
MONOCYTES NFR BLD AUTO: 8 % — SIGNIFICANT CHANGE UP (ref 2–14)
NEUTROPHILS # BLD AUTO: 1.7 K/UL — LOW (ref 1.8–7.4)
NEUTROPHILS NFR BLD AUTO: 52 % — SIGNIFICANT CHANGE UP (ref 43–77)
PLAT MORPH BLD: NORMAL — SIGNIFICANT CHANGE UP
PLATELET # BLD AUTO: 309 K/UL — SIGNIFICANT CHANGE UP (ref 150–400)
RBC # BLD: 3.57 M/UL — LOW (ref 3.8–5.2)
RBC # FLD: 14.7 % — HIGH (ref 10.3–14.5)
RBC BLD AUTO: SIGNIFICANT CHANGE UP
WBC # BLD: 3.3 K/UL — LOW (ref 3.8–10.5)
WBC # FLD AUTO: 3.3 K/UL — LOW (ref 3.8–10.5)

## 2017-08-02 ENCOUNTER — APPOINTMENT (OUTPATIENT)
Dept: INFUSION THERAPY | Facility: HOSPITAL | Age: 67
End: 2017-08-02

## 2017-08-02 ENCOUNTER — RESULT REVIEW (OUTPATIENT)
Age: 67
End: 2017-08-02

## 2017-08-02 LAB
BASOPHILS # BLD AUTO: 0.1 K/UL — SIGNIFICANT CHANGE UP (ref 0–0.2)
BASOPHILS NFR BLD AUTO: 1.9 % — SIGNIFICANT CHANGE UP (ref 0–2)
EOSINOPHIL # BLD AUTO: 0.1 K/UL — SIGNIFICANT CHANGE UP (ref 0–0.5)
EOSINOPHIL NFR BLD AUTO: 4.2 % — SIGNIFICANT CHANGE UP (ref 0–6)
HCT VFR BLD CALC: 31.4 % — LOW (ref 34.5–45)
HGB BLD-MCNC: 11.1 G/DL — LOW (ref 11.5–15.5)
LYMPHOCYTES # BLD AUTO: 0.8 K/UL — LOW (ref 1–3.3)
LYMPHOCYTES # BLD AUTO: 29.5 % — SIGNIFICANT CHANGE UP (ref 13–44)
MCHC RBC-ENTMCNC: 33.4 PG — SIGNIFICANT CHANGE UP (ref 27–34)
MCHC RBC-ENTMCNC: 35.5 G/DL — SIGNIFICANT CHANGE UP (ref 32–36)
MCV RBC AUTO: 94.2 FL — SIGNIFICANT CHANGE UP (ref 80–100)
MONOCYTES # BLD AUTO: 0.4 K/UL — SIGNIFICANT CHANGE UP (ref 0–0.9)
MONOCYTES NFR BLD AUTO: 12.9 % — SIGNIFICANT CHANGE UP (ref 2–14)
NEUTROPHILS # BLD AUTO: 1.5 K/UL — LOW (ref 1.8–7.4)
NEUTROPHILS NFR BLD AUTO: 51.5 % — SIGNIFICANT CHANGE UP (ref 43–77)
PLAT MORPH BLD: NORMAL — SIGNIFICANT CHANGE UP
PLATELET # BLD AUTO: 310 K/UL — SIGNIFICANT CHANGE UP (ref 150–400)
RBC # BLD: 3.34 M/UL — LOW (ref 3.8–5.2)
RBC # FLD: 14.8 % — HIGH (ref 10.3–14.5)
RBC BLD AUTO: SIGNIFICANT CHANGE UP
WBC # BLD: 2.8 K/UL — LOW (ref 3.8–10.5)
WBC # FLD AUTO: 2.8 K/UL — LOW (ref 3.8–10.5)

## 2017-08-07 ENCOUNTER — OUTPATIENT (OUTPATIENT)
Dept: OUTPATIENT SERVICES | Facility: HOSPITAL | Age: 67
LOS: 1 days | Discharge: HOME | End: 2017-08-07

## 2017-08-07 DIAGNOSIS — C50.919 MALIGNANT NEOPLASM OF UNSPECIFIED SITE OF UNSPECIFIED FEMALE BREAST: ICD-10-CM

## 2017-08-07 DIAGNOSIS — Z98.890 OTHER SPECIFIED POSTPROCEDURAL STATES: Chronic | ICD-10-CM

## 2017-08-09 ENCOUNTER — RESULT REVIEW (OUTPATIENT)
Age: 67
End: 2017-08-09

## 2017-08-09 ENCOUNTER — APPOINTMENT (OUTPATIENT)
Dept: INFUSION THERAPY | Facility: HOSPITAL | Age: 67
End: 2017-08-09

## 2017-08-09 LAB
BASOPHILS # BLD AUTO: 0 K/UL — SIGNIFICANT CHANGE UP (ref 0–0.2)
BASOPHILS NFR BLD AUTO: 0.6 % — SIGNIFICANT CHANGE UP (ref 0–2)
EOSINOPHIL # BLD AUTO: 0.1 K/UL — SIGNIFICANT CHANGE UP (ref 0–0.5)
EOSINOPHIL NFR BLD AUTO: 2.9 % — SIGNIFICANT CHANGE UP (ref 0–6)
HCT VFR BLD CALC: 33.4 % — LOW (ref 34.5–45)
HGB BLD-MCNC: 11.1 G/DL — LOW (ref 11.5–15.5)
LYMPHOCYTES # BLD AUTO: 0.8 K/UL — LOW (ref 1–3.3)
LYMPHOCYTES # BLD AUTO: 27.5 % — SIGNIFICANT CHANGE UP (ref 13–44)
MCHC RBC-ENTMCNC: 31.3 PG — SIGNIFICANT CHANGE UP (ref 27–34)
MCHC RBC-ENTMCNC: 33.1 G/DL — SIGNIFICANT CHANGE UP (ref 32–36)
MCV RBC AUTO: 94.7 FL — SIGNIFICANT CHANGE UP (ref 80–100)
MONOCYTES # BLD AUTO: 0.4 K/UL — SIGNIFICANT CHANGE UP (ref 0–0.9)
MONOCYTES NFR BLD AUTO: 14.3 % — HIGH (ref 2–14)
NEUTROPHILS # BLD AUTO: 1.6 K/UL — LOW (ref 1.8–7.4)
NEUTROPHILS NFR BLD AUTO: 54.6 % — SIGNIFICANT CHANGE UP (ref 43–77)
PLATELET # BLD AUTO: 305 K/UL — SIGNIFICANT CHANGE UP (ref 150–400)
RBC # BLD: 3.53 M/UL — LOW (ref 3.8–5.2)
RBC # FLD: 16.6 % — HIGH (ref 10.3–14.5)
WBC # BLD: 2.9 K/UL — LOW (ref 3.8–10.5)
WBC # FLD AUTO: 2.9 K/UL — LOW (ref 3.8–10.5)

## 2017-08-10 DIAGNOSIS — R11.2 NAUSEA WITH VOMITING, UNSPECIFIED: ICD-10-CM

## 2017-08-10 DIAGNOSIS — Z51.11 ENCOUNTER FOR ANTINEOPLASTIC CHEMOTHERAPY: ICD-10-CM

## 2017-08-16 ENCOUNTER — APPOINTMENT (OUTPATIENT)
Dept: INFUSION THERAPY | Facility: HOSPITAL | Age: 67
End: 2017-08-16

## 2017-08-16 ENCOUNTER — RX RENEWAL (OUTPATIENT)
Age: 67
End: 2017-08-16

## 2017-08-16 ENCOUNTER — RESULT REVIEW (OUTPATIENT)
Age: 67
End: 2017-08-16

## 2017-08-16 LAB
BASOPHILS # BLD AUTO: 0 K/UL — SIGNIFICANT CHANGE UP (ref 0–0.2)
BASOPHILS NFR BLD AUTO: 0 % — SIGNIFICANT CHANGE UP (ref 0–2)
EOSINOPHIL # BLD AUTO: 0.2 K/UL — SIGNIFICANT CHANGE UP (ref 0–0.5)
EOSINOPHIL NFR BLD AUTO: 4 % — SIGNIFICANT CHANGE UP (ref 0–6)
HCT VFR BLD CALC: 32.2 % — LOW (ref 34.5–45)
HGB BLD-MCNC: 11.6 G/DL — SIGNIFICANT CHANGE UP (ref 11.5–15.5)
LYMPHOCYTES # BLD AUTO: 0.8 K/UL — LOW (ref 1–3.3)
LYMPHOCYTES # BLD AUTO: 19.5 % — SIGNIFICANT CHANGE UP (ref 13–44)
MCHC RBC-ENTMCNC: 34.5 PG — HIGH (ref 27–34)
MCHC RBC-ENTMCNC: 36.2 G/DL — HIGH (ref 32–36)
MCV RBC AUTO: 95.3 FL — SIGNIFICANT CHANGE UP (ref 80–100)
MONOCYTES # BLD AUTO: 0.4 K/UL — SIGNIFICANT CHANGE UP (ref 0–0.9)
MONOCYTES NFR BLD AUTO: 11 % — SIGNIFICANT CHANGE UP (ref 2–14)
NEUTROPHILS # BLD AUTO: 2.6 K/UL — SIGNIFICANT CHANGE UP (ref 1.8–7.4)
NEUTROPHILS NFR BLD AUTO: 65.5 % — SIGNIFICANT CHANGE UP (ref 43–77)
PLATELET # BLD AUTO: 306 K/UL — SIGNIFICANT CHANGE UP (ref 150–400)
RBC # BLD: 3.38 M/UL — LOW (ref 3.8–5.2)
RBC # FLD: 15.1 % — HIGH (ref 10.3–14.5)
WBC # BLD: 4 K/UL — SIGNIFICANT CHANGE UP (ref 3.8–10.5)
WBC # FLD AUTO: 4 K/UL — SIGNIFICANT CHANGE UP (ref 3.8–10.5)

## 2017-08-23 ENCOUNTER — RESULT REVIEW (OUTPATIENT)
Age: 67
End: 2017-08-23

## 2017-08-23 ENCOUNTER — APPOINTMENT (OUTPATIENT)
Dept: INFUSION THERAPY | Facility: HOSPITAL | Age: 67
End: 2017-08-23

## 2017-08-23 LAB
BASOPHILS # BLD AUTO: 0 K/UL — SIGNIFICANT CHANGE UP (ref 0–0.2)
BASOPHILS NFR BLD AUTO: 0.4 % — SIGNIFICANT CHANGE UP (ref 0–2)
EOSINOPHIL # BLD AUTO: 0.2 K/UL — SIGNIFICANT CHANGE UP (ref 0–0.5)
EOSINOPHIL NFR BLD AUTO: 4.9 % — SIGNIFICANT CHANGE UP (ref 0–6)
HCT VFR BLD CALC: 31.6 % — LOW (ref 34.5–45)
HGB BLD-MCNC: 11.1 G/DL — LOW (ref 11.5–15.5)
LYMPHOCYTES # BLD AUTO: 0.7 K/UL — LOW (ref 1–3.3)
LYMPHOCYTES # BLD AUTO: 21.6 % — SIGNIFICANT CHANGE UP (ref 13–44)
MCHC RBC-ENTMCNC: 33.1 PG — SIGNIFICANT CHANGE UP (ref 27–34)
MCHC RBC-ENTMCNC: 35 G/DL — SIGNIFICANT CHANGE UP (ref 32–36)
MCV RBC AUTO: 94.7 FL — SIGNIFICANT CHANGE UP (ref 80–100)
MONOCYTES # BLD AUTO: 0.4 K/UL — SIGNIFICANT CHANGE UP (ref 0–0.9)
MONOCYTES NFR BLD AUTO: 12.6 % — SIGNIFICANT CHANGE UP (ref 2–14)
NEUTROPHILS # BLD AUTO: 2 K/UL — SIGNIFICANT CHANGE UP (ref 1.8–7.4)
NEUTROPHILS NFR BLD AUTO: 60.5 % — SIGNIFICANT CHANGE UP (ref 43–77)
PLATELET # BLD AUTO: 318 K/UL — SIGNIFICANT CHANGE UP (ref 150–400)
RBC # BLD: 3.34 M/UL — LOW (ref 3.8–5.2)
RBC # FLD: 15.1 % — HIGH (ref 10.3–14.5)
WBC # BLD: 3.2 K/UL — LOW (ref 3.8–10.5)
WBC # FLD AUTO: 3.2 K/UL — LOW (ref 3.8–10.5)

## 2017-08-30 ENCOUNTER — RESULT REVIEW (OUTPATIENT)
Age: 67
End: 2017-08-30

## 2017-08-30 ENCOUNTER — APPOINTMENT (OUTPATIENT)
Dept: INFUSION THERAPY | Facility: HOSPITAL | Age: 67
End: 2017-08-30

## 2017-08-30 LAB
BASOPHILS # BLD AUTO: 0 K/UL — SIGNIFICANT CHANGE UP (ref 0–0.2)
BASOPHILS NFR BLD AUTO: 1 % — SIGNIFICANT CHANGE UP (ref 0–2)
EOSINOPHIL # BLD AUTO: 0.2 K/UL — SIGNIFICANT CHANGE UP (ref 0–0.5)
EOSINOPHIL NFR BLD AUTO: 4 % — SIGNIFICANT CHANGE UP (ref 0–6)
HCT VFR BLD CALC: 34.4 % — LOW (ref 34.5–45)
HGB BLD-MCNC: 11.5 G/DL — SIGNIFICANT CHANGE UP (ref 11.5–15.5)
LYMPHOCYTES # BLD AUTO: 1 K/UL — SIGNIFICANT CHANGE UP (ref 1–3.3)
LYMPHOCYTES # BLD AUTO: 19 % — SIGNIFICANT CHANGE UP (ref 13–44)
MCHC RBC-ENTMCNC: 31.8 PG — SIGNIFICANT CHANGE UP (ref 27–34)
MCHC RBC-ENTMCNC: 33.5 G/DL — SIGNIFICANT CHANGE UP (ref 32–36)
MCV RBC AUTO: 94.8 FL — SIGNIFICANT CHANGE UP (ref 80–100)
MONOCYTES # BLD AUTO: 0.9 K/UL — SIGNIFICANT CHANGE UP (ref 0–0.9)
MONOCYTES NFR BLD AUTO: 16 % — HIGH (ref 2–14)
NEUTROPHILS # BLD AUTO: 3.2 K/UL — SIGNIFICANT CHANGE UP (ref 1.8–7.4)
NEUTROPHILS NFR BLD AUTO: 60 % — SIGNIFICANT CHANGE UP (ref 43–77)
PLAT MORPH BLD: NORMAL — SIGNIFICANT CHANGE UP
PLATELET # BLD AUTO: 288 K/UL — SIGNIFICANT CHANGE UP (ref 150–400)
RBC # BLD: 3.63 M/UL — LOW (ref 3.8–5.2)
RBC # FLD: 17.2 % — HIGH (ref 10.3–14.5)
RBC BLD AUTO: SIGNIFICANT CHANGE UP
WBC # BLD: 5.4 K/UL — SIGNIFICANT CHANGE UP (ref 3.8–10.5)
WBC # FLD AUTO: 5.4 K/UL — SIGNIFICANT CHANGE UP (ref 3.8–10.5)

## 2017-09-01 ENCOUNTER — OUTPATIENT (OUTPATIENT)
Dept: OUTPATIENT SERVICES | Facility: HOSPITAL | Age: 67
LOS: 1 days | Discharge: HOME | End: 2017-09-01

## 2017-09-01 DIAGNOSIS — Z98.890 OTHER SPECIFIED POSTPROCEDURAL STATES: Chronic | ICD-10-CM

## 2017-09-01 DIAGNOSIS — C50.919 MALIGNANT NEOPLASM OF UNSPECIFIED SITE OF UNSPECIFIED FEMALE BREAST: ICD-10-CM

## 2017-09-07 ENCOUNTER — LABORATORY RESULT (OUTPATIENT)
Age: 67
End: 2017-09-07

## 2017-09-07 ENCOUNTER — RESULT REVIEW (OUTPATIENT)
Age: 67
End: 2017-09-07

## 2017-09-07 ENCOUNTER — APPOINTMENT (OUTPATIENT)
Dept: INFUSION THERAPY | Facility: HOSPITAL | Age: 67
End: 2017-09-07

## 2017-09-07 LAB
BASOPHILS # BLD AUTO: 0.1 K/UL — SIGNIFICANT CHANGE UP (ref 0–0.2)
BASOPHILS NFR BLD AUTO: 0.9 % — SIGNIFICANT CHANGE UP (ref 0–2)
EOSINOPHIL # BLD AUTO: 0 K/UL — SIGNIFICANT CHANGE UP (ref 0–0.5)
EOSINOPHIL NFR BLD AUTO: 0 % — SIGNIFICANT CHANGE UP (ref 0–6)
HCT VFR BLD CALC: 32.8 % — LOW (ref 34.5–45)
HGB BLD-MCNC: 11.3 G/DL — LOW (ref 11.5–15.5)
LYMPHOCYTES # BLD AUTO: 1.2 K/UL — SIGNIFICANT CHANGE UP (ref 1–3.3)
LYMPHOCYTES # BLD AUTO: 20.6 % — SIGNIFICANT CHANGE UP (ref 13–44)
MCHC RBC-ENTMCNC: 33 PG — SIGNIFICANT CHANGE UP (ref 27–34)
MCHC RBC-ENTMCNC: 34.5 G/DL — SIGNIFICANT CHANGE UP (ref 32–36)
MCV RBC AUTO: 95.7 FL — SIGNIFICANT CHANGE UP (ref 80–100)
MONOCYTES # BLD AUTO: 0.8 K/UL — SIGNIFICANT CHANGE UP (ref 0–0.9)
MONOCYTES NFR BLD AUTO: 13.6 % — SIGNIFICANT CHANGE UP (ref 2–14)
NEUTROPHILS # BLD AUTO: 3.6 K/UL — SIGNIFICANT CHANGE UP (ref 1.8–7.4)
NEUTROPHILS NFR BLD AUTO: 64.9 % — SIGNIFICANT CHANGE UP (ref 43–77)
PLATELET # BLD AUTO: 289 K/UL — SIGNIFICANT CHANGE UP (ref 150–400)
RBC # BLD: 3.43 M/UL — LOW (ref 3.8–5.2)
RBC # FLD: 15.4 % — HIGH (ref 10.3–14.5)
WBC # BLD: 5.6 K/UL — SIGNIFICANT CHANGE UP (ref 3.8–10.5)
WBC # FLD AUTO: 5.6 K/UL — SIGNIFICANT CHANGE UP (ref 3.8–10.5)

## 2017-09-08 DIAGNOSIS — Z51.11 ENCOUNTER FOR ANTINEOPLASTIC CHEMOTHERAPY: ICD-10-CM

## 2017-09-08 DIAGNOSIS — R11.2 NAUSEA WITH VOMITING, UNSPECIFIED: ICD-10-CM

## 2017-09-14 ENCOUNTER — RESULT REVIEW (OUTPATIENT)
Age: 67
End: 2017-09-14

## 2017-09-14 ENCOUNTER — APPOINTMENT (OUTPATIENT)
Dept: INFUSION THERAPY | Facility: HOSPITAL | Age: 67
End: 2017-09-14

## 2017-09-14 LAB
BASOPHILS # BLD AUTO: 0 K/UL — SIGNIFICANT CHANGE UP (ref 0–0.2)
BASOPHILS NFR BLD AUTO: 0.9 % — SIGNIFICANT CHANGE UP (ref 0–2)
EOSINOPHIL # BLD AUTO: 0.2 K/UL — SIGNIFICANT CHANGE UP (ref 0–0.5)
EOSINOPHIL NFR BLD AUTO: 5.6 % — SIGNIFICANT CHANGE UP (ref 0–6)
HCT VFR BLD CALC: 31.2 % — LOW (ref 34.5–45)
HGB BLD-MCNC: 11 G/DL — LOW (ref 11.5–15.5)
LYMPHOCYTES # BLD AUTO: 0.9 K/UL — LOW (ref 1–3.3)
LYMPHOCYTES # BLD AUTO: 32.2 % — SIGNIFICANT CHANGE UP (ref 13–44)
MCHC RBC-ENTMCNC: 33.6 PG — SIGNIFICANT CHANGE UP (ref 27–34)
MCHC RBC-ENTMCNC: 35.1 G/DL — SIGNIFICANT CHANGE UP (ref 32–36)
MCV RBC AUTO: 95.5 FL — SIGNIFICANT CHANGE UP (ref 80–100)
MONOCYTES # BLD AUTO: 0.3 K/UL — SIGNIFICANT CHANGE UP (ref 0–0.9)
MONOCYTES NFR BLD AUTO: 12 % — SIGNIFICANT CHANGE UP (ref 2–14)
NEUTROPHILS # BLD AUTO: 1.4 K/UL — LOW (ref 1.8–7.4)
NEUTROPHILS NFR BLD AUTO: 49.2 % — SIGNIFICANT CHANGE UP (ref 43–77)
PLAT MORPH BLD: NORMAL — SIGNIFICANT CHANGE UP
PLATELET # BLD AUTO: 302 K/UL — SIGNIFICANT CHANGE UP (ref 150–400)
RBC # BLD: 3.27 M/UL — LOW (ref 3.8–5.2)
RBC # FLD: 15.4 % — HIGH (ref 10.3–14.5)
RBC BLD AUTO: SIGNIFICANT CHANGE UP
WBC # BLD: 2.9 K/UL — LOW (ref 3.8–10.5)
WBC # FLD AUTO: 2.9 K/UL — LOW (ref 3.8–10.5)

## 2017-09-19 ENCOUNTER — APPOINTMENT (OUTPATIENT)
Dept: INFUSION THERAPY | Facility: HOSPITAL | Age: 67
End: 2017-09-19

## 2017-09-19 ENCOUNTER — RESULT REVIEW (OUTPATIENT)
Age: 67
End: 2017-09-19

## 2017-09-19 LAB
BASOPHILS # BLD AUTO: 0 K/UL — SIGNIFICANT CHANGE UP (ref 0–0.2)
BASOPHILS NFR BLD AUTO: 0.4 % — SIGNIFICANT CHANGE UP (ref 0–2)
EOSINOPHIL # BLD AUTO: 0.2 K/UL — SIGNIFICANT CHANGE UP (ref 0–0.5)
EOSINOPHIL NFR BLD AUTO: 3.6 % — SIGNIFICANT CHANGE UP (ref 0–6)
HCT VFR BLD CALC: 32 % — LOW (ref 34.5–45)
HGB BLD-MCNC: 11 G/DL — LOW (ref 11.5–15.5)
LYMPHOCYTES # BLD AUTO: 1 K/UL — SIGNIFICANT CHANGE UP (ref 1–3.3)
LYMPHOCYTES # BLD AUTO: 23.8 % — SIGNIFICANT CHANGE UP (ref 13–44)
MCHC RBC-ENTMCNC: 32.8 PG — SIGNIFICANT CHANGE UP (ref 27–34)
MCHC RBC-ENTMCNC: 34.4 G/DL — SIGNIFICANT CHANGE UP (ref 32–36)
MCV RBC AUTO: 95.4 FL — SIGNIFICANT CHANGE UP (ref 80–100)
MONOCYTES # BLD AUTO: 0.6 K/UL — SIGNIFICANT CHANGE UP (ref 0–0.9)
MONOCYTES NFR BLD AUTO: 13.1 % — SIGNIFICANT CHANGE UP (ref 2–14)
NEUTROPHILS # BLD AUTO: 2.6 K/UL — SIGNIFICANT CHANGE UP (ref 1.8–7.4)
NEUTROPHILS NFR BLD AUTO: 59.1 % — SIGNIFICANT CHANGE UP (ref 43–77)
PLATELET # BLD AUTO: 298 K/UL — SIGNIFICANT CHANGE UP (ref 150–400)
RBC # BLD: 3.36 M/UL — LOW (ref 3.8–5.2)
RBC # FLD: 15.3 % — HIGH (ref 10.3–14.5)
WBC # BLD: 4.4 K/UL — SIGNIFICANT CHANGE UP (ref 3.8–10.5)
WBC # FLD AUTO: 4.4 K/UL — SIGNIFICANT CHANGE UP (ref 3.8–10.5)

## 2017-09-22 ENCOUNTER — APPOINTMENT (OUTPATIENT)
Dept: HEMATOLOGY ONCOLOGY | Facility: CLINIC | Age: 67
End: 2017-09-22
Payer: MEDICARE

## 2017-09-22 VITALS
WEIGHT: 238.1 LBS | HEART RATE: 91 BPM | SYSTOLIC BLOOD PRESSURE: 111 MMHG | OXYGEN SATURATION: 100 % | DIASTOLIC BLOOD PRESSURE: 73 MMHG | RESPIRATION RATE: 16 BRPM | BODY MASS INDEX: 41.41 KG/M2 | TEMPERATURE: 97.7 F

## 2017-09-22 PROCEDURE — 99215 OFFICE O/P EST HI 40 MIN: CPT

## 2017-09-25 ENCOUNTER — FORM ENCOUNTER (OUTPATIENT)
Age: 67
End: 2017-09-25

## 2017-09-26 ENCOUNTER — FORM ENCOUNTER (OUTPATIENT)
Age: 67
End: 2017-09-26

## 2017-09-26 ENCOUNTER — OUTPATIENT (OUTPATIENT)
Dept: OUTPATIENT SERVICES | Facility: HOSPITAL | Age: 67
LOS: 1 days | End: 2017-09-26
Payer: MEDICARE

## 2017-09-26 ENCOUNTER — APPOINTMENT (OUTPATIENT)
Dept: MRI IMAGING | Facility: CLINIC | Age: 67
End: 2017-09-26

## 2017-09-26 DIAGNOSIS — Z98.890 OTHER SPECIFIED POSTPROCEDURAL STATES: Chronic | ICD-10-CM

## 2017-09-26 DIAGNOSIS — Z00.8 ENCOUNTER FOR OTHER GENERAL EXAMINATION: ICD-10-CM

## 2017-09-26 PROCEDURE — 72157 MRI CHEST SPINE W/O & W/DYE: CPT | Mod: 26

## 2017-09-26 PROCEDURE — A9585: CPT

## 2017-09-26 PROCEDURE — 72157 MRI CHEST SPINE W/O & W/DYE: CPT

## 2017-09-27 ENCOUNTER — APPOINTMENT (OUTPATIENT)
Dept: CT IMAGING | Facility: CLINIC | Age: 67
End: 2017-09-27

## 2017-09-27 ENCOUNTER — APPOINTMENT (OUTPATIENT)
Dept: MRI IMAGING | Facility: CLINIC | Age: 67
End: 2017-09-27

## 2017-09-27 ENCOUNTER — OUTPATIENT (OUTPATIENT)
Dept: OUTPATIENT SERVICES | Facility: HOSPITAL | Age: 67
LOS: 1 days | End: 2017-09-27
Payer: MEDICARE

## 2017-09-27 DIAGNOSIS — Z98.890 OTHER SPECIFIED POSTPROCEDURAL STATES: Chronic | ICD-10-CM

## 2017-09-27 DIAGNOSIS — C50.919 MALIGNANT NEOPLASM OF UNSPECIFIED SITE OF UNSPECIFIED FEMALE BREAST: ICD-10-CM

## 2017-09-27 PROCEDURE — 71260 CT THORAX DX C+: CPT | Mod: 26

## 2017-09-27 PROCEDURE — 74177 CT ABD & PELVIS W/CONTRAST: CPT | Mod: 26

## 2017-09-27 PROCEDURE — 0159T: CPT | Mod: 26

## 2017-09-27 PROCEDURE — 77059 MRI BREAST BILATERAL: CPT | Mod: 26

## 2017-09-28 PROCEDURE — 82565 ASSAY OF CREATININE: CPT

## 2017-09-28 PROCEDURE — A9585: CPT

## 2017-09-28 PROCEDURE — C8908: CPT

## 2017-09-28 PROCEDURE — C8937: CPT

## 2017-09-28 PROCEDURE — 71260 CT THORAX DX C+: CPT

## 2017-09-28 PROCEDURE — 74177 CT ABD & PELVIS W/CONTRAST: CPT

## 2017-10-03 ENCOUNTER — APPOINTMENT (OUTPATIENT)
Dept: SURGICAL ONCOLOGY | Facility: CLINIC | Age: 67
End: 2017-10-03
Payer: MEDICARE

## 2017-10-03 VITALS
WEIGHT: 236 LBS | HEART RATE: 82 BPM | BODY MASS INDEX: 41.82 KG/M2 | DIASTOLIC BLOOD PRESSURE: 82 MMHG | HEIGHT: 63 IN | RESPIRATION RATE: 16 BRPM | SYSTOLIC BLOOD PRESSURE: 138 MMHG | OXYGEN SATURATION: 98 %

## 2017-10-03 PROCEDURE — 99205 OFFICE O/P NEW HI 60 MIN: CPT

## 2017-10-03 RX ORDER — METOPROLOL SUCCINATE 50 MG/1
50 TABLET, EXTENDED RELEASE ORAL
Refills: 0 | Status: ACTIVE | COMMUNITY

## 2017-10-03 RX ORDER — AMLODIPINE BESYLATE 2.5 MG/1
2.5 TABLET ORAL DAILY
Qty: 30 | Refills: 0 | Status: DISCONTINUED | COMMUNITY
Start: 2017-05-08 | End: 2017-10-03

## 2017-10-03 RX ORDER — VALSARTAN 160 MG/1
160 TABLET, COATED ORAL DAILY
Qty: 30 | Refills: 0 | Status: COMPLETED | COMMUNITY
Start: 2017-05-08 | End: 2017-10-03

## 2017-10-03 RX ORDER — METOPROLOL TARTRATE 25 MG/1
25 TABLET, FILM COATED ORAL TWICE DAILY
Qty: 30 | Refills: 0 | Status: COMPLETED | COMMUNITY
Start: 2017-05-08 | End: 2017-10-03

## 2017-10-15 ENCOUNTER — FORM ENCOUNTER (OUTPATIENT)
Age: 67
End: 2017-10-15

## 2017-10-16 ENCOUNTER — APPOINTMENT (OUTPATIENT)
Dept: MRI IMAGING | Facility: CLINIC | Age: 67
End: 2017-10-16

## 2017-10-16 ENCOUNTER — RESULT REVIEW (OUTPATIENT)
Age: 67
End: 2017-10-16

## 2017-10-16 ENCOUNTER — OUTPATIENT (OUTPATIENT)
Dept: OUTPATIENT SERVICES | Facility: HOSPITAL | Age: 67
LOS: 1 days | End: 2017-10-16
Payer: MEDICARE

## 2017-10-16 DIAGNOSIS — Z00.8 ENCOUNTER FOR OTHER GENERAL EXAMINATION: ICD-10-CM

## 2017-10-16 DIAGNOSIS — Z98.890 OTHER SPECIFIED POSTPROCEDURAL STATES: Chronic | ICD-10-CM

## 2017-10-16 PROCEDURE — A9585: CPT

## 2017-10-16 PROCEDURE — 19086 BX BREAST ADD LESION MR IMAG: CPT

## 2017-10-16 PROCEDURE — 77065 DX MAMMO INCL CAD UNI: CPT

## 2017-10-16 PROCEDURE — 19085 BX BREAST 1ST LESION MR IMAG: CPT

## 2017-10-16 PROCEDURE — G0206: CPT | Mod: 26,RT

## 2017-10-16 PROCEDURE — 19085 BX BREAST 1ST LESION MR IMAG: CPT | Mod: RT

## 2017-10-16 PROCEDURE — 19086 BX BREAST ADD LESION MR IMAG: CPT | Mod: RT

## 2017-10-16 PROCEDURE — A4648: CPT

## 2017-10-24 DIAGNOSIS — C50.911 MALIGNANT NEOPLASM OF UNSPECIFIED SITE OF RIGHT FEMALE BREAST: ICD-10-CM

## 2017-10-24 DIAGNOSIS — N64.89 OTHER SPECIFIED DISORDERS OF BREAST: ICD-10-CM

## 2017-10-24 DIAGNOSIS — R92.8 OTHER ABNORMAL AND INCONCLUSIVE FINDINGS ON DIAGNOSTIC IMAGING OF BREAST: ICD-10-CM

## 2017-10-25 ENCOUNTER — OUTPATIENT (OUTPATIENT)
Dept: OUTPATIENT SERVICES | Facility: HOSPITAL | Age: 67
LOS: 1 days | End: 2017-10-25
Payer: MEDICARE

## 2017-10-25 ENCOUNTER — OUTPATIENT (OUTPATIENT)
Dept: OUTPATIENT SERVICES | Facility: HOSPITAL | Age: 67
LOS: 1 days | Discharge: HOME | End: 2017-10-25

## 2017-10-25 VITALS
RESPIRATION RATE: 20 BRPM | TEMPERATURE: 98 F | DIASTOLIC BLOOD PRESSURE: 78 MMHG | SYSTOLIC BLOOD PRESSURE: 120 MMHG | OXYGEN SATURATION: 99 % | WEIGHT: 227.08 LBS | HEART RATE: 78 BPM | HEIGHT: 65 IN

## 2017-10-25 DIAGNOSIS — Z98.890 OTHER SPECIFIED POSTPROCEDURAL STATES: Chronic | ICD-10-CM

## 2017-10-25 DIAGNOSIS — Z01.818 ENCOUNTER FOR OTHER PREPROCEDURAL EXAMINATION: ICD-10-CM

## 2017-10-25 DIAGNOSIS — Z95.828 PRESENCE OF OTHER VASCULAR IMPLANTS AND GRAFTS: Chronic | ICD-10-CM

## 2017-10-25 DIAGNOSIS — I81 PORTAL VEIN THROMBOSIS: ICD-10-CM

## 2017-10-25 DIAGNOSIS — Z90.89 ACQUIRED ABSENCE OF OTHER ORGANS: Chronic | ICD-10-CM

## 2017-10-25 DIAGNOSIS — C50.911 MALIGNANT NEOPLASM OF UNSPECIFIED SITE OF RIGHT FEMALE BREAST: ICD-10-CM

## 2017-10-25 DIAGNOSIS — I10 ESSENTIAL (PRIMARY) HYPERTENSION: ICD-10-CM

## 2017-10-25 DIAGNOSIS — C50.919 MALIGNANT NEOPLASM OF UNSPECIFIED SITE OF UNSPECIFIED FEMALE BREAST: ICD-10-CM

## 2017-10-25 LAB
HCT VFR BLD CALC: 35.6 % — SIGNIFICANT CHANGE UP (ref 34.5–45)
HGB BLD-MCNC: 11.4 G/DL — LOW (ref 11.5–15.5)
MCHC RBC-ENTMCNC: 31.2 PG — SIGNIFICANT CHANGE UP (ref 27–34)
MCHC RBC-ENTMCNC: 32 GM/DL — SIGNIFICANT CHANGE UP (ref 32–36)
MCV RBC AUTO: 97.5 FL — SIGNIFICANT CHANGE UP (ref 80–100)
PLATELET # BLD AUTO: 300 K/UL — SIGNIFICANT CHANGE UP (ref 150–400)
RBC # BLD: 3.65 M/UL — LOW (ref 3.8–5.2)
RBC # FLD: 15.9 % — HIGH (ref 10.3–14.5)
WBC # BLD: 5.26 K/UL — SIGNIFICANT CHANGE UP (ref 3.8–10.5)
WBC # FLD AUTO: 5.26 K/UL — SIGNIFICANT CHANGE UP (ref 3.8–10.5)

## 2017-10-25 PROCEDURE — 80048 BASIC METABOLIC PNL TOTAL CA: CPT

## 2017-10-25 PROCEDURE — 85027 COMPLETE CBC AUTOMATED: CPT

## 2017-10-25 PROCEDURE — G0463: CPT

## 2017-10-25 RX ORDER — ACETAMINOPHEN 500 MG
975 TABLET ORAL ONCE
Qty: 0 | Refills: 0 | Status: COMPLETED | OUTPATIENT
Start: 2017-11-01 | End: 2017-11-01

## 2017-10-25 RX ORDER — LIDOCAINE HCL 20 MG/ML
0.2 VIAL (ML) INJECTION ONCE
Qty: 0 | Refills: 0 | Status: DISCONTINUED | OUTPATIENT
Start: 2017-11-01 | End: 2017-11-16

## 2017-10-25 RX ORDER — SODIUM CHLORIDE 9 MG/ML
3 INJECTION INTRAMUSCULAR; INTRAVENOUS; SUBCUTANEOUS EVERY 8 HOURS
Qty: 0 | Refills: 0 | Status: DISCONTINUED | OUTPATIENT
Start: 2017-11-01 | End: 2017-11-16

## 2017-10-25 NOTE — H&P PST ADULT - PROBLEM SELECTOR PLAN 2
On Lovenox- pt to call Dr. Ruano for plan, possible last dose 10/30/17. Pt to see Dr. Ruano for appointment on 10/31/17.

## 2017-10-25 NOTE — H&P PST ADULT - PROBLEM SELECTOR PLAN 1
Right breast lumpectomy post marilyn  Local, Right axillary sentinel lymph node biopsy post marilyn  local, Possible right axillary node dissection on 11/1/17.

## 2017-10-25 NOTE — H&P PST ADULT - HISTORY OF PRESENT ILLNESS
66 F w/ breast CA (dx in 2003 (infiltrating lobular carcinoma, ER/NV +, HER-2/marvin -) s/p lumpectomy and chemotherapy with 4 cycles of adriamycin cytoxan followed by radation therapy) w/ disease recurrence in 12/2016 (on chemo and taxol weekly for 12 weeks, last chemo on 6/2014 via R upper chest wall mediport) along with HTN presenting from home with fever (Tm of 103), nonproductive cough and generalized fatigue for the past day. Has been taking  tylenol at home with only modest improvement prompting her to come to ER for further evaluation. Pt felt fine following her last dose of chemotherapy last Wednesday; this morning, however, she woke up with generalized malaise and fatigue prompting her to come to ER for further evaluation. Pt denies any associated shaking rigors, no CP, no palpitations, no SOB/BABCOCK, denies any abdominal pain, no nausea/vomiting/diarrhea no dysuria, denies increased frequency/urgency or hematuria. Pt does admit to decreased PO intake 2/2 poor appetite which she attributes to her chemotherapy. No sick contacts, no recent travel.      Recent admission to [facility] (5/18-5/25/17) for pseudomonas septicemia (of unclear source) and found to have a portal vein thrombosis , currently on Lovenox 180mg qd; pt d/c'ed to rehab with IV antibiotics via PICC line and finished PO ciprofloxacin 3 days prior (6/15/17). Pt had recent admission prior to that (4/19-4/25/17) for bacteremia 2/2 Mediport infection; per pt, Mediport was not removed or exchanged.     Of note, recent TTE (performed 4/18/17) revealed: normal LV systolic function, EF of 60%, normal RV systolic function and minimal mitral regurgitation.     In the ED, VS: T: 103 (Tm: 103.1 orally), HR: 122-127, BP: 80/43 --> 143/93, RR: 20, SpO2: 94% on RA, 95% on 2L NS. In the ED, pt recieved: tylenol 1gm IV x 1, cefepime 2gm IV x 1, levofloxacin 750mg IV x 1, vancomycin 1gm IV x 1 and a 500cc NS bolus x 1.    Labs on admission: WBC of 5.6 (94% neutrophils), Hgb of 11 (baseline ~ 8), INR of 1.19. RVP negative.     CXR: low lung volumes limiting evaluation, no gross focal consolidations.  CT chest : performed, read pending. 67 yr old female with h/o HTN, Obesity, Breast ca left (2003)- s/p lumpectomy, chemo & radiation, developed right breast mass Dec 2016- biopsy revealed  Right breast Invasive ductal carcinoma- was on Taxol until Sep 2017. Now coming in for Right breast lumpectomy post marilyn  Local, Right axillary sentinel lymph node biopsy post marilyn  local, Possible right axillary node dissection on 11/1/17.

## 2017-10-25 NOTE — H&P PST ADULT - NSANTHOSAYNRD_GEN_A_CORE
No. MARYANN screening performed.  STOP BANG Legend: 0-2 = LOW Risk; 3-4 = INTERMEDIATE Risk; 5-8 = HIGH Risk

## 2017-10-25 NOTE — H&P PST ADULT - PMH
Asthma  on Advair  Breast cancer  LEFT (2003) _ - s/p lumpectomy, chemo & radiation- Stage 2  Hearing loss of left ear    HTN (hypertension)    Portal vein thrombosis  May 2017- On Enoxiparin Asthma  on Advair  Breast cancer  LEFT (2003) _ - s/p lumpectomy, chemo & radiation- Stage 2  Breast cancer, right  invasive ductal ca- treated with TAXol from 3/2017-9/2017  Hearing loss of left ear    HTN (hypertension)    Obesity (BMI 30-39.9)    Osteoarthritis    Portal vein thrombosis  May 2017- On Enoxaparin  Sepsis, due to unspecified organism  June 2017

## 2017-10-25 NOTE — H&P PST ADULT - RS GEN PE MLT RESP DETAILS PC
clear to auscultation bilaterally/breath sounds equal/respirations non-labored/good air movement/normal/airway patent

## 2017-10-26 LAB
ANION GAP SERPL CALC-SCNC: 18 MMOL/L — HIGH (ref 5–17)
BUN SERPL-MCNC: 24 MG/DL — HIGH (ref 7–23)
CALCIUM SERPL-MCNC: 10.7 MG/DL — HIGH (ref 8.4–10.5)
CHLORIDE SERPL-SCNC: 98 MMOL/L — SIGNIFICANT CHANGE UP (ref 96–108)
CO2 SERPL-SCNC: 24 MMOL/L — SIGNIFICANT CHANGE UP (ref 22–31)
CREAT SERPL-MCNC: 0.87 MG/DL — SIGNIFICANT CHANGE UP (ref 0.5–1.3)
GLUCOSE SERPL-MCNC: 93 MG/DL — SIGNIFICANT CHANGE UP (ref 70–99)
POTASSIUM SERPL-MCNC: 4.8 MMOL/L — SIGNIFICANT CHANGE UP (ref 3.5–5.3)
POTASSIUM SERPL-SCNC: 4.8 MMOL/L — SIGNIFICANT CHANGE UP (ref 3.5–5.3)
SODIUM SERPL-SCNC: 140 MMOL/L — SIGNIFICANT CHANGE UP (ref 135–145)

## 2017-10-27 ENCOUNTER — APPOINTMENT (OUTPATIENT)
Dept: INFUSION THERAPY | Facility: HOSPITAL | Age: 67
End: 2017-10-27

## 2017-10-30 ENCOUNTER — FORM ENCOUNTER (OUTPATIENT)
Age: 67
End: 2017-10-30

## 2017-10-31 ENCOUNTER — APPOINTMENT (OUTPATIENT)
Dept: ULTRASOUND IMAGING | Facility: IMAGING CENTER | Age: 67
End: 2017-10-31
Payer: MEDICARE

## 2017-10-31 ENCOUNTER — APPOINTMENT (OUTPATIENT)
Dept: HEMATOLOGY ONCOLOGY | Facility: CLINIC | Age: 67
End: 2017-10-31

## 2017-10-31 ENCOUNTER — APPOINTMENT (OUTPATIENT)
Dept: INFUSION THERAPY | Facility: HOSPITAL | Age: 67
End: 2017-10-31

## 2017-10-31 ENCOUNTER — APPOINTMENT (OUTPATIENT)
Dept: MAMMOGRAPHY | Facility: IMAGING CENTER | Age: 67
End: 2017-10-31
Payer: MEDICARE

## 2017-10-31 ENCOUNTER — FORM ENCOUNTER (OUTPATIENT)
Age: 67
End: 2017-10-31

## 2017-10-31 ENCOUNTER — OUTPATIENT (OUTPATIENT)
Dept: OUTPATIENT SERVICES | Facility: HOSPITAL | Age: 67
LOS: 1 days | End: 2017-10-31
Payer: MEDICARE

## 2017-10-31 DIAGNOSIS — Z98.890 OTHER SPECIFIED POSTPROCEDURAL STATES: Chronic | ICD-10-CM

## 2017-10-31 DIAGNOSIS — Z00.8 ENCOUNTER FOR OTHER GENERAL EXAMINATION: ICD-10-CM

## 2017-10-31 DIAGNOSIS — Z90.89 ACQUIRED ABSENCE OF OTHER ORGANS: Chronic | ICD-10-CM

## 2017-10-31 DIAGNOSIS — Z95.828 PRESENCE OF OTHER VASCULAR IMPLANTS AND GRAFTS: Chronic | ICD-10-CM

## 2017-10-31 PROBLEM — C50.919 MALIGNANT NEOPLASM OF UNSPECIFIED SITE OF UNSPECIFIED FEMALE BREAST: Chronic | Status: ACTIVE | Noted: 2017-04-19

## 2017-10-31 PROCEDURE — 19282 PERQ DEVICE BREAST EA IMAG: CPT

## 2017-10-31 PROCEDURE — C1739: CPT

## 2017-10-31 PROCEDURE — 10035 PLMT SFT TISS LOCLZJ DEV 1ST: CPT | Mod: RT

## 2017-10-31 PROCEDURE — 19285 PERQ DEV BREAST 1ST US IMAG: CPT

## 2017-10-31 PROCEDURE — 19281 PERQ DEVICE BREAST 1ST IMAG: CPT | Mod: LT

## 2017-10-31 PROCEDURE — 19281 PERQ DEVICE BREAST 1ST IMAG: CPT

## 2017-10-31 PROCEDURE — 19282 PERQ DEVICE BREAST EA IMAG: CPT | Mod: 59,RT

## 2017-10-31 RX ORDER — SCOPALAMINE 1 MG/3D
1.5 PATCH, EXTENDED RELEASE TRANSDERMAL ONCE
Qty: 0 | Refills: 0 | Status: COMPLETED | OUTPATIENT
Start: 2017-11-01 | End: 2017-11-01

## 2017-11-01 ENCOUNTER — RESULT REVIEW (OUTPATIENT)
Age: 67
End: 2017-11-01

## 2017-11-01 ENCOUNTER — OUTPATIENT (OUTPATIENT)
Dept: OUTPATIENT SERVICES | Facility: HOSPITAL | Age: 67
LOS: 1 days | End: 2017-11-01
Payer: MEDICARE

## 2017-11-01 ENCOUNTER — APPOINTMENT (OUTPATIENT)
Dept: NUCLEAR MEDICINE | Facility: HOSPITAL | Age: 67
End: 2017-11-01

## 2017-11-01 ENCOUNTER — TRANSCRIPTION ENCOUNTER (OUTPATIENT)
Age: 67
End: 2017-11-01

## 2017-11-01 ENCOUNTER — APPOINTMENT (OUTPATIENT)
Dept: SURGICAL ONCOLOGY | Facility: HOSPITAL | Age: 67
End: 2017-11-01

## 2017-11-01 VITALS
HEART RATE: 90 BPM | SYSTOLIC BLOOD PRESSURE: 125 MMHG | DIASTOLIC BLOOD PRESSURE: 70 MMHG | TEMPERATURE: 98 F | OXYGEN SATURATION: 99 % | RESPIRATION RATE: 18 BRPM

## 2017-11-01 VITALS
WEIGHT: 227.08 LBS | TEMPERATURE: 98 F | OXYGEN SATURATION: 97 % | DIASTOLIC BLOOD PRESSURE: 75 MMHG | SYSTOLIC BLOOD PRESSURE: 114 MMHG | HEIGHT: 65 IN | RESPIRATION RATE: 20 BRPM | HEART RATE: 94 BPM

## 2017-11-01 DIAGNOSIS — Z98.890 OTHER SPECIFIED POSTPROCEDURAL STATES: Chronic | ICD-10-CM

## 2017-11-01 DIAGNOSIS — Z90.89 ACQUIRED ABSENCE OF OTHER ORGANS: Chronic | ICD-10-CM

## 2017-11-01 DIAGNOSIS — C50.911 MALIGNANT NEOPLASM OF UNSPECIFIED SITE OF RIGHT FEMALE BREAST: ICD-10-CM

## 2017-11-01 DIAGNOSIS — Z95.828 PRESENCE OF OTHER VASCULAR IMPLANTS AND GRAFTS: Chronic | ICD-10-CM

## 2017-11-01 PROCEDURE — 88305 TISSUE EXAM BY PATHOLOGIST: CPT | Mod: 26

## 2017-11-01 PROCEDURE — 19125 EXCISION BREAST LESION: CPT | Mod: 50

## 2017-11-01 PROCEDURE — 76098 X-RAY EXAM SURGICAL SPECIMEN: CPT

## 2017-11-01 PROCEDURE — 19281 PERQ DEVICE BREAST 1ST IMAG: CPT | Mod: RT

## 2017-11-01 PROCEDURE — 88307 TISSUE EXAM BY PATHOLOGIST: CPT | Mod: 26

## 2017-11-01 PROCEDURE — 88307 TISSUE EXAM BY PATHOLOGIST: CPT

## 2017-11-01 PROCEDURE — C1889: CPT

## 2017-11-01 PROCEDURE — A9541: CPT

## 2017-11-01 PROCEDURE — 38525 BIOPSY/REMOVAL LYMPH NODES: CPT | Mod: 50

## 2017-11-01 PROCEDURE — 88331 PATH CONSLTJ SURG 1 BLK 1SPC: CPT | Mod: 26

## 2017-11-01 PROCEDURE — 38745 REMOVE ARMPIT LYMPH NODES: CPT | Mod: RT,59

## 2017-11-01 PROCEDURE — 88331 PATH CONSLTJ SURG 1 BLK 1SPC: CPT

## 2017-11-01 PROCEDURE — 76098 X-RAY EXAM SURGICAL SPECIMEN: CPT | Mod: 26

## 2017-11-01 PROCEDURE — 38790 INJECT FOR LYMPHATIC X-RAY: CPT | Mod: 59

## 2017-11-01 PROCEDURE — 38900 IO MAP OF SENT LYMPH NODE: CPT | Mod: RT

## 2017-11-01 PROCEDURE — 88305 TISSUE EXAM BY PATHOLOGIST: CPT

## 2017-11-01 PROCEDURE — 19301 PARTIAL MASTECTOMY: CPT | Mod: 50

## 2017-11-01 RX ORDER — MORPHINE SULFATE 50 MG/1
2 CAPSULE, EXTENDED RELEASE ORAL
Qty: 0 | Refills: 0 | Status: DISCONTINUED | OUTPATIENT
Start: 2017-11-01 | End: 2017-11-01

## 2017-11-01 RX ORDER — CELECOXIB 200 MG/1
200 CAPSULE ORAL ONCE
Qty: 0 | Refills: 0 | Status: COMPLETED | OUTPATIENT
Start: 2017-11-01 | End: 2017-11-01

## 2017-11-01 RX ORDER — OXYCODONE HYDROCHLORIDE 5 MG/1
10 TABLET ORAL ONCE
Qty: 0 | Refills: 0 | Status: DISCONTINUED | OUTPATIENT
Start: 2017-11-01 | End: 2017-11-01

## 2017-11-01 RX ORDER — CELECOXIB 200 MG/1
200 CAPSULE ORAL ONCE
Qty: 0 | Refills: 0 | Status: DISCONTINUED | OUTPATIENT
Start: 2017-11-01 | End: 2017-11-16

## 2017-11-01 RX ORDER — ONDANSETRON 8 MG/1
4 TABLET, FILM COATED ORAL ONCE
Qty: 0 | Refills: 0 | Status: DISCONTINUED | OUTPATIENT
Start: 2017-11-01 | End: 2017-11-16

## 2017-11-01 RX ADMIN — CELECOXIB 200 MILLIGRAM(S): 200 CAPSULE ORAL at 10:52

## 2017-11-01 RX ADMIN — Medication 975 MILLIGRAM(S): at 10:53

## 2017-11-01 RX ADMIN — SCOPALAMINE 1.5 MILLIGRAM(S): 1 PATCH, EXTENDED RELEASE TRANSDERMAL at 11:25

## 2017-11-01 NOTE — ASU PATIENT PROFILE, ADULT - PMH
Asthma  on Advair  Breast cancer  LEFT (2003) _ - s/p lumpectomy, chemo & radiation- Stage 2  Breast cancer, right  invasive ductal ca- treated with TAXol from 3/2017-9/2017  Hearing loss of left ear    HTN (hypertension)    Obesity (BMI 30-39.9)    Osteoarthritis    Portal vein thrombosis  May 2017- On Enoxaparin  Sepsis, due to unspecified organism  June 2017

## 2017-11-01 NOTE — BRIEF OPERATIVE NOTE - PRE-OP DX
Breast mass, left  11/01/2017    Active  Reynold Valencia  Malignant neoplasm of central portion of right female breast, unspecified estrogen receptor status  11/01/2017    Active  Reynold Valencia

## 2017-11-01 NOTE — BRIEF OPERATIVE NOTE - PROCEDURE
<<-----Click on this checkbox to enter Procedure Axillary lymph node dissection with sentinel lymph node biopsy  11/01/2017  right  Active  DFILARDI  Lumpectomy of both breasts  11/01/2017  post marilyn   Active  DFILARDI

## 2017-11-01 NOTE — ASU DISCHARGE PLAN (ADULT/PEDIATRIC). - MEDICATION SUMMARY - MEDICATIONS TO TAKE
I will START or STAY ON the medications listed below when I get home from the hospital:    Vicodin 5 mg-300 mg oral tablet  -- 1 tab(s) by mouth every 4 hours, As Needed -for moderate pain MDD:5 tabs  -- Caution federal law prohibits the transfer of this drug to any person other  than the person for whom it was prescribed.  Do not drink alcoholic beverages when taking this medication.  May cause drowsiness.  Alcohol may intensify this effect.  Use care when operating dangerous machinery.  This drug may impair the ability to drive or operate machinery.  Use care until you become familiar with its effects.  This product contains acetaminophen.  Do not use  with any other product containing acetaminophen to prevent possible liver damage.  Using more of this medication than prescribed may cause serious breathing problems.    -- Indication: For Pain    valsartan 320 mg oral tablet  -- 1 tab(s) by mouth once a day  -- Indication: For Home med    enoxaparin  -- 150 milligram(s) subcutaneous once a day (at bedtime)  -- Indication: For Home med    CeleXA 10 mg oral tablet  -- 1 tab(s) by mouth once a day  -- Indication: For Home med    metoprolol tartrate 50 mg oral tablet  -- 1 tab(s) by mouth 2 times a day  -- Indication: For Home med    Advair Diskus 250 mcg-50 mcg inhalation powder  -- 1 puff(s) inhaled once a day  -- Indication: For Home med    amLODIPine 5 mg oral tablet  -- 1 tab(s) by mouth once a day  -- Indication: For Home med    famotidine 20 mg oral tablet  -- 1 tab(s) by mouth once, 1 tab night prior to surgery, 1 tab day of surgery      -- Indication: For Home med    Vitamin D3 1000 intl units oral tablet  -- 1 tab(s) by mouth once a day  -- Indication: For Home med

## 2017-11-01 NOTE — ASU DISCHARGE PLAN (ADULT/PEDIATRIC). - NOTIFY
Fever greater than 101/Swelling that continues/Bleeding that does not stop Bleeding that does not stop/Persistent Nausea and Vomiting/Swelling that continues/Inability to Tolerate Liquids or Foods/Unable to Urinate/Pain not relieved by Medications/Fever greater than 101

## 2017-11-07 ENCOUNTER — APPOINTMENT (OUTPATIENT)
Dept: SURGICAL ONCOLOGY | Facility: CLINIC | Age: 67
End: 2017-11-07
Payer: MEDICARE

## 2017-11-07 VITALS
WEIGHT: 236 LBS | SYSTOLIC BLOOD PRESSURE: 121 MMHG | RESPIRATION RATE: 16 BRPM | BODY MASS INDEX: 41.82 KG/M2 | DIASTOLIC BLOOD PRESSURE: 78 MMHG | OXYGEN SATURATION: 98 % | TEMPERATURE: 98 F | HEART RATE: 102 BPM | HEIGHT: 63 IN

## 2017-11-07 LAB — SURGICAL PATHOLOGY STUDY: SIGNIFICANT CHANGE UP

## 2017-11-07 PROCEDURE — 99024 POSTOP FOLLOW-UP VISIT: CPT

## 2017-11-14 ENCOUNTER — APPOINTMENT (OUTPATIENT)
Dept: HEMATOLOGY ONCOLOGY | Facility: CLINIC | Age: 67
End: 2017-11-14
Payer: MEDICARE

## 2017-11-14 ENCOUNTER — APPOINTMENT (OUTPATIENT)
Dept: SURGICAL ONCOLOGY | Facility: CLINIC | Age: 67
End: 2017-11-14

## 2017-11-14 VITALS
DIASTOLIC BLOOD PRESSURE: 82 MMHG | OXYGEN SATURATION: 98 % | BODY MASS INDEX: 40.22 KG/M2 | TEMPERATURE: 98.4 F | HEART RATE: 95 BPM | WEIGHT: 227.07 LBS | SYSTOLIC BLOOD PRESSURE: 125 MMHG | RESPIRATION RATE: 16 BRPM

## 2017-11-14 PROCEDURE — 99215 OFFICE O/P EST HI 40 MIN: CPT

## 2017-11-17 RX ORDER — LIDOCAINE HCL 20 MG/ML
0.2 VIAL (ML) INJECTION ONCE
Qty: 0 | Refills: 0 | Status: DISCONTINUED | OUTPATIENT
Start: 2017-11-20 | End: 2017-12-05

## 2017-11-17 RX ORDER — SODIUM CHLORIDE 9 MG/ML
3 INJECTION INTRAMUSCULAR; INTRAVENOUS; SUBCUTANEOUS EVERY 8 HOURS
Qty: 0 | Refills: 0 | Status: DISCONTINUED | OUTPATIENT
Start: 2017-11-20 | End: 2017-12-05

## 2017-11-17 RX ORDER — ACETAMINOPHEN 500 MG
975 TABLET ORAL ONCE
Qty: 0 | Refills: 0 | Status: COMPLETED | OUTPATIENT
Start: 2017-11-20 | End: 2017-11-20

## 2017-11-20 ENCOUNTER — APPOINTMENT (OUTPATIENT)
Dept: SURGICAL ONCOLOGY | Facility: HOSPITAL | Age: 67
End: 2017-11-20

## 2017-11-20 ENCOUNTER — TRANSCRIPTION ENCOUNTER (OUTPATIENT)
Age: 67
End: 2017-11-20

## 2017-11-20 ENCOUNTER — OUTPATIENT (OUTPATIENT)
Dept: OUTPATIENT SERVICES | Facility: HOSPITAL | Age: 67
LOS: 1 days | End: 2017-11-20
Payer: MEDICARE

## 2017-11-20 ENCOUNTER — RESULT REVIEW (OUTPATIENT)
Age: 67
End: 2017-11-20

## 2017-11-20 VITALS
HEART RATE: 84 BPM | DIASTOLIC BLOOD PRESSURE: 80 MMHG | TEMPERATURE: 98 F | WEIGHT: 227.08 LBS | SYSTOLIC BLOOD PRESSURE: 131 MMHG | OXYGEN SATURATION: 99 % | RESPIRATION RATE: 20 BRPM | HEIGHT: 65 IN

## 2017-11-20 VITALS
RESPIRATION RATE: 12 BRPM | SYSTOLIC BLOOD PRESSURE: 121 MMHG | TEMPERATURE: 97 F | DIASTOLIC BLOOD PRESSURE: 59 MMHG | HEART RATE: 70 BPM | OXYGEN SATURATION: 98 %

## 2017-11-20 DIAGNOSIS — Z90.89 ACQUIRED ABSENCE OF OTHER ORGANS: Chronic | ICD-10-CM

## 2017-11-20 DIAGNOSIS — Z95.828 PRESENCE OF OTHER VASCULAR IMPLANTS AND GRAFTS: Chronic | ICD-10-CM

## 2017-11-20 DIAGNOSIS — Z98.890 OTHER SPECIFIED POSTPROCEDURAL STATES: Chronic | ICD-10-CM

## 2017-11-20 DIAGNOSIS — C50.911 MALIGNANT NEOPLASM OF UNSPECIFIED SITE OF RIGHT FEMALE BREAST: ICD-10-CM

## 2017-11-20 PROCEDURE — 88341 IMHCHEM/IMCYTCHM EA ADD ANTB: CPT | Mod: 26

## 2017-11-20 PROCEDURE — 88305 TISSUE EXAM BY PATHOLOGIST: CPT | Mod: 26

## 2017-11-20 PROCEDURE — 88342 IMHCHEM/IMCYTCHM 1ST ANTB: CPT | Mod: 26

## 2017-11-20 PROCEDURE — 14001 TIS TRNFR TRUNK 10.1-30SQCM: CPT | Mod: 59

## 2017-11-20 PROCEDURE — 88304 TISSUE EXAM BY PATHOLOGIST: CPT | Mod: 26

## 2017-11-20 PROCEDURE — 19301 PARTIAL MASTECTOMY: CPT | Mod: RT

## 2017-11-20 PROCEDURE — A4648: CPT

## 2017-11-20 PROCEDURE — 12032 INTMD RPR S/A/T/EXT 2.6-7.5: CPT | Mod: 59

## 2017-11-20 PROCEDURE — 88304 TISSUE EXAM BY PATHOLOGIST: CPT

## 2017-11-20 PROCEDURE — 19499 UNLISTED PROCEDURE BREAST: CPT

## 2017-11-20 PROCEDURE — 88305 TISSUE EXAM BY PATHOLOGIST: CPT

## 2017-11-20 PROCEDURE — 88342 IMHCHEM/IMCYTCHM 1ST ANTB: CPT

## 2017-11-20 PROCEDURE — 88341 IMHCHEM/IMCYTCHM EA ADD ANTB: CPT

## 2017-11-20 RX ORDER — CELECOXIB 200 MG/1
200 CAPSULE ORAL ONCE
Qty: 0 | Refills: 0 | Status: COMPLETED | OUTPATIENT
Start: 2017-11-20 | End: 2017-11-20

## 2017-11-20 RX ORDER — SODIUM CHLORIDE 9 MG/ML
1000 INJECTION, SOLUTION INTRAVENOUS
Qty: 0 | Refills: 0 | Status: DISCONTINUED | OUTPATIENT
Start: 2017-11-20 | End: 2017-12-05

## 2017-11-20 RX ORDER — ONDANSETRON 8 MG/1
4 TABLET, FILM COATED ORAL ONCE
Qty: 0 | Refills: 0 | Status: DISCONTINUED | OUTPATIENT
Start: 2017-11-20 | End: 2017-12-05

## 2017-11-20 RX ORDER — APREPITANT 80 MG/1
40 CAPSULE ORAL ONCE
Qty: 0 | Refills: 0 | Status: COMPLETED | OUTPATIENT
Start: 2017-11-20 | End: 2017-11-20

## 2017-11-20 RX ADMIN — Medication 975 MILLIGRAM(S): at 10:41

## 2017-11-20 RX ADMIN — APREPITANT 40 MILLIGRAM(S): 80 CAPSULE ORAL at 10:41

## 2017-11-20 RX ADMIN — CELECOXIB 200 MILLIGRAM(S): 200 CAPSULE ORAL at 10:41

## 2017-11-20 NOTE — ASU DISCHARGE PLAN (ADULT/PEDIATRIC). - NOTIFY
Numbness, color, or temperature change to extremity/Fever greater than 101/Pain not relieved by Medications/Persistent Nausea and Vomiting/Numbness, tingling/Swelling that continues/Bleeding that does not stop Bleeding that does not stop/Swelling that continues/Persistent Nausea and Vomiting/Unable to Urinate/Fever greater than 101/Numbness, tingling/Numbness, color, or temperature change to extremity/Pain not relieved by Medications

## 2017-11-20 NOTE — BRIEF OPERATIVE NOTE - PRE-OP DX
Malignant neoplasm of upper-inner quadrant of right female breast, unspecified estrogen receptor status  11/20/2017    Active  Reynold Valencia

## 2017-11-20 NOTE — ASU DISCHARGE PLAN (ADULT/PEDIATRIC). - MEDICATION SUMMARY - MEDICATIONS TO TAKE
I will START or STAY ON the medications listed below when I get home from the hospital:    valsartan 320 mg oral tablet  -- 1 tab(s) by mouth once a day  -- Indication: For High bleed pressure    enoxaparin  -- 150 milligram(s) subcutaneous once a day (at bedtime)  -- Indication: For blood thinner    CeleXA 10 mg oral tablet  -- 1 tab(s) by mouth once a day  -- Indication: For Mood    metoprolol tartrate 50 mg oral tablet  -- 1 tab(s) by mouth 2 times a day  -- Indication: For High bleed pressure    Advair Diskus 250 mcg-50 mcg inhalation powder  -- 1 puff(s) inhaled once a day  -- Indication: For shortness of breath    amLODIPine 5 mg oral tablet  -- 1 tab(s) by mouth once a day  -- Indication: For High bleed pressure    famotidine 20 mg oral tablet  -- 1 tab(s) by mouth once, 1 tab night prior to surgery, 1 tab day of surgery      -- Indication: For acid reflux    Vitamin D3 1000 intl units oral tablet  -- 1 tab(s) by mouth once a day  -- Indication: For supplement

## 2017-11-20 NOTE — BRIEF OPERATIVE NOTE - PROCEDURE
<<-----Click on this checkbox to enter Procedure Insertion of Biozorb tissue marker  11/20/2017  right  Active  DFILARDI  Lumpectomy of right breast  11/20/2017    Active  DFILARDI

## 2017-11-20 NOTE — ASU DISCHARGE PLAN (ADULT/PEDIATRIC). - ITEMS TO FOLLOWUP WITH YOUR PHYSICIAN'S
Please schedule a follow up appointment with Dr. Valencia ( [phone] ) in 7-10s after discharge

## 2017-11-20 NOTE — PRE-ANESTHESIA EVALUATION ADULT - NSANTHPMHFT_GEN_ALL_CORE
sepsis likely from chemoport while patient was neutropenic  portal vein thrombosis diagnosed incidentally on CT scan, treated currently with lovenox

## 2017-11-30 LAB — SURGICAL PATHOLOGY STUDY: SIGNIFICANT CHANGE UP

## 2017-12-11 ENCOUNTER — APPOINTMENT (OUTPATIENT)
Dept: SURGICAL ONCOLOGY | Facility: CLINIC | Age: 67
End: 2017-12-11
Payer: MEDICARE

## 2017-12-11 VITALS
DIASTOLIC BLOOD PRESSURE: 79 MMHG | RESPIRATION RATE: 15 BRPM | BODY MASS INDEX: 38.62 KG/M2 | WEIGHT: 218 LBS | TEMPERATURE: 97.5 F | HEART RATE: 84 BPM | SYSTOLIC BLOOD PRESSURE: 117 MMHG | HEIGHT: 63 IN | OXYGEN SATURATION: 99 %

## 2017-12-11 PROCEDURE — 99024 POSTOP FOLLOW-UP VISIT: CPT

## 2017-12-22 ENCOUNTER — OUTPATIENT (OUTPATIENT)
Dept: OUTPATIENT SERVICES | Facility: HOSPITAL | Age: 67
LOS: 1 days | Discharge: HOME | End: 2017-12-22

## 2017-12-22 DIAGNOSIS — C50.919 MALIGNANT NEOPLASM OF UNSPECIFIED SITE OF UNSPECIFIED FEMALE BREAST: ICD-10-CM

## 2017-12-22 DIAGNOSIS — Z95.828 PRESENCE OF OTHER VASCULAR IMPLANTS AND GRAFTS: Chronic | ICD-10-CM

## 2017-12-22 DIAGNOSIS — Z98.890 OTHER SPECIFIED POSTPROCEDURAL STATES: Chronic | ICD-10-CM

## 2017-12-22 DIAGNOSIS — Z90.89 ACQUIRED ABSENCE OF OTHER ORGANS: Chronic | ICD-10-CM

## 2018-01-03 ENCOUNTER — RESULT REVIEW (OUTPATIENT)
Age: 68
End: 2018-01-03

## 2018-01-03 ENCOUNTER — APPOINTMENT (OUTPATIENT)
Dept: INFUSION THERAPY | Facility: HOSPITAL | Age: 68
End: 2018-01-03

## 2018-01-03 ENCOUNTER — LABORATORY RESULT (OUTPATIENT)
Age: 68
End: 2018-01-03

## 2018-01-03 LAB
BASOPHILS # BLD AUTO: 0 K/UL — SIGNIFICANT CHANGE UP (ref 0–0.2)
BASOPHILS NFR BLD AUTO: 0.9 % — SIGNIFICANT CHANGE UP (ref 0–2)
EOSINOPHIL # BLD AUTO: 0.1 K/UL — SIGNIFICANT CHANGE UP (ref 0–0.5)
EOSINOPHIL NFR BLD AUTO: 1.9 % — SIGNIFICANT CHANGE UP (ref 0–6)
HCT VFR BLD CALC: 35.8 % — SIGNIFICANT CHANGE UP (ref 34.5–45)
HGB BLD-MCNC: 12.3 G/DL — SIGNIFICANT CHANGE UP (ref 11.5–15.5)
LYMPHOCYTES # BLD AUTO: 1.2 K/UL — SIGNIFICANT CHANGE UP (ref 1–3.3)
LYMPHOCYTES # BLD AUTO: 24.8 % — SIGNIFICANT CHANGE UP (ref 13–44)
MCHC RBC-ENTMCNC: 33.4 PG — SIGNIFICANT CHANGE UP (ref 27–34)
MCHC RBC-ENTMCNC: 34.5 G/DL — SIGNIFICANT CHANGE UP (ref 32–36)
MCV RBC AUTO: 96.8 FL — SIGNIFICANT CHANGE UP (ref 80–100)
MONOCYTES # BLD AUTO: 0.5 K/UL — SIGNIFICANT CHANGE UP (ref 0–0.9)
MONOCYTES NFR BLD AUTO: 9.9 % — SIGNIFICANT CHANGE UP (ref 2–14)
NEUTROPHILS # BLD AUTO: 3.1 K/UL — SIGNIFICANT CHANGE UP (ref 1.8–7.4)
NEUTROPHILS NFR BLD AUTO: 62.5 % — SIGNIFICANT CHANGE UP (ref 43–77)
PLATELET # BLD AUTO: 236 K/UL — SIGNIFICANT CHANGE UP (ref 150–400)
RBC # BLD: 3.69 M/UL — LOW (ref 3.8–5.2)
RBC # FLD: 12.4 % — SIGNIFICANT CHANGE UP (ref 10.3–14.5)
WBC # BLD: 4.9 K/UL — SIGNIFICANT CHANGE UP (ref 3.8–10.5)
WBC # FLD AUTO: 4.9 K/UL — SIGNIFICANT CHANGE UP (ref 3.8–10.5)

## 2018-02-08 ENCOUNTER — OUTPATIENT (OUTPATIENT)
Dept: OUTPATIENT SERVICES | Facility: HOSPITAL | Age: 68
LOS: 1 days | Discharge: HOME | End: 2018-02-08

## 2018-02-08 DIAGNOSIS — Z90.89 ACQUIRED ABSENCE OF OTHER ORGANS: Chronic | ICD-10-CM

## 2018-02-08 DIAGNOSIS — Z98.890 OTHER SPECIFIED POSTPROCEDURAL STATES: Chronic | ICD-10-CM

## 2018-02-08 DIAGNOSIS — C50.919 MALIGNANT NEOPLASM OF UNSPECIFIED SITE OF UNSPECIFIED FEMALE BREAST: ICD-10-CM

## 2018-02-08 DIAGNOSIS — Z95.828 PRESENCE OF OTHER VASCULAR IMPLANTS AND GRAFTS: Chronic | ICD-10-CM

## 2018-02-08 NOTE — PATIENT PROFILE ADULT. - AS SC BRADEN FRICTION
The spine pre-operative education packet was mailed to the patient on 1/24/18.  The patient reviewed the information included in the packet. She called the office and we reviewed the information. All her questions were answered and she gave a clear understanding of the instructions. She was advised to call the office at any time with further questions or concerns.
(3) no apparent problem

## 2018-02-14 ENCOUNTER — LABORATORY RESULT (OUTPATIENT)
Age: 68
End: 2018-02-14

## 2018-02-14 ENCOUNTER — RESULT REVIEW (OUTPATIENT)
Age: 68
End: 2018-02-14

## 2018-02-14 ENCOUNTER — APPOINTMENT (OUTPATIENT)
Dept: INFUSION THERAPY | Facility: HOSPITAL | Age: 68
End: 2018-02-14

## 2018-02-14 LAB
BASOPHILS # BLD AUTO: 0 K/UL — SIGNIFICANT CHANGE UP (ref 0–0.2)
BASOPHILS NFR BLD AUTO: 0.6 % — SIGNIFICANT CHANGE UP (ref 0–2)
EOSINOPHIL # BLD AUTO: 0.1 K/UL — SIGNIFICANT CHANGE UP (ref 0–0.5)
EOSINOPHIL NFR BLD AUTO: 2.5 % — SIGNIFICANT CHANGE UP (ref 0–6)
HCT VFR BLD CALC: 35.6 % — SIGNIFICANT CHANGE UP (ref 34.5–45)
HGB BLD-MCNC: 11.9 G/DL — SIGNIFICANT CHANGE UP (ref 11.5–15.5)
LYMPHOCYTES # BLD AUTO: 0.6 K/UL — LOW (ref 1–3.3)
LYMPHOCYTES # BLD AUTO: 14.6 % — SIGNIFICANT CHANGE UP (ref 13–44)
MCHC RBC-ENTMCNC: 32 PG — SIGNIFICANT CHANGE UP (ref 27–34)
MCHC RBC-ENTMCNC: 33.5 G/DL — SIGNIFICANT CHANGE UP (ref 32–36)
MCV RBC AUTO: 95.5 FL — SIGNIFICANT CHANGE UP (ref 80–100)
MONOCYTES # BLD AUTO: 0.6 K/UL — SIGNIFICANT CHANGE UP (ref 0–0.9)
MONOCYTES NFR BLD AUTO: 14.7 % — HIGH (ref 2–14)
NEUTROPHILS # BLD AUTO: 2.9 K/UL — SIGNIFICANT CHANGE UP (ref 1.8–7.4)
NEUTROPHILS NFR BLD AUTO: 67.6 % — SIGNIFICANT CHANGE UP (ref 43–77)
PLATELET # BLD AUTO: 228 K/UL — SIGNIFICANT CHANGE UP (ref 150–400)
RBC # BLD: 3.72 M/UL — LOW (ref 3.8–5.2)
RBC # FLD: 12.7 % — SIGNIFICANT CHANGE UP (ref 10.3–14.5)
WBC # BLD: 4.3 K/UL — SIGNIFICANT CHANGE UP (ref 3.8–10.5)
WBC # FLD AUTO: 4.3 K/UL — SIGNIFICANT CHANGE UP (ref 3.8–10.5)

## 2018-03-23 ENCOUNTER — OUTPATIENT (OUTPATIENT)
Dept: OUTPATIENT SERVICES | Facility: HOSPITAL | Age: 68
LOS: 1 days | Discharge: HOME | End: 2018-03-23

## 2018-03-23 DIAGNOSIS — Z95.828 PRESENCE OF OTHER VASCULAR IMPLANTS AND GRAFTS: Chronic | ICD-10-CM

## 2018-03-23 DIAGNOSIS — Z98.890 OTHER SPECIFIED POSTPROCEDURAL STATES: Chronic | ICD-10-CM

## 2018-03-23 DIAGNOSIS — C50.919 MALIGNANT NEOPLASM OF UNSPECIFIED SITE OF UNSPECIFIED FEMALE BREAST: ICD-10-CM

## 2018-03-23 DIAGNOSIS — Z90.89 ACQUIRED ABSENCE OF OTHER ORGANS: Chronic | ICD-10-CM

## 2018-03-28 ENCOUNTER — LABORATORY RESULT (OUTPATIENT)
Age: 68
End: 2018-03-28

## 2018-03-28 ENCOUNTER — APPOINTMENT (OUTPATIENT)
Dept: INFUSION THERAPY | Facility: HOSPITAL | Age: 68
End: 2018-03-28

## 2018-04-13 ENCOUNTER — OTHER (OUTPATIENT)
Age: 68
End: 2018-04-13

## 2018-04-16 ENCOUNTER — RESULT REVIEW (OUTPATIENT)
Age: 68
End: 2018-04-16

## 2018-04-16 ENCOUNTER — APPOINTMENT (OUTPATIENT)
Dept: HEMATOLOGY ONCOLOGY | Facility: CLINIC | Age: 68
End: 2018-04-16
Payer: MEDICARE

## 2018-04-16 VITALS
SYSTOLIC BLOOD PRESSURE: 128 MMHG | HEART RATE: 87 BPM | OXYGEN SATURATION: 99 % | RESPIRATION RATE: 16 BRPM | BODY MASS INDEX: 35.15 KG/M2 | TEMPERATURE: 98.5 F | WEIGHT: 198.41 LBS | DIASTOLIC BLOOD PRESSURE: 78 MMHG

## 2018-04-16 LAB
BASOPHILS # BLD AUTO: 0 K/UL — SIGNIFICANT CHANGE UP (ref 0–0.2)
BASOPHILS NFR BLD AUTO: 0.6 % — SIGNIFICANT CHANGE UP (ref 0–2)
EOSINOPHIL # BLD AUTO: 0.1 K/UL — SIGNIFICANT CHANGE UP (ref 0–0.5)
EOSINOPHIL NFR BLD AUTO: 2.3 % — SIGNIFICANT CHANGE UP (ref 0–6)
HCT VFR BLD CALC: 33.5 % — LOW (ref 34.5–45)
HGB BLD-MCNC: 11.9 G/DL — SIGNIFICANT CHANGE UP (ref 11.5–15.5)
LYMPHOCYTES # BLD AUTO: 0.8 K/UL — LOW (ref 1–3.3)
LYMPHOCYTES # BLD AUTO: 16.2 % — SIGNIFICANT CHANGE UP (ref 13–44)
MCHC RBC-ENTMCNC: 34.5 PG — HIGH (ref 27–34)
MCHC RBC-ENTMCNC: 35.5 G/DL — SIGNIFICANT CHANGE UP (ref 32–36)
MCV RBC AUTO: 97 FL — SIGNIFICANT CHANGE UP (ref 80–100)
MONOCYTES # BLD AUTO: 0.5 K/UL — SIGNIFICANT CHANGE UP (ref 0–0.9)
MONOCYTES NFR BLD AUTO: 10.4 % — SIGNIFICANT CHANGE UP (ref 2–14)
NEUTROPHILS # BLD AUTO: 3.3 K/UL — SIGNIFICANT CHANGE UP (ref 1.8–7.4)
NEUTROPHILS NFR BLD AUTO: 70.5 % — SIGNIFICANT CHANGE UP (ref 43–77)
PLATELET # BLD AUTO: 225 K/UL — SIGNIFICANT CHANGE UP (ref 150–400)
RBC # BLD: 3.45 M/UL — LOW (ref 3.8–5.2)
RBC # FLD: 12.7 % — SIGNIFICANT CHANGE UP (ref 10.3–14.5)
WBC # BLD: 4.7 K/UL — SIGNIFICANT CHANGE UP (ref 3.8–10.5)
WBC # FLD AUTO: 4.7 K/UL — SIGNIFICANT CHANGE UP (ref 3.8–10.5)

## 2018-04-16 PROCEDURE — 99214 OFFICE O/P EST MOD 30 MIN: CPT

## 2018-04-17 LAB
ALBUMIN SERPL ELPH-MCNC: 4 G/DL
ALP BLD-CCNC: 81 U/L
ALT SERPL-CCNC: 20 U/L
ANION GAP SERPL CALC-SCNC: 10 MMOL/L
AST SERPL-CCNC: 20 U/L
BILIRUB SERPL-MCNC: 0.3 MG/DL
BUN SERPL-MCNC: 35 MG/DL
CALCIUM SERPL-MCNC: 9.9 MG/DL
CANCER AG27-29 SERPL-ACNC: 27.4 U/ML
CHLORIDE SERPL-SCNC: 99 MMOL/L
CO2 SERPL-SCNC: 28 MMOL/L
CREAT SERPL-MCNC: 0.91 MG/DL
GLUCOSE SERPL-MCNC: 101 MG/DL
POTASSIUM SERPL-SCNC: 5 MMOL/L
PROT SERPL-MCNC: 6.9 G/DL
SODIUM SERPL-SCNC: 137 MMOL/L

## 2018-04-19 ENCOUNTER — FORM ENCOUNTER (OUTPATIENT)
Age: 68
End: 2018-04-19

## 2018-04-20 ENCOUNTER — APPOINTMENT (OUTPATIENT)
Dept: CT IMAGING | Facility: CLINIC | Age: 68
End: 2018-04-20

## 2018-04-20 ENCOUNTER — OUTPATIENT (OUTPATIENT)
Dept: OUTPATIENT SERVICES | Facility: HOSPITAL | Age: 68
LOS: 1 days | End: 2018-04-20
Payer: MEDICARE

## 2018-04-20 DIAGNOSIS — Z90.89 ACQUIRED ABSENCE OF OTHER ORGANS: Chronic | ICD-10-CM

## 2018-04-20 DIAGNOSIS — Z98.890 OTHER SPECIFIED POSTPROCEDURAL STATES: Chronic | ICD-10-CM

## 2018-04-20 DIAGNOSIS — Z00.8 ENCOUNTER FOR OTHER GENERAL EXAMINATION: ICD-10-CM

## 2018-04-20 DIAGNOSIS — Z95.828 PRESENCE OF OTHER VASCULAR IMPLANTS AND GRAFTS: Chronic | ICD-10-CM

## 2018-04-20 PROCEDURE — 74177 CT ABD & PELVIS W/CONTRAST: CPT | Mod: 26

## 2018-04-20 PROCEDURE — 71260 CT THORAX DX C+: CPT | Mod: 26

## 2018-04-20 PROCEDURE — 82565 ASSAY OF CREATININE: CPT

## 2018-04-20 PROCEDURE — 71260 CT THORAX DX C+: CPT

## 2018-04-20 PROCEDURE — 74177 CT ABD & PELVIS W/CONTRAST: CPT

## 2018-04-26 ENCOUNTER — OTHER (OUTPATIENT)
Age: 68
End: 2018-04-26

## 2018-04-30 ENCOUNTER — OTHER (OUTPATIENT)
Age: 68
End: 2018-04-30

## 2018-05-01 ENCOUNTER — OUTPATIENT (OUTPATIENT)
Dept: OUTPATIENT SERVICES | Facility: HOSPITAL | Age: 68
LOS: 1 days | Discharge: HOME | End: 2018-05-01

## 2018-05-01 DIAGNOSIS — Z98.890 OTHER SPECIFIED POSTPROCEDURAL STATES: Chronic | ICD-10-CM

## 2018-05-01 DIAGNOSIS — Z90.89 ACQUIRED ABSENCE OF OTHER ORGANS: Chronic | ICD-10-CM

## 2018-05-01 DIAGNOSIS — C50.919 MALIGNANT NEOPLASM OF UNSPECIFIED SITE OF UNSPECIFIED FEMALE BREAST: ICD-10-CM

## 2018-05-01 DIAGNOSIS — Z95.828 PRESENCE OF OTHER VASCULAR IMPLANTS AND GRAFTS: Chronic | ICD-10-CM

## 2018-05-02 ENCOUNTER — APPOINTMENT (OUTPATIENT)
Dept: NUCLEAR MEDICINE | Facility: CLINIC | Age: 68
End: 2018-05-02

## 2018-05-02 ENCOUNTER — OUTPATIENT (OUTPATIENT)
Dept: OUTPATIENT SERVICES | Facility: HOSPITAL | Age: 68
LOS: 1 days | End: 2018-05-02
Payer: MEDICARE

## 2018-05-02 DIAGNOSIS — Z90.89 ACQUIRED ABSENCE OF OTHER ORGANS: Chronic | ICD-10-CM

## 2018-05-02 DIAGNOSIS — Z98.890 OTHER SPECIFIED POSTPROCEDURAL STATES: Chronic | ICD-10-CM

## 2018-05-02 DIAGNOSIS — C50.919 MALIGNANT NEOPLASM OF UNSPECIFIED SITE OF UNSPECIFIED FEMALE BREAST: ICD-10-CM

## 2018-05-02 DIAGNOSIS — Z95.828 PRESENCE OF OTHER VASCULAR IMPLANTS AND GRAFTS: Chronic | ICD-10-CM

## 2018-05-02 PROCEDURE — 78815 PET IMAGE W/CT SKULL-THIGH: CPT | Mod: 26,PS

## 2018-05-02 PROCEDURE — A9552: CPT

## 2018-05-02 PROCEDURE — 78815 PET IMAGE W/CT SKULL-THIGH: CPT

## 2018-05-03 ENCOUNTER — RESULT REVIEW (OUTPATIENT)
Age: 68
End: 2018-05-03

## 2018-05-03 ENCOUNTER — APPOINTMENT (OUTPATIENT)
Dept: HEMATOLOGY ONCOLOGY | Facility: CLINIC | Age: 68
End: 2018-05-03
Payer: MEDICARE

## 2018-05-03 ENCOUNTER — OTHER (OUTPATIENT)
Age: 68
End: 2018-05-03

## 2018-05-03 VITALS
DIASTOLIC BLOOD PRESSURE: 78 MMHG | SYSTOLIC BLOOD PRESSURE: 125 MMHG | OXYGEN SATURATION: 100 % | RESPIRATION RATE: 16 BRPM | BODY MASS INDEX: 34.64 KG/M2 | WEIGHT: 195.53 LBS | HEART RATE: 71 BPM | TEMPERATURE: 97.9 F

## 2018-05-03 DIAGNOSIS — I82.409 ACUTE EMBOLISM AND THROMBOSIS OF UNSPECIFIED DEEP VEINS OF UNSPECIFIED LOWER EXTREMITY: ICD-10-CM

## 2018-05-03 DIAGNOSIS — I81 PORTAL VEIN THROMBOSIS: ICD-10-CM

## 2018-05-03 LAB
BASOPHILS # BLD AUTO: 0 K/UL — SIGNIFICANT CHANGE UP (ref 0–0.2)
BASOPHILS NFR BLD AUTO: 0.8 % — SIGNIFICANT CHANGE UP (ref 0–2)
EOSINOPHIL # BLD AUTO: 0.1 K/UL — SIGNIFICANT CHANGE UP (ref 0–0.5)
EOSINOPHIL NFR BLD AUTO: 2.2 % — SIGNIFICANT CHANGE UP (ref 0–6)
HCT VFR BLD CALC: 33.6 % — LOW (ref 34.5–45)
HGB BLD-MCNC: 11.6 G/DL — SIGNIFICANT CHANGE UP (ref 11.5–15.5)
LYMPHOCYTES # BLD AUTO: 0.8 K/UL — LOW (ref 1–3.3)
LYMPHOCYTES # BLD AUTO: 18.8 % — SIGNIFICANT CHANGE UP (ref 13–44)
MCHC RBC-ENTMCNC: 33.7 PG — SIGNIFICANT CHANGE UP (ref 27–34)
MCHC RBC-ENTMCNC: 34.5 G/DL — SIGNIFICANT CHANGE UP (ref 32–36)
MCV RBC AUTO: 97.5 FL — SIGNIFICANT CHANGE UP (ref 80–100)
MONOCYTES # BLD AUTO: 0.5 K/UL — SIGNIFICANT CHANGE UP (ref 0–0.9)
MONOCYTES NFR BLD AUTO: 11.8 % — SIGNIFICANT CHANGE UP (ref 2–14)
NEUTROPHILS # BLD AUTO: 2.9 K/UL — SIGNIFICANT CHANGE UP (ref 1.8–7.4)
NEUTROPHILS NFR BLD AUTO: 66.4 % — SIGNIFICANT CHANGE UP (ref 43–77)
PLATELET # BLD AUTO: 224 K/UL — SIGNIFICANT CHANGE UP (ref 150–400)
RBC # BLD: 3.44 M/UL — LOW (ref 3.8–5.2)
RBC # FLD: 12.6 % — SIGNIFICANT CHANGE UP (ref 10.3–14.5)
WBC # BLD: 4.4 K/UL — SIGNIFICANT CHANGE UP (ref 3.8–10.5)
WBC # FLD AUTO: 4.4 K/UL — SIGNIFICANT CHANGE UP (ref 3.8–10.5)

## 2018-05-03 PROCEDURE — 99214 OFFICE O/P EST MOD 30 MIN: CPT

## 2018-05-04 LAB
ALBUMIN SERPL ELPH-MCNC: 3.8 G/DL
ALP BLD-CCNC: 83 U/L
ALT SERPL-CCNC: 22 U/L
ANION GAP SERPL CALC-SCNC: 14 MMOL/L
AST SERPL-CCNC: 22 U/L
BILIRUB SERPL-MCNC: 0.5 MG/DL
BUN SERPL-MCNC: 27 MG/DL
CALCIUM SERPL-MCNC: 10.1 MG/DL
CHLORIDE SERPL-SCNC: 100 MMOL/L
CO2 SERPL-SCNC: 27 MMOL/L
CREAT SERPL-MCNC: 0.95 MG/DL
GLUCOSE SERPL-MCNC: 92 MG/DL
POTASSIUM SERPL-SCNC: 5.2 MMOL/L
PROT SERPL-MCNC: 6.6 G/DL
SODIUM SERPL-SCNC: 141 MMOL/L

## 2018-05-07 ENCOUNTER — FORM ENCOUNTER (OUTPATIENT)
Age: 68
End: 2018-05-07

## 2018-05-08 ENCOUNTER — OUTPATIENT (OUTPATIENT)
Dept: OUTPATIENT SERVICES | Facility: HOSPITAL | Age: 68
LOS: 1 days | End: 2018-05-08
Payer: MEDICARE

## 2018-05-08 ENCOUNTER — APPOINTMENT (OUTPATIENT)
Dept: MRI IMAGING | Facility: CLINIC | Age: 68
End: 2018-05-08
Payer: MEDICARE

## 2018-05-08 DIAGNOSIS — Z90.89 ACQUIRED ABSENCE OF OTHER ORGANS: Chronic | ICD-10-CM

## 2018-05-08 DIAGNOSIS — Z98.890 OTHER SPECIFIED POSTPROCEDURAL STATES: Chronic | ICD-10-CM

## 2018-05-08 DIAGNOSIS — Z95.828 PRESENCE OF OTHER VASCULAR IMPLANTS AND GRAFTS: Chronic | ICD-10-CM

## 2018-05-08 DIAGNOSIS — M89.9 DISORDER OF BONE, UNSPECIFIED: ICD-10-CM

## 2018-05-08 DIAGNOSIS — C50.919 MALIGNANT NEOPLASM OF UNSPECIFIED SITE OF UNSPECIFIED FEMALE BREAST: ICD-10-CM

## 2018-05-08 PROCEDURE — 72158 MRI LUMBAR SPINE W/O & W/DYE: CPT | Mod: 26

## 2018-05-08 PROCEDURE — 72157 MRI CHEST SPINE W/O & W/DYE: CPT | Mod: 26

## 2018-05-08 PROCEDURE — A9585: CPT

## 2018-05-08 PROCEDURE — 72157 MRI CHEST SPINE W/O & W/DYE: CPT

## 2018-05-08 PROCEDURE — 72158 MRI LUMBAR SPINE W/O & W/DYE: CPT

## 2018-05-17 ENCOUNTER — OTHER (OUTPATIENT)
Age: 68
End: 2018-05-17

## 2018-06-12 ENCOUNTER — OUTPATIENT (OUTPATIENT)
Dept: OUTPATIENT SERVICES | Facility: HOSPITAL | Age: 68
LOS: 1 days | Discharge: HOME | End: 2018-06-12

## 2018-06-12 DIAGNOSIS — C50.919 MALIGNANT NEOPLASM OF UNSPECIFIED SITE OF UNSPECIFIED FEMALE BREAST: ICD-10-CM

## 2018-06-12 DIAGNOSIS — Z98.890 OTHER SPECIFIED POSTPROCEDURAL STATES: Chronic | ICD-10-CM

## 2018-06-12 DIAGNOSIS — Z95.828 PRESENCE OF OTHER VASCULAR IMPLANTS AND GRAFTS: Chronic | ICD-10-CM

## 2018-06-12 DIAGNOSIS — Z90.89 ACQUIRED ABSENCE OF OTHER ORGANS: Chronic | ICD-10-CM

## 2018-06-13 ENCOUNTER — LABORATORY RESULT (OUTPATIENT)
Age: 68
End: 2018-06-13

## 2018-06-13 ENCOUNTER — RESULT REVIEW (OUTPATIENT)
Age: 68
End: 2018-06-13

## 2018-06-13 ENCOUNTER — APPOINTMENT (OUTPATIENT)
Dept: INFUSION THERAPY | Facility: HOSPITAL | Age: 68
End: 2018-06-13

## 2018-06-13 LAB
BASOPHILS # BLD AUTO: 0 K/UL — SIGNIFICANT CHANGE UP (ref 0–0.2)
BASOPHILS NFR BLD AUTO: 0.4 % — SIGNIFICANT CHANGE UP (ref 0–2)
EOSINOPHIL # BLD AUTO: 0.1 K/UL — SIGNIFICANT CHANGE UP (ref 0–0.5)
EOSINOPHIL NFR BLD AUTO: 2.2 % — SIGNIFICANT CHANGE UP (ref 0–6)
HCT VFR BLD CALC: 34.6 % — SIGNIFICANT CHANGE UP (ref 34.5–45)
HGB BLD-MCNC: 12.2 G/DL — SIGNIFICANT CHANGE UP (ref 11.5–15.5)
LYMPHOCYTES # BLD AUTO: 1 K/UL — SIGNIFICANT CHANGE UP (ref 1–3.3)
LYMPHOCYTES # BLD AUTO: 19.3 % — SIGNIFICANT CHANGE UP (ref 13–44)
MCHC RBC-ENTMCNC: 34.1 PG — HIGH (ref 27–34)
MCHC RBC-ENTMCNC: 35.1 G/DL — SIGNIFICANT CHANGE UP (ref 32–36)
MCV RBC AUTO: 97 FL — SIGNIFICANT CHANGE UP (ref 80–100)
MONOCYTES # BLD AUTO: 0.5 K/UL — SIGNIFICANT CHANGE UP (ref 0–0.9)
MONOCYTES NFR BLD AUTO: 9.8 % — SIGNIFICANT CHANGE UP (ref 2–14)
NEUTROPHILS # BLD AUTO: 3.7 K/UL — SIGNIFICANT CHANGE UP (ref 1.8–7.4)
NEUTROPHILS NFR BLD AUTO: 68.2 % — SIGNIFICANT CHANGE UP (ref 43–77)
PLATELET # BLD AUTO: 214 K/UL — SIGNIFICANT CHANGE UP (ref 150–400)
RBC # BLD: 3.57 M/UL — LOW (ref 3.8–5.2)
RBC # FLD: 12.1 % — SIGNIFICANT CHANGE UP (ref 10.3–14.5)
WBC # BLD: 5.4 K/UL — SIGNIFICANT CHANGE UP (ref 3.8–10.5)
WBC # FLD AUTO: 5.4 K/UL — SIGNIFICANT CHANGE UP (ref 3.8–10.5)

## 2018-06-19 ENCOUNTER — APPOINTMENT (OUTPATIENT)
Dept: SURGICAL ONCOLOGY | Facility: CLINIC | Age: 68
End: 2018-06-19
Payer: MEDICARE

## 2018-06-19 VITALS — DIASTOLIC BLOOD PRESSURE: 70 MMHG | HEART RATE: 79 BPM | OXYGEN SATURATION: 99 % | SYSTOLIC BLOOD PRESSURE: 106 MMHG

## 2018-06-19 VITALS — BODY MASS INDEX: 33.83 KG/M2 | WEIGHT: 191 LBS

## 2018-06-19 PROCEDURE — 99214 OFFICE O/P EST MOD 30 MIN: CPT

## 2018-07-16 PROBLEM — I81 PORTAL VEIN THROMBOSIS: Chronic | Status: ACTIVE | Noted: 2017-10-25

## 2018-07-24 ENCOUNTER — FORM ENCOUNTER (OUTPATIENT)
Age: 68
End: 2018-07-24

## 2018-07-24 PROBLEM — M19.90 UNSPECIFIED OSTEOARTHRITIS, UNSPECIFIED SITE: Chronic | Status: ACTIVE | Noted: 2017-10-25

## 2018-07-24 PROBLEM — J45.909 UNSPECIFIED ASTHMA, UNCOMPLICATED: Chronic | Status: ACTIVE | Noted: 2017-10-25

## 2018-07-24 PROBLEM — E66.9 OBESITY, UNSPECIFIED: Chronic | Status: ACTIVE | Noted: 2017-10-25

## 2018-07-24 PROBLEM — I10 ESSENTIAL (PRIMARY) HYPERTENSION: Chronic | Status: ACTIVE | Noted: 2017-10-25

## 2018-07-24 PROBLEM — C50.911 MALIGNANT NEOPLASM OF UNSPECIFIED SITE OF RIGHT FEMALE BREAST: Chronic | Status: ACTIVE | Noted: 2017-10-25

## 2018-07-24 PROBLEM — A41.9 SEPSIS, UNSPECIFIED ORGANISM: Chronic | Status: ACTIVE | Noted: 2017-10-25

## 2018-07-24 PROBLEM — H91.92 UNSPECIFIED HEARING LOSS, LEFT EAR: Chronic | Status: ACTIVE | Noted: 2017-10-25

## 2018-07-25 ENCOUNTER — OUTPATIENT (OUTPATIENT)
Dept: OUTPATIENT SERVICES | Facility: HOSPITAL | Age: 68
LOS: 1 days | End: 2018-07-25
Payer: MEDICARE

## 2018-07-25 ENCOUNTER — APPOINTMENT (OUTPATIENT)
Dept: ULTRASOUND IMAGING | Facility: CLINIC | Age: 68
End: 2018-07-25
Payer: MEDICARE

## 2018-07-25 ENCOUNTER — APPOINTMENT (OUTPATIENT)
Dept: MAMMOGRAPHY | Facility: CLINIC | Age: 68
End: 2018-07-25
Payer: MEDICARE

## 2018-07-25 DIAGNOSIS — Z95.828 PRESENCE OF OTHER VASCULAR IMPLANTS AND GRAFTS: Chronic | ICD-10-CM

## 2018-07-25 DIAGNOSIS — Z98.890 OTHER SPECIFIED POSTPROCEDURAL STATES: Chronic | ICD-10-CM

## 2018-07-25 DIAGNOSIS — Z00.8 ENCOUNTER FOR OTHER GENERAL EXAMINATION: ICD-10-CM

## 2018-07-25 DIAGNOSIS — Z90.89 ACQUIRED ABSENCE OF OTHER ORGANS: Chronic | ICD-10-CM

## 2018-07-25 PROCEDURE — G0279: CPT

## 2018-07-25 PROCEDURE — G0279: CPT | Mod: 26

## 2018-07-25 PROCEDURE — 76641 ULTRASOUND BREAST COMPLETE: CPT | Mod: 26,50

## 2018-07-25 PROCEDURE — 77066 DX MAMMO INCL CAD BI: CPT

## 2018-07-25 PROCEDURE — 76641 ULTRASOUND BREAST COMPLETE: CPT

## 2018-07-25 PROCEDURE — 77066 DX MAMMO INCL CAD BI: CPT | Mod: 26

## 2018-08-01 ENCOUNTER — OUTPATIENT (OUTPATIENT)
Dept: OUTPATIENT SERVICES | Facility: HOSPITAL | Age: 68
LOS: 1 days | Discharge: HOME | End: 2018-08-01

## 2018-08-01 DIAGNOSIS — Z98.890 OTHER SPECIFIED POSTPROCEDURAL STATES: Chronic | ICD-10-CM

## 2018-08-01 DIAGNOSIS — Z95.828 PRESENCE OF OTHER VASCULAR IMPLANTS AND GRAFTS: Chronic | ICD-10-CM

## 2018-08-01 DIAGNOSIS — Z90.89 ACQUIRED ABSENCE OF OTHER ORGANS: Chronic | ICD-10-CM

## 2018-08-01 DIAGNOSIS — C50.919 MALIGNANT NEOPLASM OF UNSPECIFIED SITE OF UNSPECIFIED FEMALE BREAST: ICD-10-CM

## 2018-08-08 ENCOUNTER — RESULT REVIEW (OUTPATIENT)
Age: 68
End: 2018-08-08

## 2018-08-08 ENCOUNTER — LABORATORY RESULT (OUTPATIENT)
Age: 68
End: 2018-08-08

## 2018-08-08 ENCOUNTER — APPOINTMENT (OUTPATIENT)
Dept: HEMATOLOGY ONCOLOGY | Facility: CLINIC | Age: 68
End: 2018-08-08
Payer: MEDICARE

## 2018-08-08 ENCOUNTER — APPOINTMENT (OUTPATIENT)
Dept: INFUSION THERAPY | Facility: HOSPITAL | Age: 68
End: 2018-08-08

## 2018-08-08 VITALS
SYSTOLIC BLOOD PRESSURE: 119 MMHG | OXYGEN SATURATION: 98 % | TEMPERATURE: 98.2 F | HEART RATE: 76 BPM | RESPIRATION RATE: 16 BRPM | DIASTOLIC BLOOD PRESSURE: 73 MMHG | WEIGHT: 189.58 LBS | BODY MASS INDEX: 33.58 KG/M2

## 2018-08-08 LAB
BASOPHILS # BLD AUTO: 0 K/UL — SIGNIFICANT CHANGE UP (ref 0–0.2)
BASOPHILS # BLD AUTO: 0 K/UL — SIGNIFICANT CHANGE UP (ref 0–0.2)
BASOPHILS NFR BLD AUTO: 0.4 % — SIGNIFICANT CHANGE UP (ref 0–2)
BASOPHILS NFR BLD AUTO: 0.8 % — SIGNIFICANT CHANGE UP (ref 0–2)
EOSINOPHIL # BLD AUTO: 0.1 K/UL — SIGNIFICANT CHANGE UP (ref 0–0.5)
EOSINOPHIL # BLD AUTO: 0.1 K/UL — SIGNIFICANT CHANGE UP (ref 0–0.5)
EOSINOPHIL NFR BLD AUTO: 2.4 % — SIGNIFICANT CHANGE UP (ref 0–6)
EOSINOPHIL NFR BLD AUTO: 2.4 % — SIGNIFICANT CHANGE UP (ref 0–6)
HCT VFR BLD CALC: 32.8 % — LOW (ref 34.5–45)
HCT VFR BLD CALC: 33.8 % — LOW (ref 34.5–45)
HGB BLD-MCNC: 11.2 G/DL — LOW (ref 11.5–15.5)
HGB BLD-MCNC: 11.5 G/DL — SIGNIFICANT CHANGE UP (ref 11.5–15.5)
LYMPHOCYTES # BLD AUTO: 1.1 K/UL — SIGNIFICANT CHANGE UP (ref 1–3.3)
LYMPHOCYTES # BLD AUTO: 1.1 K/UL — SIGNIFICANT CHANGE UP (ref 1–3.3)
LYMPHOCYTES # BLD AUTO: 22.9 % — SIGNIFICANT CHANGE UP (ref 13–44)
LYMPHOCYTES # BLD AUTO: 24.2 % — SIGNIFICANT CHANGE UP (ref 13–44)
MCHC RBC-ENTMCNC: 32.9 PG — SIGNIFICANT CHANGE UP (ref 27–34)
MCHC RBC-ENTMCNC: 33 PG — SIGNIFICANT CHANGE UP (ref 27–34)
MCHC RBC-ENTMCNC: 34 G/DL — SIGNIFICANT CHANGE UP (ref 32–36)
MCHC RBC-ENTMCNC: 34.2 G/DL — SIGNIFICANT CHANGE UP (ref 32–36)
MCV RBC AUTO: 96.6 FL — SIGNIFICANT CHANGE UP (ref 80–100)
MCV RBC AUTO: 96.8 FL — SIGNIFICANT CHANGE UP (ref 80–100)
MONOCYTES # BLD AUTO: 0.5 K/UL — SIGNIFICANT CHANGE UP (ref 0–0.9)
MONOCYTES # BLD AUTO: 0.6 K/UL — SIGNIFICANT CHANGE UP (ref 0–0.9)
MONOCYTES NFR BLD AUTO: 11.4 % — SIGNIFICANT CHANGE UP (ref 2–14)
MONOCYTES NFR BLD AUTO: 12.7 % — SIGNIFICANT CHANGE UP (ref 2–14)
NEUTROPHILS # BLD AUTO: 2.8 K/UL — SIGNIFICANT CHANGE UP (ref 1.8–7.4)
NEUTROPHILS # BLD AUTO: 2.9 K/UL — SIGNIFICANT CHANGE UP (ref 1.8–7.4)
NEUTROPHILS NFR BLD AUTO: 59.9 % — SIGNIFICANT CHANGE UP (ref 43–77)
NEUTROPHILS NFR BLD AUTO: 62.9 % — SIGNIFICANT CHANGE UP (ref 43–77)
PLATELET # BLD AUTO: 195 K/UL — SIGNIFICANT CHANGE UP (ref 150–400)
PLATELET # BLD AUTO: 210 K/UL — SIGNIFICANT CHANGE UP (ref 150–400)
RBC # BLD: 3.4 M/UL — LOW (ref 3.8–5.2)
RBC # BLD: 3.49 M/UL — LOW (ref 3.8–5.2)
RBC # FLD: 12.1 % — SIGNIFICANT CHANGE UP (ref 10.3–14.5)
RBC # FLD: 12.2 % — SIGNIFICANT CHANGE UP (ref 10.3–14.5)
WBC # BLD: 4.6 K/UL — SIGNIFICANT CHANGE UP (ref 3.8–10.5)
WBC # BLD: 4.7 K/UL — SIGNIFICANT CHANGE UP (ref 3.8–10.5)
WBC # FLD AUTO: 4.6 K/UL — SIGNIFICANT CHANGE UP (ref 3.8–10.5)
WBC # FLD AUTO: 4.7 K/UL — SIGNIFICANT CHANGE UP (ref 3.8–10.5)

## 2018-08-08 PROCEDURE — 99214 OFFICE O/P EST MOD 30 MIN: CPT

## 2018-08-26 ENCOUNTER — FORM ENCOUNTER (OUTPATIENT)
Age: 68
End: 2018-08-26

## 2018-08-27 ENCOUNTER — OUTPATIENT (OUTPATIENT)
Dept: OUTPATIENT SERVICES | Facility: HOSPITAL | Age: 68
LOS: 1 days | End: 2018-08-27
Payer: MEDICARE

## 2018-08-27 DIAGNOSIS — Z98.890 OTHER SPECIFIED POSTPROCEDURAL STATES: Chronic | ICD-10-CM

## 2018-08-27 DIAGNOSIS — Z90.89 ACQUIRED ABSENCE OF OTHER ORGANS: Chronic | ICD-10-CM

## 2018-08-27 DIAGNOSIS — Z95.828 PRESENCE OF OTHER VASCULAR IMPLANTS AND GRAFTS: Chronic | ICD-10-CM

## 2018-08-27 DIAGNOSIS — C50.919 MALIGNANT NEOPLASM OF UNSPECIFIED SITE OF UNSPECIFIED FEMALE BREAST: ICD-10-CM

## 2018-08-27 PROCEDURE — 36590 REMOVAL TUNNELED CV CATH: CPT

## 2018-08-27 PROCEDURE — 77001 FLUOROGUIDE FOR VEIN DEVICE: CPT

## 2018-08-27 PROCEDURE — 77001 FLUOROGUIDE FOR VEIN DEVICE: CPT | Mod: 26

## 2018-08-29 DIAGNOSIS — Z45.2 ENCOUNTER FOR ADJUSTMENT AND MANAGEMENT OF VASCULAR ACCESS DEVICE: ICD-10-CM

## 2018-08-29 DIAGNOSIS — C50.919 MALIGNANT NEOPLASM OF UNSPECIFIED SITE OF UNSPECIFIED FEMALE BREAST: ICD-10-CM

## 2018-12-13 LAB
ALBUMIN SERPL ELPH-MCNC: 3.4 G/DL
ALBUMIN SERPL ELPH-MCNC: 3.9 G/DL
ALBUMIN SERPL ELPH-MCNC: 3.9 G/DL
ALBUMIN SERPL ELPH-MCNC: 4.1 G/DL
ALP BLD-CCNC: 84 U/L
ALP BLD-CCNC: 90 U/L
ALT SERPL-CCNC: 16 U/L
ALT SERPL-CCNC: 21 U/L
ALT SERPL-CCNC: 26 U/L
ALT SERPL-CCNC: 28 U/L
ANION GAP SERPL CALC-SCNC: 13 MMOL/L
ANION GAP SERPL CALC-SCNC: 13 MMOL/L
ANION GAP SERPL CALC-SCNC: 14 MMOL/L
ANION GAP SERPL CALC-SCNC: 17 MMOL/L
APTT BLD: 30.6 SEC
APTT BLD: 33.1 SEC
AST SERPL-CCNC: 19 U/L
AST SERPL-CCNC: 19 U/L
AST SERPL-CCNC: 23 U/L
AST SERPL-CCNC: 34 U/L
BASOPHILS # BLD AUTO: 0.03 K/UL
BASOPHILS NFR BLD AUTO: 0.4 %
BILIRUB SERPL-MCNC: 0.2 MG/DL
BILIRUB SERPL-MCNC: 0.3 MG/DL
BILIRUB SERPL-MCNC: 0.5 MG/DL
BILIRUB SERPL-MCNC: 0.5 MG/DL
BUN SERPL-MCNC: 13 MG/DL
BUN SERPL-MCNC: 23 MG/DL
BUN SERPL-MCNC: 26 MG/DL
BUN SERPL-MCNC: 27 MG/DL
CALCIUM SERPL-MCNC: 10 MG/DL
CALCIUM SERPL-MCNC: 9.6 MG/DL
CALCIUM SERPL-MCNC: 9.7 MG/DL
CALCIUM SERPL-MCNC: 9.8 MG/DL
CANCER AG27-29 SERPL-ACNC: 260.5 U/ML
CANCER AG27-29 SERPL-ACNC: 27.2 U/ML
CANCER AG27-29 SERPL-ACNC: 98.2 U/ML
CHLORIDE SERPL-SCNC: 100 MMOL/L
CHLORIDE SERPL-SCNC: 101 MMOL/L
CHLORIDE SERPL-SCNC: 102 MMOL/L
CHLORIDE SERPL-SCNC: 103 MMOL/L
CO2 SERPL-SCNC: 24 MMOL/L
CO2 SERPL-SCNC: 26 MMOL/L
CO2 SERPL-SCNC: 27 MMOL/L
CO2 SERPL-SCNC: 27 MMOL/L
CREAT SERPL-MCNC: 0.84 MG/DL
CREAT SERPL-MCNC: 0.86 MG/DL
EOSINOPHIL # BLD AUTO: 0.28 K/UL
EOSINOPHIL NFR BLD AUTO: 4.1 %
GLUCOSE SERPL-MCNC: 105 MG/DL
GLUCOSE SERPL-MCNC: 88 MG/DL
GLUCOSE SERPL-MCNC: 89 MG/DL
GLUCOSE SERPL-MCNC: 92 MG/DL
HCT VFR BLD CALC: 42.2 %
HGB BLD-MCNC: 13.4 G/DL
IMM GRANULOCYTES NFR BLD AUTO: 0.1 %
INR PPP: 0.96 RATIO
INR PPP: 1.19 RATIO
LYMPHOCYTES # BLD AUTO: 2.1 K/UL
LYMPHOCYTES NFR BLD AUTO: 31 %
MAN DIFF?: NORMAL
MCHC RBC-ENTMCNC: 30.5 PG
MCHC RBC-ENTMCNC: 31.8 GM/DL
MCV RBC AUTO: 96.1 FL
MONOCYTES # BLD AUTO: 0.63 K/UL
MONOCYTES NFR BLD AUTO: 9.3 %
NEUTROPHILS # BLD AUTO: 3.72 K/UL
NEUTROPHILS NFR BLD AUTO: 55.1 %
PLATELET # BLD AUTO: 289 K/UL
POTASSIUM SERPL-SCNC: 4.3 MMOL/L
POTASSIUM SERPL-SCNC: 4.6 MMOL/L
POTASSIUM SERPL-SCNC: 4.7 MMOL/L
POTASSIUM SERPL-SCNC: 5 MMOL/L
PROT SERPL-MCNC: 6.5 G/DL
PROT SERPL-MCNC: 7.1 G/DL
PROT SERPL-MCNC: 7.2 G/DL
PROT SERPL-MCNC: 7.5 G/DL
PT BLD: 10.9 SEC
PT BLD: 13.5 SEC
RBC # BLD: 4.39 M/UL
RBC # FLD: 13.7 %
SODIUM SERPL-SCNC: 140 MMOL/L
SODIUM SERPL-SCNC: 141 MMOL/L
SODIUM SERPL-SCNC: 141 MMOL/L
SODIUM SERPL-SCNC: 145 MMOL/L
WBC # FLD AUTO: 6.77 K/UL

## 2019-03-06 ENCOUNTER — TRANSCRIPTION ENCOUNTER (OUTPATIENT)
Age: 69
End: 2019-03-06

## 2019-06-14 ENCOUNTER — OTHER (OUTPATIENT)
Age: 69
End: 2019-06-14

## 2019-07-16 ENCOUNTER — APPOINTMENT (OUTPATIENT)
Dept: SURGICAL ONCOLOGY | Facility: CLINIC | Age: 69
End: 2019-07-16
Payer: MEDICARE

## 2019-07-16 VITALS
OXYGEN SATURATION: 98 % | TEMPERATURE: 98 F | HEIGHT: 70 IN | SYSTOLIC BLOOD PRESSURE: 122 MMHG | RESPIRATION RATE: 16 BRPM | BODY MASS INDEX: 24.2 KG/M2 | DIASTOLIC BLOOD PRESSURE: 72 MMHG | HEART RATE: 75 BPM | WEIGHT: 169 LBS

## 2019-07-16 DIAGNOSIS — Z85.3 ENCOUNTER FOR FOLLOW-UP EXAMINATION AFTER COMPLETED TREATMENT FOR MALIGNANT NEOPLASM: ICD-10-CM

## 2019-07-16 DIAGNOSIS — Z08 ENCOUNTER FOR FOLLOW-UP EXAMINATION AFTER COMPLETED TREATMENT FOR MALIGNANT NEOPLASM: ICD-10-CM

## 2019-07-16 PROCEDURE — 99214 OFFICE O/P EST MOD 30 MIN: CPT

## 2019-07-16 NOTE — ASSESSMENT
[FreeTextEntry1] : Imp:\par No evidence of new or recurrent lesions. Breast imaging failed to identify any suspicious lesions. No suspicious lesions on exam\par \par Plan:\par Continue yearly surveillance\par \par

## 2019-07-16 NOTE — HISTORY OF PRESENT ILLNESS
[de-identified] : 68 year old female presents for follow up.\par She is s/p right breast lumpectomy, axillary node dissection, and left breast lumpectomy on 11/1/2017.\par Pathology 11/1/2017: Right breast IDC, moderately differentiated, DCIS intermediate nuclear grade, solid, rare duct. Right axillary sentinel lymph node metastatic carcinoma in single lymph node. Right axilla lymph nodes: metastatic carcinoma in 6/14.\par \par MMG 07/25/18: Heterogenously dense parenchyma. Bilateral post lumpectomy and radiation changes. No mammographic evidence of malignancy. \par \par She is currently on Anastrazole, which she is tolerating well. \par She c/o of "pulling" on axilla compatible to lymph node dissection. Denies palpable breast masses, nipple discharge, skin changes, inversion or breast pain. Denies constitutional symptoms.\par \par \par

## 2019-07-16 NOTE — PHYSICAL EXAM
[Normal] : supple, no neck mass and thyroid not enlarged [Normal Neck Lymph Nodes] : normal neck lymph nodes  [Normal Groin Lymph Nodes] : normal groin lymph nodes [Normal] : oriented to person, place and time, with appropriate affect [FreeTextEntry1] : I, Norberto Knutson was present for the physical exam\par \par \par \par  [de-identified] : Normal S1, S2. Regular rate and rhythm\par \par  [de-identified] : No masses on LT breast, however, contracted edematous . No suspicious masses.  [de-identified] : Clear breath sounds bilaterally, normal respiratory effort\par \par

## 2019-07-28 ENCOUNTER — FORM ENCOUNTER (OUTPATIENT)
Age: 69
End: 2019-07-28

## 2019-07-29 ENCOUNTER — APPOINTMENT (OUTPATIENT)
Dept: MAMMOGRAPHY | Facility: CLINIC | Age: 69
End: 2019-07-29
Payer: MEDICARE

## 2019-07-29 ENCOUNTER — APPOINTMENT (OUTPATIENT)
Dept: ULTRASOUND IMAGING | Facility: CLINIC | Age: 69
End: 2019-07-29
Payer: MEDICARE

## 2019-07-29 ENCOUNTER — OUTPATIENT (OUTPATIENT)
Dept: OUTPATIENT SERVICES | Facility: HOSPITAL | Age: 69
LOS: 1 days | End: 2019-07-29
Payer: MEDICARE

## 2019-07-29 DIAGNOSIS — Z98.890 OTHER SPECIFIED POSTPROCEDURAL STATES: Chronic | ICD-10-CM

## 2019-07-29 DIAGNOSIS — Z90.89 ACQUIRED ABSENCE OF OTHER ORGANS: Chronic | ICD-10-CM

## 2019-07-29 DIAGNOSIS — C50.919 MALIGNANT NEOPLASM OF UNSPECIFIED SITE OF UNSPECIFIED FEMALE BREAST: ICD-10-CM

## 2019-07-29 DIAGNOSIS — Z95.828 PRESENCE OF OTHER VASCULAR IMPLANTS AND GRAFTS: Chronic | ICD-10-CM

## 2019-07-29 PROCEDURE — 76641 ULTRASOUND BREAST COMPLETE: CPT

## 2019-07-29 PROCEDURE — 77066 DX MAMMO INCL CAD BI: CPT

## 2019-07-29 PROCEDURE — 76641 ULTRASOUND BREAST COMPLETE: CPT | Mod: 26,50

## 2019-07-29 PROCEDURE — 77066 DX MAMMO INCL CAD BI: CPT | Mod: 26

## 2019-07-29 PROCEDURE — G0279: CPT | Mod: 26

## 2019-07-29 PROCEDURE — G0279: CPT

## 2019-08-06 ENCOUNTER — OUTPATIENT (OUTPATIENT)
Dept: OUTPATIENT SERVICES | Facility: HOSPITAL | Age: 69
LOS: 1 days | Discharge: HOME | End: 2019-08-06

## 2019-08-06 DIAGNOSIS — Z95.828 PRESENCE OF OTHER VASCULAR IMPLANTS AND GRAFTS: Chronic | ICD-10-CM

## 2019-08-06 DIAGNOSIS — Z90.89 ACQUIRED ABSENCE OF OTHER ORGANS: Chronic | ICD-10-CM

## 2019-08-06 DIAGNOSIS — C50.919 MALIGNANT NEOPLASM OF UNSPECIFIED SITE OF UNSPECIFIED FEMALE BREAST: ICD-10-CM

## 2019-08-06 DIAGNOSIS — Z98.890 OTHER SPECIFIED POSTPROCEDURAL STATES: Chronic | ICD-10-CM

## 2019-08-07 ENCOUNTER — APPOINTMENT (OUTPATIENT)
Dept: HEMATOLOGY ONCOLOGY | Facility: CLINIC | Age: 69
End: 2019-08-07
Payer: MEDICARE

## 2019-08-07 VITALS
SYSTOLIC BLOOD PRESSURE: 117 MMHG | WEIGHT: 170.62 LBS | OXYGEN SATURATION: 99 % | RESPIRATION RATE: 18 BRPM | TEMPERATURE: 99.1 F | BODY MASS INDEX: 24.48 KG/M2 | HEART RATE: 74 BPM | DIASTOLIC BLOOD PRESSURE: 72 MMHG

## 2019-08-07 PROCEDURE — 99214 OFFICE O/P EST MOD 30 MIN: CPT

## 2019-08-07 NOTE — ASSESSMENT
[Palliative] : Goals of care discussed with patient: Palliative [FreeTextEntry1] : Pt to continue on anastrazole and be followed with repeat imaging to assess for response or progression of her disease.Pt to do PETCT and be evaluated for bone metastases. Pt to RTO in 6 months or sooner if necessary. [Palliative Care Plan] : not applicable at this time

## 2019-08-07 NOTE — HISTORY OF PRESENT ILLNESS
[de-identified] : Since the last visit the pt remaiins well and has lost 100 lbs. Pt on anastrazole [de-identified] : 66-year-old woman with history of breast carcinoma. Her history dates back to September 2003 when she was found to have a mass in the left breast. The biopsy revealed infiltrating lobular carcinoma measuring 3.5 cm. The patient underwent lumpectomy and 3 sentinel lymph nodes and 2 non-sentinel lymph nodes were negative for metastases. ER was +90% WV weakly positive and HER-2/marvin was negative. The patient underwent chemotherapy with 4 cycles of Adriamycin Cytoxan followed by radiation therapy. She was only able to tolerate 6 months of tamoxifen and then stopped her hormonal therapy.\par \par She subsequently has been well until about one month ago when she noted a mass in the right breast. A mammography on December 7, 2016 revealed a mass in the right breast with architectural distortion and multiple microcalcifications. The left breast revealed no evidence of malignancy. Sonogram confirmed a 3.5 x 3.8 cm mass which was palpable and the left breast was negative. \par \par On December 14 of patient underwent ultrasound-guided biopsy of the mass in the right breast which revealed invasive moderately differentiated ductal carcinoma [place] score of 7/9 the ER and WV were positive greater than 95%, HER-2/marvin 2+ and CISH  test was negative with a ratio of one.\par \par On January 11, 2017 the patient underwent MRI of the breast which revealed an irregular 6.7 x 6.2 cm mass in the right breast in the area of the biopsy proven malignancy. They were irregular and enhancing lesions throughout the right breast measuring up to 2 cm along with diffuse non-mass enhancement throughout the right breast. These are suspicious for multicentric disease. If breast conservation therapy is selected a second look ultrasound biopsy or MRI biopsy would be performed. The left breast revealed diffuse thickening of the skin with subcutaneous edema consistent with post lumpectomy and radiation therapy changes versus infiltrating malignancy with vascular or lymphatic obstruction. Skin punch biopsy and PET/CT were recommended. Also linear clumped enhancement was seen for which MRI guided biopsy was recommended. There were also multiple abnormal appearing lymph nodes in the right axilla suggestive of metastatic disease. An ultrasound-guided biopsy could be performed. The patient now presents for evaluation for further treatment.\par \par The patient overall feels well and denies symptoms relating to her abnormal breast imaging tests and confirmed malignancy. She has undergone BRCA testing and results are pending. Her family history is positive for a sister with breast cancer at age 53, a maternal first cousin with breast cancer and a maternal second cousin with breast cancer. There is no family history of ovarian cancer. 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

## 2019-09-04 ENCOUNTER — FORM ENCOUNTER (OUTPATIENT)
Age: 69
End: 2019-09-04

## 2019-09-05 ENCOUNTER — APPOINTMENT (OUTPATIENT)
Dept: NUCLEAR MEDICINE | Facility: CLINIC | Age: 69
End: 2019-09-05
Payer: MEDICARE

## 2019-09-05 ENCOUNTER — OUTPATIENT (OUTPATIENT)
Dept: OUTPATIENT SERVICES | Facility: HOSPITAL | Age: 69
LOS: 1 days | End: 2019-09-05
Payer: MEDICARE

## 2019-09-05 DIAGNOSIS — Z90.89 ACQUIRED ABSENCE OF OTHER ORGANS: Chronic | ICD-10-CM

## 2019-09-05 DIAGNOSIS — Z98.890 OTHER SPECIFIED POSTPROCEDURAL STATES: Chronic | ICD-10-CM

## 2019-09-05 DIAGNOSIS — Z95.828 PRESENCE OF OTHER VASCULAR IMPLANTS AND GRAFTS: Chronic | ICD-10-CM

## 2019-09-05 DIAGNOSIS — Z00.8 ENCOUNTER FOR OTHER GENERAL EXAMINATION: ICD-10-CM

## 2019-09-05 PROCEDURE — 78815 PET IMAGE W/CT SKULL-THIGH: CPT | Mod: 26,PS

## 2019-09-05 PROCEDURE — 78815 PET IMAGE W/CT SKULL-THIGH: CPT

## 2019-09-05 PROCEDURE — A9552: CPT

## 2019-09-17 ENCOUNTER — OUTPATIENT (OUTPATIENT)
Dept: OUTPATIENT SERVICES | Facility: HOSPITAL | Age: 69
LOS: 1 days | Discharge: HOME | End: 2019-09-17

## 2019-09-17 DIAGNOSIS — Z90.89 ACQUIRED ABSENCE OF OTHER ORGANS: Chronic | ICD-10-CM

## 2019-09-17 DIAGNOSIS — Z95.828 PRESENCE OF OTHER VASCULAR IMPLANTS AND GRAFTS: Chronic | ICD-10-CM

## 2019-09-17 DIAGNOSIS — Z98.890 OTHER SPECIFIED POSTPROCEDURAL STATES: Chronic | ICD-10-CM

## 2019-09-17 DIAGNOSIS — C50.919 MALIGNANT NEOPLASM OF UNSPECIFIED SITE OF UNSPECIFIED FEMALE BREAST: ICD-10-CM

## 2019-09-18 ENCOUNTER — RESULT REVIEW (OUTPATIENT)
Age: 69
End: 2019-09-18

## 2019-09-18 ENCOUNTER — APPOINTMENT (OUTPATIENT)
Dept: HEMATOLOGY ONCOLOGY | Facility: CLINIC | Age: 69
End: 2019-09-18
Payer: MEDICARE

## 2019-09-18 VITALS
SYSTOLIC BLOOD PRESSURE: 145 MMHG | WEIGHT: 168.98 LBS | DIASTOLIC BLOOD PRESSURE: 84 MMHG | HEART RATE: 71 BPM | RESPIRATION RATE: 16 BRPM | TEMPERATURE: 98.4 F | BODY MASS INDEX: 24.25 KG/M2 | OXYGEN SATURATION: 98 %

## 2019-09-18 LAB
BASOPHILS # BLD AUTO: 0 K/UL — SIGNIFICANT CHANGE UP (ref 0–0.2)
BASOPHILS NFR BLD AUTO: 0.5 % — SIGNIFICANT CHANGE UP (ref 0–2)
EOSINOPHIL # BLD AUTO: 0.2 K/UL — SIGNIFICANT CHANGE UP (ref 0–0.5)
EOSINOPHIL NFR BLD AUTO: 3.3 % — SIGNIFICANT CHANGE UP (ref 0–6)
HCT VFR BLD CALC: 38.1 % — SIGNIFICANT CHANGE UP (ref 34.5–45)
HGB BLD-MCNC: 12.4 G/DL — SIGNIFICANT CHANGE UP (ref 11.5–15.5)
LYMPHOCYTES # BLD AUTO: 1 K/UL — SIGNIFICANT CHANGE UP (ref 1–3.3)
LYMPHOCYTES # BLD AUTO: 21.8 % — SIGNIFICANT CHANGE UP (ref 13–44)
MCHC RBC-ENTMCNC: 31.9 PG — SIGNIFICANT CHANGE UP (ref 27–34)
MCHC RBC-ENTMCNC: 32.5 G/DL — SIGNIFICANT CHANGE UP (ref 32–36)
MCV RBC AUTO: 98.2 FL — SIGNIFICANT CHANGE UP (ref 80–100)
MONOCYTES # BLD AUTO: 0.5 K/UL — SIGNIFICANT CHANGE UP (ref 0–0.9)
MONOCYTES NFR BLD AUTO: 9.8 % — SIGNIFICANT CHANGE UP (ref 2–14)
NEUTROPHILS # BLD AUTO: 3.1 K/UL — SIGNIFICANT CHANGE UP (ref 1.8–7.4)
NEUTROPHILS NFR BLD AUTO: 64.6 % — SIGNIFICANT CHANGE UP (ref 43–77)
PLATELET # BLD AUTO: 226 K/UL — SIGNIFICANT CHANGE UP (ref 150–400)
RBC # BLD: 3.88 M/UL — SIGNIFICANT CHANGE UP (ref 3.8–5.2)
RBC # FLD: 12.2 % — SIGNIFICANT CHANGE UP (ref 10.3–14.5)
WBC # BLD: 4.8 K/UL — SIGNIFICANT CHANGE UP (ref 3.8–10.5)
WBC # FLD AUTO: 4.8 K/UL — SIGNIFICANT CHANGE UP (ref 3.8–10.5)

## 2019-09-18 PROCEDURE — 99214 OFFICE O/P EST MOD 30 MIN: CPT

## 2019-09-18 NOTE — ASSESSMENT
[FreeTextEntry1] : Pt has evidence of new liver lesions ans bone lesions without developing new symptoms. Discussed doing biopsy of liver with Dr. Nikita Doe and blood testing done. Discussed options for treatment pending results of ER,MI,Her2 marvin testing including hormonal therapy,cyclinD4/6 inhibitors, and chemotherapy po or IV. Once we have have further info final recommendations will be made. [Palliative] : Goals of care discussed with patient: Palliative [Palliative Care Plan] : not applicable at this time

## 2019-09-18 NOTE — HISTORY OF PRESENT ILLNESS
[de-identified] : Since the last visit the pt had repeat PetCT and has liver and bones lesions but she remains asymptomatic, Pt returns to discuss results. [de-identified] : 66-year-old woman with history of breast carcinoma. Her history dates back to September 2003 when she was found to have a mass in the left breast. The biopsy revealed infiltrating lobular carcinoma measuring 3.5 cm. The patient underwent lumpectomy and 3 sentinel lymph nodes and 2 non-sentinel lymph nodes were negative for metastases. ER was +90% HI weakly positive and HER-2/marvin was negative. The patient underwent chemotherapy with 4 cycles of Adriamycin Cytoxan followed by radiation therapy. She was only able to tolerate 6 months of tamoxifen and then stopped her hormonal therapy.\par \par She subsequently has been well until about one month ago when she noted a mass in the right breast. A mammography on December 7, 2016 revealed a mass in the right breast with architectural distortion and multiple microcalcifications. The left breast revealed no evidence of malignancy. Sonogram confirmed a 3.5 x 3.8 cm mass which was palpable and the left breast was negative. \par \par On December 14 of patient underwent ultrasound-guided biopsy of the mass in the right breast which revealed invasive moderately differentiated ductal carcinoma [place] score of 7/9 the ER and HI were positive greater than 95%, HER-2/marvin 2+ and CISH  test was negative with a ratio of one.\par \par On January 11, 2017 the patient underwent MRI of the breast which revealed an irregular 6.7 x 6.2 cm mass in the right breast in the area of the biopsy proven malignancy. They were irregular and enhancing lesions throughout the right breast measuring up to 2 cm along with diffuse non-mass enhancement throughout the right breast. These are suspicious for multicentric disease. If breast conservation therapy is selected a second look ultrasound biopsy or MRI biopsy would be performed. The left breast revealed diffuse thickening of the skin with subcutaneous edema consistent with post lumpectomy and radiation therapy changes versus infiltrating malignancy with vascular or lymphatic obstruction. Skin punch biopsy and PET/CT were recommended. Also linear clumped enhancement was seen for which MRI guided biopsy was recommended. There were also multiple abnormal appearing lymph nodes in the right axilla suggestive of metastatic disease. An ultrasound-guided biopsy could be performed. The patient now presents for evaluation for further treatment.\par \par The patient overall feels well and denies symptoms relating to her abnormal breast imaging tests and confirmed malignancy. She has undergone BRCA testing and results are pending. Her family history is positive for a sister with breast cancer at age 53, a maternal first cousin with breast cancer and a maternal second cousin with breast cancer. There is no family history of ovarian cancer. 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

## 2019-09-18 NOTE — PHYSICAL EXAM
[Restricted in physically strenuous activity but ambulatory and able to carry out work of a light or sedentary nature] : Status 1- Restricted in physically strenuous activity but ambulatory and able to carry out work of a light or sedentary nature, e.g., light house work, office work [Normal] : affect appropriate [de-identified] : right breast lumpectomy, left breast indurated as before, no palpable masses

## 2019-09-22 LAB
ALBUMIN SERPL ELPH-MCNC: 4.1 G/DL
ALP BLD-CCNC: 100 U/L
ALT SERPL-CCNC: 39 U/L
ANION GAP SERPL CALC-SCNC: 16 MMOL/L
APTT BLD: 33.5 SEC
AST SERPL-CCNC: 37 U/L
BILIRUB SERPL-MCNC: 0.4 MG/DL
BUN SERPL-MCNC: 26 MG/DL
CALCIUM SERPL-MCNC: 10 MG/DL
CANCER AG27-29 SERPL-ACNC: 1020.9 U/ML
CHLORIDE SERPL-SCNC: 103 MMOL/L
CO2 SERPL-SCNC: 24 MMOL/L
CREAT SERPL-MCNC: 0.74 MG/DL
GLUCOSE SERPL-MCNC: 86 MG/DL
INR PPP: 1.27 RATIO
POTASSIUM SERPL-SCNC: 4.9 MMOL/L
PROT SERPL-MCNC: 7 G/DL
PT BLD: 14.7 SEC
SODIUM SERPL-SCNC: 143 MMOL/L

## 2019-09-23 ENCOUNTER — FORM ENCOUNTER (OUTPATIENT)
Age: 69
End: 2019-09-23

## 2019-09-24 ENCOUNTER — APPOINTMENT (OUTPATIENT)
Dept: ULTRASOUND IMAGING | Facility: IMAGING CENTER | Age: 69
End: 2019-09-24
Payer: MEDICARE

## 2019-09-24 ENCOUNTER — RESULT REVIEW (OUTPATIENT)
Age: 69
End: 2019-09-24

## 2019-09-24 ENCOUNTER — OUTPATIENT (OUTPATIENT)
Dept: OUTPATIENT SERVICES | Facility: HOSPITAL | Age: 69
LOS: 1 days | End: 2019-09-24
Payer: MEDICARE

## 2019-09-24 DIAGNOSIS — Z98.890 OTHER SPECIFIED POSTPROCEDURAL STATES: Chronic | ICD-10-CM

## 2019-09-24 DIAGNOSIS — Z90.89 ACQUIRED ABSENCE OF OTHER ORGANS: Chronic | ICD-10-CM

## 2019-09-24 DIAGNOSIS — C78.7 SECONDARY MALIGNANT NEOPLASM OF LIVER AND INTRAHEPATIC BILE DUCT: ICD-10-CM

## 2019-09-24 DIAGNOSIS — Z95.828 PRESENCE OF OTHER VASCULAR IMPLANTS AND GRAFTS: Chronic | ICD-10-CM

## 2019-09-24 DIAGNOSIS — C50.919 MALIGNANT NEOPLASM OF UNSPECIFIED SITE OF UNSPECIFIED FEMALE BREAST: ICD-10-CM

## 2019-09-24 PROCEDURE — 47000 NEEDLE BIOPSY OF LIVER PERQ: CPT

## 2019-09-24 PROCEDURE — 88342 IMHCHEM/IMCYTCHM 1ST ANTB: CPT

## 2019-09-24 PROCEDURE — 88341 IMHCHEM/IMCYTCHM EA ADD ANTB: CPT | Mod: 26,59

## 2019-09-24 PROCEDURE — 88377 M/PHMTRC ALYS ISHQUANT/SEMIQ: CPT | Mod: 26

## 2019-09-24 PROCEDURE — 88341 IMHCHEM/IMCYTCHM EA ADD ANTB: CPT | Mod: XU

## 2019-09-24 PROCEDURE — 88307 TISSUE EXAM BY PATHOLOGIST: CPT | Mod: 26

## 2019-09-24 PROCEDURE — 88360 TUMOR IMMUNOHISTOCHEM/MANUAL: CPT | Mod: 26

## 2019-09-24 PROCEDURE — 88307 TISSUE EXAM BY PATHOLOGIST: CPT

## 2019-09-24 PROCEDURE — 76942 ECHO GUIDE FOR BIOPSY: CPT | Mod: 26

## 2019-09-24 PROCEDURE — 88377 M/PHMTRC ALYS ISHQUANT/SEMIQ: CPT

## 2019-09-24 PROCEDURE — 88360 TUMOR IMMUNOHISTOCHEM/MANUAL: CPT

## 2019-09-24 PROCEDURE — 76942 ECHO GUIDE FOR BIOPSY: CPT

## 2019-09-24 PROCEDURE — 88342 IMHCHEM/IMCYTCHM 1ST ANTB: CPT | Mod: 26,59

## 2019-09-27 LAB — SURGICAL PATHOLOGY STUDY: SIGNIFICANT CHANGE UP

## 2019-10-03 ENCOUNTER — APPOINTMENT (OUTPATIENT)
Dept: HEMATOLOGY ONCOLOGY | Facility: CLINIC | Age: 69
End: 2019-10-03
Payer: MEDICARE

## 2019-10-03 ENCOUNTER — RESULT REVIEW (OUTPATIENT)
Age: 69
End: 2019-10-03

## 2019-10-03 VITALS
DIASTOLIC BLOOD PRESSURE: 87 MMHG | SYSTOLIC BLOOD PRESSURE: 153 MMHG | OXYGEN SATURATION: 99 % | HEART RATE: 69 BPM | TEMPERATURE: 98.8 F

## 2019-10-03 LAB
ALBUMIN SERPL ELPH-MCNC: 4.2 G/DL
ALP BLD-CCNC: 106 U/L
ALT SERPL-CCNC: 39 U/L
ANION GAP SERPL CALC-SCNC: 11 MMOL/L
AST SERPL-CCNC: 39 U/L
BASOPHILS # BLD AUTO: 0 K/UL — SIGNIFICANT CHANGE UP (ref 0–0.2)
BASOPHILS NFR BLD AUTO: 0.8 % — SIGNIFICANT CHANGE UP (ref 0–2)
BILIRUB DIRECT SERPL-MCNC: 0.1 MG/DL
BILIRUB SERPL-MCNC: 0.4 MG/DL
BUN SERPL-MCNC: 29 MG/DL
CALCIUM SERPL-MCNC: 10.4 MG/DL
CHLORIDE SERPL-SCNC: 100 MMOL/L
CO2 SERPL-SCNC: 29 MMOL/L
CREAT SERPL-MCNC: 0.75 MG/DL
EOSINOPHIL # BLD AUTO: 0.2 K/UL — SIGNIFICANT CHANGE UP (ref 0–0.5)
EOSINOPHIL NFR BLD AUTO: 2.9 % — SIGNIFICANT CHANGE UP (ref 0–6)
GLUCOSE SERPL-MCNC: 92 MG/DL
HCT VFR BLD CALC: 38.8 % — SIGNIFICANT CHANGE UP (ref 34.5–45)
HGB BLD-MCNC: 12.9 G/DL — SIGNIFICANT CHANGE UP (ref 11.5–15.5)
LYMPHOCYTES # BLD AUTO: 1.3 K/UL — SIGNIFICANT CHANGE UP (ref 1–3.3)
LYMPHOCYTES # BLD AUTO: 23.1 % — SIGNIFICANT CHANGE UP (ref 13–44)
MCHC RBC-ENTMCNC: 32.3 PG — SIGNIFICANT CHANGE UP (ref 27–34)
MCHC RBC-ENTMCNC: 33.2 G/DL — SIGNIFICANT CHANGE UP (ref 32–36)
MCV RBC AUTO: 97.3 FL — SIGNIFICANT CHANGE UP (ref 80–100)
MONOCYTES # BLD AUTO: 0.5 K/UL — SIGNIFICANT CHANGE UP (ref 0–0.9)
MONOCYTES NFR BLD AUTO: 9.5 % — SIGNIFICANT CHANGE UP (ref 2–14)
NEUTROPHILS # BLD AUTO: 3.5 K/UL — SIGNIFICANT CHANGE UP (ref 1.8–7.4)
NEUTROPHILS NFR BLD AUTO: 63.8 % — SIGNIFICANT CHANGE UP (ref 43–77)
PLATELET # BLD AUTO: 228 K/UL — SIGNIFICANT CHANGE UP (ref 150–400)
POTASSIUM SERPL-SCNC: 5.5 MMOL/L
PROT SERPL-MCNC: 7.2 G/DL
RBC # BLD: 3.99 M/UL — SIGNIFICANT CHANGE UP (ref 3.8–5.2)
RBC # FLD: 12.2 % — SIGNIFICANT CHANGE UP (ref 10.3–14.5)
SODIUM SERPL-SCNC: 140 MMOL/L
WBC # BLD: 5.5 K/UL — SIGNIFICANT CHANGE UP (ref 3.8–10.5)
WBC # FLD AUTO: 5.5 K/UL — SIGNIFICANT CHANGE UP (ref 3.8–10.5)

## 2019-10-03 PROCEDURE — 99214 OFFICE O/P EST MOD 30 MIN: CPT

## 2019-10-03 RX ORDER — ANASTROZOLE TABLETS 1 MG/1
1 TABLET ORAL
Qty: 90 | Refills: 3 | Status: DISCONTINUED | COMMUNITY
Start: 2017-11-14 | End: 2019-10-03

## 2019-10-04 ENCOUNTER — APPOINTMENT (OUTPATIENT)
Dept: HEMATOLOGY ONCOLOGY | Facility: CLINIC | Age: 69
End: 2019-10-04

## 2019-10-04 NOTE — PHYSICAL EXAM
[Fully active, able to carry on all pre-disease performance without restriction] : Status 0 - Fully active, able to carry on all pre-disease performance without restriction [Normal Male] : prostate smooth, symmetric with no modularity or induration [Normal] : affect appropriate

## 2019-10-07 ENCOUNTER — APPOINTMENT (OUTPATIENT)
Dept: INFUSION THERAPY | Facility: HOSPITAL | Age: 69
End: 2019-10-07

## 2019-10-07 ENCOUNTER — APPOINTMENT (OUTPATIENT)
Dept: HEMATOLOGY ONCOLOGY | Facility: CLINIC | Age: 69
End: 2019-10-07

## 2019-10-07 VITALS — WEIGHT: 168.65 LBS | BODY MASS INDEX: 29.88 KG/M2 | HEIGHT: 63 IN

## 2019-10-07 NOTE — HISTORY OF PRESENT ILLNESS
[de-identified] : 66-year-old woman with history of breast carcinoma. Her history dates back to September 2003 when she was found to have a mass in the left breast. The biopsy revealed infiltrating lobular carcinoma measuring 3.5 cm. The patient underwent lumpectomy and 3 sentinel lymph nodes and 2 non-sentinel lymph nodes were negative for metastases. ER was +90% MD weakly positive and HER-2/marvin was negative. The patient underwent chemotherapy with 4 cycles of Adriamycin Cytoxan followed by radiation therapy. She was only able to tolerate 6 months of tamoxifen and then stopped her hormonal therapy.\par \par She subsequently has been well until about one month ago when she noted a mass in the right breast. A mammography on December 7, 2016 revealed a mass in the right breast with architectural distortion and multiple microcalcifications. The left breast revealed no evidence of malignancy. Sonogram confirmed a 3.5 x 3.8 cm mass which was palpable and the left breast was negative. \par \par On December 14 of patient underwent ultrasound-guided biopsy of the mass in the right breast which revealed invasive moderately differentiated ductal carcinoma [place] score of 7/9 the ER and MD were positive greater than 95%, HER-2/marvin 2+ and CISH  test was negative with a ratio of one.\par \par On January 11, 2017 the patient underwent MRI of the breast which revealed an irregular 6.7 x 6.2 cm mass in the right breast in the area of the biopsy proven malignancy. They were irregular and enhancing lesions throughout the right breast measuring up to 2 cm along with diffuse non-mass enhancement throughout the right breast. These are suspicious for multicentric disease. If breast conservation therapy is selected a second look ultrasound biopsy or MRI biopsy would be performed. The left breast revealed diffuse thickening of the skin with subcutaneous edema consistent with post lumpectomy and radiation therapy changes versus infiltrating malignancy with vascular or lymphatic obstruction. Skin punch biopsy and PET/CT were recommended. Also linear clumped enhancement was seen for which MRI guided biopsy was recommended. There were also multiple abnormal appearing lymph nodes in the right axilla suggestive of metastatic disease. An ultrasound-guided biopsy could be performed. The patient now presents for evaluation for further treatment.\par \par The patient overall feels well and denies symptoms relating to her abnormal breast imaging tests and confirmed malignancy. She has undergone BRCA testing and results are pending. Her family history is positive for a sister with breast cancer at age 53, a maternal first cousin with breast cancer and a maternal second cousin with breast cancer. There is no family history of ovarian cancer. 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 Pt returns to discuss treatment.

## 2019-10-07 NOTE — ASSESSMENT
[Palliative] : Goals of care discussed with patient: Palliative [Palliative Care Plan] : not applicable at this time [FreeTextEntry1] : We discussed protocol therapy with ibrance and faslodex for pts 71 yo and older. Side effects were discussed in detail/\par  {Pt consented to [id number], A phase 1 trial assessing the tolerance of palbociclib in combo with faslodex in pts aged 70 and older with ER pos her2 neg metastatic breast ca. and DCP-001, Use of a clinical trial screening tool to address cancer health disparities in [facility].\par Pt is able to read and comprehend /English\par Pt is able to swallow and retain oral meds\par Pt has life expectancy at least 6 months\par Pt has np second malignanciesPt has np prior therapy with a CDK inhibitor.\par Eligibility has been [id number] and pt deemed eligible to participate in [id number] and DCPO 001. \par \par We discussed side effects of therapy and pt is aware. pt will return in 2 weeks to assess for side effects. Pt will reecive Faslodex q 2 weeks x 3 then monthly. Discussed adding xgeva for bone metastases after dental cleaqrance.

## 2019-10-16 ENCOUNTER — OUTPATIENT (OUTPATIENT)
Dept: OUTPATIENT SERVICES | Facility: HOSPITAL | Age: 69
LOS: 1 days | Discharge: HOME | End: 2019-10-16

## 2019-10-16 DIAGNOSIS — Z98.890 OTHER SPECIFIED POSTPROCEDURAL STATES: Chronic | ICD-10-CM

## 2019-10-16 DIAGNOSIS — C50.919 MALIGNANT NEOPLASM OF UNSPECIFIED SITE OF UNSPECIFIED FEMALE BREAST: ICD-10-CM

## 2019-10-16 DIAGNOSIS — Z90.89 ACQUIRED ABSENCE OF OTHER ORGANS: Chronic | ICD-10-CM

## 2019-10-16 DIAGNOSIS — Z95.828 PRESENCE OF OTHER VASCULAR IMPLANTS AND GRAFTS: Chronic | ICD-10-CM

## 2019-10-21 ENCOUNTER — APPOINTMENT (OUTPATIENT)
Dept: HEMATOLOGY ONCOLOGY | Facility: CLINIC | Age: 69
End: 2019-10-21

## 2019-10-21 ENCOUNTER — RESULT REVIEW (OUTPATIENT)
Age: 69
End: 2019-10-21

## 2019-10-21 ENCOUNTER — APPOINTMENT (OUTPATIENT)
Dept: INFUSION THERAPY | Facility: HOSPITAL | Age: 69
End: 2019-10-21

## 2019-10-21 VITALS
TEMPERATURE: 97.6 F | SYSTOLIC BLOOD PRESSURE: 121 MMHG | WEIGHT: 170 LBS | BODY MASS INDEX: 30.11 KG/M2 | HEART RATE: 72 BPM | OXYGEN SATURATION: 100 % | RESPIRATION RATE: 16 BRPM | DIASTOLIC BLOOD PRESSURE: 76 MMHG

## 2019-10-21 LAB
EOSINOPHIL # BLD AUTO: 0.1 K/UL — SIGNIFICANT CHANGE UP (ref 0–0.5)
EOSINOPHIL NFR BLD AUTO: 3 % — SIGNIFICANT CHANGE UP (ref 0–6)
HCT VFR BLD CALC: 34.9 % — SIGNIFICANT CHANGE UP (ref 34.5–45)
HGB BLD-MCNC: 11.8 G/DL — SIGNIFICANT CHANGE UP (ref 11.5–15.5)
LYMPHOCYTES # BLD AUTO: 0.9 K/UL — LOW (ref 1–3.3)
LYMPHOCYTES # BLD AUTO: 47 % — HIGH (ref 13–44)
MCHC RBC-ENTMCNC: 32.8 PG — SIGNIFICANT CHANGE UP (ref 27–34)
MCHC RBC-ENTMCNC: 33.9 G/DL — SIGNIFICANT CHANGE UP (ref 32–36)
MCV RBC AUTO: 96.7 FL — SIGNIFICANT CHANGE UP (ref 80–100)
METAMYELOCYTES # FLD: 1 % — HIGH (ref 0–0)
MONOCYTES # BLD AUTO: 0.1 K/UL — SIGNIFICANT CHANGE UP (ref 0–0.9)
MONOCYTES NFR BLD AUTO: 6 % — SIGNIFICANT CHANGE UP (ref 2–14)
NEUTROPHILS # BLD AUTO: 1 K/UL — LOW (ref 1.8–7.4)
NEUTROPHILS NFR BLD AUTO: 43 % — SIGNIFICANT CHANGE UP (ref 43–77)
NRBC # BLD: 1 /100 — HIGH (ref 0–0)
PLAT MORPH BLD: NORMAL — SIGNIFICANT CHANGE UP
PLATELET # BLD AUTO: 149 K/UL — LOW (ref 150–400)
RBC # BLD: 3.6 M/UL — LOW (ref 3.8–5.2)
RBC # FLD: 12.2 % — SIGNIFICANT CHANGE UP (ref 10.3–14.5)
RBC BLD AUTO: SIGNIFICANT CHANGE UP
WBC # BLD: 2.1 K/UL — LOW (ref 3.8–10.5)
WBC # FLD AUTO: 2.1 K/UL — LOW (ref 3.8–10.5)

## 2019-10-23 LAB
ALBUMIN SERPL ELPH-MCNC: 3.9 G/DL
ALP BLD-CCNC: 98 U/L
ALT SERPL-CCNC: 27 U/L
ANION GAP SERPL CALC-SCNC: 12 MMOL/L
AST SERPL-CCNC: 28 U/L
BILIRUB DIRECT SERPL-MCNC: 0.2 MG/DL
BILIRUB SERPL-MCNC: 0.5 MG/DL
BUN SERPL-MCNC: 34 MG/DL
CALCIUM SERPL-MCNC: 9.3 MG/DL
CHLORIDE SERPL-SCNC: 100 MMOL/L
CO2 SERPL-SCNC: 27 MMOL/L
CREAT SERPL-MCNC: 1.03 MG/DL
GLUCOSE SERPL-MCNC: 74 MG/DL
POTASSIUM SERPL-SCNC: 4.4 MMOL/L
PROT SERPL-MCNC: 6.5 G/DL
SODIUM SERPL-SCNC: 139 MMOL/L

## 2019-10-25 ENCOUNTER — APPOINTMENT (OUTPATIENT)
Dept: HEMATOLOGY ONCOLOGY | Facility: CLINIC | Age: 69
End: 2019-10-25

## 2019-10-28 ENCOUNTER — RESULT REVIEW (OUTPATIENT)
Age: 69
End: 2019-10-28

## 2019-10-28 ENCOUNTER — APPOINTMENT (OUTPATIENT)
Dept: HEMATOLOGY ONCOLOGY | Facility: CLINIC | Age: 69
End: 2019-10-28
Payer: MEDICARE

## 2019-10-28 VITALS
DIASTOLIC BLOOD PRESSURE: 83 MMHG | HEART RATE: 66 BPM | SYSTOLIC BLOOD PRESSURE: 136 MMHG | RESPIRATION RATE: 17 BRPM | OXYGEN SATURATION: 100 % | TEMPERATURE: 98 F

## 2019-10-28 LAB
ALBUMIN SERPL ELPH-MCNC: 3.8 G/DL
ALP BLD-CCNC: 108 U/L
ALT SERPL-CCNC: 30 U/L
ANION GAP SERPL CALC-SCNC: 11 MMOL/L
AST SERPL-CCNC: 30 U/L
BILIRUB DIRECT SERPL-MCNC: 0.1 MG/DL
BILIRUB SERPL-MCNC: 0.3 MG/DL
BUN SERPL-MCNC: 31 MG/DL
CALCIUM SERPL-MCNC: 9.5 MG/DL
CHLORIDE SERPL-SCNC: 101 MMOL/L
CO2 SERPL-SCNC: 28 MMOL/L
CREAT SERPL-MCNC: 0.83 MG/DL
EOSINOPHIL # BLD AUTO: 0 K/UL — SIGNIFICANT CHANGE UP (ref 0–0.5)
GLUCOSE SERPL-MCNC: 89 MG/DL
HCT VFR BLD CALC: 32.2 % — LOW (ref 34.5–45)
HGB BLD-MCNC: 11.3 G/DL — LOW (ref 11.5–15.5)
LYMPHOCYTES # BLD AUTO: 0.9 K/UL — LOW (ref 1–3.3)
LYMPHOCYTES # BLD AUTO: 44 % — SIGNIFICANT CHANGE UP (ref 13–44)
MCHC RBC-ENTMCNC: 34.1 PG — HIGH (ref 27–34)
MCHC RBC-ENTMCNC: 34.9 G/DL — SIGNIFICANT CHANGE UP (ref 32–36)
MCV RBC AUTO: 97.7 FL — SIGNIFICANT CHANGE UP (ref 80–100)
MONOCYTES # BLD AUTO: 0.3 K/UL — SIGNIFICANT CHANGE UP (ref 0–0.9)
MONOCYTES NFR BLD AUTO: 10 % — SIGNIFICANT CHANGE UP (ref 2–14)
NEUTROPHILS # BLD AUTO: 0.8 K/UL — LOW (ref 1.8–7.4)
NEUTROPHILS NFR BLD AUTO: 46 % — SIGNIFICANT CHANGE UP (ref 43–77)
PLAT MORPH BLD: NORMAL — SIGNIFICANT CHANGE UP
PLATELET # BLD AUTO: 99 K/UL — LOW (ref 150–400)
POTASSIUM SERPL-SCNC: 5 MMOL/L
PROT SERPL-MCNC: 6.6 G/DL
RBC # BLD: 3.3 M/UL — LOW (ref 3.8–5.2)
RBC # FLD: 13.4 % — SIGNIFICANT CHANGE UP (ref 10.3–14.5)
RBC BLD AUTO: SIGNIFICANT CHANGE UP
SODIUM SERPL-SCNC: 140 MMOL/L
WBC # BLD: 2.1 K/UL — LOW (ref 3.8–10.5)
WBC # FLD AUTO: 2.1 K/UL — LOW (ref 3.8–10.5)

## 2019-10-28 PROCEDURE — 99213 OFFICE O/P EST LOW 20 MIN: CPT

## 2019-10-28 NOTE — ASSESSMENT
[Palliative] : Goals of care discussed with patient: Palliative [Palliative Care Plan] : not applicable at this time [With Patient/Caregiver] : : With Patient/Caregiver [Designated Health Care Proxy] : Designated Health Care Proxy [Name: ___] : Name: [unfilled] [Relationship: ___] : Relationship: [unfilled] [FreeTextEntry1] : Continue to hold Ibrance 125 mg for 1 week more. Today,  CBC/ANC=2.1/0.8  When ANC greater than 1.2 will restart Ibrance @100 mg daily x 21 day. LFT are WNL. RTO 1 week. [AdvancecareDate] : 10/28/2019

## 2019-10-28 NOTE — HISTORY OF PRESENT ILLNESS
[Treatment Protocol] : Treatment Protocol [de-identified] : 66-year-old woman with history of breast carcinoma. Her history dates back to September 2003 when she was found to have a mass in the left breast. The biopsy revealed infiltrating lobular carcinoma measuring 3.5 cm. The patient underwent lumpectomy and 3 sentinel lymph nodes and 2 non-sentinel lymph nodes were negative for metastases. ER was +90% [place] weakly positive and HER-2/marvin was negative. The patient underwent chemotherapy with 4 cycles of Adriamycin Cytoxan followed by radiation therapy. She was only able to tolerate 6 months of tamoxifen and then stopped her hormonal therapy.\par \par She subsequently has been well until about one month ago when she noted a mass in the right breast. A mammography on December 7, 2016 revealed a mass in the right breast with architectural distortion and multiple microcalcifications. The left breast revealed no evidence of malignancy. Sonogram confirmed a 3.5 x 3.8 cm mass which was palpable and the left breast was negative. \par \par On December 14 of patient underwent ultrasound-guided biopsy of the mass in the right breast which revealed invasive moderately differentiated ductal carcinoma [place] score of 7/9 the ER and [place] were positive greater than 95%, HER-2/marvin 2+ and CISH  test was negative with a ratio of one.\par \par On January 11, 2017 the patient underwent MRI of the breast which revealed an irregular 6.7 x 6.2 cm mass in the right breast in the area of the biopsy proven malignancy. They were irregular and enhancing lesions throughout the right breast measuring up to 2 cm along with diffuse non-mass enhancement throughout the right breast. These are suspicious for multicentric disease. If breast conservation therapy is selected a second look ultrasound biopsy or MRI biopsy would be performed. The left breast revealed diffuse thickening of the skin with subcutaneous edema consistent with post lumpectomy and radiation therapy changes versus infiltrating malignancy with vascular or lymphatic obstruction. Skin punch biopsy and PET/CT were recommended. Also linear clumped enhancement was seen for which MRI guided biopsy was recommended. There were also multiple abnormal appearing lymph nodes in the right axilla suggestive of metastatic disease. An ultrasound-guided biopsy could be performed. The patient now presents for evaluation for further treatment.\par \par The patient overall feels well and denies symptoms relating to her abnormal breast imaging tests and confirmed malignancy. She has undergone BRCA testing and results are pending. Her family history is positive for a sister with breast cancer at age 53, a maternal first cousin with breast cancer and a maternal second cousin with breast cancer. There is no family history of ovarian cancer. 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  [de-identified] : Patient is here for repeat CBC, Her ANC=1.0, and  Ibrance was held for 1 week. Presently, she feels fine. Denies any joint pain, hot flashes or night sweats. Her appetite is good without any nausea, vomiting, diarrhea or constipation.

## 2019-11-04 ENCOUNTER — APPOINTMENT (OUTPATIENT)
Dept: HEMATOLOGY ONCOLOGY | Facility: CLINIC | Age: 69
End: 2019-11-04
Payer: MEDICARE

## 2019-11-04 ENCOUNTER — RESULT REVIEW (OUTPATIENT)
Age: 69
End: 2019-11-04

## 2019-11-04 ENCOUNTER — APPOINTMENT (OUTPATIENT)
Dept: HEMATOLOGY ONCOLOGY | Facility: CLINIC | Age: 69
End: 2019-11-04

## 2019-11-04 ENCOUNTER — APPOINTMENT (OUTPATIENT)
Dept: INFUSION THERAPY | Facility: HOSPITAL | Age: 69
End: 2019-11-04

## 2019-11-04 VITALS
HEART RATE: 70 BPM | SYSTOLIC BLOOD PRESSURE: 143 MMHG | WEIGHT: 171.52 LBS | DIASTOLIC BLOOD PRESSURE: 79 MMHG | BODY MASS INDEX: 30.38 KG/M2 | TEMPERATURE: 97.8 F | OXYGEN SATURATION: 94 %

## 2019-11-04 LAB
BASOPHILS # BLD AUTO: 0 K/UL — SIGNIFICANT CHANGE UP (ref 0–0.2)
BASOPHILS NFR BLD AUTO: 1.5 % — SIGNIFICANT CHANGE UP (ref 0–2)
EOSINOPHIL # BLD AUTO: 0.1 K/UL — SIGNIFICANT CHANGE UP (ref 0–0.5)
EOSINOPHIL NFR BLD AUTO: 1.9 % — SIGNIFICANT CHANGE UP (ref 0–6)
HCT VFR BLD CALC: 32.6 % — LOW (ref 34.5–45)
HGB BLD-MCNC: 11.3 G/DL — LOW (ref 11.5–15.5)
LYMPHOCYTES # BLD AUTO: 1 K/UL — SIGNIFICANT CHANGE UP (ref 1–3.3)
LYMPHOCYTES # BLD AUTO: 33.9 % — SIGNIFICANT CHANGE UP (ref 13–44)
MCHC RBC-ENTMCNC: 33.9 PG — SIGNIFICANT CHANGE UP (ref 27–34)
MCHC RBC-ENTMCNC: 34.6 G/DL — SIGNIFICANT CHANGE UP (ref 32–36)
MCV RBC AUTO: 98.1 FL — SIGNIFICANT CHANGE UP (ref 80–100)
MONOCYTES # BLD AUTO: 0.5 K/UL — SIGNIFICANT CHANGE UP (ref 0–0.9)
MONOCYTES NFR BLD AUTO: 15.4 % — HIGH (ref 2–14)
NEUTROPHILS # BLD AUTO: 1.4 K/UL — LOW (ref 1.8–7.4)
NEUTROPHILS NFR BLD AUTO: 47.3 % — SIGNIFICANT CHANGE UP (ref 43–77)
PLATELET # BLD AUTO: 257 K/UL — SIGNIFICANT CHANGE UP (ref 150–400)
RBC # BLD: 3.33 M/UL — LOW (ref 3.8–5.2)
RBC # FLD: 14 % — SIGNIFICANT CHANGE UP (ref 10.3–14.5)
WBC # BLD: 2.9 K/UL — LOW (ref 3.8–10.5)
WBC # FLD AUTO: 2.9 K/UL — LOW (ref 3.8–10.5)

## 2019-11-04 PROCEDURE — 99214 OFFICE O/P EST MOD 30 MIN: CPT

## 2019-11-10 LAB
ALBUMIN SERPL ELPH-MCNC: 3.7 G/DL
ALP BLD-CCNC: 107 U/L
ALT SERPL-CCNC: 40 U/L
ANION GAP SERPL CALC-SCNC: 10 MMOL/L
AST SERPL-CCNC: 38 U/L
BILIRUB DIRECT SERPL-MCNC: 0.1 MG/DL
BILIRUB SERPL-MCNC: 0.4 MG/DL
BUN SERPL-MCNC: 29 MG/DL
CALCIUM SERPL-MCNC: 9.8 MG/DL
CHLORIDE SERPL-SCNC: 102 MMOL/L
CO2 SERPL-SCNC: 28 MMOL/L
CREAT SERPL-MCNC: 0.81 MG/DL
GLUCOSE SERPL-MCNC: 89 MG/DL
POTASSIUM SERPL-SCNC: 5.5 MMOL/L
PROT SERPL-MCNC: 6.6 G/DL
SODIUM SERPL-SCNC: 140 MMOL/L

## 2019-11-12 ENCOUNTER — OUTPATIENT (OUTPATIENT)
Dept: OUTPATIENT SERVICES | Facility: HOSPITAL | Age: 69
LOS: 1 days | Discharge: HOME | End: 2019-11-12

## 2019-11-12 DIAGNOSIS — Z95.828 PRESENCE OF OTHER VASCULAR IMPLANTS AND GRAFTS: Chronic | ICD-10-CM

## 2019-11-12 DIAGNOSIS — C50.919 MALIGNANT NEOPLASM OF UNSPECIFIED SITE OF UNSPECIFIED FEMALE BREAST: ICD-10-CM

## 2019-11-12 DIAGNOSIS — Z98.890 OTHER SPECIFIED POSTPROCEDURAL STATES: Chronic | ICD-10-CM

## 2019-11-12 DIAGNOSIS — Z90.89 ACQUIRED ABSENCE OF OTHER ORGANS: Chronic | ICD-10-CM

## 2019-11-18 ENCOUNTER — RESULT REVIEW (OUTPATIENT)
Age: 69
End: 2019-11-18

## 2019-11-18 ENCOUNTER — APPOINTMENT (OUTPATIENT)
Dept: HEMATOLOGY ONCOLOGY | Facility: CLINIC | Age: 69
End: 2019-11-18

## 2019-11-18 VITALS
TEMPERATURE: 97.1 F | SYSTOLIC BLOOD PRESSURE: 147 MMHG | DIASTOLIC BLOOD PRESSURE: 78 MMHG | HEIGHT: 64 IN | WEIGHT: 167.55 LBS | OXYGEN SATURATION: 98 % | RESPIRATION RATE: 16 BRPM | HEART RATE: 60 BPM | BODY MASS INDEX: 28.6 KG/M2

## 2019-11-18 LAB
ALBUMIN SERPL ELPH-MCNC: 3.8 G/DL
ALP BLD-CCNC: 94 U/L
ALT SERPL-CCNC: 25 U/L
ANION GAP SERPL CALC-SCNC: 12 MMOL/L
AST SERPL-CCNC: 29 U/L
BASOPHILS # BLD AUTO: 0 K/UL — SIGNIFICANT CHANGE UP (ref 0–0.2)
BASOPHILS NFR BLD AUTO: 0 % — SIGNIFICANT CHANGE UP (ref 0–2)
BILIRUB DIRECT SERPL-MCNC: 0.2 MG/DL
BILIRUB SERPL-MCNC: 0.5 MG/DL
BUN SERPL-MCNC: 28 MG/DL
CALCIUM SERPL-MCNC: 9.7 MG/DL
CHLORIDE SERPL-SCNC: 99 MMOL/L
CO2 SERPL-SCNC: 26 MMOL/L
CREAT SERPL-MCNC: 0.9 MG/DL
EOSINOPHIL # BLD AUTO: 0.1 K/UL — SIGNIFICANT CHANGE UP (ref 0–0.5)
EOSINOPHIL NFR BLD AUTO: 3.9 % — SIGNIFICANT CHANGE UP (ref 0–6)
GLUCOSE SERPL-MCNC: 87 MG/DL
HCT VFR BLD CALC: 32.4 % — LOW (ref 34.5–45)
HGB BLD-MCNC: 11.5 G/DL — SIGNIFICANT CHANGE UP (ref 11.5–15.5)
LYMPHOCYTES # BLD AUTO: 0.8 K/UL — LOW (ref 1–3.3)
LYMPHOCYTES # BLD AUTO: 38.4 % — SIGNIFICANT CHANGE UP (ref 13–44)
MCHC RBC-ENTMCNC: 35.1 PG — HIGH (ref 27–34)
MCHC RBC-ENTMCNC: 35.3 G/DL — SIGNIFICANT CHANGE UP (ref 32–36)
MCV RBC AUTO: 99.3 FL — SIGNIFICANT CHANGE UP (ref 80–100)
MONOCYTES # BLD AUTO: 0.2 K/UL — SIGNIFICANT CHANGE UP (ref 0–0.9)
MONOCYTES NFR BLD AUTO: 9.8 % — SIGNIFICANT CHANGE UP (ref 2–14)
NEUTROPHILS # BLD AUTO: 1 K/UL — LOW (ref 1.8–7.4)
NEUTROPHILS NFR BLD AUTO: 47.9 % — SIGNIFICANT CHANGE UP (ref 43–77)
PLATELET # BLD AUTO: 217 K/UL — SIGNIFICANT CHANGE UP (ref 150–400)
POTASSIUM SERPL-SCNC: 4.8 MMOL/L
PROT SERPL-MCNC: 6.7 G/DL
RBC # BLD: 3.27 M/UL — LOW (ref 3.8–5.2)
RBC # FLD: 14.3 % — SIGNIFICANT CHANGE UP (ref 10.3–14.5)
SODIUM SERPL-SCNC: 137 MMOL/L
WBC # BLD: 2.1 K/UL — LOW (ref 3.8–10.5)
WBC # FLD AUTO: 2.1 K/UL — LOW (ref 3.8–10.5)

## 2019-12-02 ENCOUNTER — RESULT REVIEW (OUTPATIENT)
Age: 69
End: 2019-12-02

## 2019-12-02 ENCOUNTER — APPOINTMENT (OUTPATIENT)
Dept: HEMATOLOGY ONCOLOGY | Facility: CLINIC | Age: 69
End: 2019-12-02

## 2019-12-02 ENCOUNTER — APPOINTMENT (OUTPATIENT)
Dept: HEMATOLOGY ONCOLOGY | Facility: CLINIC | Age: 69
End: 2019-12-02
Payer: MEDICARE

## 2019-12-02 ENCOUNTER — APPOINTMENT (OUTPATIENT)
Dept: INFUSION THERAPY | Facility: HOSPITAL | Age: 69
End: 2019-12-02
Payer: MEDICARE

## 2019-12-02 VITALS
HEART RATE: 78 BPM | BODY MASS INDEX: 28.84 KG/M2 | WEIGHT: 167.99 LBS | DIASTOLIC BLOOD PRESSURE: 72 MMHG | OXYGEN SATURATION: 100 % | SYSTOLIC BLOOD PRESSURE: 119 MMHG | TEMPERATURE: 97.9 F | RESPIRATION RATE: 16 BRPM

## 2019-12-02 LAB
HCT VFR BLD CALC: 32.9 % — LOW (ref 34.5–45)
HGB BLD-MCNC: 11.3 G/DL — LOW (ref 11.5–15.5)
LYMPHOCYTES # BLD AUTO: 0.8 K/UL — LOW (ref 1–3.3)
LYMPHOCYTES # BLD AUTO: 38 % — SIGNIFICANT CHANGE UP (ref 13–44)
MCHC RBC-ENTMCNC: 34 PG — SIGNIFICANT CHANGE UP (ref 27–34)
MCHC RBC-ENTMCNC: 34.2 G/DL — SIGNIFICANT CHANGE UP (ref 32–36)
MCV RBC AUTO: 99.4 FL — SIGNIFICANT CHANGE UP (ref 80–100)
MONOCYTES # BLD AUTO: 0.3 K/UL — SIGNIFICANT CHANGE UP (ref 0–0.9)
MONOCYTES NFR BLD AUTO: 14 % — SIGNIFICANT CHANGE UP (ref 2–14)
NEUTROPHILS # BLD AUTO: 0.9 K/UL — LOW (ref 1.8–7.4)
NEUTROPHILS NFR BLD AUTO: 48 % — SIGNIFICANT CHANGE UP (ref 43–77)
PLAT MORPH BLD: NORMAL — SIGNIFICANT CHANGE UP
PLATELET # BLD AUTO: 167 K/UL — SIGNIFICANT CHANGE UP (ref 150–400)
RBC # BLD: 3.31 M/UL — LOW (ref 3.8–5.2)
RBC # FLD: 14.8 % — HIGH (ref 10.3–14.5)
RBC BLD AUTO: SIGNIFICANT CHANGE UP
WBC # BLD: 2 K/UL — LOW (ref 3.8–10.5)
WBC # FLD AUTO: 2 K/UL — LOW (ref 3.8–10.5)

## 2019-12-02 PROCEDURE — 99214 OFFICE O/P EST MOD 30 MIN: CPT

## 2019-12-02 PROCEDURE — G0008: CPT

## 2019-12-05 NOTE — ASSESSMENT
[Palliative] : Goals of care discussed with patient: Palliative [Palliative Care Plan] : not applicable at this time [FreeTextEntry1] : Pt to hold  [id number] mg  x  21 days for 1 week more. Will  continue on faslodex and be followed for side effects and toxicity. RTO in 1 week.

## 2019-12-05 NOTE — PHYSICAL EXAM
[Restricted in physically strenuous activity but ambulatory and able to carry out work of a light or sedentary nature] : Status 1- Restricted in physically strenuous activity but ambulatory and able to carry out work of a light or sedentary nature, e.g., light house work, office work [Normal] : affect appropriate [de-identified] : left breast with thicken skin, firm to palpation. no lumps

## 2019-12-05 NOTE — HISTORY OF PRESENT ILLNESS
[Treatment Protocol] : Treatment Protocol [Cycle: ___] : Cycle: [unfilled] [de-identified] : Patient is here for f/u. Patient takes Ibrance 100 mg x 21 days. She feels fine except occasional hot flashes. Denies any fatigue or weakness. She has no pain to joints or mouth sores.  Today, CBC shows ANC=0.9. [FreeTextEntry1] : Ibrance 100 mg x 21 days

## 2019-12-08 LAB
ALBUMIN SERPL ELPH-MCNC: 3.9 G/DL
ALP BLD-CCNC: 87 U/L
ALT SERPL-CCNC: 24 U/L
ANION GAP SERPL CALC-SCNC: 11 MMOL/L
AST SERPL-CCNC: 29 U/L
BILIRUB DIRECT SERPL-MCNC: 0.1 MG/DL
BILIRUB SERPL-MCNC: 0.3 MG/DL
BUN SERPL-MCNC: 27 MG/DL
CALCIUM SERPL-MCNC: 9.6 MG/DL
CANCER AG27-29 SERPL-ACNC: 3183.8 U/ML
CHLORIDE SERPL-SCNC: 104 MMOL/L
CO2 SERPL-SCNC: 26 MMOL/L
CREAT SERPL-MCNC: 0.82 MG/DL
GLUCOSE SERPL-MCNC: 86 MG/DL
POTASSIUM SERPL-SCNC: 5.1 MMOL/L
PROT SERPL-MCNC: 6.5 G/DL
SODIUM SERPL-SCNC: 141 MMOL/L

## 2019-12-09 ENCOUNTER — APPOINTMENT (OUTPATIENT)
Dept: HEMATOLOGY ONCOLOGY | Facility: CLINIC | Age: 69
End: 2019-12-09
Payer: MEDICARE

## 2019-12-09 ENCOUNTER — RESULT REVIEW (OUTPATIENT)
Age: 69
End: 2019-12-09

## 2019-12-09 VITALS
SYSTOLIC BLOOD PRESSURE: 100 MMHG | TEMPERATURE: 97.3 F | HEART RATE: 73 BPM | RESPIRATION RATE: 16 BRPM | DIASTOLIC BLOOD PRESSURE: 70 MMHG | OXYGEN SATURATION: 99 %

## 2019-12-09 LAB
BASOPHILS # BLD AUTO: 0 K/UL — SIGNIFICANT CHANGE UP (ref 0–0.2)
BASOPHILS NFR BLD AUTO: 1.1 % — SIGNIFICANT CHANGE UP (ref 0–2)
EOSINOPHIL # BLD AUTO: 0.1 K/UL — SIGNIFICANT CHANGE UP (ref 0–0.5)
EOSINOPHIL NFR BLD AUTO: 2 % — SIGNIFICANT CHANGE UP (ref 0–6)
HCT VFR BLD CALC: 33.3 % — LOW (ref 34.5–45)
HGB BLD-MCNC: 11.1 G/DL — LOW (ref 11.5–15.5)
LYMPHOCYTES # BLD AUTO: 0.9 K/UL — LOW (ref 1–3.3)
LYMPHOCYTES # BLD AUTO: 32 % — SIGNIFICANT CHANGE UP (ref 13–44)
MCHC RBC-ENTMCNC: 33.4 G/DL — SIGNIFICANT CHANGE UP (ref 32–36)
MCHC RBC-ENTMCNC: 33.5 PG — SIGNIFICANT CHANGE UP (ref 27–34)
MCV RBC AUTO: 100 FL — SIGNIFICANT CHANGE UP (ref 80–100)
MONOCYTES # BLD AUTO: 0.3 K/UL — SIGNIFICANT CHANGE UP (ref 0–0.9)
MONOCYTES NFR BLD AUTO: 11.9 % — SIGNIFICANT CHANGE UP (ref 2–14)
NEUTROPHILS # BLD AUTO: 1.6 K/UL — LOW (ref 1.8–7.4)
NEUTROPHILS NFR BLD AUTO: 52.9 % — SIGNIFICANT CHANGE UP (ref 43–77)
PLATELET # BLD AUTO: 262 K/UL — SIGNIFICANT CHANGE UP (ref 150–400)
RBC # BLD: 3.33 M/UL — LOW (ref 3.8–5.2)
RBC # FLD: 15.4 % — HIGH (ref 10.3–14.5)
WBC # BLD: 2.9 K/UL — LOW (ref 3.8–10.5)
WBC # FLD AUTO: 2.9 K/UL — LOW (ref 3.8–10.5)

## 2019-12-09 PROCEDURE — 99213 OFFICE O/P EST LOW 20 MIN: CPT

## 2019-12-15 LAB
ALBUMIN SERPL ELPH-MCNC: 3.7 G/DL
ALP BLD-CCNC: 91 U/L
ALT SERPL-CCNC: 26 U/L
ANION GAP SERPL CALC-SCNC: 12 MMOL/L
AST SERPL-CCNC: 32 U/L
BILIRUB SERPL-MCNC: 0.3 MG/DL
BUN SERPL-MCNC: 32 MG/DL
CALCIUM SERPL-MCNC: 9.3 MG/DL
CHLORIDE SERPL-SCNC: 102 MMOL/L
CO2 SERPL-SCNC: 26 MMOL/L
CREAT SERPL-MCNC: 0.8 MG/DL
GLUCOSE SERPL-MCNC: 81 MG/DL
POTASSIUM SERPL-SCNC: 4.5 MMOL/L
PROT SERPL-MCNC: 6.4 G/DL
SODIUM SERPL-SCNC: 140 MMOL/L

## 2019-12-15 NOTE — ASSESSMENT
[Palliative] : Goals of care discussed with patient: Palliative [Palliative Care Plan] : not applicable at this time [FreeTextEntry1] : Pt to restart [id number] mg  x  21 days for 1 week more. Will  continue on faslodex and be followed for side effects and toxicity. RTO in 2 weeks

## 2019-12-15 NOTE — PHYSICAL EXAM
[Restricted in physically strenuous activity but ambulatory and able to carry out work of a light or sedentary nature] : Status 1- Restricted in physically strenuous activity but ambulatory and able to carry out work of a light or sedentary nature, e.g., light house work, office work [Normal] : affect appropriate [de-identified] : left breast with thicken skin, firm to palpation. no lumps

## 2019-12-15 NOTE — HISTORY OF PRESENT ILLNESS
[Treatment Protocol] : Treatment Protocol [Cycle: ___] : Cycle: [unfilled] [FreeTextEntry1] : Ibrance 100 mg x 21 days [de-identified] : Patient is here for f/u. Medication has  been held for 2 weeks 2/2 low blood counts. She takes Ibrance 100 mg x 21 days. She feels fine except occasional hot flashes. Denies any fatigue or weakness. She has no pain to joints or mouth sores.  Today, CBC=

## 2019-12-16 ENCOUNTER — OUTPATIENT (OUTPATIENT)
Dept: OUTPATIENT SERVICES | Facility: HOSPITAL | Age: 69
LOS: 1 days | Discharge: HOME | End: 2019-12-16

## 2019-12-16 DIAGNOSIS — Z95.828 PRESENCE OF OTHER VASCULAR IMPLANTS AND GRAFTS: Chronic | ICD-10-CM

## 2019-12-16 DIAGNOSIS — Z90.89 ACQUIRED ABSENCE OF OTHER ORGANS: Chronic | ICD-10-CM

## 2019-12-16 DIAGNOSIS — C50.919 MALIGNANT NEOPLASM OF UNSPECIFIED SITE OF UNSPECIFIED FEMALE BREAST: ICD-10-CM

## 2019-12-16 DIAGNOSIS — Z98.890 OTHER SPECIFIED POSTPROCEDURAL STATES: Chronic | ICD-10-CM

## 2019-12-20 ENCOUNTER — RESULT REVIEW (OUTPATIENT)
Age: 69
End: 2019-12-20

## 2019-12-20 ENCOUNTER — APPOINTMENT (OUTPATIENT)
Dept: HEMATOLOGY ONCOLOGY | Facility: CLINIC | Age: 69
End: 2019-12-20
Payer: MEDICARE

## 2019-12-20 VITALS
BODY MASS INDEX: 28.93 KG/M2 | TEMPERATURE: 98.4 F | RESPIRATION RATE: 16 BRPM | WEIGHT: 168.52 LBS | SYSTOLIC BLOOD PRESSURE: 115 MMHG | DIASTOLIC BLOOD PRESSURE: 74 MMHG | OXYGEN SATURATION: 100 % | HEART RATE: 61 BPM

## 2019-12-20 LAB
BASOPHILS # BLD AUTO: 0 K/UL — SIGNIFICANT CHANGE UP (ref 0–0.2)
BASOPHILS NFR BLD AUTO: 1.2 % — SIGNIFICANT CHANGE UP (ref 0–2)
EOSINOPHIL # BLD AUTO: 0.1 K/UL — SIGNIFICANT CHANGE UP (ref 0–0.5)
EOSINOPHIL NFR BLD AUTO: 2.4 % — SIGNIFICANT CHANGE UP (ref 0–6)
HCT VFR BLD CALC: 32.8 % — LOW (ref 34.5–45)
HGB BLD-MCNC: 10.9 G/DL — LOW (ref 11.5–15.5)
LYMPHOCYTES # BLD AUTO: 0.8 K/UL — LOW (ref 1–3.3)
LYMPHOCYTES # BLD AUTO: 26.9 % — SIGNIFICANT CHANGE UP (ref 13–44)
MCHC RBC-ENTMCNC: 33.3 G/DL — SIGNIFICANT CHANGE UP (ref 32–36)
MCHC RBC-ENTMCNC: 34 PG — SIGNIFICANT CHANGE UP (ref 27–34)
MCV RBC AUTO: 102 FL — HIGH (ref 80–100)
MONOCYTES # BLD AUTO: 0.2 K/UL — SIGNIFICANT CHANGE UP (ref 0–0.9)
MONOCYTES NFR BLD AUTO: 5.2 % — SIGNIFICANT CHANGE UP (ref 2–14)
NEUTROPHILS # BLD AUTO: 1.9 K/UL — SIGNIFICANT CHANGE UP (ref 1.8–7.4)
NEUTROPHILS NFR BLD AUTO: 64.4 % — SIGNIFICANT CHANGE UP (ref 43–77)
PLATELET # BLD AUTO: 229 K/UL — SIGNIFICANT CHANGE UP (ref 150–400)
RBC # BLD: 3.21 M/UL — LOW (ref 3.8–5.2)
RBC # FLD: 15.1 % — HIGH (ref 10.3–14.5)
WBC # BLD: 3 K/UL — LOW (ref 3.8–10.5)
WBC # FLD AUTO: 3 K/UL — LOW (ref 3.8–10.5)

## 2019-12-20 PROCEDURE — 99213 OFFICE O/P EST LOW 20 MIN: CPT

## 2019-12-23 ENCOUNTER — FORM ENCOUNTER (OUTPATIENT)
Age: 69
End: 2019-12-23

## 2019-12-24 ENCOUNTER — OUTPATIENT (OUTPATIENT)
Dept: OUTPATIENT SERVICES | Facility: HOSPITAL | Age: 69
LOS: 1 days | End: 2019-12-24
Payer: MEDICARE

## 2019-12-24 ENCOUNTER — APPOINTMENT (OUTPATIENT)
Dept: CT IMAGING | Facility: CLINIC | Age: 69
End: 2019-12-24
Payer: MEDICARE

## 2019-12-24 DIAGNOSIS — Z98.890 OTHER SPECIFIED POSTPROCEDURAL STATES: Chronic | ICD-10-CM

## 2019-12-24 DIAGNOSIS — Z95.828 PRESENCE OF OTHER VASCULAR IMPLANTS AND GRAFTS: Chronic | ICD-10-CM

## 2019-12-24 DIAGNOSIS — C78.7 SECONDARY MALIGNANT NEOPLASM OF LIVER AND INTRAHEPATIC BILE DUCT: ICD-10-CM

## 2019-12-24 DIAGNOSIS — Z90.89 ACQUIRED ABSENCE OF OTHER ORGANS: Chronic | ICD-10-CM

## 2019-12-24 PROCEDURE — 71260 CT THORAX DX C+: CPT | Mod: 26

## 2019-12-24 PROCEDURE — 74177 CT ABD & PELVIS W/CONTRAST: CPT | Mod: 26

## 2019-12-24 PROCEDURE — 71260 CT THORAX DX C+: CPT

## 2019-12-24 PROCEDURE — 74177 CT ABD & PELVIS W/CONTRAST: CPT

## 2019-12-26 LAB
ALBUMIN SERPL ELPH-MCNC: 3.7 G/DL
ALP BLD-CCNC: 85 U/L
ALT SERPL-CCNC: 21 U/L
ANION GAP SERPL CALC-SCNC: 9 MMOL/L
AST SERPL-CCNC: 26 U/L
BILIRUB DIRECT SERPL-MCNC: 0.1 MG/DL
BILIRUB SERPL-MCNC: 0.5 MG/DL
BUN SERPL-MCNC: 30 MG/DL
CALCIUM SERPL-MCNC: 9.2 MG/DL
CANCER AG27-29 SERPL-ACNC: 3011.3 U/ML
CHLORIDE SERPL-SCNC: 101 MMOL/L
CO2 SERPL-SCNC: 27 MMOL/L
CREAT SERPL-MCNC: 0.97 MG/DL
GLUCOSE SERPL-MCNC: 76 MG/DL
POTASSIUM SERPL-SCNC: 4.8 MMOL/L
PROT SERPL-MCNC: 6.5 G/DL
SODIUM SERPL-SCNC: 137 MMOL/L

## 2019-12-30 ENCOUNTER — APPOINTMENT (OUTPATIENT)
Dept: HEMATOLOGY ONCOLOGY | Facility: CLINIC | Age: 69
End: 2019-12-30

## 2019-12-30 ENCOUNTER — APPOINTMENT (OUTPATIENT)
Dept: INFUSION THERAPY | Facility: HOSPITAL | Age: 69
End: 2019-12-30

## 2019-12-30 NOTE — HISTORY OF PRESENT ILLNESS
[Cycle: ___] : Cycle: [unfilled] [Treatment Protocol] : Treatment Protocol [FreeTextEntry1] : Ibrance 100 mg x 21 days [de-identified] : Patient is here for f/u. Medication has  been held for 2 weeks due to low blood counts. She takes Ibrance 100 mg x 21 days. She feels fine except occasional hot flashes. Denies any fatigue or weakness. She has no pain to joints or mouth sores.  Today, CBC=

## 2019-12-30 NOTE — PHYSICAL EXAM
[Restricted in physically strenuous activity but ambulatory and able to carry out work of a light or sedentary nature] : Status 1- Restricted in physically strenuous activity but ambulatory and able to carry out work of a light or sedentary nature, e.g., light house work, office work [Normal] : grossly intact [de-identified] : left breast with thicken skin, firm to palpation. no lumps

## 2019-12-30 NOTE — PHYSICAL EXAM
[Restricted in physically strenuous activity but ambulatory and able to carry out work of a light or sedentary nature] : Status 1- Restricted in physically strenuous activity but ambulatory and able to carry out work of a light or sedentary nature, e.g., light house work, office work [Normal] : affect appropriate [de-identified] : left breast with thicken skin, firm to palpation. no lumps

## 2019-12-30 NOTE — ASSESSMENT
[Palliative] : Goals of care discussed with patient: Palliative [Palliative Care Plan] : not applicable at this time [FreeTextEntry1] : Pt to restart [id number] mg  x  21 days for 1 week more. Will  continue on faslodex and be followed for side effects and toxicity. RTO in 2 weeks

## 2019-12-30 NOTE — HISTORY OF PRESENT ILLNESS
[Treatment Protocol] : Treatment Protocol [Cycle: ___] : Cycle: [unfilled] [FreeTextEntry1] : Ibrance 100 mg x 21 days [de-identified] : Patient is here for f/u. Medication has  been held for 2 weeks due to low blood counts. She takes Ibrance 100 mg x 21 days. She feels fine except occasional hot flashes. Denies any fatigue or weakness. She has no pain to joints or mouth sores.  Today, CBC=

## 2019-12-30 NOTE — ASSESSMENT
[Palliative] : Goals of care discussed with patient: Palliative [Palliative Care Plan] : not applicable at this time [FreeTextEntry1] : Pt to restart [id number] mg  x  21 days for 1 week more. Will  continue on faslodex and be followed for side effects and toxicity. RTO in 2 weeks

## 2020-01-05 ENCOUNTER — FORM ENCOUNTER (OUTPATIENT)
Age: 70
End: 2020-01-05

## 2020-01-06 ENCOUNTER — RESULT REVIEW (OUTPATIENT)
Age: 70
End: 2020-01-06

## 2020-01-06 ENCOUNTER — APPOINTMENT (OUTPATIENT)
Dept: HEMATOLOGY ONCOLOGY | Facility: CLINIC | Age: 70
End: 2020-01-06
Payer: MEDICARE

## 2020-01-06 ENCOUNTER — OUTPATIENT (OUTPATIENT)
Dept: OUTPATIENT SERVICES | Facility: HOSPITAL | Age: 70
LOS: 1 days | End: 2020-01-06
Payer: MEDICARE

## 2020-01-06 ENCOUNTER — APPOINTMENT (OUTPATIENT)
Dept: NUCLEAR MEDICINE | Facility: CLINIC | Age: 70
End: 2020-01-06

## 2020-01-06 VITALS
BODY MASS INDEX: 28.31 KG/M2 | SYSTOLIC BLOOD PRESSURE: 137 MMHG | TEMPERATURE: 97.3 F | OXYGEN SATURATION: 100 % | WEIGHT: 164.88 LBS | RESPIRATION RATE: 16 BRPM | DIASTOLIC BLOOD PRESSURE: 82 MMHG | HEART RATE: 71 BPM

## 2020-01-06 DIAGNOSIS — Z98.890 OTHER SPECIFIED POSTPROCEDURAL STATES: Chronic | ICD-10-CM

## 2020-01-06 DIAGNOSIS — Z00.8 ENCOUNTER FOR OTHER GENERAL EXAMINATION: ICD-10-CM

## 2020-01-06 DIAGNOSIS — Z90.89 ACQUIRED ABSENCE OF OTHER ORGANS: Chronic | ICD-10-CM

## 2020-01-06 DIAGNOSIS — Z95.828 PRESENCE OF OTHER VASCULAR IMPLANTS AND GRAFTS: Chronic | ICD-10-CM

## 2020-01-06 LAB
BASOPHILS # BLD AUTO: 0 K/UL — SIGNIFICANT CHANGE UP (ref 0–0.2)
BASOPHILS NFR BLD AUTO: 2 % — SIGNIFICANT CHANGE UP (ref 0–2)
EOSINOPHIL # BLD AUTO: 0 K/UL — SIGNIFICANT CHANGE UP (ref 0–0.5)
EOSINOPHIL NFR BLD AUTO: 1 % — SIGNIFICANT CHANGE UP (ref 0–6)
HCT VFR BLD CALC: 31.6 % — LOW (ref 34.5–45)
HGB BLD-MCNC: 10.8 G/DL — LOW (ref 11.5–15.5)
LYMPHOCYTES # BLD AUTO: 0.7 K/UL — LOW (ref 1–3.3)
LYMPHOCYTES # BLD AUTO: 39 % — SIGNIFICANT CHANGE UP (ref 13–44)
MCHC RBC-ENTMCNC: 34.1 G/DL — SIGNIFICANT CHANGE UP (ref 32–36)
MCHC RBC-ENTMCNC: 35.3 PG — HIGH (ref 27–34)
MCV RBC AUTO: 103 FL — HIGH (ref 80–100)
MONOCYTES # BLD AUTO: 0.4 K/UL — SIGNIFICANT CHANGE UP (ref 0–0.9)
MONOCYTES NFR BLD AUTO: 9 % — SIGNIFICANT CHANGE UP (ref 2–14)
NEUTROPHILS # BLD AUTO: 1.1 K/UL — LOW (ref 1.8–7.4)
NEUTROPHILS NFR BLD AUTO: 49 % — SIGNIFICANT CHANGE UP (ref 43–77)
PLAT MORPH BLD: NORMAL — SIGNIFICANT CHANGE UP
PLATELET # BLD AUTO: 175 K/UL — SIGNIFICANT CHANGE UP (ref 150–400)
RBC # BLD: 3.06 M/UL — LOW (ref 3.8–5.2)
RBC # FLD: 15.5 % — HIGH (ref 10.3–14.5)
RBC BLD AUTO: SIGNIFICANT CHANGE UP
WBC # BLD: 2.2 K/UL — LOW (ref 3.8–10.5)
WBC # FLD AUTO: 2.2 K/UL — LOW (ref 3.8–10.5)

## 2020-01-06 PROCEDURE — 99214 OFFICE O/P EST MOD 30 MIN: CPT

## 2020-01-06 PROCEDURE — 78815 PET IMAGE W/CT SKULL-THIGH: CPT

## 2020-01-06 PROCEDURE — A9552: CPT

## 2020-01-06 PROCEDURE — 78815 PET IMAGE W/CT SKULL-THIGH: CPT | Mod: 26,PS

## 2020-01-07 LAB
ALBUMIN SERPL ELPH-MCNC: 3.9 G/DL
ALP BLD-CCNC: 84 U/L
ALT SERPL-CCNC: 25 U/L
ANION GAP SERPL CALC-SCNC: 11 MMOL/L
AST SERPL-CCNC: 33 U/L
BILIRUB SERPL-MCNC: 0.5 MG/DL
BUN SERPL-MCNC: 21 MG/DL
CALCIUM SERPL-MCNC: 9.7 MG/DL
CANCER AG27-29 SERPL-ACNC: 3702.7 U/ML
CHLORIDE SERPL-SCNC: 103 MMOL/L
CO2 SERPL-SCNC: 27 MMOL/L
CREAT SERPL-MCNC: 0.86 MG/DL
GLUCOSE SERPL-MCNC: 81 MG/DL
POTASSIUM SERPL-SCNC: 5 MMOL/L
PROT SERPL-MCNC: 6.6 G/DL
SODIUM SERPL-SCNC: 141 MMOL/L

## 2020-01-13 ENCOUNTER — APPOINTMENT (OUTPATIENT)
Dept: HEMATOLOGY ONCOLOGY | Facility: CLINIC | Age: 70
End: 2020-01-13
Payer: MEDICARE

## 2020-01-13 ENCOUNTER — LABORATORY RESULT (OUTPATIENT)
Age: 70
End: 2020-01-13

## 2020-01-13 ENCOUNTER — RESULT REVIEW (OUTPATIENT)
Age: 70
End: 2020-01-13

## 2020-01-13 ENCOUNTER — APPOINTMENT (OUTPATIENT)
Age: 70
End: 2020-01-13

## 2020-01-13 VITALS
DIASTOLIC BLOOD PRESSURE: 74 MMHG | OXYGEN SATURATION: 100 % | TEMPERATURE: 97.4 F | SYSTOLIC BLOOD PRESSURE: 125 MMHG | RESPIRATION RATE: 18 BRPM | HEART RATE: 78 BPM

## 2020-01-13 LAB
BASOPHILS # BLD AUTO: 0 K/UL — SIGNIFICANT CHANGE UP (ref 0–0.2)
BASOPHILS NFR BLD AUTO: 1.8 % — SIGNIFICANT CHANGE UP (ref 0–2)
EOSINOPHIL # BLD AUTO: 0 K/UL — SIGNIFICANT CHANGE UP (ref 0–0.5)
EOSINOPHIL NFR BLD AUTO: 1.4 % — SIGNIFICANT CHANGE UP (ref 0–6)
HCT VFR BLD CALC: 32.5 % — LOW (ref 34.5–45)
HGB BLD-MCNC: 10.9 G/DL — LOW (ref 11.5–15.5)
LYMPHOCYTES # BLD AUTO: 0.7 K/UL — LOW (ref 1–3.3)
LYMPHOCYTES # BLD AUTO: 30.6 % — SIGNIFICANT CHANGE UP (ref 13–44)
MCHC RBC-ENTMCNC: 33.6 G/DL — SIGNIFICANT CHANGE UP (ref 32–36)
MCHC RBC-ENTMCNC: 35.1 PG — HIGH (ref 27–34)
MCV RBC AUTO: 105 FL — HIGH (ref 80–100)
MONOCYTES # BLD AUTO: 0.2 K/UL — SIGNIFICANT CHANGE UP (ref 0–0.9)
MONOCYTES NFR BLD AUTO: 8.3 % — SIGNIFICANT CHANGE UP (ref 2–14)
NEUTROPHILS # BLD AUTO: 1.4 K/UL — LOW (ref 1.8–7.4)
NEUTROPHILS NFR BLD AUTO: 57.9 % — SIGNIFICANT CHANGE UP (ref 43–77)
PLATELET # BLD AUTO: 254 K/UL — SIGNIFICANT CHANGE UP (ref 150–400)
RBC # BLD: 3.11 M/UL — LOW (ref 3.8–5.2)
RBC # FLD: 15.6 % — HIGH (ref 10.3–14.5)
WBC # BLD: 2.3 K/UL — LOW (ref 3.8–10.5)
WBC # FLD AUTO: 2.3 K/UL — LOW (ref 3.8–10.5)

## 2020-01-13 PROCEDURE — 99214 OFFICE O/P EST MOD 30 MIN: CPT

## 2020-01-14 NOTE — HISTORY OF PRESENT ILLNESS
[de-identified] : 66-year-old woman with history of breast carcinoma. Her history dates back to September 2003 when she was found to have a mass in the left breast. The biopsy revealed infiltrating lobular carcinoma measuring 3.5 cm. The patient underwent lumpectomy and 3 sentinel lymph nodes and 2 non-sentinel lymph nodes were negative for metastases. ER was +90% [place] weakly positive and HER-2/marvin was negative. The patient underwent chemotherapy with 4 cycles of Adriamycin Cytoxan followed by radiation therapy. She was only able to tolerate 6 months of tamoxifen and then stopped her hormonal therapy.\par \par She subsequently has been well until about one month ago when she noted a mass in the right breast. A mammography on December 7, 2016 revealed a mass in the right breast with architectural distortion and multiple microcalcifications. The left breast revealed no evidence of malignancy. Sonogram confirmed a 3.5 x 3.8 cm mass which was palpable and the left breast was negative. \par \par On December 14 of patient underwent ultrasound-guided biopsy of the mass in the right breast which revealed invasive moderately differentiated ductal carcinoma [place] score of 7/9 the ER and [place] were positive greater than 95%, HER-2/marvin 2+ and CISH  test was negative with a ratio of one.\par \par On January 11, 2017 the patient underwent MRI of the breast which revealed an irregular 6.7 x 6.2 cm mass in the right breast in the area of the biopsy proven malignancy. They were irregular and enhancing lesions throughout the right breast measuring up to 2 cm along with diffuse non-mass enhancement throughout the right breast. These are suspicious for multicentric disease. If breast conservation therapy is selected a second look ultrasound biopsy or MRI biopsy would be performed. The left breast revealed diffuse thickening of the skin with subcutaneous edema consistent with post lumpectomy and radiation therapy changes versus infiltrating malignancy with vascular or lymphatic obstruction. Skin punch biopsy and PET/CT were recommended. Also linear clumped enhancement was seen for which MRI guided biopsy was recommended. There were also multiple abnormal appearing lymph nodes in the right axilla suggestive of metastatic disease. An ultrasound-guided biopsy could be performed. The patient now presents for evaluation for further treatment.\par \par The patient overall feels well and denies symptoms relating to her abnormal breast imaging tests and confirmed malignancy. She has undergone BRCA testing and results are pending. Her family history is positive for a sister with breast cancer at age 53, a maternal first cousin with breast cancer and a maternal second cousin with breast cancer. There is no family history of ovarian cancer. 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

## 2020-01-14 NOTE — PHYSICAL EXAM
[Restricted in physically strenuous activity but ambulatory and able to carry out work of a light or sedentary nature] : Status 1- Restricted in physically strenuous activity but ambulatory and able to carry out work of a light or sedentary nature, e.g., light house work, office work
(4) rarely moist

## 2020-01-14 NOTE — ASSESSMENT
[FreeTextEntry1] : Since the last visit the pt had repeat CT scan and PETCT showing inc liver and bone lesions. Pt remains well with no new symptoms and overall feels well. Discussed discussed further  rx with chemo as pt has failed hormonal therapy with faslodex, anastrazole and ibrance. Discudded xeloda, eribulin, abraxane, navelbine and gemzar therapy. Side effects were discussed in detail and informed consent obtained. If no response will repeat biopsy to test foir markers.Pt to RTO in 3 weeks after cycle of xeloda. [Palliative Care Plan] : not applicable at this time [Palliative] : Goals of care discussed with patient: Palliative

## 2020-01-16 LAB
ALBUMIN SERPL ELPH-MCNC: 4.2 G/DL
ALP BLD-CCNC: 85 U/L
ALT SERPL-CCNC: 24 U/L
ANION GAP SERPL CALC-SCNC: 11 MMOL/L
AST SERPL-CCNC: 29 U/L
BILIRUB DIRECT SERPL-MCNC: 0.1 MG/DL
BILIRUB DIRECT SERPL-MCNC: 0.1 MG/DL
BILIRUB SERPL-MCNC: 0.4 MG/DL
BUN SERPL-MCNC: 25 MG/DL
CALCIUM SERPL-MCNC: 9.4 MG/DL
CHLORIDE SERPL-SCNC: 100 MMOL/L
CO2 SERPL-SCNC: 28 MMOL/L
CREAT SERPL-MCNC: 0.88 MG/DL
GLUCOSE SERPL-MCNC: 94 MG/DL
POTASSIUM SERPL-SCNC: 4.9 MMOL/L
PROT SERPL-MCNC: 6.6 G/DL
SODIUM SERPL-SCNC: 139 MMOL/L

## 2020-01-25 ENCOUNTER — OUTPATIENT (OUTPATIENT)
Dept: OUTPATIENT SERVICES | Facility: HOSPITAL | Age: 70
LOS: 1 days | Discharge: HOME | End: 2020-01-25

## 2020-01-25 DIAGNOSIS — C50.919 MALIGNANT NEOPLASM OF UNSPECIFIED SITE OF UNSPECIFIED FEMALE BREAST: ICD-10-CM

## 2020-01-25 DIAGNOSIS — Z90.89 ACQUIRED ABSENCE OF OTHER ORGANS: Chronic | ICD-10-CM

## 2020-01-25 DIAGNOSIS — Z95.828 PRESENCE OF OTHER VASCULAR IMPLANTS AND GRAFTS: Chronic | ICD-10-CM

## 2020-01-25 DIAGNOSIS — Z98.890 OTHER SPECIFIED POSTPROCEDURAL STATES: Chronic | ICD-10-CM

## 2020-02-03 ENCOUNTER — APPOINTMENT (OUTPATIENT)
Dept: HEMATOLOGY ONCOLOGY | Facility: CLINIC | Age: 70
End: 2020-02-03
Payer: MEDICARE

## 2020-02-03 ENCOUNTER — RESULT REVIEW (OUTPATIENT)
Age: 70
End: 2020-02-03

## 2020-02-03 ENCOUNTER — APPOINTMENT (OUTPATIENT)
Dept: INFUSION THERAPY | Facility: HOSPITAL | Age: 70
End: 2020-02-03

## 2020-02-03 VITALS
TEMPERATURE: 98.4 F | RESPIRATION RATE: 16 BRPM | OXYGEN SATURATION: 99 % | DIASTOLIC BLOOD PRESSURE: 73 MMHG | SYSTOLIC BLOOD PRESSURE: 115 MMHG | HEART RATE: 74 BPM

## 2020-02-03 LAB
BASOPHILS # BLD AUTO: 0 K/UL — SIGNIFICANT CHANGE UP (ref 0–0.2)
BASOPHILS NFR BLD AUTO: 1.1 % — SIGNIFICANT CHANGE UP (ref 0–2)
EOSINOPHIL # BLD AUTO: 0.1 K/UL — SIGNIFICANT CHANGE UP (ref 0–0.5)
EOSINOPHIL NFR BLD AUTO: 3.8 % — SIGNIFICANT CHANGE UP (ref 0–6)
HCT VFR BLD CALC: 31.8 % — LOW (ref 34.5–45)
HGB BLD-MCNC: 11 G/DL — LOW (ref 11.5–15.5)
LYMPHOCYTES # BLD AUTO: 1 K/UL — SIGNIFICANT CHANGE UP (ref 1–3.3)
LYMPHOCYTES # BLD AUTO: 26.8 % — SIGNIFICANT CHANGE UP (ref 13–44)
MCHC RBC-ENTMCNC: 34.5 G/DL — SIGNIFICANT CHANGE UP (ref 32–36)
MCHC RBC-ENTMCNC: 36.8 PG — HIGH (ref 27–34)
MCV RBC AUTO: 107 FL — HIGH (ref 80–100)
MONOCYTES # BLD AUTO: 0.5 K/UL — SIGNIFICANT CHANGE UP (ref 0–0.9)
MONOCYTES NFR BLD AUTO: 13.3 % — SIGNIFICANT CHANGE UP (ref 2–14)
NEUTROPHILS # BLD AUTO: 2 K/UL — SIGNIFICANT CHANGE UP (ref 1.8–7.4)
NEUTROPHILS NFR BLD AUTO: 54.9 % — SIGNIFICANT CHANGE UP (ref 43–77)
PLATELET # BLD AUTO: 250 K/UL — SIGNIFICANT CHANGE UP (ref 150–400)
RBC # BLD: 2.98 M/UL — LOW (ref 3.8–5.2)
RBC # FLD: 15.6 % — HIGH (ref 10.3–14.5)
WBC # BLD: 3.6 K/UL — LOW (ref 3.8–10.5)
WBC # FLD AUTO: 3.6 K/UL — LOW (ref 3.8–10.5)

## 2020-02-03 PROCEDURE — 99214 OFFICE O/P EST MOD 30 MIN: CPT

## 2020-02-03 NOTE — HISTORY OF PRESENT ILLNESS
[Cycle: ___] : Cycle: [unfilled] [Day: ___] : Day: [unfilled] [de-identified] : 66-year-old woman with history of breast carcinoma. Her history dates back to September 2003 when she was found to have a mass in the left breast. The biopsy revealed infiltrating lobular carcinoma measuring 3.5 cm. The patient underwent lumpectomy and 3 sentinel lymph nodes and 2 non-sentinel lymph nodes were negative for metastases. ER was +90% CA weakly positive and HER-2/mravin was negative. The patient underwent chemotherapy with 4 cycles of Adriamycin Cytoxan followed by radiation therapy. She was only able to tolerate 6 months of tamoxifen and then stopped her hormonal therapy.\par \par She subsequently has been well until about one month ago when she noted a mass in the right breast. A mammography on December 7, 2016 revealed a mass in the right breast with architectural distortion and multiple microcalcifications. The left breast revealed no evidence of malignancy. Sonogram confirmed a 3.5 x 3.8 cm mass which was palpable and the left breast was negative. \par \par On December 14 of patient underwent ultrasound-guided biopsy of the mass in the right breast which revealed invasive moderately differentiated ductal carcinoma [place] score of 7/9 the ER and CA were positive greater than 95%, HER-2/marvin 2+ and CISH  test was negative with a ratio of one.\par \par On January 11, 2017 the patient underwent MRI of the breast which revealed an irregular 6.7 x 6.2 cm mass in the right breast in the area of the biopsy proven malignancy. They were irregular and enhancing lesions throughout the right breast measuring up to 2 cm along with diffuse non-mass enhancement throughout the right breast. These are suspicious for multicentric disease. If breast conservation therapy is selected a second look ultrasound biopsy or MRI biopsy would be performed. The left breast revealed diffuse thickening of the skin with subcutaneous edema consistent with post lumpectomy and radiation therapy changes versus infiltrating malignancy with vascular or lymphatic obstruction. Skin punch biopsy and PET/CT were recommended. Also linear clumped enhancement was seen for which MRI guided biopsy was recommended. There were also multiple abnormal appearing lymph nodes in the right axilla suggestive of metastatic disease. An ultrasound-guided biopsy could be performed. The patient now presents for evaluation for further treatment.\par \par The patient overall feels well and denies symptoms relating to her abnormal breast imaging tests and confirmed malignancy. She has undergone BRCA testing and results are pending. Her family history is positive for a sister with breast cancer at age 53, a maternal first cousin with breast cancer and a maternal second cousin with breast cancer. There is no family history of ovarian cancer. 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 Patient feels okay. Admits  that she had 1 episode of nausea, but no vomiting. She has no pain, diarrhea or constipation. She has h/o mild peripheral neuropathy which is unchanged.  [FreeTextEntry1] : Xeloda 500 m tabs in am/2 tabs in pm

## 2020-02-03 NOTE — ASSESSMENT
[Palliative] : Goals of care discussed with patient: Palliative [Palliative Care Plan] : not applicable at this time [FreeTextEntry1] : Continue cycle 2  Xeloda increased 500 mg 3 tab in am with breakfast/2 tabs in pm with dinner x 14 days/ 7 days off.  Will monitor for side effects/toxicities. Labs ordered, f/u .Pt to RTO in 3 weeks

## 2020-02-09 LAB
ALBUMIN SERPL ELPH-MCNC: 3.8 G/DL
ALP BLD-CCNC: 80 U/L
ALT SERPL-CCNC: 35 U/L
ANION GAP SERPL CALC-SCNC: 11 MMOL/L
AST SERPL-CCNC: 33 U/L
BILIRUB SERPL-MCNC: 0.4 MG/DL
BUN SERPL-MCNC: 28 MG/DL
CALCIUM SERPL-MCNC: 9.7 MG/DL
CHLORIDE SERPL-SCNC: 103 MMOL/L
CO2 SERPL-SCNC: 27 MMOL/L
CREAT SERPL-MCNC: 0.79 MG/DL
GLUCOSE SERPL-MCNC: 85 MG/DL
POTASSIUM SERPL-SCNC: 4.5 MMOL/L
PROT SERPL-MCNC: 6.2 G/DL
SODIUM SERPL-SCNC: 141 MMOL/L

## 2020-02-22 ENCOUNTER — OUTPATIENT (OUTPATIENT)
Dept: OUTPATIENT SERVICES | Facility: HOSPITAL | Age: 70
LOS: 1 days | Discharge: HOME | End: 2020-02-22

## 2020-02-22 DIAGNOSIS — Z98.890 OTHER SPECIFIED POSTPROCEDURAL STATES: Chronic | ICD-10-CM

## 2020-02-22 DIAGNOSIS — Z95.828 PRESENCE OF OTHER VASCULAR IMPLANTS AND GRAFTS: Chronic | ICD-10-CM

## 2020-02-22 DIAGNOSIS — Z90.89 ACQUIRED ABSENCE OF OTHER ORGANS: Chronic | ICD-10-CM

## 2020-02-22 DIAGNOSIS — C50.919 MALIGNANT NEOPLASM OF UNSPECIFIED SITE OF UNSPECIFIED FEMALE BREAST: ICD-10-CM

## 2020-02-26 ENCOUNTER — RESULT REVIEW (OUTPATIENT)
Age: 70
End: 2020-02-26

## 2020-02-26 ENCOUNTER — APPOINTMENT (OUTPATIENT)
Dept: HEMATOLOGY ONCOLOGY | Facility: CLINIC | Age: 70
End: 2020-02-26
Payer: MEDICARE

## 2020-02-26 VITALS
RESPIRATION RATE: 18 BRPM | TEMPERATURE: 97.5 F | BODY MASS INDEX: 28.87 KG/M2 | OXYGEN SATURATION: 95 % | HEART RATE: 73 BPM | DIASTOLIC BLOOD PRESSURE: 74 MMHG | WEIGHT: 168.19 LBS | SYSTOLIC BLOOD PRESSURE: 129 MMHG

## 2020-02-26 LAB
BASOPHILS # BLD AUTO: 0 K/UL — SIGNIFICANT CHANGE UP (ref 0–0.2)
BASOPHILS NFR BLD AUTO: 0.4 % — SIGNIFICANT CHANGE UP (ref 0–2)
EOSINOPHIL # BLD AUTO: 0.2 K/UL — SIGNIFICANT CHANGE UP (ref 0–0.5)
EOSINOPHIL NFR BLD AUTO: 5 % — SIGNIFICANT CHANGE UP (ref 0–6)
HCT VFR BLD CALC: 31.6 % — LOW (ref 34.5–45)
HGB BLD-MCNC: 10.8 G/DL — LOW (ref 11.5–15.5)
LYMPHOCYTES # BLD AUTO: 1 K/UL — SIGNIFICANT CHANGE UP (ref 1–3.3)
LYMPHOCYTES # BLD AUTO: 27.9 % — SIGNIFICANT CHANGE UP (ref 13–44)
MCHC RBC-ENTMCNC: 34.3 G/DL — SIGNIFICANT CHANGE UP (ref 32–36)
MCHC RBC-ENTMCNC: 37.7 PG — HIGH (ref 27–34)
MCV RBC AUTO: 110 FL — HIGH (ref 80–100)
MONOCYTES # BLD AUTO: 0.4 K/UL — SIGNIFICANT CHANGE UP (ref 0–0.9)
MONOCYTES NFR BLD AUTO: 11.8 % — SIGNIFICANT CHANGE UP (ref 2–14)
NEUTROPHILS # BLD AUTO: 2 K/UL — SIGNIFICANT CHANGE UP (ref 1.8–7.4)
NEUTROPHILS NFR BLD AUTO: 54.9 % — SIGNIFICANT CHANGE UP (ref 43–77)
PLATELET # BLD AUTO: 170 K/UL — SIGNIFICANT CHANGE UP (ref 150–400)
RBC # BLD: 2.87 M/UL — LOW (ref 3.8–5.2)
RBC # FLD: 15.3 % — HIGH (ref 10.3–14.5)
WBC # BLD: 3.7 K/UL — LOW (ref 3.8–10.5)
WBC # FLD AUTO: 3.7 K/UL — LOW (ref 3.8–10.5)

## 2020-02-26 PROCEDURE — 99213 OFFICE O/P EST LOW 20 MIN: CPT

## 2020-03-03 NOTE — ASSESSMENT
[Palliative] : Goals of care discussed with patient: Palliative [Palliative Care Plan] : not applicable at this time [FreeTextEntry1] : Continue cycle 4  Xeloda increased 500 mg 3 tab in am with breakfast/3 tabs in pm with dinner x 14 days/ 7 days off.  Will monitor for side effects/toxicities. Labs ordered, f/u. Patient was re-consented for [id number] after she was made aware of risk for pneumonitis, and damage to lungs which may cause shortness of breath.    RTO in 3 weeks

## 2020-03-03 NOTE — HISTORY OF PRESENT ILLNESS
[Day: ___] : Day: [unfilled] [Cycle: ___] : Cycle: [unfilled] [de-identified] : 66-year-old woman with history of breast carcinoma. Her history dates back to September 2003 when she was found to have a mass in the left breast. The biopsy revealed infiltrating lobular carcinoma measuring 3.5 cm. The patient underwent lumpectomy and 3 sentinel lymph nodes and 2 non-sentinel lymph nodes were negative for metastases. ER was +90% DC weakly positive and HER-2/marvin was negative. The patient underwent chemotherapy with 4 cycles of Adriamycin Cytoxan followed by radiation therapy. She was only able to tolerate 6 months of tamoxifen and then stopped her hormonal therapy.\par \par She subsequently has been well until about one month ago when she noted a mass in the right breast. A mammography on December 7, 2016 revealed a mass in the right breast with architectural distortion and multiple microcalcifications. The left breast revealed no evidence of malignancy. Sonogram confirmed a 3.5 x 3.8 cm mass which was palpable and the left breast was negative. \par \par On December 14 of patient underwent ultrasound-guided biopsy of the mass in the right breast which revealed invasive moderately differentiated ductal carcinoma [place] score of 7/9 the ER and DC were positive greater than 95%, HER-2/marvin 2+ and CISH  test was negative with a ratio of one.\par \par On January 11, 2017 the patient underwent MRI of the breast which revealed an irregular 6.7 x 6.2 cm mass in the right breast in the area of the biopsy proven malignancy. They were irregular and enhancing lesions throughout the right breast measuring up to 2 cm along with diffuse non-mass enhancement throughout the right breast. These are suspicious for multicentric disease. If breast conservation therapy is selected a second look ultrasound biopsy or MRI biopsy would be performed. The left breast revealed diffuse thickening of the skin with subcutaneous edema consistent with post lumpectomy and radiation therapy changes versus infiltrating malignancy with vascular or lymphatic obstruction. Skin punch biopsy and PET/CT were recommended. Also linear clumped enhancement was seen for which MRI guided biopsy was recommended. There were also multiple abnormal appearing lymph nodes in the right axilla suggestive of metastatic disease. An ultrasound-guided biopsy could be performed. The patient now presents for evaluation for further treatment.\par \par The patient overall feels well and denies symptoms relating to her abnormal breast imaging tests and confirmed malignancy. She has undergone BRCA testing and results are pending. Her family history is positive for a sister with breast cancer at age 53, a maternal first cousin with breast cancer and a maternal second cousin with breast cancer. There is no family history of ovarian cancer. 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 Patient feels fine. Appetite is good. She has no pain, nausea, vomiting,  diarrhea or constipation. She has h/o mild peripheral neuropathy which is unchanged. Patient takes Xeloda 500 mg as directed without complications.

## 2020-03-05 LAB
ALBUMIN SERPL ELPH-MCNC: 3.9 G/DL
ALP BLD-CCNC: 84 U/L
ALT SERPL-CCNC: 18 U/L
ANION GAP SERPL CALC-SCNC: 11 MMOL/L
AST SERPL-CCNC: 21 U/L
BILIRUB SERPL-MCNC: 0.6 MG/DL
BUN SERPL-MCNC: 26 MG/DL
CALCIUM SERPL-MCNC: 9.4 MG/DL
CANCER AG27-29 SERPL-ACNC: 2023.3 U/ML
CHLORIDE SERPL-SCNC: 100 MMOL/L
CO2 SERPL-SCNC: 26 MMOL/L
CREAT SERPL-MCNC: 0.77 MG/DL
GLUCOSE SERPL-MCNC: 79 MG/DL
POTASSIUM SERPL-SCNC: 4.7 MMOL/L
PROT SERPL-MCNC: 6.3 G/DL
SODIUM SERPL-SCNC: 137 MMOL/L

## 2020-03-16 ENCOUNTER — TRANSCRIPTION ENCOUNTER (OUTPATIENT)
Age: 70
End: 2020-03-16

## 2020-03-19 ENCOUNTER — OUTPATIENT (OUTPATIENT)
Dept: OUTPATIENT SERVICES | Facility: HOSPITAL | Age: 70
LOS: 1 days | Discharge: HOME | End: 2020-03-19

## 2020-03-19 DIAGNOSIS — Z95.828 PRESENCE OF OTHER VASCULAR IMPLANTS AND GRAFTS: Chronic | ICD-10-CM

## 2020-03-19 DIAGNOSIS — Z98.890 OTHER SPECIFIED POSTPROCEDURAL STATES: Chronic | ICD-10-CM

## 2020-03-19 DIAGNOSIS — C50.919 MALIGNANT NEOPLASM OF UNSPECIFIED SITE OF UNSPECIFIED FEMALE BREAST: ICD-10-CM

## 2020-03-19 DIAGNOSIS — Z90.89 ACQUIRED ABSENCE OF OTHER ORGANS: Chronic | ICD-10-CM

## 2020-03-24 ENCOUNTER — LABORATORY RESULT (OUTPATIENT)
Age: 70
End: 2020-03-24

## 2020-03-25 ENCOUNTER — APPOINTMENT (OUTPATIENT)
Dept: HEMATOLOGY ONCOLOGY | Facility: CLINIC | Age: 70
End: 2020-03-25

## 2020-04-10 ENCOUNTER — RESULT REVIEW (OUTPATIENT)
Age: 70
End: 2020-04-10

## 2020-04-10 ENCOUNTER — OUTPATIENT (OUTPATIENT)
Dept: OUTPATIENT SERVICES | Facility: HOSPITAL | Age: 70
LOS: 1 days | Discharge: HOME | End: 2020-04-10

## 2020-04-10 ENCOUNTER — APPOINTMENT (OUTPATIENT)
Dept: HEMATOLOGY ONCOLOGY | Facility: CLINIC | Age: 70
End: 2020-04-10

## 2020-04-10 ENCOUNTER — LABORATORY RESULT (OUTPATIENT)
Age: 70
End: 2020-04-10

## 2020-04-10 DIAGNOSIS — Z90.89 ACQUIRED ABSENCE OF OTHER ORGANS: Chronic | ICD-10-CM

## 2020-04-10 DIAGNOSIS — C50.919 MALIGNANT NEOPLASM OF UNSPECIFIED SITE OF UNSPECIFIED FEMALE BREAST: ICD-10-CM

## 2020-04-10 DIAGNOSIS — Z98.890 OTHER SPECIFIED POSTPROCEDURAL STATES: Chronic | ICD-10-CM

## 2020-04-10 DIAGNOSIS — Z95.828 PRESENCE OF OTHER VASCULAR IMPLANTS AND GRAFTS: Chronic | ICD-10-CM

## 2020-04-10 LAB
BASOPHILS # BLD AUTO: 0.02 K/UL — SIGNIFICANT CHANGE UP (ref 0–0.2)
BASOPHILS NFR BLD AUTO: 0.3 % — SIGNIFICANT CHANGE UP (ref 0–2)
EOSINOPHIL # BLD AUTO: 0.22 K/UL — SIGNIFICANT CHANGE UP (ref 0–0.5)
EOSINOPHIL NFR BLD AUTO: 3.2 % — SIGNIFICANT CHANGE UP (ref 0–6)
HCT VFR BLD CALC: 31.6 % — LOW (ref 34.5–45)
HGB BLD-MCNC: 10.7 G/DL — LOW (ref 11.5–15.5)
IMM GRANULOCYTES NFR BLD AUTO: 0.3 % — SIGNIFICANT CHANGE UP (ref 0–1.5)
LYMPHOCYTES # BLD AUTO: 0.95 K/UL — LOW (ref 1–3.3)
LYMPHOCYTES # BLD AUTO: 13.7 % — SIGNIFICANT CHANGE UP (ref 13–44)
MCHC RBC-ENTMCNC: 33.9 GM/DL — SIGNIFICANT CHANGE UP (ref 32–36)
MCHC RBC-ENTMCNC: 36.9 PG — HIGH (ref 27–34)
MCV RBC AUTO: 109 FL — HIGH (ref 80–100)
MONOCYTES # BLD AUTO: 0.83 K/UL — SIGNIFICANT CHANGE UP (ref 0–0.9)
MONOCYTES NFR BLD AUTO: 11.9 % — SIGNIFICANT CHANGE UP (ref 2–14)
NEUTROPHILS # BLD AUTO: 4.91 K/UL — SIGNIFICANT CHANGE UP (ref 1.8–7.4)
NEUTROPHILS NFR BLD AUTO: 70.6 % — SIGNIFICANT CHANGE UP (ref 43–77)
NRBC # BLD: 0 /100 WBCS — SIGNIFICANT CHANGE UP (ref 0–0)
PLATELET # BLD AUTO: 196 K/UL — SIGNIFICANT CHANGE UP (ref 150–400)
RBC # BLD: 2.9 M/UL — LOW (ref 3.8–5.2)
RBC # FLD: 16.4 % — HIGH (ref 10.3–14.5)
WBC # BLD: 6.95 K/UL — SIGNIFICANT CHANGE UP (ref 3.8–10.5)
WBC # FLD AUTO: 6.95 K/UL — SIGNIFICANT CHANGE UP (ref 3.8–10.5)

## 2020-04-15 ENCOUNTER — APPOINTMENT (OUTPATIENT)
Dept: HEMATOLOGY ONCOLOGY | Facility: CLINIC | Age: 70
End: 2020-04-15
Payer: MEDICARE

## 2020-04-15 PROCEDURE — 99213 OFFICE O/P EST LOW 20 MIN: CPT | Mod: 95

## 2020-04-16 NOTE — PHYSICAL EXAM
[Restricted in physically strenuous activity but ambulatory and able to carry out work of a light or sedentary nature] : Status 1- Restricted in physically strenuous activity but ambulatory and able to carry out work of a light or sedentary nature, e.g., light house work, office work [Normal] : well developed, well nourished, in no acute distress [Obese] : obese

## 2020-04-17 NOTE — HISTORY OF PRESENT ILLNESS
[de-identified] : 66-year-old woman with history of breast carcinoma. Her history dates back to September 2003 when she was found to have a mass in the left breast. The biopsy revealed infiltrating lobular carcinoma measuring 3.5 cm. The patient underwent lumpectomy and 3 sentinel lymph nodes and 2 non-sentinel lymph nodes were negative for metastases. ER was +90% LA weakly positive and HER-2/marvin was negative. The patient underwent chemotherapy with 4 cycles of Adriamycin Cytoxan followed by radiation therapy. She was only able to tolerate 6 months of tamoxifen and then stopped her hormonal therapy.\par \par She subsequently has been well until about one month ago when she noted a mass in the right breast. A mammography on December 7, 2016 revealed a mass in the right breast with architectural distortion and multiple microcalcifications. The left breast revealed no evidence of malignancy. Sonogram confirmed a 3.5 x 3.8 cm mass which was palpable and the left breast was negative. \par \par On December 14 of patient underwent ultrasound-guided biopsy of the mass in the right breast which revealed invasive moderately differentiated ductal carcinoma [place] score of 7/9 the ER and LA were positive greater than 95%, HER-2/marvin 2+ and CISH  test was negative with a ratio of one.\par \par On January 11, 2017 the patient underwent MRI of the breast which revealed an irregular 6.7 x 6.2 cm mass in the right breast in the area of the biopsy proven malignancy. They were irregular and enhancing lesions throughout the right breast measuring up to 2 cm along with diffuse non-mass enhancement throughout the right breast. These are suspicious for multicentric disease. If breast conservation therapy is selected a second look ultrasound biopsy or MRI biopsy would be performed. The left breast revealed diffuse thickening of the skin with subcutaneous edema consistent with post lumpectomy and radiation therapy changes versus infiltrating malignancy with vascular or lymphatic obstruction. Skin punch biopsy and PET/CT were recommended. Also linear clumped enhancement was seen for which MRI guided biopsy was recommended. There were also multiple abnormal appearing lymph nodes in the right axilla suggestive of metastatic disease. An ultrasound-guided biopsy could be performed. The patient now presents for evaluation for further treatment.\par \par The patient overall feels well and denies symptoms relating to her abnormal breast imaging tests and confirmed malignancy. She has undergone BRCA testing and results are pending. Her family history is positive for a sister with breast cancer at age 53, a maternal first cousin with breast cancer and a maternal second cousin with breast cancer. There is no family history of ovarian cancer. 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

## 2020-04-17 NOTE — REVIEW OF SYSTEMS
[Negative] : Allergic/Immunologic [FreeTextEntry7] : occ episodes of burning in stomach [de-identified] : has hand and foot ittitation on 6 pills now on 5 pills doing well.

## 2020-04-17 NOTE — ASSESSMENT
[Palliative] : Goals of care discussed with patient: Palliative [Palliative Care Plan] : not applicable at this time [FreeTextEntry1] : Pt has been on xeloda for metastases in bones and liver. Pt had hand foot irritation on 6 pills and now tolerating 5 pills. Pt to repeat CT scans after 4 cycles, now on cycle #2. Pt to RTO in 3 weeks or sooner if needed.

## 2020-05-05 ENCOUNTER — LABORATORY RESULT (OUTPATIENT)
Age: 70
End: 2020-05-05

## 2020-05-07 ENCOUNTER — APPOINTMENT (OUTPATIENT)
Dept: HEMATOLOGY ONCOLOGY | Facility: CLINIC | Age: 70
End: 2020-05-07
Payer: MEDICARE

## 2020-05-07 PROCEDURE — 99213 OFFICE O/P EST LOW 20 MIN: CPT | Mod: 95

## 2020-05-07 NOTE — HISTORY OF PRESENT ILLNESS
[de-identified] : 66-year-old woman with history of breast carcinoma. Her history dates back to September 2003 when she was found to have a mass in the left breast. The biopsy revealed infiltrating lobular carcinoma measuring 3.5 cm. The patient underwent lumpectomy and 3 sentinel lymph nodes and 2 non-sentinel lymph nodes were negative for metastases. ER was +90% HI weakly positive and HER-2/marvin was negative. The patient underwent chemotherapy with 4 cycles of Adriamycin Cytoxan followed by radiation therapy. She was only able to tolerate 6 months of tamoxifen and then stopped her hormonal therapy.\par \par She subsequently has been well until about one month ago when she noted a mass in the right breast. A mammography on December 7, 2016 revealed a mass in the right breast with architectural distortion and multiple microcalcifications. The left breast revealed no evidence of malignancy. Sonogram confirmed a 3.5 x 3.8 cm mass which was palpable and the left breast was negative. \par \par On December 14 of patient underwent ultrasound-guided biopsy of the mass in the right breast which revealed invasive moderately differentiated ductal carcinoma [place] score of 7/9 the ER and HI were positive greater than 95%, HER-2/marvin 2+ and CISH  test was negative with a ratio of one.\par \par On January 11, 2017 the patient underwent MRI of the breast which revealed an irregular 6.7 x 6.2 cm mass in the right breast in the area of the biopsy proven malignancy. They were irregular and enhancing lesions throughout the right breast measuring up to 2 cm along with diffuse non-mass enhancement throughout the right breast. These are suspicious for multicentric disease. If breast conservation therapy is selected a second look ultrasound biopsy or MRI biopsy would be performed. The left breast revealed diffuse thickening of the skin with subcutaneous edema consistent with post lumpectomy and radiation therapy changes versus infiltrating malignancy with vascular or lymphatic obstruction. Skin punch biopsy and PET/CT were recommended. Also linear clumped enhancement was seen for which MRI guided biopsy was recommended. There were also multiple abnormal appearing lymph nodes in the right axilla suggestive of metastatic disease. An ultrasound-guided biopsy could be performed. The patient now presents for evaluation for further treatment.\par \par The patient overall feels well and denies symptoms relating to her abnormal breast imaging tests and confirmed malignancy. She has undergone BRCA testing and results are pending. Her family history is positive for a sister with breast cancer at age 53, a maternal first cousin with breast cancer and a maternal second cousin with breast cancer. There is no family history of ovarian cancer. 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

## 2020-05-07 NOTE — ASSESSMENT
[Palliative] : Goals of care discussed with patient: Palliative [FreeTextEntry1] : The patient is participating in a telehealth video visit with her permission due to the covid crisis.The patient has completed 2 cycles of Xeloda and we'll continue for 2 more cycles. She takes 3 pills in the morning and 2 pills in the evening and repeat her blood counts and testing on the third week off treatment. She will continue to be monitored for side effects and toxicity. After 4 cycles she'll undergo repeat CAT scans to assess for response or progression of her disease. The patient will return in 3 weeks for followup evaluation. [Palliative Care Plan] : not applicable at this time

## 2020-05-07 NOTE — REVIEW OF SYSTEMS
[Joint Pain] : joint pain [Joint Stiffness] : joint stiffness [Negative] : Heme/Lymph [FreeTextEntry9] : Has mild burning of the feet while taking Xeloda [FreeTextEntry4] : As tearing of the eyes related to Xeloda. [FreeTextEntry5] : States she is her heart bleeding on occasion [de-identified] : Has mild neuropathy in the hands and feet.

## 2020-05-10 LAB
ALBUMIN SERPL ELPH-MCNC: 3.7 G/DL
ALP BLD-CCNC: 92 U/L
ALT SERPL-CCNC: 12 U/L
ANION GAP SERPL CALC-SCNC: 10 MMOL/L
AST SERPL-CCNC: 17 U/L
BASOPHILS # BLD AUTO: 0.04 K/UL
BASOPHILS NFR BLD AUTO: 0.9 %
BILIRUB SERPL-MCNC: 0.6 MG/DL
BUN SERPL-MCNC: 27 MG/DL
CALCIUM SERPL-MCNC: 9.2 MG/DL
CHLORIDE SERPL-SCNC: 100 MMOL/L
CO2 SERPL-SCNC: 27 MMOL/L
CREAT SERPL-MCNC: 0.76 MG/DL
EOSINOPHIL # BLD AUTO: 0.24 K/UL
EOSINOPHIL NFR BLD AUTO: 5.4 %
GLUCOSE SERPL-MCNC: 87 MG/DL
HCT VFR BLD CALC: 31.2 %
HGB BLD-MCNC: 10.5 G/DL
LYMPHOCYTES # BLD AUTO: 0.76 K/UL
LYMPHOCYTES NFR BLD AUTO: 17 %
MAN DIFF?: NORMAL
MCHC RBC-ENTMCNC: 33.7 GM/DL
MCHC RBC-ENTMCNC: 37.5 PG
MCV RBC AUTO: 111.4 FL
MONOCYTES # BLD AUTO: 0.28 K/UL
MONOCYTES NFR BLD AUTO: 6.2 %
NEUTROPHILS # BLD AUTO: 3.14 K/UL
NEUTROPHILS NFR BLD AUTO: 70.5 %
PLATELET # BLD AUTO: 190 K/UL
POTASSIUM SERPL-SCNC: 4.2 MMOL/L
PROT SERPL-MCNC: 6.2 G/DL
RBC # BLD: 2.8 M/UL
RBC # FLD: 17.7 %
SODIUM SERPL-SCNC: 137 MMOL/L
WBC # FLD AUTO: 4.46 K/UL

## 2020-05-28 ENCOUNTER — APPOINTMENT (OUTPATIENT)
Dept: HEMATOLOGY ONCOLOGY | Facility: CLINIC | Age: 70
End: 2020-05-28
Payer: MEDICARE

## 2020-05-28 ENCOUNTER — OUTPATIENT (OUTPATIENT)
Dept: OUTPATIENT SERVICES | Facility: HOSPITAL | Age: 70
LOS: 1 days | Discharge: HOME | End: 2020-05-28

## 2020-05-28 ENCOUNTER — APPOINTMENT (OUTPATIENT)
Dept: HEMATOLOGY ONCOLOGY | Facility: CLINIC | Age: 70
End: 2020-05-28

## 2020-05-28 DIAGNOSIS — Z98.890 OTHER SPECIFIED POSTPROCEDURAL STATES: Chronic | ICD-10-CM

## 2020-05-28 DIAGNOSIS — Z90.89 ACQUIRED ABSENCE OF OTHER ORGANS: Chronic | ICD-10-CM

## 2020-05-28 DIAGNOSIS — Z95.828 PRESENCE OF OTHER VASCULAR IMPLANTS AND GRAFTS: Chronic | ICD-10-CM

## 2020-05-28 DIAGNOSIS — C50.919 MALIGNANT NEOPLASM OF UNSPECIFIED SITE OF UNSPECIFIED FEMALE BREAST: ICD-10-CM

## 2020-05-28 LAB
ALBUMIN SERPL ELPH-MCNC: 3.8 G/DL
ALP BLD-CCNC: 93 U/L
ALT SERPL-CCNC: 10 U/L
ANION GAP SERPL CALC-SCNC: 10 MMOL/L
AST SERPL-CCNC: 17 U/L
BASOPHILS # BLD AUTO: 0.03 K/UL
BASOPHILS NFR BLD AUTO: 0.8 %
BILIRUB SERPL-MCNC: 0.7 MG/DL
BUN SERPL-MCNC: 30 MG/DL
CALCIUM SERPL-MCNC: 9 MG/DL
CHLORIDE SERPL-SCNC: 101 MMOL/L
CO2 SERPL-SCNC: 26 MMOL/L
CREAT SERPL-MCNC: 0.8 MG/DL
EOSINOPHIL # BLD AUTO: 0.16 K/UL
EOSINOPHIL NFR BLD AUTO: 4.3 %
GLUCOSE SERPL-MCNC: 94 MG/DL
HCT VFR BLD CALC: 30.2 %
HGB BLD-MCNC: 10.4 G/DL
IMM GRANULOCYTES NFR BLD AUTO: 0.3 %
LYMPHOCYTES # BLD AUTO: 0.95 K/UL
LYMPHOCYTES NFR BLD AUTO: 25.5 %
MAN DIFF?: NORMAL
MCHC RBC-ENTMCNC: 34.4 GM/DL
MCHC RBC-ENTMCNC: 37.5 PG
MCV RBC AUTO: 109 FL
MONOCYTES # BLD AUTO: 0.53 K/UL
MONOCYTES NFR BLD AUTO: 14.2 %
NEUTROPHILS # BLD AUTO: 2.05 K/UL
NEUTROPHILS NFR BLD AUTO: 54.9 %
PLATELET # BLD AUTO: 176 K/UL
POTASSIUM SERPL-SCNC: 4.7 MMOL/L
PROT SERPL-MCNC: 6.5 G/DL
RBC # BLD: 2.77 M/UL
RBC # FLD: 17.8 %
SODIUM SERPL-SCNC: 136 MMOL/L
WBC # FLD AUTO: 3.73 K/UL

## 2020-05-28 PROCEDURE — 99213 OFFICE O/P EST LOW 20 MIN: CPT | Mod: 95

## 2020-05-28 NOTE — PHYSICAL EXAM
[Restricted in physically strenuous activity but ambulatory and able to carry out work of a light or sedentary nature] : Status 1- Restricted in physically strenuous activity but ambulatory and able to carry out work of a light or sedentary nature, e.g., light house work, office work [Normal] : well developed, well nourished, in no acute distress [de-identified] : Appears AOX3, NAD

## 2020-05-28 NOTE — ASSESSMENT
[Palliative] : Goals of care discussed with patient: Palliative [Palliative Care Plan] : not applicable at this time [FreeTextEntry1] : Will change medication Xeloda 500 mg takes 3 pills in the morning and 2 pills in the evening 1 week on 1 week off every 28 days because of increased neuropathy. . She will continue to be monitored for side effects and toxicity. After 4 cycles she'll undergo repeat CAT scans to assess for response or progression of her disease. The patient will return in 4 weeks for followup evaluation.

## 2020-05-28 NOTE — HISTORY OF PRESENT ILLNESS
[Medical Office: ([facility])___] : at the medical office located in  [Home] : at home, [unfilled] , at the time of the visit. [Verbal consent obtained from patient] : the patient, [unfilled] [de-identified] : 66-year-old woman with history of breast carcinoma. Her history dates back to September 2003 when she was found to have a mass in the left breast. The biopsy revealed infiltrating lobular carcinoma measuring 3.5 cm. The patient underwent lumpectomy and 3 sentinel lymph nodes and 2 non-sentinel lymph nodes were negative for metastases. ER was +90% [place] weakly positive and HER-2/marvin was negative. The patient underwent chemotherapy with 4 cycles of Adriamycin Cytoxan followed by radiation therapy. She was only able to tolerate 6 months of tamoxifen and then stopped her hormonal therapy.\par \par She subsequently has been well until about one month ago when she noted a mass in the right breast. A mammography on December 7, 2016 revealed a mass in the right breast with architectural distortion and multiple microcalcifications. The left breast revealed no evidence of malignancy. Sonogram confirmed a 3.5 x 3.8 cm mass which was palpable and the left breast was negative. \par \par On December 14 of patient underwent ultrasound-guided biopsy of the mass in the right breast which revealed invasive moderately differentiated ductal carcinoma [place] score of 7/9 the ER and MN were positive greater than 95%, HER-2/marvin 2+ and CISH  test was negative with a ratio of one.\par \par On January 11, 2017 the patient underwent MRI of the breast which revealed an irregular 6.7 x 6.2 cm mass in the right breast in the area of the biopsy proven malignancy. They were irregular and enhancing lesions throughout the right breast measuring up to 2 cm along with diffuse non-mass enhancement throughout the right breast. These are suspicious for multicentric disease. If breast conservation therapy is selected a second look ultrasound biopsy or MRI biopsy would be performed. The left breast revealed diffuse thickening of the skin with subcutaneous edema consistent with post lumpectomy and radiation therapy changes versus infiltrating malignancy with vascular or lymphatic obstruction. Skin punch biopsy and PET/CT were recommended. Also linear clumped enhancement was seen for which MRI guided biopsy was recommended. There were also multiple abnormal appearing lymph nodes in the right axilla suggestive of metastatic disease. An ultrasound-guided biopsy could be performed. The patient now presents for evaluation for further treatment.\par \par The patient overall feels well and denies symptoms relating to her abnormal breast imaging tests and confirmed malignancy. She has undergone BRCA testing and results are pending. Her family history is positive for a sister with breast cancer at age 53, a maternal first cousin with breast cancer and a maternal second cousin with breast cancer. There is no family history of ovarian cancer. 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 She states that she feels okay except she has increase numbness/tingling to feet/hands. Appetite is okay. Denies any nausea, vomiting, diarrhea, mouth sores or constipation.

## 2020-05-28 NOTE — REVIEW OF SYSTEMS
[Joint Pain] : joint pain [Joint Stiffness] : joint stiffness [Difficulty Walking] : difficulty walking [Negative] : Allergic/Immunologic [Confused] : no confusion [Dizziness] : no dizziness [Fainting] : no fainting [FreeTextEntry4] : As tearing of the eyes related to Xeloda. [FreeTextEntry9] : Has mild burning of the feet while taking Xeloda [de-identified] : Moderate neuropathy in the hands and feet which make it difficult to feel the bottom of feet.

## 2020-05-30 LAB — CANCER AG27-29 SERPL-ACNC: 546.6 U/ML

## 2020-06-26 ENCOUNTER — APPOINTMENT (OUTPATIENT)
Dept: HEMATOLOGY ONCOLOGY | Facility: CLINIC | Age: 70
End: 2020-06-26
Payer: MEDICARE

## 2020-06-26 ENCOUNTER — OUTPATIENT (OUTPATIENT)
Dept: OUTPATIENT SERVICES | Facility: HOSPITAL | Age: 70
LOS: 1 days | End: 2020-06-26
Payer: MEDICARE

## 2020-06-26 ENCOUNTER — LABORATORY RESULT (OUTPATIENT)
Age: 70
End: 2020-06-26

## 2020-06-26 ENCOUNTER — APPOINTMENT (OUTPATIENT)
Dept: NUCLEAR MEDICINE | Facility: IMAGING CENTER | Age: 70
End: 2020-06-26
Payer: MEDICARE

## 2020-06-26 DIAGNOSIS — C79.51 SECONDARY MALIGNANT NEOPLASM OF BONE: ICD-10-CM

## 2020-06-26 DIAGNOSIS — Z90.89 ACQUIRED ABSENCE OF OTHER ORGANS: Chronic | ICD-10-CM

## 2020-06-26 DIAGNOSIS — Z95.828 PRESENCE OF OTHER VASCULAR IMPLANTS AND GRAFTS: Chronic | ICD-10-CM

## 2020-06-26 DIAGNOSIS — Z98.890 OTHER SPECIFIED POSTPROCEDURAL STATES: Chronic | ICD-10-CM

## 2020-06-26 PROCEDURE — 78815 PET IMAGE W/CT SKULL-THIGH: CPT | Mod: 26,PS

## 2020-06-26 PROCEDURE — 78815 PET IMAGE W/CT SKULL-THIGH: CPT

## 2020-06-26 PROCEDURE — 99213 OFFICE O/P EST LOW 20 MIN: CPT | Mod: 95

## 2020-06-26 PROCEDURE — A9552: CPT

## 2020-06-26 NOTE — PHYSICAL EXAM
[Restricted in physically strenuous activity but ambulatory and able to carry out work of a light or sedentary nature] : Status 1- Restricted in physically strenuous activity but ambulatory and able to carry out work of a light or sedentary nature, e.g., light house work, office work [Normal] : well developed, well nourished, in no acute distress [de-identified] : Appears well,  AOX3, NAD

## 2020-06-26 NOTE — HISTORY OF PRESENT ILLNESS
[Home] : at home, [unfilled] , at the time of the visit. [Medical Office: ([facility] [place]___] : at the medical office located in  [Verbal consent obtained from patient] : the patient, [unfilled] [Treatment Protocol] : Treatment Protocol [de-identified] : 66-year-old woman with history of breast carcinoma. Her history dates back to September 2003 when she was found to have a mass in the left breast. The biopsy revealed infiltrating lobular carcinoma measuring 3.5 cm. The patient underwent lumpectomy and 3 sentinel lymph nodes and 2 non-sentinel lymph nodes were negative for metastases. ER was +90% MO weakly positive and HER-2/marvin was negative. The patient underwent chemotherapy with 4 cycles of Adriamycin Cytoxan followed by radiation therapy. She was only able to tolerate 6 months of tamoxifen and then stopped her hormonal therapy.\par \par She subsequently has been well until about one month ago when she noted a mass in the right breast. A mammography on December 7, 2016 revealed a mass in the right breast with architectural distortion and multiple microcalcifications. The left breast revealed no evidence of malignancy. Sonogram confirmed a 3.5 x 3.8 cm mass which was palpable and the left breast was negative. \par \par On December 14 of patient underwent ultrasound-guided biopsy of the mass in the right breast which revealed invasive moderately differentiated ductal carcinoma [place] score of 7/9 the ER and MO were positive greater than 95%, HER-2/marvin 2+ and CISH  test was negative with a ratio of one.\par \par On January 11, 2017 the patient underwent MRI of the breast which revealed an irregular 6.7 x 6.2 cm mass in the right breast in the area of the biopsy proven malignancy. They were irregular and enhancing lesions throughout the right breast measuring up to 2 cm along with diffuse non-mass enhancement throughout the right breast. These are suspicious for multicentric disease. If breast conservation therapy is selected a second look ultrasound biopsy or MRI biopsy would be performed. The left breast revealed diffuse thickening of the skin with subcutaneous edema consistent with post lumpectomy and radiation therapy changes versus infiltrating malignancy with vascular or lymphatic obstruction. Skin punch biopsy and PET/CT were recommended. Also linear clumped enhancement was seen for which MRI guided biopsy was recommended. There were also multiple abnormal appearing lymph nodes in the right axilla suggestive of metastatic disease. An ultrasound-guided biopsy could be performed. The patient now presents for evaluation for further treatment.\par \par The patient overall feels well and denies symptoms relating to her abnormal breast imaging tests and confirmed malignancy. She has undergone BRCA testing and results are pending. Her family history is positive for a sister with breast cancer at age 53, a maternal first cousin with breast cancer and a maternal second cousin with breast cancer. There is no family history of ovarian cancer. 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 [de-identified] : Patient has agreed to telehealth visit.  She states that she feels fine. She is taking  Capecitabine 500 mg; 3 tabs/2 tabs bid x 7 days on/7 days off with decreased neuropathy to feet. She is able to go out walking daily without difficulties. Appetite is good. Denies any pain, nausea, vomiting, diarrhea, mouth sores or constipation.

## 2020-06-26 NOTE — ASSESSMENT
[Palliative] : Goals of care discussed with patient: Palliative [Palliative Care Plan] : not applicable at this time [FreeTextEntry1] : Pt to  Xeloda 500 mg takes 3 pills in the morning and 2 pills in the evening 1 week on 1 week off every 28 days. Will monitored for side effects and toxicity. Ordered PET- CT scans to assess for response or progression of her disease, f/u. Ordered repeat labs, f/u Telehealth visit in 2 week to discuss results of PET scan.

## 2020-06-26 NOTE — REVIEW OF SYSTEMS
[Negative] : Neurological [Joint Pain] : no joint pain [Joint Stiffness] : no joint stiffness [Dizziness] : no dizziness [Confused] : no confusion [Difficulty Walking] : no difficulty walking [Fainting] : no fainting [FreeTextEntry9] : Decreased neuropathy in the hands and feet, and able to walk without difficulties

## 2020-06-28 NOTE — ED PROVIDER NOTE - EKG #1 DATE/TIME
[FreeTextEntry1] :  46 yo P2 LMP 5/25/2020  presents for Gyn exam and to discuss pelvic MRI result. She has no new complaints.\par \par Pelvic MRI-  Uterus- 7.4 x12.7 x 8.1 cm.  Multiple submucosal fibroids, right anterior fundal submucosal fibroid 3.3 cm x 3.2 cm x 3.0 cm.  Left sided fundal anterior myoma 2.5 x 3.3 x 3.2 cm.  LEFT complex ovarian mass 9 x 5 x 4.7 cm with dominant cystic component.  17-May-2017 23:17

## 2020-07-19 LAB
ALBUMIN SERPL ELPH-MCNC: 4 G/DL
ALP BLD-CCNC: 88 U/L
ALT SERPL-CCNC: 12 U/L
ANION GAP SERPL CALC-SCNC: 12 MMOL/L
AST SERPL-CCNC: 18 U/L
BASOPHILS # BLD AUTO: 0 K/UL
BASOPHILS NFR BLD AUTO: 0 %
BILIRUB SERPL-MCNC: 0.8 MG/DL
BUN SERPL-MCNC: 27 MG/DL
CALCIUM SERPL-MCNC: 9.2 MG/DL
CANCER AG27-29 SERPL-ACNC: 425.3 U/ML
CHLORIDE SERPL-SCNC: 102 MMOL/L
CO2 SERPL-SCNC: 27 MMOL/L
CREAT SERPL-MCNC: 0.89 MG/DL
EOSINOPHIL # BLD AUTO: 0.17 K/UL
EOSINOPHIL NFR BLD AUTO: 3.5 %
GLUCOSE SERPL-MCNC: 83 MG/DL
HCT VFR BLD CALC: 32.4 %
HGB BLD-MCNC: 10.6 G/DL
LYMPHOCYTES # BLD AUTO: 0.73 K/UL
LYMPHOCYTES NFR BLD AUTO: 14.9 %
MAN DIFF?: NORMAL
MCHC RBC-ENTMCNC: 32.7 GM/DL
MCHC RBC-ENTMCNC: 37.7 PG
MCV RBC AUTO: 115.3 FL
MONOCYTES # BLD AUTO: 0.47 K/UL
MONOCYTES NFR BLD AUTO: 9.7 %
NEUTROPHILS # BLD AUTO: 3.52 K/UL
NEUTROPHILS NFR BLD AUTO: 71.9 %
PLATELET # BLD AUTO: 180 K/UL
POTASSIUM SERPL-SCNC: 4.4 MMOL/L
PROT SERPL-MCNC: 6.4 G/DL
RBC # BLD: 2.81 M/UL
RBC # FLD: 17 %
SODIUM SERPL-SCNC: 141 MMOL/L
WBC # FLD AUTO: 4.89 K/UL

## 2020-07-23 ENCOUNTER — OUTPATIENT (OUTPATIENT)
Dept: OUTPATIENT SERVICES | Facility: HOSPITAL | Age: 70
LOS: 1 days | Discharge: HOME | End: 2020-07-23

## 2020-07-23 DIAGNOSIS — C50.919 MALIGNANT NEOPLASM OF UNSPECIFIED SITE OF UNSPECIFIED FEMALE BREAST: ICD-10-CM

## 2020-07-23 DIAGNOSIS — Z95.828 PRESENCE OF OTHER VASCULAR IMPLANTS AND GRAFTS: Chronic | ICD-10-CM

## 2020-07-23 DIAGNOSIS — Z98.890 OTHER SPECIFIED POSTPROCEDURAL STATES: Chronic | ICD-10-CM

## 2020-07-23 DIAGNOSIS — Z90.89 ACQUIRED ABSENCE OF OTHER ORGANS: Chronic | ICD-10-CM

## 2020-07-24 ENCOUNTER — APPOINTMENT (OUTPATIENT)
Dept: HEMATOLOGY ONCOLOGY | Facility: CLINIC | Age: 70
End: 2020-07-24
Payer: MEDICARE

## 2020-07-24 LAB
ALBUMIN SERPL ELPH-MCNC: 3.8 G/DL
ALP BLD-CCNC: 86 U/L
ALT SERPL-CCNC: 10 U/L
ANION GAP SERPL CALC-SCNC: 12 MMOL/L
AST SERPL-CCNC: 18 U/L
BASOPHILS # BLD AUTO: 0.04 K/UL
BASOPHILS NFR BLD AUTO: 1 %
BILIRUB SERPL-MCNC: 0.5 MG/DL
BUN SERPL-MCNC: 30 MG/DL
CALCIUM SERPL-MCNC: 9.5 MG/DL
CANCER AG27-29 SERPL-ACNC: 383 U/ML
CHLORIDE SERPL-SCNC: 102 MMOL/L
CO2 SERPL-SCNC: 26 MMOL/L
CREAT SERPL-MCNC: 0.76 MG/DL
EOSINOPHIL # BLD AUTO: 0.19 K/UL
EOSINOPHIL NFR BLD AUTO: 4.6 %
GLUCOSE SERPL-MCNC: 108 MG/DL
HCT VFR BLD CALC: 33.2 %
HGB BLD-MCNC: 11.2 G/DL
IMM GRANULOCYTES NFR BLD AUTO: 0.2 %
LYMPHOCYTES # BLD AUTO: 1.1 K/UL
LYMPHOCYTES NFR BLD AUTO: 26.7 %
MAN DIFF?: NORMAL
MCHC RBC-ENTMCNC: 33.7 GM/DL
MCHC RBC-ENTMCNC: 38.2 PG
MCV RBC AUTO: 113.3 FL
MONOCYTES # BLD AUTO: 0.49 K/UL
MONOCYTES NFR BLD AUTO: 11.9 %
NEUTROPHILS # BLD AUTO: 2.29 K/UL
NEUTROPHILS NFR BLD AUTO: 55.6 %
PLATELET # BLD AUTO: 172 K/UL
POTASSIUM SERPL-SCNC: 4.5 MMOL/L
PROT SERPL-MCNC: 6.2 G/DL
RBC # BLD: 2.93 M/UL
RBC # FLD: 15 %
SODIUM SERPL-SCNC: 141 MMOL/L
WBC # FLD AUTO: 4.12 K/UL

## 2020-07-24 PROCEDURE — 99213 OFFICE O/P EST LOW 20 MIN: CPT | Mod: 95

## 2020-07-27 NOTE — H&P PST ADULT - TEMPERATURE IN FAHRENHEIT (DEGREES F)
Writer apologized to pt, as wasn't noted that pt was triage yesterday by contact center. Pt put on metronidazole for BV by OBGYN. Pt has call in to OG to change medication as pt began to have lightheadedness after taking medication.    Pt ok to cancel Wed appt.  Pt does f/u with PCP in 2 weeks. Will let us know if dizziness continues even after metronidazole is d/c.    98.2

## 2020-07-29 NOTE — HISTORY OF PRESENT ILLNESS
[Treatment Protocol] : Treatment Protocol [Home] : at home, [unfilled] , at the time of the visit. [Medical Office: ([facility])___] : at the medical office located in  [Verbal consent obtained from patient] : the patient, [unfilled] [de-identified] : 66-year-old woman with history of breast carcinoma. Her history dates back to September 2003 when she was found to have a mass in the left breast. The biopsy revealed infiltrating lobular carcinoma measuring 3.5 cm. The patient underwent lumpectomy and 3 sentinel lymph nodes and 2 non-sentinel lymph nodes were negative for metastases. ER was +90% PA weakly positive and HER-2/marvin was negative. The patient underwent chemotherapy with 4 cycles of Adriamycin Cytoxan followed by radiation therapy. She was only able to tolerate 6 months of tamoxifen and then stopped her hormonal therapy.\par \par She subsequently has been well until about one month ago when she noted a mass in the right breast. A mammography on December 7, 2016 revealed a mass in the right breast with architectural distortion and multiple microcalcifications. The left breast revealed no evidence of malignancy. Sonogram confirmed a 3.5 x 3.8 cm mass which was palpable and the left breast was negative. \par \par On December 14 of patient underwent ultrasound-guided biopsy of the mass in the right breast which revealed invasive moderately differentiated ductal carcinoma [place] score of 7/9 the ER and PA were positive greater than 95%, HER-2/marvin 2+ and CISH  test was negative with a ratio of one.\par \par On January 11, 2017 the patient underwent MRI of the breast which revealed an irregular 6.7 x 6.2 cm mass in the right breast in the area of the biopsy proven malignancy. They were irregular and enhancing lesions throughout the right breast measuring up to 2 cm along with diffuse non-mass enhancement throughout the right breast. These are suspicious for multicentric disease. If breast conservation therapy is selected a second look ultrasound biopsy or MRI biopsy would be performed. The left breast revealed diffuse thickening of the skin with subcutaneous edema consistent with post lumpectomy and radiation therapy changes versus infiltrating malignancy with vascular or lymphatic obstruction. Skin punch biopsy and PET/CT were recommended. Also linear clumped enhancement was seen for which MRI guided biopsy was recommended. There were also multiple abnormal appearing lymph nodes in the right axilla suggestive of metastatic disease. An ultrasound-guided biopsy could be performed. The patient now presents for evaluation for further treatment.\par \par The patient overall feels well and denies symptoms relating to her abnormal breast imaging tests and confirmed malignancy. She has undergone BRCA testing and results are pending. Her family history is positive for a sister with breast cancer at age 53, a maternal first cousin with breast cancer and a maternal second cousin with breast cancer. There is no family history of ovarian cancer. 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 [de-identified] : On 06/29/2020, PET-Scan was done which showed:. Marked interval decrease in size and number of bilobar hepatic metastases, compatible with a partial response to interval therapy. 2. Resolution of hypermetabolic foci in axial and appendicular skeleton with interval visualization of non-FDG-avid sclerotic lesions, compatible with response to interval therapy. 3. No new lesion \par \par Patient has agreed to telehealth visit. She states that she feels well. She has mild numbness/tingling to hands/feet. however, she is able to go out walking daily without difficulties. Denies any pain, nausea, vomiting, diarrhea, mouth sores or constipation.

## 2020-07-29 NOTE — REVIEW OF SYSTEMS
[Negative] : Heme/Lymph [Joint Pain] : no joint pain [Confused] : no confusion [Joint Stiffness] : no joint stiffness [Dizziness] : no dizziness [Fainting] : no fainting [Difficulty Walking] : no difficulty walking [FreeTextEntry9] : Mild neuropathy in the hands and feet

## 2020-07-29 NOTE — PHYSICAL EXAM
[Restricted in physically strenuous activity but ambulatory and able to carry out work of a light or sedentary nature] : Status 1- Restricted in physically strenuous activity but ambulatory and able to carry out work of a light or sedentary nature, e.g., light house work, office work [Normal] : well developed, well nourished, in no acute distress [de-identified] : Appears well,  AOX3, NAD

## 2020-07-29 NOTE — ASSESSMENT
[Palliative] : Goals of care discussed with patient: Palliative [Palliative Care Plan] : not applicable at this time [FreeTextEntry1] : Continue Xeloda 500 mg takes 3 pills in the morning and 2 pills in the evening 1 week on 1 week off every 28 days. Will monitored for side effects and toxicity. PET- CT scans of 06/29/2020 did showed regression of disease. Will repeat scans in 6 months. Ordered repeat labs, RTO in 4 weeks.

## 2020-08-11 ENCOUNTER — APPOINTMENT (OUTPATIENT)
Dept: MAMMOGRAPHY | Facility: CLINIC | Age: 70
End: 2020-08-11

## 2020-08-11 ENCOUNTER — APPOINTMENT (OUTPATIENT)
Dept: ULTRASOUND IMAGING | Facility: CLINIC | Age: 70
End: 2020-08-11

## 2020-08-17 ENCOUNTER — APPOINTMENT (OUTPATIENT)
Dept: HEMATOLOGY ONCOLOGY | Facility: CLINIC | Age: 70
End: 2020-08-17
Payer: MEDICARE

## 2020-08-17 PROCEDURE — 99213 OFFICE O/P EST LOW 20 MIN: CPT | Mod: 95

## 2020-08-18 ENCOUNTER — LABORATORY RESULT (OUTPATIENT)
Age: 70
End: 2020-08-18

## 2020-08-18 NOTE — ASSESSMENT
[FreeTextEntry1] : The plan is to continue Xeloda 3 pills in the morning 2 pills at night and the patient will be monitored for side effects and toxicity.  She will then undergo repeat CAT scans 3 to 4 months after her last scans to assess for response or progression of her disease.  The patient will return in 3 weeks or sooner as needed. [Palliative Care Plan] : not applicable at this time [Palliative] : Goals of care discussed with patient: Palliative

## 2020-08-18 NOTE — REASON FOR VISIT
[Home] : at home, [unfilled] , at the time of the visit. [Medical Office: ([facility])___] : at the medical office located in

## 2020-08-18 NOTE — HISTORY OF PRESENT ILLNESS
[de-identified] : Since the last visit the patient remains on Xeloda and tolerates her treatment well.  She takes 5 pills daily.  The patient returns to discuss follow-up treatment. [de-identified] : 66-year-old woman with history of breast carcinoma. Her history dates back to September 2003 when she was found to have a mass in the left breast. The biopsy revealed infiltrating lobular carcinoma measuring 3.5 cm. The patient underwent lumpectomy and 3 sentinel lymph nodes and 2 non-sentinel lymph nodes were negative for metastases. ER was +90% SC weakly positive and HER-2/marvin was negative. The patient underwent chemotherapy with 4 cycles of Adriamycin Cytoxan followed by radiation therapy. She was only able to tolerate 6 months of tamoxifen and then stopped her hormonal therapy.\par \par She subsequently has been well until about one month ago when she noted a mass in the right breast. A mammography on December 7, 2016 revealed a mass in the right breast with architectural distortion and multiple microcalcifications. The left breast revealed no evidence of malignancy. Sonogram confirmed a 3.5 x 3.8 cm mass which was palpable and the left breast was negative. \par \par On December 14 of patient underwent ultrasound-guided biopsy of the mass in the right breast which revealed invasive moderately differentiated ductal carcinoma [place] score of 7/9 the ER and SC were positive greater than 95%, HER-2/marvin 2+ and CISH  test was negative with a ratio of one.\par \par On January 11, 2017 the patient underwent MRI of the breast which revealed an irregular 6.7 x 6.2 cm mass in the right breast in the area of the biopsy proven malignancy. They were irregular and enhancing lesions throughout the right breast measuring up to 2 cm along with diffuse non-mass enhancement throughout the right breast. These are suspicious for multicentric disease. If breast conservation therapy is selected a second look ultrasound biopsy or MRI biopsy would be performed. The left breast revealed diffuse thickening of the skin with subcutaneous edema consistent with post lumpectomy and radiation therapy changes versus infiltrating malignancy with vascular or lymphatic obstruction. Skin punch biopsy and PET/CT were recommended. Also linear clumped enhancement was seen for which MRI guided biopsy was recommended. There were also multiple abnormal appearing lymph nodes in the right axilla suggestive of metastatic disease. An ultrasound-guided biopsy could be performed. The patient now presents for evaluation for further treatment.\par \par The patient overall feels well and denies symptoms relating to her abnormal breast imaging tests and confirmed malignancy. She has undergone BRCA testing and results are pending. Her family history is positive for a sister with breast cancer at age 53, a maternal first cousin with breast cancer and a maternal second cousin with breast cancer. There is no family history of ovarian cancer. 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

## 2020-08-26 LAB
ALBUMIN SERPL ELPH-MCNC: 3.7 G/DL
ALP BLD-CCNC: 79 U/L
ALT SERPL-CCNC: 13 U/L
ANION GAP SERPL CALC-SCNC: 11 MMOL/L
AST SERPL-CCNC: 19 U/L
BASOPHILS # BLD AUTO: 0.01 K/UL
BASOPHILS NFR BLD AUTO: 0.2 %
BILIRUB SERPL-MCNC: 0.6 MG/DL
BUN SERPL-MCNC: 27 MG/DL
CALCIUM SERPL-MCNC: 9.1 MG/DL
CANCER AG27-29 SERPL-ACNC: 341.9 U/ML
CHLORIDE SERPL-SCNC: 104 MMOL/L
CO2 SERPL-SCNC: 26 MMOL/L
CREAT SERPL-MCNC: 0.74 MG/DL
EOSINOPHIL # BLD AUTO: 0.09 K/UL
EOSINOPHIL NFR BLD AUTO: 1.6 %
GLUCOSE SERPL-MCNC: 106 MG/DL
HCT VFR BLD CALC: 33.7 %
HGB BLD-MCNC: 11.2 G/DL
IMM GRANULOCYTES NFR BLD AUTO: 0.4 %
LYMPHOCYTES # BLD AUTO: 1.01 K/UL
LYMPHOCYTES NFR BLD AUTO: 18.1 %
MAN DIFF?: NORMAL
MCHC RBC-ENTMCNC: 33.2 GM/DL
MCHC RBC-ENTMCNC: 36.4 PG
MCV RBC AUTO: 109.4 FL
MONOCYTES # BLD AUTO: 0.38 K/UL
MONOCYTES NFR BLD AUTO: 6.8 %
NEUTROPHILS # BLD AUTO: 4.06 K/UL
NEUTROPHILS NFR BLD AUTO: 72.9 %
PLATELET # BLD AUTO: 168 K/UL
POTASSIUM SERPL-SCNC: 4.4 MMOL/L
PROT SERPL-MCNC: 6 G/DL
RBC # BLD: 3.08 M/UL
RBC # FLD: 14.2 %
SODIUM SERPL-SCNC: 141 MMOL/L
WBC # FLD AUTO: 5.57 K/UL

## 2020-09-02 ENCOUNTER — OUTPATIENT (OUTPATIENT)
Dept: OUTPATIENT SERVICES | Facility: HOSPITAL | Age: 70
LOS: 1 days | Discharge: HOME | End: 2020-09-02

## 2020-09-02 DIAGNOSIS — Z90.89 ACQUIRED ABSENCE OF OTHER ORGANS: Chronic | ICD-10-CM

## 2020-09-02 DIAGNOSIS — C50.919 MALIGNANT NEOPLASM OF UNSPECIFIED SITE OF UNSPECIFIED FEMALE BREAST: ICD-10-CM

## 2020-09-02 DIAGNOSIS — Z95.828 PRESENCE OF OTHER VASCULAR IMPLANTS AND GRAFTS: Chronic | ICD-10-CM

## 2020-09-02 DIAGNOSIS — Z98.890 OTHER SPECIFIED POSTPROCEDURAL STATES: Chronic | ICD-10-CM

## 2020-09-08 ENCOUNTER — APPOINTMENT (OUTPATIENT)
Dept: HEMATOLOGY ONCOLOGY | Facility: CLINIC | Age: 70
End: 2020-09-08
Payer: MEDICARE

## 2020-09-08 PROCEDURE — 99213 OFFICE O/P EST LOW 20 MIN: CPT | Mod: 95

## 2020-09-08 NOTE — ASSESSMENT
[FreeTextEntry1] : The patient will continue Xeloda and be monitored for side effects and toxicity.  Her blood counts remain with mild anemia, hemoglobin 11.2.  The patient will be scheduling a repeat PET/CT in October 2020 to evaluate for response or progression of her disease.  She will continue Xeloda 3 pills in the morning and 2 pills in the evening.  She will revisit in 3 weeks or sooner as needed. [Palliative Care Plan] : not applicable at this time [Palliative] : Goals of care discussed with patient: Palliative

## 2020-09-08 NOTE — REVIEW OF SYSTEMS
[Negative] : Allergic/Immunologic [FreeTextEntry4] : has seasonal allergies [de-identified] : has neuropathy

## 2020-09-08 NOTE — REASON FOR VISIT
[Medical Office: ([facility])___] : at the medical office located in  [Home] : at home, [unfilled] , at the time of the visit. [Verbal consent obtained from patient] : the patient, [unfilled] [Follow-Up Visit] : a follow-up [FreeTextEntry2] : ca breast

## 2020-09-08 NOTE — HISTORY OF PRESENT ILLNESS
[de-identified] : 66-year-old woman with history of breast carcinoma. Her history dates back to September 2003 when she was found to have a mass in the left breast. The biopsy revealed infiltrating lobular carcinoma measuring 3.5 cm. The patient underwent lumpectomy and 3 sentinel lymph nodes and 2 non-sentinel lymph nodes were negative for metastases. ER was +90% [place] weakly positive and HER-2/marvin was negative. The patient underwent chemotherapy with 4 cycles of Adriamycin Cytoxan followed by radiation therapy. She was only able to tolerate 6 months of tamoxifen and then stopped her hormonal therapy.\par \par She subsequently has been well until about one month ago when she noted a mass in the right breast. A mammography on December 7, 2016 revealed a mass in the right breast with architectural distortion and multiple microcalcifications. The left breast revealed no evidence of malignancy. Sonogram confirmed a 3.5 x 3.8 cm mass which was palpable and the left breast was negative. \par \par On December 14 of patient underwent ultrasound-guided biopsy of the mass in the right breast which revealed invasive moderately differentiated ductal carcinoma [place] score of 7/9 the ER and [place] were positive greater than 95%, HER-2/marvin 2+ and CISH  test was negative with a ratio of one.\par \par On January 11, 2017 the patient underwent MRI of the breast which revealed an irregular 6.7 x 6.2 cm mass in the right breast in the area of the biopsy proven malignancy. They were irregular and enhancing lesions throughout the right breast measuring up to 2 cm along with diffuse non-mass enhancement throughout the right breast. These are suspicious for multicentric disease. If breast conservation therapy is selected a second look ultrasound biopsy or MRI biopsy would be performed. The left breast revealed diffuse thickening of the skin with subcutaneous edema consistent with post lumpectomy and radiation therapy changes versus infiltrating malignancy with vascular or lymphatic obstruction. Skin punch biopsy and PET/CT were recommended. Also linear clumped enhancement was seen for which MRI guided biopsy was recommended. There were also multiple abnormal appearing lymph nodes in the right axilla suggestive of metastatic disease. An ultrasound-guided biopsy could be performed. The patient now presents for evaluation for further treatment.\par \par The patient overall feels well and denies symptoms relating to her abnormal breast imaging tests and confirmed malignancy. She has undergone BRCA testing and results are pending. Her family history is positive for a sister with breast cancer at age 53, a maternal first cousin with breast cancer and a maternal second cousin with breast cancer. There is no family history of ovarian cancer. 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

## 2020-10-15 ENCOUNTER — LABORATORY RESULT (OUTPATIENT)
Age: 70
End: 2020-10-15

## 2020-10-16 ENCOUNTER — OUTPATIENT (OUTPATIENT)
Dept: OUTPATIENT SERVICES | Facility: HOSPITAL | Age: 70
LOS: 1 days | Discharge: HOME | End: 2020-10-16

## 2020-10-16 DIAGNOSIS — Z98.890 OTHER SPECIFIED POSTPROCEDURAL STATES: Chronic | ICD-10-CM

## 2020-10-16 DIAGNOSIS — Z90.89 ACQUIRED ABSENCE OF OTHER ORGANS: Chronic | ICD-10-CM

## 2020-10-16 DIAGNOSIS — Z95.828 PRESENCE OF OTHER VASCULAR IMPLANTS AND GRAFTS: Chronic | ICD-10-CM

## 2020-10-16 DIAGNOSIS — C50.919 MALIGNANT NEOPLASM OF UNSPECIFIED SITE OF UNSPECIFIED FEMALE BREAST: ICD-10-CM

## 2020-10-21 ENCOUNTER — APPOINTMENT (OUTPATIENT)
Dept: HEMATOLOGY ONCOLOGY | Facility: CLINIC | Age: 70
End: 2020-10-21
Payer: MEDICARE

## 2020-10-21 LAB
ALBUMIN SERPL ELPH-MCNC: 4 G/DL
ALP BLD-CCNC: 89 U/L
ALT SERPL-CCNC: 12 U/L
ANION GAP SERPL CALC-SCNC: 10 MMOL/L
AST SERPL-CCNC: 18 U/L
BASOPHILS # BLD AUTO: 0.03 K/UL
BASOPHILS NFR BLD AUTO: 0.8 %
BILIRUB SERPL-MCNC: 1.2 MG/DL
BUN SERPL-MCNC: 20 MG/DL
CALCIUM SERPL-MCNC: 9.5 MG/DL
CANCER AG27-29 SERPL-ACNC: 234.8 U/ML
CHLORIDE SERPL-SCNC: 102 MMOL/L
CO2 SERPL-SCNC: 28 MMOL/L
CREAT SERPL-MCNC: 0.7 MG/DL
EOSINOPHIL # BLD AUTO: 0.12 K/UL
EOSINOPHIL NFR BLD AUTO: 3.1 %
GLUCOSE SERPL-MCNC: 84 MG/DL
HCT VFR BLD CALC: 35 %
HGB BLD-MCNC: 11.8 G/DL
IMM GRANULOCYTES NFR BLD AUTO: 0 %
LYMPHOCYTES # BLD AUTO: 0.75 K/UL
LYMPHOCYTES NFR BLD AUTO: 19.6 %
MAN DIFF?: NORMAL
MCHC RBC-ENTMCNC: 33.7 GM/DL
MCHC RBC-ENTMCNC: 36.4 PG
MCV RBC AUTO: 108 FL
MONOCYTES # BLD AUTO: 0.48 K/UL
MONOCYTES NFR BLD AUTO: 12.6 %
NEUTROPHILS # BLD AUTO: 2.44 K/UL
NEUTROPHILS NFR BLD AUTO: 63.9 %
PLATELET # BLD AUTO: 170 K/UL
POTASSIUM SERPL-SCNC: 4.7 MMOL/L
PROT SERPL-MCNC: 6.3 G/DL
RBC # BLD: 3.24 M/UL
RBC # FLD: 15.8 %
SODIUM SERPL-SCNC: 140 MMOL/L
WBC # FLD AUTO: 3.82 K/UL

## 2020-10-21 PROCEDURE — 99213 OFFICE O/P EST LOW 20 MIN: CPT | Mod: 95

## 2020-10-21 NOTE — ASSESSMENT
[Palliative] : Goals of care discussed with patient: Palliative [Palliative Care Plan] : not applicable at this time [FreeTextEntry1] :  She will continue Xeloda 3 pills am and 2 pills pm 7 days on/7 days off every 28 days. Will monitor for side effects/toxicities. Ordered repeat CT-Scan of abdomen/pelvis/chest to evaluate progression vs regression of disease. RTO in 4 weeks.

## 2020-10-21 NOTE — PHYSICAL EXAM
[Restricted in physically strenuous activity but ambulatory and able to carry out work of a light or sedentary nature] : Status 1- Restricted in physically strenuous activity but ambulatory and able to carry out work of a light or sedentary nature, e.g., light house work, office work [Normal] : well developed, well nourished, in no acute distress [de-identified] : Appears well, NAD, AOX3

## 2020-10-21 NOTE — HISTORY OF PRESENT ILLNESS
[Home] : at home, [unfilled] , at the time of the visit. [Verbal consent obtained from patient] : the patient, [unfilled] [Medical Office: ([facility])___] : at the medical office located in  [Therapy: ___] : Therapy: [unfilled] [de-identified] : 66-year-old woman with history of breast carcinoma. Her history dates back to September 2003 when she was found to have a mass in the left breast. The biopsy revealed infiltrating lobular carcinoma measuring 3.5 cm. The patient underwent lumpectomy and 3 sentinel lymph nodes and 2 non-sentinel lymph nodes were negative for metastases. ER was +90% CT weakly positive and HER-2/marvin was negative. The patient underwent chemotherapy with 4 cycles of Adriamycin Cytoxan followed by radiation therapy. She was only able to tolerate 6 months of tamoxifen and then stopped her hormonal therapy.\par \par She subsequently has been well until about one month ago when she noted a mass in the right breast. A mammography on December 7, 2016 revealed a mass in the right breast with architectural distortion and multiple microcalcifications. The left breast revealed no evidence of malignancy. Sonogram confirmed a 3.5 x 3.8 cm mass which was palpable and the left breast was negative. \par \par On December 14 of patient underwent ultrasound-guided biopsy of the mass in the right breast which revealed invasive moderately differentiated ductal carcinoma [place] score of 7/9 the ER and CT were positive greater than 95%, HER-2/marvin 2+ and CISH  test was negative with a ratio of one.\par \par On January 11, 2017 the patient underwent MRI of the breast which revealed an irregular 6.7 x 6.2 cm mass in the right breast in the area of the biopsy proven malignancy. They were irregular and enhancing lesions throughout the right breast measuring up to 2 cm along with diffuse non-mass enhancement throughout the right breast. These are suspicious for multicentric disease. If breast conservation therapy is selected a second look ultrasound biopsy or MRI biopsy would be performed. The left breast revealed diffuse thickening of the skin with subcutaneous edema consistent with post lumpectomy and radiation therapy changes versus infiltrating malignancy with vascular or lymphatic obstruction. Skin punch biopsy and PET/CT were recommended. Also linear clumped enhancement was seen for which MRI guided biopsy was recommended. There were also multiple abnormal appearing lymph nodes in the right axilla suggestive of metastatic disease. An ultrasound-guided biopsy could be performed. The patient now presents for evaluation for further treatment.\par \par The patient overall feels well and denies symptoms relating to her abnormal breast imaging tests and confirmed malignancy. She has undergone BRCA testing and results are pending. Her family history is positive for a sister with breast cancer at age 53, a maternal first cousin with breast cancer and a maternal second cousin with breast cancer. There is no family history of ovarian cancer. 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 [de-identified] : Patient has agreed to telehealth visit. She feels okay, and she is back to working part-time. She does feel some increase fatigue, but appetite is good. Denies any mouth sores, diarrhea, constipation or pain. Her peripheral neuropathy is unchanged.

## 2020-11-04 ENCOUNTER — OUTPATIENT (OUTPATIENT)
Dept: OUTPATIENT SERVICES | Facility: HOSPITAL | Age: 70
LOS: 1 days | End: 2020-11-04
Payer: MEDICARE

## 2020-11-04 ENCOUNTER — APPOINTMENT (OUTPATIENT)
Dept: CT IMAGING | Facility: CLINIC | Age: 70
End: 2020-11-04
Payer: MEDICARE

## 2020-11-04 DIAGNOSIS — Z95.828 PRESENCE OF OTHER VASCULAR IMPLANTS AND GRAFTS: Chronic | ICD-10-CM

## 2020-11-04 DIAGNOSIS — Z98.890 OTHER SPECIFIED POSTPROCEDURAL STATES: Chronic | ICD-10-CM

## 2020-11-04 DIAGNOSIS — Z90.89 ACQUIRED ABSENCE OF OTHER ORGANS: Chronic | ICD-10-CM

## 2020-11-04 DIAGNOSIS — C79.51 SECONDARY MALIGNANT NEOPLASM OF BONE: ICD-10-CM

## 2020-11-04 PROCEDURE — 74177 CT ABD & PELVIS W/CONTRAST: CPT | Mod: 26

## 2020-11-04 PROCEDURE — 71260 CT THORAX DX C+: CPT | Mod: 26

## 2020-11-04 PROCEDURE — 74177 CT ABD & PELVIS W/CONTRAST: CPT

## 2020-11-04 PROCEDURE — 71260 CT THORAX DX C+: CPT

## 2020-11-09 ENCOUNTER — APPOINTMENT (OUTPATIENT)
Dept: HEMATOLOGY ONCOLOGY | Facility: CLINIC | Age: 70
End: 2020-11-09
Payer: MEDICARE

## 2020-11-09 PROCEDURE — 99213 OFFICE O/P EST LOW 20 MIN: CPT | Mod: 95

## 2020-11-09 NOTE — REASON FOR VISIT
[Home] : at home, [unfilled] , at the time of the visit. [Medical Office: ([facility])___] : at the medical office located in  [Follow-Up Visit] : a follow-up [FreeTextEntry2] : brfeast ca

## 2020-11-09 NOTE — ASSESSMENT
[FreeTextEntry1] : The patient underwent repeat CAT scans of the chest abdomen pelvis which shows treated bone metastases and no evidence of definite liver metastases at this time.  The patient will continue Xeloda 3 pills in the a.m. and 2 pills in the p.m. 7 days on and 7 days off and be followed for side effects and toxicity.  She will continue blood testing and be followed for evidence of progression of her disease.  The patient understands that she is not she would but that she is responding and in remission. [Palliative] : Goals of care discussed with patient: Palliative [Palliative Care Plan] : not applicable at this time

## 2020-11-09 NOTE — HISTORY OF PRESENT ILLNESS
[de-identified] : 66-year-old woman with history of breast carcinoma. Her history dates back to September 2003 when she was found to have a mass in the left breast. The biopsy revealed infiltrating lobular carcinoma measuring 3.5 cm. The patient underwent lumpectomy and 3 sentinel lymph nodes and 2 non-sentinel lymph nodes were negative for metastases. ER was +90% [place] weakly positive and HER-2/marvin was negative. The patient underwent chemotherapy with 4 cycles of Adriamycin Cytoxan followed by radiation therapy. She was only able to tolerate 6 months of tamoxifen and then stopped her hormonal therapy.\par \par She subsequently has been well until about one month ago when she noted a mass in the right breast. A mammography on December 7, 2016 revealed a mass in the right breast with architectural distortion and multiple microcalcifications. The left breast revealed no evidence of malignancy. Sonogram confirmed a 3.5 x 3.8 cm mass which was palpable and the left breast was negative. \par \par On December 14 of patient underwent ultrasound-guided biopsy of the mass in the right breast which revealed invasive moderately differentiated ductal carcinoma [place] score of 7/9 the ER and [place] were positive greater than 95%, HER-2/marvin 2+ and CISH  test was negative with a ratio of one.\par \par On January 11, 2017 the patient underwent MRI of the breast which revealed an irregular 6.7 x 6.2 cm mass in the right breast in the area of the biopsy proven malignancy. They were irregular and enhancing lesions throughout the right breast measuring up to 2 cm along with diffuse non-mass enhancement throughout the right breast. These are suspicious for multicentric disease. If breast conservation therapy is selected a second look ultrasound biopsy or MRI biopsy would be performed. The left breast revealed diffuse thickening of the skin with subcutaneous edema consistent with post lumpectomy and radiation therapy changes versus infiltrating malignancy with vascular or lymphatic obstruction. Skin punch biopsy and PET/CT were recommended. Also linear clumped enhancement was seen for which MRI guided biopsy was recommended. There were also multiple abnormal appearing lymph nodes in the right axilla suggestive of metastatic disease. An ultrasound-guided biopsy could be performed. The patient now presents for evaluation for further treatment.\par \par The patient overall feels well and denies symptoms relating to her abnormal breast imaging tests and confirmed malignancy. She has undergone BRCA testing and results are pending. Her family history is positive for a sister with breast cancer at age 53, a maternal first cousin with breast cancer and a maternal second cousin with breast cancer. There is no family history of ovarian cancer. 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  She underwent repeat CAT scans and returns to discuss results of testing.

## 2020-11-18 ENCOUNTER — APPOINTMENT (OUTPATIENT)
Dept: HEMATOLOGY ONCOLOGY | Facility: CLINIC | Age: 70
End: 2020-11-18

## 2020-11-23 ENCOUNTER — OUTPATIENT (OUTPATIENT)
Dept: OUTPATIENT SERVICES | Facility: HOSPITAL | Age: 70
LOS: 1 days | Discharge: HOME | End: 2020-11-23

## 2020-11-23 DIAGNOSIS — Z98.890 OTHER SPECIFIED POSTPROCEDURAL STATES: Chronic | ICD-10-CM

## 2020-11-23 DIAGNOSIS — Z90.89 ACQUIRED ABSENCE OF OTHER ORGANS: Chronic | ICD-10-CM

## 2020-11-23 DIAGNOSIS — C50.919 MALIGNANT NEOPLASM OF UNSPECIFIED SITE OF UNSPECIFIED FEMALE BREAST: ICD-10-CM

## 2020-11-23 DIAGNOSIS — Z95.828 PRESENCE OF OTHER VASCULAR IMPLANTS AND GRAFTS: Chronic | ICD-10-CM

## 2020-11-25 ENCOUNTER — LABORATORY RESULT (OUTPATIENT)
Age: 70
End: 2020-11-25

## 2020-11-30 ENCOUNTER — APPOINTMENT (OUTPATIENT)
Dept: HEMATOLOGY ONCOLOGY | Facility: CLINIC | Age: 70
End: 2020-11-30
Payer: MEDICARE

## 2020-11-30 PROCEDURE — 99213 OFFICE O/P EST LOW 20 MIN: CPT | Mod: 95

## 2020-11-30 NOTE — REVIEW OF SYSTEMS
[Negative] : Allergic/Immunologic [FreeTextEntry4] : Patient has sinus congestion but requires no medication.

## 2020-11-30 NOTE — ASSESSMENT
[Palliative] : Goals of care discussed with patient: Palliative [Palliative Care Plan] : not applicable at this time [FreeTextEntry1] : The patient will continue Xeloda treatment for her metastatic breast carcinoma with liver and bone metastases.  She will continue to be monitored for side effects and toxicity.  Her last CAT scan in November 2020 showed response to treatment.  She will repeat CAT scans and bone scans to assess for response or progression of her disease.

## 2020-11-30 NOTE — HISTORY OF PRESENT ILLNESS
[de-identified] : 66-year-old woman with history of breast carcinoma. Her history dates back to September 2003 when she was found to have a mass in the left breast. The biopsy revealed infiltrating lobular carcinoma measuring 3.5 cm. The patient underwent lumpectomy and 3 sentinel lymph nodes and 2 non-sentinel lymph nodes were negative for metastases. ER was +90% [place] weakly positive and HER-2/marvin was negative. The patient underwent chemotherapy with 4 cycles of Adriamycin Cytoxan followed by radiation therapy. She was only able to tolerate 6 months of tamoxifen and then stopped her hormonal therapy.\par \par She subsequently has been well until about one month ago when she noted a mass in the right breast. A mammography on December 7, 2016 revealed a mass in the right breast with architectural distortion and multiple microcalcifications. The left breast revealed no evidence of malignancy. Sonogram confirmed a 3.5 x 3.8 cm mass which was palpable and the left breast was negative. \par \par On December 14 of patient underwent ultrasound-guided biopsy of the mass in the right breast which revealed invasive moderately differentiated ductal carcinoma [place] score of 7/9 the ER and [place] were positive greater than 95%, HER-2/marvin 2+ and CISH  test was negative with a ratio of one.\par \par On January 11, 2017 the patient underwent MRI of the breast which revealed an irregular 6.7 x 6.2 cm mass in the right breast in the area of the biopsy proven malignancy. They were irregular and enhancing lesions throughout the right breast measuring up to 2 cm along with diffuse non-mass enhancement throughout the right breast. These are suspicious for multicentric disease. If breast conservation therapy is selected a second look ultrasound biopsy or MRI biopsy would be performed. The left breast revealed diffuse thickening of the skin with subcutaneous edema consistent with post lumpectomy and radiation therapy changes versus infiltrating malignancy with vascular or lymphatic obstruction. Skin punch biopsy and PET/CT were recommended. Also linear clumped enhancement was seen for which MRI guided biopsy was recommended. There were also multiple abnormal appearing lymph nodes in the right axilla suggestive of metastatic disease. An ultrasound-guided biopsy could be performed. The patient now presents for evaluation for further treatment.\par \par The patient overall feels well and denies symptoms relating to her abnormal breast imaging tests and confirmed malignancy. She has undergone BRCA testing and results are pending. Her family history is positive for a sister with breast cancer at age 53, a maternal first cousin with breast cancer and a maternal second cousin with breast cancer. There is no family history of ovarian cancer. 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  She had repeat blood testing which is normal.

## 2020-11-30 NOTE — REASON FOR VISIT
[Follow-Up Visit] : a follow-up [Home] : at home, [unfilled] , at the time of the visit. [Medical Office: ([facility])___] : at the medical office located in  [Verbal consent obtained from patient] : the patient, [unfilled] [FreeTextEntry2] : ca breast

## 2020-12-06 LAB
ALBUMIN SERPL ELPH-MCNC: 4 G/DL
ALP BLD-CCNC: 96 U/L
ALT SERPL-CCNC: 9 U/L
ANION GAP SERPL CALC-SCNC: 10 MMOL/L
AST SERPL-CCNC: 18 U/L
BASOPHILS # BLD AUTO: 0.03 K/UL
BASOPHILS NFR BLD AUTO: 0.7 %
BILIRUB SERPL-MCNC: 0.8 MG/DL
BUN SERPL-MCNC: 22 MG/DL
CALCIUM SERPL-MCNC: 9.6 MG/DL
CANCER AG27-29 SERPL-ACNC: 236.8 U/ML
CHLORIDE SERPL-SCNC: 103 MMOL/L
CO2 SERPL-SCNC: 29 MMOL/L
CREAT SERPL-MCNC: 0.7 MG/DL
EOSINOPHIL # BLD AUTO: 0.11 K/UL
EOSINOPHIL NFR BLD AUTO: 2.4 %
GLUCOSE SERPL-MCNC: 82 MG/DL
HCT VFR BLD CALC: 35.5 %
HGB BLD-MCNC: 11.7 G/DL
IMM GRANULOCYTES NFR BLD AUTO: 0.2 %
LYMPHOCYTES # BLD AUTO: 1.3 K/UL
LYMPHOCYTES NFR BLD AUTO: 29 %
MAN DIFF?: NORMAL
MCHC RBC-ENTMCNC: 33 GM/DL
MCHC RBC-ENTMCNC: 36 PG
MCV RBC AUTO: 109.2 FL
MONOCYTES # BLD AUTO: 0.52 K/UL
MONOCYTES NFR BLD AUTO: 11.6 %
NEUTROPHILS # BLD AUTO: 2.52 K/UL
NEUTROPHILS NFR BLD AUTO: 56.1 %
PLATELET # BLD AUTO: 175 K/UL
POTASSIUM SERPL-SCNC: 4.2 MMOL/L
PROT SERPL-MCNC: 6.5 G/DL
RBC # BLD: 3.25 M/UL
RBC # FLD: 16 %
SODIUM SERPL-SCNC: 142 MMOL/L
WBC # FLD AUTO: 4.49 K/UL

## 2021-01-01 ENCOUNTER — APPOINTMENT (OUTPATIENT)
Dept: INFUSION THERAPY | Facility: HOSPITAL | Age: 71
End: 2021-01-01

## 2021-01-01 ENCOUNTER — RESULT REVIEW (OUTPATIENT)
Age: 71
End: 2021-01-01

## 2021-01-01 LAB
BASOPHILS # BLD AUTO: 0.08 K/UL — SIGNIFICANT CHANGE UP (ref 0–0.2)
BASOPHILS NFR BLD AUTO: 2 % — SIGNIFICANT CHANGE UP (ref 0–2)
EOSINOPHIL # BLD AUTO: 0.12 K/UL — SIGNIFICANT CHANGE UP (ref 0–0.5)
EOSINOPHIL NFR BLD AUTO: 2.9 % — SIGNIFICANT CHANGE UP (ref 0–6)
HCT VFR BLD CALC: 28.9 % — LOW (ref 34.5–45)
HGB BLD-MCNC: 10.1 G/DL — LOW (ref 11.5–15.5)
IMM GRANULOCYTES NFR BLD AUTO: 4.7 % — HIGH (ref 0–1.5)
LYMPHOCYTES # BLD AUTO: 0.84 K/UL — LOW (ref 1–3.3)
LYMPHOCYTES # BLD AUTO: 20.6 % — SIGNIFICANT CHANGE UP (ref 13–44)
MCHC RBC-ENTMCNC: 34.9 G/DL — SIGNIFICANT CHANGE UP (ref 32–36)
MCHC RBC-ENTMCNC: 35.6 PG — HIGH (ref 27–34)
MCV RBC AUTO: 101.8 FL — HIGH (ref 80–100)
MISCELLANEOUS TEST: NORMAL
MONOCYTES # BLD AUTO: 0.47 K/UL — SIGNIFICANT CHANGE UP (ref 0–0.9)
MONOCYTES NFR BLD AUTO: 11.5 % — SIGNIFICANT CHANGE UP (ref 2–14)
NEUTROPHILS # BLD AUTO: 2.37 K/UL — SIGNIFICANT CHANGE UP (ref 1.8–7.4)
NEUTROPHILS NFR BLD AUTO: 58.3 % — SIGNIFICANT CHANGE UP (ref 43–77)
NRBC # BLD: 0 /100 WBCS — SIGNIFICANT CHANGE UP (ref 0–0)
PLATELET # BLD AUTO: 284 K/UL — SIGNIFICANT CHANGE UP (ref 150–400)
PROC NAME: NORMAL
RBC # BLD: 2.84 M/UL — LOW (ref 3.8–5.2)
RBC # FLD: 15 % — HIGH (ref 10.3–14.5)
WBC # BLD: 4.07 K/UL — SIGNIFICANT CHANGE UP (ref 3.8–10.5)
WBC # FLD AUTO: 4.07 K/UL — SIGNIFICANT CHANGE UP (ref 3.8–10.5)

## 2021-01-08 NOTE — ED PROVIDER NOTE - TOBACCO USE
RN returning pt triage call; pt verified name/; RN informed pf of 20 pap/HPV results showing ASCUS but HPV neg; RN provided extensive education on abn paps, as well as MD recommendations for colp, d/t pt h/o cervical dysplasia; pt initially reluctant to schedule colp d/t fear of pain caused by procedure in past, however decided to proceed; appt scheduled; RN advised pt to take 600mg IBU 30-45min prior to procedure to help lessen cramping caused by procedure; pt verbalized understanding and agreement w/ POC and denies any additional questions or concerns at this time.   Former smoker

## 2021-01-20 ENCOUNTER — OUTPATIENT (OUTPATIENT)
Dept: OUTPATIENT SERVICES | Facility: HOSPITAL | Age: 71
LOS: 1 days | Discharge: HOME | End: 2021-01-20

## 2021-01-20 DIAGNOSIS — C50.919 MALIGNANT NEOPLASM OF UNSPECIFIED SITE OF UNSPECIFIED FEMALE BREAST: ICD-10-CM

## 2021-01-20 DIAGNOSIS — Z95.828 PRESENCE OF OTHER VASCULAR IMPLANTS AND GRAFTS: Chronic | ICD-10-CM

## 2021-01-20 DIAGNOSIS — Z98.890 OTHER SPECIFIED POSTPROCEDURAL STATES: Chronic | ICD-10-CM

## 2021-01-20 DIAGNOSIS — Z90.89 ACQUIRED ABSENCE OF OTHER ORGANS: Chronic | ICD-10-CM

## 2021-01-22 ENCOUNTER — RESULT REVIEW (OUTPATIENT)
Age: 71
End: 2021-01-22

## 2021-01-22 ENCOUNTER — APPOINTMENT (OUTPATIENT)
Dept: HEMATOLOGY ONCOLOGY | Facility: CLINIC | Age: 71
End: 2021-01-22
Payer: MEDICARE

## 2021-01-22 VITALS
BODY MASS INDEX: 27.85 KG/M2 | SYSTOLIC BLOOD PRESSURE: 123 MMHG | OXYGEN SATURATION: 98 % | DIASTOLIC BLOOD PRESSURE: 76 MMHG | HEART RATE: 73 BPM | TEMPERATURE: 98.1 F | RESPIRATION RATE: 16 BRPM | WEIGHT: 163.14 LBS | HEIGHT: 63.98 IN

## 2021-01-22 LAB
BASOPHILS # BLD AUTO: 0.01 K/UL — SIGNIFICANT CHANGE UP (ref 0–0.2)
BASOPHILS NFR BLD AUTO: 0.3 % — SIGNIFICANT CHANGE UP (ref 0–2)
EOSINOPHIL # BLD AUTO: 0.09 K/UL — SIGNIFICANT CHANGE UP (ref 0–0.5)
EOSINOPHIL NFR BLD AUTO: 2.3 % — SIGNIFICANT CHANGE UP (ref 0–6)
HCT VFR BLD CALC: 35 % — SIGNIFICANT CHANGE UP (ref 34.5–45)
HGB BLD-MCNC: 11.8 G/DL — SIGNIFICANT CHANGE UP (ref 11.5–15.5)
IMM GRANULOCYTES NFR BLD AUTO: 0.5 % — SIGNIFICANT CHANGE UP (ref 0–1.5)
LYMPHOCYTES # BLD AUTO: 0.95 K/UL — LOW (ref 1–3.3)
LYMPHOCYTES # BLD AUTO: 23.9 % — SIGNIFICANT CHANGE UP (ref 13–44)
MCHC RBC-ENTMCNC: 33.7 G/DL — SIGNIFICANT CHANGE UP (ref 32–36)
MCHC RBC-ENTMCNC: 36.3 PG — HIGH (ref 27–34)
MCV RBC AUTO: 107.7 FL — HIGH (ref 80–100)
MONOCYTES # BLD AUTO: 0.46 K/UL — SIGNIFICANT CHANGE UP (ref 0–0.9)
MONOCYTES NFR BLD AUTO: 11.6 % — SIGNIFICANT CHANGE UP (ref 2–14)
NEUTROPHILS # BLD AUTO: 2.44 K/UL — SIGNIFICANT CHANGE UP (ref 1.8–7.4)
NEUTROPHILS NFR BLD AUTO: 61.4 % — SIGNIFICANT CHANGE UP (ref 43–77)
NRBC # BLD: 0 /100 WBCS — SIGNIFICANT CHANGE UP (ref 0–0)
PLATELET # BLD AUTO: 176 K/UL — SIGNIFICANT CHANGE UP (ref 150–400)
RBC # BLD: 3.25 M/UL — LOW (ref 3.8–5.2)
RBC # FLD: 15.2 % — HIGH (ref 10.3–14.5)
WBC # BLD: 3.97 K/UL — SIGNIFICANT CHANGE UP (ref 3.8–10.5)
WBC # FLD AUTO: 3.97 K/UL — SIGNIFICANT CHANGE UP (ref 3.8–10.5)

## 2021-01-22 PROCEDURE — 99214 OFFICE O/P EST MOD 30 MIN: CPT

## 2021-01-25 NOTE — HISTORY OF PRESENT ILLNESS
[de-identified] : 66-year-old woman with history of breast carcinoma. Her history dates back to September 2003 when she was found to have a mass in the left breast. The biopsy revealed infiltrating lobular carcinoma measuring 3.5 cm. The patient underwent lumpectomy and 3 sentinel lymph nodes and 2 non-sentinel lymph nodes were negative for metastases. ER was +90% [place] weakly positive and HER-2/marvin was negative. The patient underwent chemotherapy with 4 cycles of Adriamycin Cytoxan followed by radiation therapy. She was only able to tolerate 6 months of tamoxifen and then stopped her hormonal therapy.\par \par She subsequently has been well until about one month ago when she noted a mass in the right breast. A mammography on December 7, 2016 revealed a mass in the right breast with architectural distortion and multiple microcalcifications. The left breast revealed no evidence of malignancy. Sonogram confirmed a 3.5 x 3.8 cm mass which was palpable and the left breast was negative. \par \par On December 14 of patient underwent ultrasound-guided biopsy of the mass in the right breast which revealed invasive moderately differentiated ductal carcinoma [place] score of 7/9 the ER and [place] were positive greater than 95%, HER-2/marvin 2+ and CISH  test was negative with a ratio of one.\par \par On January 11, 2017 the patient underwent MRI of the breast which revealed an irregular 6.7 x 6.2 cm mass in the right breast in the area of the biopsy proven malignancy. They were irregular and enhancing lesions throughout the right breast measuring up to 2 cm along with diffuse non-mass enhancement throughout the right breast. These are suspicious for multicentric disease. If breast conservation therapy is selected a second look ultrasound biopsy or MRI biopsy would be performed. The left breast revealed diffuse thickening of the skin with subcutaneous edema consistent with post lumpectomy and radiation therapy changes versus infiltrating malignancy with vascular or lymphatic obstruction. Skin punch biopsy and PET/CT were recommended. Also linear clumped enhancement was seen for which MRI guided biopsy was recommended. There were also multiple abnormal appearing lymph nodes in the right axilla suggestive of metastatic disease. An ultrasound-guided biopsy could be performed. The patient now presents for evaluation for further treatment.\par \par The patient overall feels well and denies symptoms relating to her abnormal breast imaging tests and confirmed malignancy. She has undergone BRCA testing and results are pending. Her family history is positive for a sister with breast cancer at age 53, a maternal first cousin with breast cancer and a maternal second cousin with breast cancer. There is no family history of ovarian cancer. 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  She had repeat blood testing which is normal.

## 2021-01-25 NOTE — ASSESSMENT
[FreeTextEntry1] : Pt to continue on chemo with xeloda and be followed for side effects and toxicity. pt to repeat blood tests for CBC and CMP ans repeat CT scans to assess for response or progression of her disease.Pt to RTO in 3 weeks or sooner if needed. [Palliative] : Goals of care discussed with patient: Palliative [Palliative Care Plan] : not applicable at this time

## 2021-01-29 NOTE — PATIENT PROFILE ADULT. - NS PRO AD NO ADVANCE DIRECTIVE
Pre-Operative Diagnosis: Cervical radiculopathy [M54.12]     Post-Operative Diagnosis: Cervical radiculopathy [M54.12]      Procedure Performed:   Procedure(s):  anterior cervical five-cervical six discectomy and fusion      Surgeon(s) and Role:     * Spen
No

## 2021-01-31 LAB
ALBUMIN SERPL ELPH-MCNC: 4 G/DL
ALP BLD-CCNC: 111 U/L
ALT SERPL-CCNC: 9 U/L
ANION GAP SERPL CALC-SCNC: 12 MMOL/L
AST SERPL-CCNC: 20 U/L
BILIRUB SERPL-MCNC: 0.8 MG/DL
BUN SERPL-MCNC: 29 MG/DL
CALCIUM SERPL-MCNC: 9.8 MG/DL
CANCER AG27-29 SERPL-ACNC: 266.7 U/ML
CHLORIDE SERPL-SCNC: 100 MMOL/L
CO2 SERPL-SCNC: 26 MMOL/L
CREAT SERPL-MCNC: 0.93 MG/DL
GLUCOSE SERPL-MCNC: 79 MG/DL
POTASSIUM SERPL-SCNC: 4.8 MMOL/L
PROT SERPL-MCNC: 6.8 G/DL
SODIUM SERPL-SCNC: 138 MMOL/L

## 2021-02-11 NOTE — HISTORY OF PRESENT ILLNESS
[Treatment Protocol] : Treatment Protocol [Cycle: ___] : Cycle: [unfilled] [de-identified] : 66-year-old woman with history of breast carcinoma. Her history dates back to September 2003 when she was found to have a mass in the left breast. The biopsy revealed infiltrating lobular carcinoma measuring 3.5 cm. The patient underwent lumpectomy and 3 sentinel lymph nodes and 2 non-sentinel lymph nodes were negative for metastases. ER was +90% [place] weakly positive and HER-2/marvin was negative. The patient underwent chemotherapy with 4 cycles of Adriamycin Cytoxan followed by radiation therapy. She was only able to tolerate 6 months of tamoxifen and then stopped her hormonal therapy.\par \par She subsequently has been well until about one month ago when she noted a mass in the right breast. A mammography on December 7, 2016 revealed a mass in the right breast with architectural distortion and multiple microcalcifications. The left breast revealed no evidence of malignancy. Sonogram confirmed a 3.5 x 3.8 cm mass which was palpable and the left breast was negative. \par \par On December 14 of patient underwent ultrasound-guided biopsy of the mass in the right breast which revealed invasive moderately differentiated ductal carcinoma [place] score of 7/9 the ER and [place] were positive greater than 95%, HER-2/marvin 2+ and CISH  test was negative with a ratio of one.\par \par On January 11, 2017 the patient underwent MRI of the breast which revealed an irregular 6.7 x 6.2 cm mass in the right breast in the area of the biopsy proven malignancy. They were irregular and enhancing lesions throughout the right breast measuring up to 2 cm along with diffuse non-mass enhancement throughout the right breast. These are suspicious for multicentric disease. If breast conservation therapy is selected a second look ultrasound biopsy or MRI biopsy would be performed. The left breast revealed diffuse thickening of the skin with subcutaneous edema consistent with post lumpectomy and radiation therapy changes versus infiltrating malignancy with vascular or lymphatic obstruction. Skin punch biopsy and PET/CT were recommended. Also linear clumped enhancement was seen for which MRI guided biopsy was recommended. There were also multiple abnormal appearing lymph nodes in the right axilla suggestive of metastatic disease. An ultrasound-guided biopsy could be performed. The patient now presents for evaluation for further treatment.\par \par The patient overall feels well and denies symptoms relating to her abnormal breast imaging tests and confirmed malignancy. She has undergone BRCA testing and results are pending. Her family history is positive for a sister with breast cancer at age 53, a maternal first cousin with breast cancer and a maternal second cousin with breast cancer. There is no family history of ovarian cancer. 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 She s/p C4 of Ibrance 100 mg daily x 21 days. She feels fine. Denies any fatigue or weakness. She has no hot flashes, sweats, joints pain or mouth sores. Patent is scheduled for PET-Scan today.

## 2021-02-11 NOTE — REVIEW OF SYSTEMS
[Negative] : Allergic/Immunologic [Joint Pain] : no joint pain [Joint Stiffness] : no joint stiffness

## 2021-02-11 NOTE — PHYSICAL EXAM
[Restricted in physically strenuous activity but ambulatory and able to carry out work of a light or sedentary nature] : Status 1- Restricted in physically strenuous activity but ambulatory and able to carry out work of a light or sedentary nature, e.g., light house work, office work [Normal] : affect appropriate [de-identified] : left breast with thicken skin, firm to palpation. nontender

## 2021-02-15 NOTE — ASSESSMENT
[Palliative] : Goals of care discussed with patient: Palliative [Palliative Care Plan] : not applicable at this time [FreeTextEntry1] : Pt to resume on ibrance but dec dose to 100 mg a day. Pt to continue on faslodex and be followed for side effects and toxicity.

## 2021-02-15 NOTE — HISTORY OF PRESENT ILLNESS
[Treatment Protocol] : Treatment Protocol [de-identified] : 66-year-old woman with history of breast carcinoma. Her history dates back to September 2003 when she was found to have a mass in the left breast. The biopsy revealed infiltrating lobular carcinoma measuring 3.5 cm. The patient underwent lumpectomy and 3 sentinel lymph nodes and 2 non-sentinel lymph nodes were negative for metastases. ER was +90% [place] weakly positive and HER-2/marvin was negative. The patient underwent chemotherapy with 4 cycles of Adriamycin Cytoxan followed by radiation therapy. She was only able to tolerate 6 months of tamoxifen and then stopped her hormonal therapy.\par \par She subsequently has been well until about one month ago when she noted a mass in the right breast. A mammography on December 7, 2016 revealed a mass in the right breast with architectural distortion and multiple microcalcifications. The left breast revealed no evidence of malignancy. Sonogram confirmed a 3.5 x 3.8 cm mass which was palpable and the left breast was negative. \par \par On December 14 of patient underwent ultrasound-guided biopsy of the mass in the right breast which revealed invasive moderately differentiated ductal carcinoma [place] score of 7/9 the ER and [place] were positive greater than 95%, HER-2/marvin 2+ and CISH  test was negative with a ratio of one.\par \par On January 11, 2017 the patient underwent MRI of the breast which revealed an irregular 6.7 x 6.2 cm mass in the right breast in the area of the biopsy proven malignancy. They were irregular and enhancing lesions throughout the right breast measuring up to 2 cm along with diffuse non-mass enhancement throughout the right breast. These are suspicious for multicentric disease. If breast conservation therapy is selected a second look ultrasound biopsy or MRI biopsy would be performed. The left breast revealed diffuse thickening of the skin with subcutaneous edema consistent with post lumpectomy and radiation therapy changes versus infiltrating malignancy with vascular or lymphatic obstruction. Skin punch biopsy and PET/CT were recommended. Also linear clumped enhancement was seen for which MRI guided biopsy was recommended. There were also multiple abnormal appearing lymph nodes in the right axilla suggestive of metastatic disease. An ultrasound-guided biopsy could be performed. The patient now presents for evaluation for further treatment.\par \par The patient overall feels well and denies symptoms relating to her abnormal breast imaging tests and confirmed malignancy. She has undergone BRCA testing and results are pending. Her family history is positive for a sister with breast cancer at age 53, a maternal first cousin with breast cancer and a maternal second cousin with breast cancer. There is no family history of ovarian cancer. 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  [de-identified] : Since the last visit the pt had to hold ibrance due to cele counts.

## 2021-03-11 ENCOUNTER — OUTPATIENT (OUTPATIENT)
Dept: OUTPATIENT SERVICES | Facility: HOSPITAL | Age: 71
LOS: 1 days | Discharge: HOME | End: 2021-03-11

## 2021-03-11 DIAGNOSIS — Z90.89 ACQUIRED ABSENCE OF OTHER ORGANS: Chronic | ICD-10-CM

## 2021-03-11 DIAGNOSIS — Z95.828 PRESENCE OF OTHER VASCULAR IMPLANTS AND GRAFTS: Chronic | ICD-10-CM

## 2021-03-11 DIAGNOSIS — Z98.890 OTHER SPECIFIED POSTPROCEDURAL STATES: Chronic | ICD-10-CM

## 2021-03-11 DIAGNOSIS — C50.919 MALIGNANT NEOPLASM OF UNSPECIFIED SITE OF UNSPECIFIED FEMALE BREAST: ICD-10-CM

## 2021-03-12 ENCOUNTER — APPOINTMENT (OUTPATIENT)
Dept: HEMATOLOGY ONCOLOGY | Facility: CLINIC | Age: 71
End: 2021-03-12

## 2021-03-15 ENCOUNTER — APPOINTMENT (OUTPATIENT)
Dept: HEMATOLOGY ONCOLOGY | Facility: CLINIC | Age: 71
End: 2021-03-15
Payer: MEDICARE

## 2021-03-15 ENCOUNTER — RESULT REVIEW (OUTPATIENT)
Age: 71
End: 2021-03-15

## 2021-03-15 VITALS
WEIGHT: 157.41 LBS | SYSTOLIC BLOOD PRESSURE: 130 MMHG | RESPIRATION RATE: 16 BRPM | BODY MASS INDEX: 26.87 KG/M2 | HEIGHT: 63.98 IN | TEMPERATURE: 97.3 F | OXYGEN SATURATION: 99 % | HEART RATE: 64 BPM | DIASTOLIC BLOOD PRESSURE: 77 MMHG

## 2021-03-15 LAB
BASOPHILS # BLD AUTO: 0.03 K/UL — SIGNIFICANT CHANGE UP (ref 0–0.2)
BASOPHILS NFR BLD AUTO: 0.7 % — SIGNIFICANT CHANGE UP (ref 0–2)
EOSINOPHIL # BLD AUTO: 0.08 K/UL — SIGNIFICANT CHANGE UP (ref 0–0.5)
EOSINOPHIL NFR BLD AUTO: 2 % — SIGNIFICANT CHANGE UP (ref 0–6)
HCT VFR BLD CALC: 36.1 % — SIGNIFICANT CHANGE UP (ref 34.5–45)
HGB BLD-MCNC: 12.3 G/DL — SIGNIFICANT CHANGE UP (ref 11.5–15.5)
IMM GRANULOCYTES NFR BLD AUTO: 0.5 % — SIGNIFICANT CHANGE UP (ref 0–1.5)
LYMPHOCYTES # BLD AUTO: 0.91 K/UL — LOW (ref 1–3.3)
LYMPHOCYTES # BLD AUTO: 22.6 % — SIGNIFICANT CHANGE UP (ref 13–44)
MCHC RBC-ENTMCNC: 34.1 G/DL — SIGNIFICANT CHANGE UP (ref 32–36)
MCHC RBC-ENTMCNC: 35.8 PG — HIGH (ref 27–34)
MCV RBC AUTO: 104.9 FL — HIGH (ref 80–100)
MONOCYTES # BLD AUTO: 0.45 K/UL — SIGNIFICANT CHANGE UP (ref 0–0.9)
MONOCYTES NFR BLD AUTO: 11.2 % — SIGNIFICANT CHANGE UP (ref 2–14)
NEUTROPHILS # BLD AUTO: 2.53 K/UL — SIGNIFICANT CHANGE UP (ref 1.8–7.4)
NEUTROPHILS NFR BLD AUTO: 63 % — SIGNIFICANT CHANGE UP (ref 43–77)
NRBC # BLD: 0 /100 WBCS — SIGNIFICANT CHANGE UP (ref 0–0)
PLATELET # BLD AUTO: 167 K/UL — SIGNIFICANT CHANGE UP (ref 150–400)
RBC # BLD: 3.44 M/UL — LOW (ref 3.8–5.2)
RBC # FLD: 15.1 % — HIGH (ref 10.3–14.5)
WBC # BLD: 4.02 K/UL — SIGNIFICANT CHANGE UP (ref 3.8–10.5)
WBC # FLD AUTO: 4.02 K/UL — SIGNIFICANT CHANGE UP (ref 3.8–10.5)

## 2021-03-15 PROCEDURE — 99214 OFFICE O/P EST MOD 30 MIN: CPT

## 2021-03-15 NOTE — HISTORY OF PRESENT ILLNESS
[de-identified] : 66-year-old woman with history of breast carcinoma. Her history dates back to September 2003 when she was found to have a mass in the left breast. The biopsy revealed infiltrating lobular carcinoma measuring 3.5 cm. The patient underwent lumpectomy and 3 sentinel lymph nodes and 2 non-sentinel lymph nodes were negative for metastases. ER was +90% [place] weakly positive and HER-2/marvin was negative. The patient underwent chemotherapy with 4 cycles of Adriamycin Cytoxan followed by radiation therapy. She was only able to tolerate 6 months of tamoxifen and then stopped her hormonal therapy.\par \par She subsequently has been well until about one month ago when she noted a mass in the right breast. A mammography on December 7, 2016 revealed a mass in the right breast with architectural distortion and multiple microcalcifications. The left breast revealed no evidence of malignancy. Sonogram confirmed a 3.5 x 3.8 cm mass which was palpable and the left breast was negative. \par \par On December 14 of patient underwent ultrasound-guided biopsy of the mass in the right breast which revealed invasive moderately differentiated ductal carcinoma [place] score of 7/9 the ER and [place] were positive greater than 95%, HER-2/marvin 2+ and CISH  test was negative with a ratio of one.\par \par On January 11, 2017 the patient underwent MRI of the breast which revealed an irregular 6.7 x 6.2 cm mass in the right breast in the area of the biopsy proven malignancy. They were irregular and enhancing lesions throughout the right breast measuring up to 2 cm along with diffuse non-mass enhancement throughout the right breast. These are suspicious for multicentric disease. If breast conservation therapy is selected a second look ultrasound biopsy or MRI biopsy would be performed. The left breast revealed diffuse thickening of the skin with subcutaneous edema consistent with post lumpectomy and radiation therapy changes versus infiltrating malignancy with vascular or lymphatic obstruction. Skin punch biopsy and PET/CT were recommended. Also linear clumped enhancement was seen for which MRI guided biopsy was recommended. There were also multiple abnormal appearing lymph nodes in the right axilla suggestive of metastatic disease. An ultrasound-guided biopsy could be performed. The patient now presents for evaluation for further treatment.\par \par The patient overall feels well and denies symptoms relating to her abnormal breast imaging tests and confirmed malignancy. She has undergone BRCA testing and results are pending. Her family history is positive for a sister with breast cancer at age 53, a maternal first cousin with breast cancer and a maternal second cousin with breast cancer. There is no family history of ovarian cancer. 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 She feels fine. Patient is still taking Capecitabine 500 mg: 3 tabs in am/2 tabs in pm. Denies any pain, weakness or fatigue. BM's are normal, and no mouth sores. Patient has tolerable peripheral neuropathy to hands and feet. Today, CBC hgb=12.3, WBC=4.02. Statement Selected

## 2021-03-15 NOTE — PHYSICAL EXAM
[Normal] : affect appropriate [Ambulatory and capable of all self care but unable to carry out any work activities] : Status 2- Ambulatory and capable of all self care but unable to carry out any work activities. Up and about more than 50% of waking hours [de-identified] : left breast induration;  right healed incision [de-identified] : + slight darken of skin to palm bilateral, no sores or lesions.

## 2021-03-15 NOTE — ASSESSMENT
[Palliative] : Goals of care discussed with patient: Palliative [Palliative Care Plan] : not applicable at this time [FreeTextEntry1] : Pt to continue Capecitabine 500 mg: 3 tabs in am/2 tabs in pm. Will monitor for side effects and toxicity. pt to repeat blood tests for CBC and CMP, f./u. Ordered CT scans of abdomen/pelvis/chest to assess for response or progression of her disease, f/u. Pt to RTO in 3 weeks.

## 2021-03-20 ENCOUNTER — RESULT REVIEW (OUTPATIENT)
Age: 71
End: 2021-03-20

## 2021-03-24 ENCOUNTER — APPOINTMENT (OUTPATIENT)
Dept: CT IMAGING | Facility: CLINIC | Age: 71
End: 2021-03-24
Payer: MEDICARE

## 2021-03-24 ENCOUNTER — OUTPATIENT (OUTPATIENT)
Dept: OUTPATIENT SERVICES | Facility: HOSPITAL | Age: 71
LOS: 1 days | End: 2021-03-24
Payer: MEDICARE

## 2021-03-24 DIAGNOSIS — Z95.828 PRESENCE OF OTHER VASCULAR IMPLANTS AND GRAFTS: Chronic | ICD-10-CM

## 2021-03-24 DIAGNOSIS — Z90.89 ACQUIRED ABSENCE OF OTHER ORGANS: Chronic | ICD-10-CM

## 2021-03-24 DIAGNOSIS — Z98.890 OTHER SPECIFIED POSTPROCEDURAL STATES: Chronic | ICD-10-CM

## 2021-03-24 DIAGNOSIS — C50.919 MALIGNANT NEOPLASM OF UNSPECIFIED SITE OF UNSPECIFIED FEMALE BREAST: ICD-10-CM

## 2021-03-24 PROCEDURE — 71260 CT THORAX DX C+: CPT

## 2021-03-24 PROCEDURE — 71260 CT THORAX DX C+: CPT | Mod: 26,MG

## 2021-03-24 PROCEDURE — G1004: CPT

## 2021-03-24 PROCEDURE — 74177 CT ABD & PELVIS W/CONTRAST: CPT | Mod: 26,MG

## 2021-03-24 PROCEDURE — 74177 CT ABD & PELVIS W/CONTRAST: CPT

## 2021-03-28 LAB
ALBUMIN SERPL ELPH-MCNC: 4.1 G/DL
ALP BLD-CCNC: 93 U/L
ALT SERPL-CCNC: 12 U/L
ANION GAP SERPL CALC-SCNC: 14 MMOL/L
AST SERPL-CCNC: 21 U/L
BILIRUB SERPL-MCNC: 0.9 MG/DL
BUN SERPL-MCNC: 20 MG/DL
CALCIUM SERPL-MCNC: 9.6 MG/DL
CANCER AG27-29 SERPL-ACNC: 372.1 U/ML
CHLORIDE SERPL-SCNC: 100 MMOL/L
CO2 SERPL-SCNC: 26 MMOL/L
CREAT SERPL-MCNC: 0.66 MG/DL
GLUCOSE SERPL-MCNC: 84 MG/DL
POTASSIUM SERPL-SCNC: 4.2 MMOL/L
PROT SERPL-MCNC: 6.8 G/DL
SODIUM SERPL-SCNC: 139 MMOL/L

## 2021-04-07 ENCOUNTER — RESULT REVIEW (OUTPATIENT)
Age: 71
End: 2021-04-07

## 2021-04-07 ENCOUNTER — APPOINTMENT (OUTPATIENT)
Dept: HEMATOLOGY ONCOLOGY | Facility: CLINIC | Age: 71
End: 2021-04-07
Payer: MEDICARE

## 2021-04-07 VITALS
WEIGHT: 161.6 LBS | RESPIRATION RATE: 16 BRPM | SYSTOLIC BLOOD PRESSURE: 121 MMHG | HEIGHT: 64 IN | OXYGEN SATURATION: 99 % | TEMPERATURE: 97.2 F | BODY MASS INDEX: 27.59 KG/M2 | HEART RATE: 72 BPM | DIASTOLIC BLOOD PRESSURE: 73 MMHG

## 2021-04-07 LAB
BASOPHILS # BLD AUTO: 0.03 K/UL — SIGNIFICANT CHANGE UP (ref 0–0.2)
BASOPHILS NFR BLD AUTO: 0.8 % — SIGNIFICANT CHANGE UP (ref 0–2)
EOSINOPHIL # BLD AUTO: 0.09 K/UL — SIGNIFICANT CHANGE UP (ref 0–0.5)
EOSINOPHIL NFR BLD AUTO: 2.4 % — SIGNIFICANT CHANGE UP (ref 0–6)
HCT VFR BLD CALC: 34.4 % — LOW (ref 34.5–45)
HGB BLD-MCNC: 11.5 G/DL — SIGNIFICANT CHANGE UP (ref 11.5–15.5)
IMM GRANULOCYTES NFR BLD AUTO: 0.5 % — SIGNIFICANT CHANGE UP (ref 0–1.5)
LYMPHOCYTES # BLD AUTO: 1.02 K/UL — SIGNIFICANT CHANGE UP (ref 1–3.3)
LYMPHOCYTES # BLD AUTO: 27.3 % — SIGNIFICANT CHANGE UP (ref 13–44)
MCHC RBC-ENTMCNC: 33.4 G/DL — SIGNIFICANT CHANGE UP (ref 32–36)
MCHC RBC-ENTMCNC: 35.7 PG — HIGH (ref 27–34)
MCV RBC AUTO: 106.8 FL — HIGH (ref 80–100)
MONOCYTES # BLD AUTO: 0.38 K/UL — SIGNIFICANT CHANGE UP (ref 0–0.9)
MONOCYTES NFR BLD AUTO: 10.2 % — SIGNIFICANT CHANGE UP (ref 2–14)
NEUTROPHILS # BLD AUTO: 2.2 K/UL — SIGNIFICANT CHANGE UP (ref 1.8–7.4)
NEUTROPHILS NFR BLD AUTO: 58.8 % — SIGNIFICANT CHANGE UP (ref 43–77)
NRBC # BLD: 0 /100 WBCS — SIGNIFICANT CHANGE UP (ref 0–0)
PLATELET # BLD AUTO: 172 K/UL — SIGNIFICANT CHANGE UP (ref 150–400)
RBC # BLD: 3.22 M/UL — LOW (ref 3.8–5.2)
RBC # FLD: 15.6 % — HIGH (ref 10.3–14.5)
WBC # BLD: 3.74 K/UL — LOW (ref 3.8–10.5)
WBC # FLD AUTO: 3.74 K/UL — LOW (ref 3.8–10.5)

## 2021-04-07 PROCEDURE — 99214 OFFICE O/P EST MOD 30 MIN: CPT

## 2021-04-07 NOTE — ASSESSMENT
[Palliative] : Goals of care discussed with patient: Palliative [Palliative Care Plan] : not applicable at this time [FreeTextEntry1] : Pt to continue on xeloda and take 5 pills daily. pt to be monitored for side effcts and toxicity. Pt had repoeat CT of chesr abdomen and pelvis and results show stable disease. Pt to continue Medical, GI, GYN and surgical followup and testing. Pt to RTO in 1 month or sooner if needed.

## 2021-04-07 NOTE — HISTORY OF PRESENT ILLNESS
[de-identified] : 66-year-old woman with history of breast carcinoma. Her history dates back to September 2003 when she was found to have a mass in the left breast. The biopsy revealed infiltrating lobular carcinoma measuring 3.5 cm. The patient underwent lumpectomy and 3 sentinel lymph nodes and 2 non-sentinel lymph nodes were negative for metastases. ER was +90% [place] weakly positive and HER-2/marvin was negative. The patient underwent chemotherapy with 4 cycles of Adriamycin Cytoxan followed by radiation therapy. She was only able to tolerate 6 months of tamoxifen and then stopped her hormonal therapy.\par \par She subsequently has been well until about one month ago when she noted a mass in the right breast. A mammography on December 7, 2016 revealed a mass in the right breast with architectural distortion and multiple microcalcifications. The left breast revealed no evidence of malignancy. Sonogram confirmed a 3.5 x 3.8 cm mass which was palpable and the left breast was negative. \par \par On December 14 of patient underwent ultrasound-guided biopsy of the mass in the right breast which revealed invasive moderately differentiated ductal carcinoma [place] score of 7/9 the ER and NM were positive greater than 95%, HER-2/marvin 2+ and CISH  test was negative with a ratio of one.\par \par On January 11, 2017 the patient underwent MRI of the breast which revealed an irregular 6.7 x 6.2 cm mass in the right breast in the area of the biopsy proven malignancy. They were irregular and enhancing lesions throughout the right breast measuring up to 2 cm along with diffuse non-mass enhancement throughout the right breast. These are suspicious for multicentric disease. If breast conservation therapy is selected a second look ultrasound biopsy or MRI biopsy would be performed. The left breast revealed diffuse thickening of the skin with subcutaneous edema consistent with post lumpectomy and radiation therapy changes versus infiltrating malignancy with vascular or lymphatic obstruction. Skin punch biopsy and PET/CT were recommended. Also linear clumped enhancement was seen for which MRI guided biopsy was recommended. There were also multiple abnormal appearing lymph nodes in the right axilla suggestive of metastatic disease. An ultrasound-guided biopsy could be performed. The patient now presents for evaluation for further treatment.\par \par The patient overall feels well and denies symptoms relating to her abnormal breast imaging tests and confirmed malignancy. She has undergone BRCA testing and results are pending. Her family history is positive for a sister with breast cancer at age 53, a maternal first cousin with breast cancer and a maternal second cousin with breast cancer. There is no family history of ovarian cancer. 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  She had repeat blood testing which is normal.\par \par Pt continue on 5 xeloda and tolerates rx well.

## 2021-04-13 LAB
ALBUMIN SERPL ELPH-MCNC: 3.9 G/DL
ALP BLD-CCNC: 96 U/L
ALT SERPL-CCNC: 12 U/L
ANION GAP SERPL CALC-SCNC: 10 MMOL/L
AST SERPL-CCNC: 19 U/L
BILIRUB SERPL-MCNC: 0.7 MG/DL
BUN SERPL-MCNC: 23 MG/DL
CALCIUM SERPL-MCNC: 9 MG/DL
CANCER AG27-29 SERPL-ACNC: 371.5 U/ML
CHLORIDE SERPL-SCNC: 101 MMOL/L
CO2 SERPL-SCNC: 27 MMOL/L
CREAT SERPL-MCNC: 0.72 MG/DL
GLUCOSE SERPL-MCNC: 91 MG/DL
POTASSIUM SERPL-SCNC: 4.4 MMOL/L
PROT SERPL-MCNC: 6.5 G/DL
SODIUM SERPL-SCNC: 138 MMOL/L

## 2021-04-23 ENCOUNTER — OUTPATIENT (OUTPATIENT)
Dept: OUTPATIENT SERVICES | Facility: HOSPITAL | Age: 71
LOS: 1 days | Discharge: HOME | End: 2021-04-23

## 2021-04-23 DIAGNOSIS — Z90.89 ACQUIRED ABSENCE OF OTHER ORGANS: Chronic | ICD-10-CM

## 2021-04-23 DIAGNOSIS — Z95.828 PRESENCE OF OTHER VASCULAR IMPLANTS AND GRAFTS: Chronic | ICD-10-CM

## 2021-04-23 DIAGNOSIS — Z98.890 OTHER SPECIFIED POSTPROCEDURAL STATES: Chronic | ICD-10-CM

## 2021-04-23 DIAGNOSIS — C50.919 MALIGNANT NEOPLASM OF UNSPECIFIED SITE OF UNSPECIFIED FEMALE BREAST: ICD-10-CM

## 2021-04-27 ENCOUNTER — RESULT REVIEW (OUTPATIENT)
Age: 71
End: 2021-04-27

## 2021-04-27 ENCOUNTER — APPOINTMENT (OUTPATIENT)
Dept: HEMATOLOGY ONCOLOGY | Facility: CLINIC | Age: 71
End: 2021-04-27
Payer: MEDICARE

## 2021-04-27 VITALS
DIASTOLIC BLOOD PRESSURE: 84 MMHG | TEMPERATURE: 97.9 F | OXYGEN SATURATION: 97 % | RESPIRATION RATE: 17 BRPM | HEART RATE: 95 BPM | WEIGHT: 161.6 LBS | BODY MASS INDEX: 27.59 KG/M2 | SYSTOLIC BLOOD PRESSURE: 153 MMHG | HEIGHT: 63.98 IN

## 2021-04-27 LAB
BASOPHILS # BLD AUTO: 0.03 K/UL — SIGNIFICANT CHANGE UP (ref 0–0.2)
BASOPHILS NFR BLD AUTO: 0.7 % — SIGNIFICANT CHANGE UP (ref 0–2)
EOSINOPHIL # BLD AUTO: 0.1 K/UL — SIGNIFICANT CHANGE UP (ref 0–0.5)
EOSINOPHIL NFR BLD AUTO: 2.3 % — SIGNIFICANT CHANGE UP (ref 0–6)
HCT VFR BLD CALC: 35.4 % — SIGNIFICANT CHANGE UP (ref 34.5–45)
HGB BLD-MCNC: 12 G/DL — SIGNIFICANT CHANGE UP (ref 11.5–15.5)
IMM GRANULOCYTES NFR BLD AUTO: 0.2 % — SIGNIFICANT CHANGE UP (ref 0–1.5)
LYMPHOCYTES # BLD AUTO: 0.87 K/UL — LOW (ref 1–3.3)
LYMPHOCYTES # BLD AUTO: 20.2 % — SIGNIFICANT CHANGE UP (ref 13–44)
MCHC RBC-ENTMCNC: 33.9 G/DL — SIGNIFICANT CHANGE UP (ref 32–36)
MCHC RBC-ENTMCNC: 35.8 PG — HIGH (ref 27–34)
MCV RBC AUTO: 105.7 FL — HIGH (ref 80–100)
MONOCYTES # BLD AUTO: 0.5 K/UL — SIGNIFICANT CHANGE UP (ref 0–0.9)
MONOCYTES NFR BLD AUTO: 11.6 % — SIGNIFICANT CHANGE UP (ref 2–14)
NEUTROPHILS # BLD AUTO: 2.79 K/UL — SIGNIFICANT CHANGE UP (ref 1.8–7.4)
NEUTROPHILS NFR BLD AUTO: 65 % — SIGNIFICANT CHANGE UP (ref 43–77)
NRBC # BLD: 0 /100 WBCS — SIGNIFICANT CHANGE UP (ref 0–0)
PLATELET # BLD AUTO: 175 K/UL — SIGNIFICANT CHANGE UP (ref 150–400)
RBC # BLD: 3.35 M/UL — LOW (ref 3.8–5.2)
RBC # FLD: 15.8 % — HIGH (ref 10.3–14.5)
WBC # BLD: 4.3 K/UL — SIGNIFICANT CHANGE UP (ref 3.8–10.5)
WBC # FLD AUTO: 4.3 K/UL — SIGNIFICANT CHANGE UP (ref 3.8–10.5)

## 2021-04-27 PROCEDURE — 99214 OFFICE O/P EST MOD 30 MIN: CPT

## 2021-04-28 NOTE — HISTORY OF PRESENT ILLNESS
[de-identified] : Patient is here for f/u. She feels okay. Denies any nausea, vomiting or abdominal pain. She still takes Xeloda 500 mg 3 tabs in am/ 2 tabs in pm. Patient does have some anxiety/trouble sleeping with recent death of her good friend.  [de-identified] : 66-year-old woman with history of breast carcinoma. Her history dates back to September 2003 when she was found to have a mass in the left breast. The biopsy revealed infiltrating lobular carcinoma measuring 3.5 cm. The patient underwent lumpectomy and 3 sentinel lymph nodes and 2 non-sentinel lymph nodes were negative for metastases. ER was +90% [place] weakly positive and HER-2/marvin was negative. The patient underwent chemotherapy with 4 cycles of Adriamycin Cytoxan followed by radiation therapy. She was only able to tolerate 6 months of tamoxifen and then stopped her hormonal therapy.\par \par She subsequently has been well until about one month ago when she noted a mass in the right breast. A mammography on December 7, 2016 revealed a mass in the right breast with architectural distortion and multiple microcalcifications. The left breast revealed no evidence of malignancy. Sonogram confirmed a 3.5 x 3.8 cm mass which was palpable and the left breast was negative. \par \par On December 14 of patient underwent ultrasound-guided biopsy of the mass in the right breast which revealed invasive moderately differentiated ductal carcinoma [place] score of 7/9 the ER and [place] were positive greater than 95%, HER-2/marvin 2+ and CISH  test was negative with a ratio of one.\par \par On January 11, 2017 the patient underwent MRI of the breast which revealed an irregular 6.7 x 6.2 cm mass in the right breast in the area of the biopsy proven malignancy. They were irregular and enhancing lesions throughout the right breast measuring up to 2 cm along with diffuse non-mass enhancement throughout the right breast. These are suspicious for multicentric disease. If breast conservation therapy is selected a second look ultrasound biopsy or MRI biopsy would be performed. The left breast revealed diffuse thickening of the skin with subcutaneous edema consistent with post lumpectomy and radiation therapy changes versus infiltrating malignancy with vascular or lymphatic obstruction. Skin punch biopsy and PET/CT were recommended. Also linear clumped enhancement was seen for which MRI guided biopsy was recommended. There were also multiple abnormal appearing lymph nodes in the right axilla suggestive of metastatic disease. An ultrasound-guided biopsy could be performed. The patient now presents for evaluation for further treatment.\par \par The patient overall feels well and denies symptoms relating to her abnormal breast imaging tests and confirmed malignancy. She has undergone BRCA testing and results are pending. Her family history is positive for a sister with breast cancer at age 53, a maternal first cousin with breast cancer and a maternal second cousin with breast cancer. There is no family history of ovarian cancer. 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

## 2021-04-28 NOTE — ASSESSMENT
[Palliative] : Goals of care discussed with patient: Palliative [Palliative Care Plan] : not applicable at this time [FreeTextEntry1] : Pt will continue  Xeloda 500 mg:  3t tab in am/2 tabs in pm x 14 days/7 days off. Will monitor for side effects/toxicity. Repeat CT of chest abdomen and pelvis and results show stable disease. Pt to continue Medical, GI, GYN and surgical followup and testing. Pt has anxiety/insomnia will give RX Xanax 0.25 mg bid. Pt to RTO in 1 month or sooner if needed.

## 2021-04-30 LAB
ALBUMIN SERPL ELPH-MCNC: 4.1 G/DL
ALP BLD-CCNC: 98 U/L
ALT SERPL-CCNC: 9 U/L
ANION GAP SERPL CALC-SCNC: 12 MMOL/L
AST SERPL-CCNC: 22 U/L
BILIRUB SERPL-MCNC: 1 MG/DL
BUN SERPL-MCNC: 25 MG/DL
CALCIUM SERPL-MCNC: 9.8 MG/DL
CHLORIDE SERPL-SCNC: 100 MMOL/L
CO2 SERPL-SCNC: 27 MMOL/L
CREAT SERPL-MCNC: 0.78 MG/DL
GLUCOSE SERPL-MCNC: 98 MG/DL
POTASSIUM SERPL-SCNC: 4.1 MMOL/L
PROT SERPL-MCNC: 7 G/DL
SODIUM SERPL-SCNC: 139 MMOL/L

## 2021-05-04 LAB — CANCER AG27-29 SERPL-ACNC: 446.4 U/ML

## 2021-05-17 ENCOUNTER — RESULT REVIEW (OUTPATIENT)
Age: 71
End: 2021-05-17

## 2021-05-17 ENCOUNTER — APPOINTMENT (OUTPATIENT)
Dept: HEMATOLOGY ONCOLOGY | Facility: CLINIC | Age: 71
End: 2021-05-17
Payer: MEDICARE

## 2021-05-17 VITALS
BODY MASS INDEX: 26.35 KG/M2 | WEIGHT: 154.32 LBS | TEMPERATURE: 97.5 F | DIASTOLIC BLOOD PRESSURE: 80 MMHG | HEIGHT: 64 IN | HEART RATE: 76 BPM | RESPIRATION RATE: 16 BRPM | OXYGEN SATURATION: 95 % | SYSTOLIC BLOOD PRESSURE: 123 MMHG

## 2021-05-17 LAB
BASOPHILS # BLD AUTO: 0.01 K/UL — SIGNIFICANT CHANGE UP (ref 0–0.2)
BASOPHILS NFR BLD AUTO: 0.3 % — SIGNIFICANT CHANGE UP (ref 0–2)
CANCER AG27-29 SERPL-ACNC: 463.1 U/ML
EOSINOPHIL # BLD AUTO: 0.05 K/UL — SIGNIFICANT CHANGE UP (ref 0–0.5)
EOSINOPHIL NFR BLD AUTO: 1.4 % — SIGNIFICANT CHANGE UP (ref 0–6)
HCT VFR BLD CALC: 34.5 % — SIGNIFICANT CHANGE UP (ref 34.5–45)
HGB BLD-MCNC: 11.6 G/DL — SIGNIFICANT CHANGE UP (ref 11.5–15.5)
IMM GRANULOCYTES NFR BLD AUTO: 0.6 % — SIGNIFICANT CHANGE UP (ref 0–1.5)
LYMPHOCYTES # BLD AUTO: 0.71 K/UL — LOW (ref 1–3.3)
LYMPHOCYTES # BLD AUTO: 19.8 % — SIGNIFICANT CHANGE UP (ref 13–44)
MCHC RBC-ENTMCNC: 33.6 G/DL — SIGNIFICANT CHANGE UP (ref 32–36)
MCHC RBC-ENTMCNC: 35.9 PG — HIGH (ref 27–34)
MCV RBC AUTO: 106.8 FL — HIGH (ref 80–100)
MONOCYTES # BLD AUTO: 0.43 K/UL — SIGNIFICANT CHANGE UP (ref 0–0.9)
MONOCYTES NFR BLD AUTO: 12 % — SIGNIFICANT CHANGE UP (ref 2–14)
NEUTROPHILS # BLD AUTO: 2.37 K/UL — SIGNIFICANT CHANGE UP (ref 1.8–7.4)
NEUTROPHILS NFR BLD AUTO: 65.9 % — SIGNIFICANT CHANGE UP (ref 43–77)
NRBC # BLD: 0 /100 WBCS — SIGNIFICANT CHANGE UP (ref 0–0)
PLATELET # BLD AUTO: 160 K/UL — SIGNIFICANT CHANGE UP (ref 150–400)
RBC # BLD: 3.23 M/UL — LOW (ref 3.8–5.2)
RBC # FLD: 16.1 % — HIGH (ref 10.3–14.5)
WBC # BLD: 3.59 K/UL — LOW (ref 3.8–10.5)
WBC # FLD AUTO: 3.59 K/UL — LOW (ref 3.8–10.5)

## 2021-05-17 PROCEDURE — 99214 OFFICE O/P EST MOD 30 MIN: CPT

## 2021-05-18 LAB
ALBUMIN SERPL ELPH-MCNC: 4 G/DL
ALP BLD-CCNC: 90 U/L
ALT SERPL-CCNC: 8 U/L
ANION GAP SERPL CALC-SCNC: 14 MMOL/L
AST SERPL-CCNC: 20 U/L
BILIRUB SERPL-MCNC: 1 MG/DL
BUN SERPL-MCNC: 21 MG/DL
CALCIUM SERPL-MCNC: 9.9 MG/DL
CHLORIDE SERPL-SCNC: 101 MMOL/L
CO2 SERPL-SCNC: 24 MMOL/L
CREAT SERPL-MCNC: 0.71 MG/DL
GLUCOSE SERPL-MCNC: 91 MG/DL
POTASSIUM SERPL-SCNC: 4.9 MMOL/L
PROT SERPL-MCNC: 6.7 G/DL
SODIUM SERPL-SCNC: 140 MMOL/L

## 2021-05-18 NOTE — ASSESSMENT
[FreeTextEntry1] : The patient will continue Xeloda therapy and be monitored for side effects and toxicity.  Last CAT scan showed stable disease with no evidence of progression or metastases.  Her tumor marker has persistently been elevated and will be followed.  She will return in 1 month or sooner as needed.  She will continue medical, GI and GYN follow-up and testing.

## 2021-05-18 NOTE — HISTORY OF PRESENT ILLNESS
[de-identified] : 66-year-old woman with history of breast carcinoma. Her history dates back to September 2003 when she was found to have a mass in the left breast. The biopsy revealed infiltrating lobular carcinoma measuring 3.5 cm. The patient underwent lumpectomy and 3 sentinel lymph nodes and 2 non-sentinel lymph nodes were negative for metastases. ER was +90% [place] weakly positive and HER-2/marvin was negative. The patient underwent chemotherapy with 4 cycles of Adriamycin Cytoxan followed by radiation therapy. She was only able to tolerate 6 months of tamoxifen and then stopped her hormonal therapy.\par \par She subsequently has been well until about one month ago when she noted a mass in the right breast. A mammography on December 7, 2016 revealed a mass in the right breast with architectural distortion and multiple microcalcifications. The left breast revealed no evidence of malignancy. Sonogram confirmed a 3.5 x 3.8 cm mass which was palpable and the left breast was negative. \par \par On December 14 of patient underwent ultrasound-guided biopsy of the mass in the right breast which revealed invasive moderately differentiated ductal carcinoma [place] score of 7/9 the ER and NM were positive greater than 95%, HER-2/marvin 2+ and CISH  test was negative with a ratio of one.\par \par On January 11, 2017 the patient underwent MRI of the breast which revealed an irregular 6.7 x 6.2 cm mass in the right breast in the area of the biopsy proven malignancy. They were irregular and enhancing lesions throughout the right breast measuring up to 2 cm along with diffuse non-mass enhancement throughout the right breast. These are suspicious for multicentric disease. If breast conservation therapy is selected a second look ultrasound biopsy or MRI biopsy would be performed. The left breast revealed diffuse thickening of the skin with subcutaneous edema consistent with post lumpectomy and radiation therapy changes versus infiltrating malignancy with vascular or lymphatic obstruction. Skin punch biopsy and PET/CT were recommended. Also linear clumped enhancement was seen for which MRI guided biopsy was recommended. There were also multiple abnormal appearing lymph nodes in the right axilla suggestive of metastatic disease. An ultrasound-guided biopsy could be performed. The patient now presents for evaluation for further treatment.\par \par The patient overall feels well and denies symptoms relating to her abnormal breast imaging tests and confirmed malignancy. She has undergone BRCA testing and results are pending. Her family history is positive for a sister with breast cancer at age 53, a maternal first cousin with breast cancer and a maternal second cousin with breast cancer. There is no family history of ovarian cancer. 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

## 2021-05-22 NOTE — H&P ADULT. - PROBLEM SELECTOR PLAN 2
no Baseline Cr appears to be ~0.7-0.8 based on labs in [facility] from 4/2017, admitted with Cr=1.42, likely prerenal in setting of SIRS with potential sepsis with occult infection, received 2L IVF in the ED  - f/u repeat Cr on AM labs  - c/w IVF hydration at 75cc/hr  - check urine electrolytes  - if Cr continuing to uptrend, will check renal US last G-CSF received 4/06/17, would be a little outside of window to fully explain patient's leukocytosis at present  trend   given fever/tachycardia on presentation, cannot r/o infectious etiology of yet, plan as above

## 2021-06-17 ENCOUNTER — OUTPATIENT (OUTPATIENT)
Dept: OUTPATIENT SERVICES | Facility: HOSPITAL | Age: 71
LOS: 1 days | Discharge: HOME | End: 2021-06-17

## 2021-06-17 DIAGNOSIS — Z95.828 PRESENCE OF OTHER VASCULAR IMPLANTS AND GRAFTS: Chronic | ICD-10-CM

## 2021-06-17 DIAGNOSIS — Z98.890 OTHER SPECIFIED POSTPROCEDURAL STATES: Chronic | ICD-10-CM

## 2021-06-17 DIAGNOSIS — Z90.89 ACQUIRED ABSENCE OF OTHER ORGANS: Chronic | ICD-10-CM

## 2021-06-17 DIAGNOSIS — C50.919 MALIGNANT NEOPLASM OF UNSPECIFIED SITE OF UNSPECIFIED FEMALE BREAST: ICD-10-CM

## 2021-06-21 ENCOUNTER — RESULT REVIEW (OUTPATIENT)
Age: 71
End: 2021-06-21

## 2021-06-21 ENCOUNTER — APPOINTMENT (OUTPATIENT)
Dept: HEMATOLOGY ONCOLOGY | Facility: CLINIC | Age: 71
End: 2021-06-21
Payer: MEDICARE

## 2021-06-21 VITALS
HEIGHT: 63.98 IN | SYSTOLIC BLOOD PRESSURE: 112 MMHG | WEIGHT: 154.32 LBS | RESPIRATION RATE: 16 BRPM | BODY MASS INDEX: 26.35 KG/M2 | HEART RATE: 74 BPM | DIASTOLIC BLOOD PRESSURE: 69 MMHG | OXYGEN SATURATION: 96 % | TEMPERATURE: 97.2 F

## 2021-06-21 LAB
ALBUMIN SERPL ELPH-MCNC: 3.9 G/DL
ALP BLD-CCNC: 84 U/L
ALT SERPL-CCNC: 7 U/L
ANION GAP SERPL CALC-SCNC: 13 MMOL/L
AST SERPL-CCNC: 17 U/L
BASOPHILS # BLD AUTO: 0.02 K/UL — SIGNIFICANT CHANGE UP (ref 0–0.2)
BASOPHILS NFR BLD AUTO: 0.4 % — SIGNIFICANT CHANGE UP (ref 0–2)
BILIRUB SERPL-MCNC: 0.9 MG/DL
BUN SERPL-MCNC: 21 MG/DL
CALCIUM SERPL-MCNC: 9.7 MG/DL
CHLORIDE SERPL-SCNC: 100 MMOL/L
CO2 SERPL-SCNC: 27 MMOL/L
CREAT SERPL-MCNC: 0.78 MG/DL
EOSINOPHIL # BLD AUTO: 0.12 K/UL — SIGNIFICANT CHANGE UP (ref 0–0.5)
EOSINOPHIL NFR BLD AUTO: 2.5 % — SIGNIFICANT CHANGE UP (ref 0–6)
GLUCOSE SERPL-MCNC: 94 MG/DL
HCT VFR BLD CALC: 34.1 % — LOW (ref 34.5–45)
HGB BLD-MCNC: 11.7 G/DL — SIGNIFICANT CHANGE UP (ref 11.5–15.5)
IMM GRANULOCYTES NFR BLD AUTO: 0.4 % — SIGNIFICANT CHANGE UP (ref 0–1.5)
LYMPHOCYTES # BLD AUTO: 0.93 K/UL — LOW (ref 1–3.3)
LYMPHOCYTES # BLD AUTO: 19.1 % — SIGNIFICANT CHANGE UP (ref 13–44)
MCHC RBC-ENTMCNC: 34.3 G/DL — SIGNIFICANT CHANGE UP (ref 32–36)
MCHC RBC-ENTMCNC: 36.6 PG — HIGH (ref 27–34)
MCV RBC AUTO: 106.6 FL — HIGH (ref 80–100)
MONOCYTES # BLD AUTO: 0.6 K/UL — SIGNIFICANT CHANGE UP (ref 0–0.9)
MONOCYTES NFR BLD AUTO: 12.3 % — SIGNIFICANT CHANGE UP (ref 2–14)
NEUTROPHILS # BLD AUTO: 3.18 K/UL — SIGNIFICANT CHANGE UP (ref 1.8–7.4)
NEUTROPHILS NFR BLD AUTO: 65.3 % — SIGNIFICANT CHANGE UP (ref 43–77)
NRBC # BLD: 0 /100 WBCS — SIGNIFICANT CHANGE UP (ref 0–0)
PLATELET # BLD AUTO: 170 K/UL — SIGNIFICANT CHANGE UP (ref 150–400)
POTASSIUM SERPL-SCNC: 4.7 MMOL/L
PROT SERPL-MCNC: 6.5 G/DL
RBC # BLD: 3.2 M/UL — LOW (ref 3.8–5.2)
RBC # FLD: 16 % — HIGH (ref 10.3–14.5)
SODIUM SERPL-SCNC: 139 MMOL/L
WBC # BLD: 4.87 K/UL — SIGNIFICANT CHANGE UP (ref 3.8–10.5)
WBC # FLD AUTO: 4.87 K/UL — SIGNIFICANT CHANGE UP (ref 3.8–10.5)

## 2021-06-21 PROCEDURE — 99214 OFFICE O/P EST MOD 30 MIN: CPT

## 2021-06-22 LAB — CANCER AG27-29 SERPL-ACNC: 542.6 U/ML

## 2021-06-24 NOTE — HISTORY OF PRESENT ILLNESS
[de-identified] : 66-year-old woman with history of breast carcinoma. Her history dates back to September 2003 when she was found to have a mass in the left breast. The biopsy revealed infiltrating lobular carcinoma measuring 3.5 cm. The patient underwent lumpectomy and 3 sentinel lymph nodes and 2 non-sentinel lymph nodes were negative for metastases. ER was +90% [place] weakly positive and HER-2/marvin was negative. The patient underwent chemotherapy with 4 cycles of Adriamycin Cytoxan followed by radiation therapy. She was only able to tolerate 6 months of tamoxifen and then stopped her hormonal therapy.\par \par She subsequently has been well until about one month ago when she noted a mass in the right breast. A mammography on December 7, 2016 revealed a mass in the right breast with architectural distortion and multiple microcalcifications. The left breast revealed no evidence of malignancy. Sonogram confirmed a 3.5 x 3.8 cm mass which was palpable and the left breast was negative. \par \par On December 14 of patient underwent ultrasound-guided biopsy of the mass in the right breast which revealed invasive moderately differentiated ductal carcinoma [place] score of 7/9 the ER and [place] were positive greater than 95%, HER-2/marvin 2+ and CISH  test was negative with a ratio of one.\par \par On January 11, 2017 the patient underwent MRI of the breast which revealed an irregular 6.7 x 6.2 cm mass in the right breast in the area of the biopsy proven malignancy. They were irregular and enhancing lesions throughout the right breast measuring up to 2 cm along with diffuse non-mass enhancement throughout the right breast. These are suspicious for multicentric disease. If breast conservation therapy is selected a second look ultrasound biopsy or MRI biopsy would be performed. The left breast revealed diffuse thickening of the skin with subcutaneous edema consistent with post lumpectomy and radiation therapy changes versus infiltrating malignancy with vascular or lymphatic obstruction. Skin punch biopsy and PET/CT were recommended. Also linear clumped enhancement was seen for which MRI guided biopsy was recommended. There were also multiple abnormal appearing lymph nodes in the right axilla suggestive of metastatic disease. An ultrasound-guided biopsy could be performed. The patient now presents for evaluation for further treatment.\par \par The patient overall feels well and denies symptoms relating to her abnormal breast imaging tests and confirmed malignancy. She has undergone BRCA testing and results are pending. Her family history is positive for a sister with breast cancer at age 53, a maternal first cousin with breast cancer and a maternal second cousin with breast cancer. There is no family history of ovarian cancer. 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 Pt on xeloda 5 pills a day. Pt has hand foot irritation

## 2021-06-24 NOTE — ASSESSMENT
[Palliative] : Goals of care discussed with patient: Palliative [FreeTextEntry1] : Pt to continue on xeloda and be followed for side effects and toxicity. Pt to repeat CT scans to assess for response or progression of her disease. Pt on xeloda almost 2 yrs to date.  The patient will return in 3 weeks or sooner as needed. [Palliative Care Plan] : not applicable at this time

## 2021-06-25 ENCOUNTER — OUTPATIENT (OUTPATIENT)
Dept: OUTPATIENT SERVICES | Facility: HOSPITAL | Age: 71
LOS: 1 days | End: 2021-06-25
Payer: MEDICARE

## 2021-06-25 ENCOUNTER — APPOINTMENT (OUTPATIENT)
Dept: CT IMAGING | Facility: CLINIC | Age: 71
End: 2021-06-25

## 2021-06-25 ENCOUNTER — APPOINTMENT (OUTPATIENT)
Dept: CT IMAGING | Facility: CLINIC | Age: 71
End: 2021-06-25
Payer: MEDICARE

## 2021-06-25 DIAGNOSIS — Z90.89 ACQUIRED ABSENCE OF OTHER ORGANS: Chronic | ICD-10-CM

## 2021-06-25 DIAGNOSIS — Z95.828 PRESENCE OF OTHER VASCULAR IMPLANTS AND GRAFTS: Chronic | ICD-10-CM

## 2021-06-25 DIAGNOSIS — C79.51 SECONDARY MALIGNANT NEOPLASM OF BONE: ICD-10-CM

## 2021-06-25 DIAGNOSIS — C50.919 MALIGNANT NEOPLASM OF UNSPECIFIED SITE OF UNSPECIFIED FEMALE BREAST: ICD-10-CM

## 2021-06-25 DIAGNOSIS — Z98.890 OTHER SPECIFIED POSTPROCEDURAL STATES: Chronic | ICD-10-CM

## 2021-06-25 PROCEDURE — 71260 CT THORAX DX C+: CPT

## 2021-06-25 PROCEDURE — 74177 CT ABD & PELVIS W/CONTRAST: CPT | Mod: 26,MH

## 2021-06-25 PROCEDURE — 71260 CT THORAX DX C+: CPT | Mod: 26,MH

## 2021-06-25 PROCEDURE — 74177 CT ABD & PELVIS W/CONTRAST: CPT

## 2021-06-26 ENCOUNTER — APPOINTMENT (OUTPATIENT)
Dept: CT IMAGING | Facility: CLINIC | Age: 71
End: 2021-06-26

## 2021-07-06 ENCOUNTER — NON-APPOINTMENT (OUTPATIENT)
Age: 71
End: 2021-07-06

## 2021-07-06 NOTE — DISCHARGE NOTE ADULT - CARE PROVIDER_API CALL
Name band; Severo Dunaway), Hematology; Internal Medicine; Medical Oncology  [address] [place] [id number]  Phone: [phone]  Fax: [phone]

## 2021-07-13 ENCOUNTER — RESULT REVIEW (OUTPATIENT)
Age: 71
End: 2021-07-13

## 2021-07-13 ENCOUNTER — APPOINTMENT (OUTPATIENT)
Dept: HEMATOLOGY ONCOLOGY | Facility: CLINIC | Age: 71
End: 2021-07-13
Payer: MEDICARE

## 2021-07-13 VITALS
HEART RATE: 68 BPM | OXYGEN SATURATION: 99 % | BODY MASS INDEX: 26.35 KG/M2 | RESPIRATION RATE: 16 BRPM | TEMPERATURE: 97.8 F | SYSTOLIC BLOOD PRESSURE: 110 MMHG | DIASTOLIC BLOOD PRESSURE: 72 MMHG | HEIGHT: 63.98 IN | WEIGHT: 154.32 LBS

## 2021-07-13 LAB
BASOPHILS # BLD AUTO: 0.02 K/UL — SIGNIFICANT CHANGE UP (ref 0–0.2)
BASOPHILS NFR BLD AUTO: 0.5 % — SIGNIFICANT CHANGE UP (ref 0–2)
EOSINOPHIL # BLD AUTO: 0.09 K/UL — SIGNIFICANT CHANGE UP (ref 0–0.5)
EOSINOPHIL NFR BLD AUTO: 2.2 % — SIGNIFICANT CHANGE UP (ref 0–6)
HCT VFR BLD CALC: 34.2 % — LOW (ref 34.5–45)
HGB BLD-MCNC: 11.5 G/DL — SIGNIFICANT CHANGE UP (ref 11.5–15.5)
IMM GRANULOCYTES NFR BLD AUTO: 0.2 % — SIGNIFICANT CHANGE UP (ref 0–1.5)
LYMPHOCYTES # BLD AUTO: 0.92 K/UL — LOW (ref 1–3.3)
LYMPHOCYTES # BLD AUTO: 22.8 % — SIGNIFICANT CHANGE UP (ref 13–44)
MCHC RBC-ENTMCNC: 33.6 G/DL — SIGNIFICANT CHANGE UP (ref 32–36)
MCHC RBC-ENTMCNC: 35.7 PG — HIGH (ref 27–34)
MCV RBC AUTO: 106.2 FL — HIGH (ref 80–100)
MONOCYTES # BLD AUTO: 0.45 K/UL — SIGNIFICANT CHANGE UP (ref 0–0.9)
MONOCYTES NFR BLD AUTO: 11.2 % — SIGNIFICANT CHANGE UP (ref 2–14)
NEUTROPHILS # BLD AUTO: 2.54 K/UL — SIGNIFICANT CHANGE UP (ref 1.8–7.4)
NEUTROPHILS NFR BLD AUTO: 63.1 % — SIGNIFICANT CHANGE UP (ref 43–77)
NRBC # BLD: 0 /100 WBCS — SIGNIFICANT CHANGE UP (ref 0–0)
PLATELET # BLD AUTO: 178 K/UL — SIGNIFICANT CHANGE UP (ref 150–400)
RBC # BLD: 3.22 M/UL — LOW (ref 3.8–5.2)
RBC # FLD: 15.9 % — HIGH (ref 10.3–14.5)
WBC # BLD: 4.03 K/UL — SIGNIFICANT CHANGE UP (ref 3.8–10.5)
WBC # FLD AUTO: 4.03 K/UL — SIGNIFICANT CHANGE UP (ref 3.8–10.5)

## 2021-07-13 PROCEDURE — 99214 OFFICE O/P EST MOD 30 MIN: CPT

## 2021-07-13 NOTE — HISTORY OF PRESENT ILLNESS
[de-identified] : 66-year-old woman with history of breast carcinoma. Her history dates back to September 2003 when she was found to have a mass in the left breast. The biopsy revealed infiltrating lobular carcinoma measuring 3.5 cm. The patient underwent lumpectomy and 3 sentinel lymph nodes and 2 non-sentinel lymph nodes were negative for metastases. ER was +90% [place] weakly positive and HER-2/marvin was negative. The patient underwent chemotherapy with 4 cycles of Adriamycin Cytoxan followed by radiation therapy. She was only able to tolerate 6 months of tamoxifen and then stopped her hormonal therapy.\par \par She subsequently has been well until about one month ago when she noted a mass in the right breast. A mammography on December 7, 2016 revealed a mass in the right breast with architectural distortion and multiple microcalcifications. The left breast revealed no evidence of malignancy. Sonogram confirmed a 3.5 x 3.8 cm mass which was palpable and the left breast was negative. \par \par On December 14 of patient underwent ultrasound-guided biopsy of the mass in the right breast which revealed invasive moderately differentiated ductal carcinoma [place] score of 7/9 the ER and [place] were positive greater than 95%, HER-2/marvin 2+ and CISH  test was negative with a ratio of one.\par \par On January 11, 2017 the patient underwent MRI of the breast which revealed an irregular 6.7 x 6.2 cm mass in the right breast in the area of the biopsy proven malignancy. They were irregular and enhancing lesions throughout the right breast measuring up to 2 cm along with diffuse non-mass enhancement throughout the right breast. These are suspicious for multicentric disease. If breast conservation therapy is selected a second look ultrasound biopsy or MRI biopsy would be performed. The left breast revealed diffuse thickening of the skin with subcutaneous edema consistent with post lumpectomy and radiation therapy changes versus infiltrating malignancy with vascular or lymphatic obstruction. Skin punch biopsy and PET/CT were recommended. Also linear clumped enhancement was seen for which MRI guided biopsy was recommended. There were also multiple abnormal appearing lymph nodes in the right axilla suggestive of metastatic disease. An ultrasound-guided biopsy could be performed. The patient now presents for evaluation for further treatment.\par \par The patient overall feels well and denies symptoms relating to her abnormal breast imaging tests and confirmed malignancy. She has undergone BRCA testing and results are pending. Her family history is positive for a sister with breast cancer at age 53, a maternal first cousin with breast cancer and a maternal second cousin with breast cancer. There is no family history of ovarian cancer. 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 Otherwise stable examination as compared to 3/24/2021.\par \par Patient has returned for f/u. She feels fine. She still takes Xeloda 500 mg: 3 tab in am/2 tab in pm x 7 days on/7 days off. Denies any pain, diarrhea, constipation or mouth sores.

## 2021-07-13 NOTE — ASSESSMENT
[Palliative] : Goals of care discussed with patient: Palliative [Palliative Care Plan] : not applicable at this time [FreeTextEntry1] : Pt will continue Xeloda 500 mg:3 tabs in am/2 tabs in pm, 7 days on/7 days off every 28 days. Will monitor for  followed for side effects and toxicity. CT scans of 06/29/2021 shows sacral lesion. Patient will be evaluate by Rad Oncologist for possible treatment to area.  The patient will return in 4 weeks or sooner as needed.

## 2021-07-14 LAB
ALBUMIN SERPL ELPH-MCNC: 4 G/DL
ALP BLD-CCNC: 89 U/L
ALT SERPL-CCNC: 12 U/L
ANION GAP SERPL CALC-SCNC: 9 MMOL/L
AST SERPL-CCNC: 22 U/L
BILIRUB SERPL-MCNC: 0.9 MG/DL
BUN SERPL-MCNC: 21 MG/DL
CALCIUM SERPL-MCNC: 9.6 MG/DL
CANCER AG27-29 SERPL-ACNC: 762.8 U/ML
CHLORIDE SERPL-SCNC: 101 MMOL/L
CO2 SERPL-SCNC: 28 MMOL/L
CREAT SERPL-MCNC: 0.71 MG/DL
GLUCOSE SERPL-MCNC: 100 MG/DL
POTASSIUM SERPL-SCNC: 4.8 MMOL/L
PROT SERPL-MCNC: 6.4 G/DL
SODIUM SERPL-SCNC: 139 MMOL/L

## 2021-08-17 ENCOUNTER — OUTPATIENT (OUTPATIENT)
Dept: OUTPATIENT SERVICES | Facility: HOSPITAL | Age: 71
LOS: 1 days | Discharge: HOME | End: 2021-08-17

## 2021-08-17 DIAGNOSIS — Z90.89 ACQUIRED ABSENCE OF OTHER ORGANS: Chronic | ICD-10-CM

## 2021-08-17 DIAGNOSIS — Z98.890 OTHER SPECIFIED POSTPROCEDURAL STATES: Chronic | ICD-10-CM

## 2021-08-17 DIAGNOSIS — C50.919 MALIGNANT NEOPLASM OF UNSPECIFIED SITE OF UNSPECIFIED FEMALE BREAST: ICD-10-CM

## 2021-08-17 DIAGNOSIS — Z95.828 PRESENCE OF OTHER VASCULAR IMPLANTS AND GRAFTS: Chronic | ICD-10-CM

## 2021-08-18 ENCOUNTER — RESULT REVIEW (OUTPATIENT)
Age: 71
End: 2021-08-18

## 2021-08-18 ENCOUNTER — APPOINTMENT (OUTPATIENT)
Dept: HEMATOLOGY ONCOLOGY | Facility: CLINIC | Age: 71
End: 2021-08-18
Payer: MEDICARE

## 2021-08-18 VITALS
OXYGEN SATURATION: 98 % | HEART RATE: 68 BPM | SYSTOLIC BLOOD PRESSURE: 128 MMHG | DIASTOLIC BLOOD PRESSURE: 80 MMHG | TEMPERATURE: 97.4 F | HEIGHT: 63.98 IN | RESPIRATION RATE: 16 BRPM | BODY MASS INDEX: 26.65 KG/M2 | WEIGHT: 156.09 LBS

## 2021-08-18 LAB
BASOPHILS # BLD AUTO: 0.02 K/UL — SIGNIFICANT CHANGE UP (ref 0–0.2)
BASOPHILS NFR BLD AUTO: 0.6 % — SIGNIFICANT CHANGE UP (ref 0–2)
EOSINOPHIL # BLD AUTO: 0.11 K/UL — SIGNIFICANT CHANGE UP (ref 0–0.5)
EOSINOPHIL NFR BLD AUTO: 3.1 % — SIGNIFICANT CHANGE UP (ref 0–6)
HCT VFR BLD CALC: 32.5 % — LOW (ref 34.5–45)
HGB BLD-MCNC: 10.9 G/DL — LOW (ref 11.5–15.5)
IMM GRANULOCYTES NFR BLD AUTO: 0.3 % — SIGNIFICANT CHANGE UP (ref 0–1.5)
LYMPHOCYTES # BLD AUTO: 0.6 K/UL — LOW (ref 1–3.3)
LYMPHOCYTES # BLD AUTO: 16.8 % — SIGNIFICANT CHANGE UP (ref 13–44)
MCHC RBC-ENTMCNC: 33.5 G/DL — SIGNIFICANT CHANGE UP (ref 32–36)
MCHC RBC-ENTMCNC: 36.1 PG — HIGH (ref 27–34)
MCV RBC AUTO: 107.6 FL — HIGH (ref 80–100)
MONOCYTES # BLD AUTO: 0.55 K/UL — SIGNIFICANT CHANGE UP (ref 0–0.9)
MONOCYTES NFR BLD AUTO: 15.4 % — HIGH (ref 2–14)
NEUTROPHILS # BLD AUTO: 2.28 K/UL — SIGNIFICANT CHANGE UP (ref 1.8–7.4)
NEUTROPHILS NFR BLD AUTO: 63.8 % — SIGNIFICANT CHANGE UP (ref 43–77)
NRBC # BLD: 0 /100 WBCS — SIGNIFICANT CHANGE UP (ref 0–0)
PLATELET # BLD AUTO: 144 K/UL — LOW (ref 150–400)
RBC # BLD: 3.02 M/UL — LOW (ref 3.8–5.2)
RBC # FLD: 15.8 % — HIGH (ref 10.3–14.5)
WBC # BLD: 3.57 K/UL — LOW (ref 3.8–10.5)
WBC # FLD AUTO: 3.57 K/UL — LOW (ref 3.8–10.5)

## 2021-08-18 PROCEDURE — 99214 OFFICE O/P EST MOD 30 MIN: CPT

## 2021-08-19 LAB
ALBUMIN SERPL ELPH-MCNC: 3.7 G/DL
ALP BLD-CCNC: 91 U/L
ALT SERPL-CCNC: 10 U/L
ANION GAP SERPL CALC-SCNC: 8 MMOL/L
AST SERPL-CCNC: 20 U/L
BILIRUB SERPL-MCNC: 0.6 MG/DL
BUN SERPL-MCNC: 18 MG/DL
CALCIUM SERPL-MCNC: 9.4 MG/DL
CHLORIDE SERPL-SCNC: 102 MMOL/L
CO2 SERPL-SCNC: 30 MMOL/L
CREAT SERPL-MCNC: 0.71 MG/DL
GLUCOSE SERPL-MCNC: 91 MG/DL
POTASSIUM SERPL-SCNC: 4.3 MMOL/L
PROT SERPL-MCNC: 6.4 G/DL
SODIUM SERPL-SCNC: 140 MMOL/L

## 2021-08-20 LAB — CANCER AG27-29 SERPL-ACNC: 748.5 U/ML

## 2021-08-22 NOTE — REVIEW OF SYSTEMS
[Negative] : Allergic/Immunologic [Joint Pain] : joint pain [FreeTextEntry9] : +left hip discomfort. pain radiates down left leg.

## 2021-08-22 NOTE — HISTORY OF PRESENT ILLNESS
[de-identified] : 66-year-old woman with history of breast carcinoma. Her history dates back to September 2003 when she was found to have a mass in the left breast. The biopsy revealed infiltrating lobular carcinoma measuring 3.5 cm. The patient underwent lumpectomy and 3 sentinel lymph nodes and 2 non-sentinel lymph nodes were negative for metastases. ER was +90% [place] weakly positive and HER-2/marvin was negative. The patient underwent chemotherapy with 4 cycles of Adriamycin Cytoxan followed by radiation therapy. She was only able to tolerate 6 months of tamoxifen and then stopped her hormonal therapy.\par \par She subsequently has been well until about one month ago when she noted a mass in the right breast. A mammography on December 7, 2016 revealed a mass in the right breast with architectural distortion and multiple microcalcifications. The left breast revealed no evidence of malignancy. Sonogram confirmed a 3.5 x 3.8 cm mass which was palpable and the left breast was negative. \par \par On December 14 of patient underwent ultrasound-guided biopsy of the mass in the right breast which revealed invasive moderately differentiated ductal carcinoma [place] score of 7/9 the ER and [place] were positive greater than 95%, HER-2/marvin 2+ and CISH  test was negative with a ratio of one.\par \par On January 11, 2017 the patient underwent MRI of the breast which revealed an irregular 6.7 x 6.2 cm mass in the right breast in the area of the biopsy proven malignancy. They were irregular and enhancing lesions throughout the right breast measuring up to 2 cm along with diffuse non-mass enhancement throughout the right breast. These are suspicious for multicentric disease. If breast conservation therapy is selected a second look ultrasound biopsy or MRI biopsy would be performed. The left breast revealed diffuse thickening of the skin with subcutaneous edema consistent with post lumpectomy and radiation therapy changes versus infiltrating malignancy with vascular or lymphatic obstruction. Skin punch biopsy and PET/CT were recommended. Also linear clumped enhancement was seen for which MRI guided biopsy was recommended. There were also multiple abnormal appearing lymph nodes in the right axilla suggestive of metastatic disease. An ultrasound-guided biopsy could be performed. The patient now presents for evaluation for further treatment.\par \par The patient overall feels well and denies symptoms relating to her abnormal breast imaging tests and confirmed malignancy. She has undergone BRCA testing and results are pending. Her family history is positive for a sister with breast cancer at age 53, a maternal first cousin with breast cancer and a maternal second cousin with breast cancer. There is no family history of ovarian cancer. 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 Otherwise stable examination as compared to 3/24/2021.\par \par \par Patient has completed SBRT x 10 sessions 3 days ago. The treatment was to lower back. Since the treatments, she feels some discomfort to left hip that radiates down leg. The discomfort 5/10, and she does not want any pain medication. She still takes Xeloda 500 mg: 3 tabs in am/2 tabs in pm x 7 days on/7 days off. Appetite is okay. Denies any abdominal pain. BM's are okay.

## 2021-08-22 NOTE — ASSESSMENT
[Palliative] : Goals of care discussed with patient: Palliative [Palliative Care Plan] : not applicable at this time [FreeTextEntry1] : Pt will continue Xeloda 500 mg:3 tabs in am/2 tabs in pm, 7 days on/7 days off every 28 days. Will monitor for  followed for side effects and toxicity. Pt s/p SBRT to lower back/sacral lesion. Repeat CT-Scan of abdomen/pelvis/chest will done at end of September to evaluate response vs progression of disease. Reviewed CBC, CMP. CA 27-29. Will consider add Xgeva 120 mg monthly, post clearance from disease. The patient will return in 4 weeks or sooner as needed.

## 2021-08-22 NOTE — HISTORY OF PRESENT ILLNESS
[de-identified] : 66-year-old woman with history of breast carcinoma. Her history dates back to September 2003 when she was found to have a mass in the left breast. The biopsy revealed infiltrating lobular carcinoma measuring 3.5 cm. The patient underwent lumpectomy and 3 sentinel lymph nodes and 2 non-sentinel lymph nodes were negative for metastases. ER was +90% [place] weakly positive and HER-2/marvin was negative. The patient underwent chemotherapy with 4 cycles of Adriamycin Cytoxan followed by radiation therapy. She was only able to tolerate 6 months of tamoxifen and then stopped her hormonal therapy.\par \par She subsequently has been well until about one month ago when she noted a mass in the right breast. A mammography on December 7, 2016 revealed a mass in the right breast with architectural distortion and multiple microcalcifications. The left breast revealed no evidence of malignancy. Sonogram confirmed a 3.5 x 3.8 cm mass which was palpable and the left breast was negative. \par \par On December 14 of patient underwent ultrasound-guided biopsy of the mass in the right breast which revealed invasive moderately differentiated ductal carcinoma [place] score of 7/9 the ER and KS were positive greater than 95%, HER-2/marvin 2+ and CISH  test was negative with a ratio of one.\par \par On January 11, 2017 the patient underwent MRI of the breast which revealed an irregular 6.7 x 6.2 cm mass in the right breast in the area of the biopsy proven malignancy. They were irregular and enhancing lesions throughout the right breast measuring up to 2 cm along with diffuse non-mass enhancement throughout the right breast. These are suspicious for multicentric disease. If breast conservation therapy is selected a second look ultrasound biopsy or MRI biopsy would be performed. The left breast revealed diffuse thickening of the skin with subcutaneous edema consistent with post lumpectomy and radiation therapy changes versus infiltrating malignancy with vascular or lymphatic obstruction. Skin punch biopsy and PET/CT were recommended. Also linear clumped enhancement was seen for which MRI guided biopsy was recommended. There were also multiple abnormal appearing lymph nodes in the right axilla suggestive of metastatic disease. An ultrasound-guided biopsy could be performed. The patient now presents for evaluation for further treatment.\par \par The patient overall feels well and denies symptoms relating to her abnormal breast imaging tests and confirmed malignancy. She has undergone BRCA testing and results are pending. Her family history is positive for a sister with breast cancer at age 53, a maternal first cousin with breast cancer and a maternal second cousin with breast cancer. There is no family history of ovarian cancer. 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 Otherwise stable examination as compared to 3/24/2021.\par \par \par Patient has completed SBRT x 10 sessions 3 days ago. The treatment was to lower back. Since the treatments, she feels some discomfort to left hip that radiates down leg. The discomfort 5/10, and she does not want any pain medication. She still takes Xeloda 500 mg: 3 tabs in am/2 tabs in pm x 7 days on/7 days off. Appetite is okay. Denies any abdominal pain. BM's are okay.

## 2021-09-07 ENCOUNTER — RESULT REVIEW (OUTPATIENT)
Age: 71
End: 2021-09-07

## 2021-09-10 ENCOUNTER — OUTPATIENT (OUTPATIENT)
Dept: OUTPATIENT SERVICES | Facility: HOSPITAL | Age: 71
LOS: 1 days | End: 2021-09-10
Payer: MEDICARE

## 2021-09-10 ENCOUNTER — APPOINTMENT (OUTPATIENT)
Dept: CT IMAGING | Facility: CLINIC | Age: 71
End: 2021-09-10
Payer: MEDICARE

## 2021-09-10 DIAGNOSIS — Z90.89 ACQUIRED ABSENCE OF OTHER ORGANS: Chronic | ICD-10-CM

## 2021-09-10 DIAGNOSIS — Z98.890 OTHER SPECIFIED POSTPROCEDURAL STATES: Chronic | ICD-10-CM

## 2021-09-10 DIAGNOSIS — C50.919 MALIGNANT NEOPLASM OF UNSPECIFIED SITE OF UNSPECIFIED FEMALE BREAST: ICD-10-CM

## 2021-09-10 DIAGNOSIS — Z95.828 PRESENCE OF OTHER VASCULAR IMPLANTS AND GRAFTS: Chronic | ICD-10-CM

## 2021-09-10 PROCEDURE — 74177 CT ABD & PELVIS W/CONTRAST: CPT | Mod: 26,MH

## 2021-09-10 PROCEDURE — 71260 CT THORAX DX C+: CPT

## 2021-09-10 PROCEDURE — 74177 CT ABD & PELVIS W/CONTRAST: CPT

## 2021-09-10 PROCEDURE — 71260 CT THORAX DX C+: CPT | Mod: 26,MH

## 2021-09-15 ENCOUNTER — RESULT REVIEW (OUTPATIENT)
Age: 71
End: 2021-09-15

## 2021-09-15 ENCOUNTER — APPOINTMENT (OUTPATIENT)
Dept: HEMATOLOGY ONCOLOGY | Facility: CLINIC | Age: 71
End: 2021-09-15
Payer: MEDICARE

## 2021-09-15 VITALS
HEART RATE: 70 BPM | SYSTOLIC BLOOD PRESSURE: 112 MMHG | WEIGHT: 154.32 LBS | DIASTOLIC BLOOD PRESSURE: 72 MMHG | TEMPERATURE: 97.9 F | BODY MASS INDEX: 26.35 KG/M2 | HEIGHT: 63.98 IN | RESPIRATION RATE: 16 BRPM | OXYGEN SATURATION: 99 %

## 2021-09-15 LAB
BASOPHILS # BLD AUTO: 0.03 K/UL — SIGNIFICANT CHANGE UP (ref 0–0.2)
BASOPHILS NFR BLD AUTO: 0.8 % — SIGNIFICANT CHANGE UP (ref 0–2)
EOSINOPHIL # BLD AUTO: 0.16 K/UL — SIGNIFICANT CHANGE UP (ref 0–0.5)
EOSINOPHIL NFR BLD AUTO: 4.4 % — SIGNIFICANT CHANGE UP (ref 0–6)
HCT VFR BLD CALC: 32.2 % — LOW (ref 34.5–45)
HGB BLD-MCNC: 10.9 G/DL — LOW (ref 11.5–15.5)
IMM GRANULOCYTES NFR BLD AUTO: 0.5 % — SIGNIFICANT CHANGE UP (ref 0–1.5)
LYMPHOCYTES # BLD AUTO: 0.63 K/UL — LOW (ref 1–3.3)
LYMPHOCYTES # BLD AUTO: 17.2 % — SIGNIFICANT CHANGE UP (ref 13–44)
MCHC RBC-ENTMCNC: 33.9 G/DL — SIGNIFICANT CHANGE UP (ref 32–36)
MCHC RBC-ENTMCNC: 36.2 PG — HIGH (ref 27–34)
MCV RBC AUTO: 107 FL — HIGH (ref 80–100)
MONOCYTES # BLD AUTO: 0.52 K/UL — SIGNIFICANT CHANGE UP (ref 0–0.9)
MONOCYTES NFR BLD AUTO: 14.2 % — HIGH (ref 2–14)
NEUTROPHILS # BLD AUTO: 2.31 K/UL — SIGNIFICANT CHANGE UP (ref 1.8–7.4)
NEUTROPHILS NFR BLD AUTO: 62.9 % — SIGNIFICANT CHANGE UP (ref 43–77)
NRBC # BLD: 0 /100 WBCS — SIGNIFICANT CHANGE UP (ref 0–0)
PLATELET # BLD AUTO: 155 K/UL — SIGNIFICANT CHANGE UP (ref 150–400)
RBC # BLD: 3.01 M/UL — LOW (ref 3.8–5.2)
RBC # FLD: 15.9 % — HIGH (ref 10.3–14.5)
WBC # BLD: 3.67 K/UL — LOW (ref 3.8–10.5)
WBC # FLD AUTO: 3.67 K/UL — LOW (ref 3.8–10.5)

## 2021-09-15 PROCEDURE — 99214 OFFICE O/P EST MOD 30 MIN: CPT

## 2021-09-15 NOTE — HISTORY OF PRESENT ILLNESS
[de-identified] : 66-year-old woman with history of breast carcinoma. Her history dates back to September 2003 when she was found to have a mass in the left breast. The biopsy revealed infiltrating lobular carcinoma measuring 3.5 cm. The patient underwent lumpectomy and 3 sentinel lymph nodes and 2 non-sentinel lymph nodes were negative for metastases. ER was +90% [place] weakly positive and HER-2/marvin was negative. The patient underwent chemotherapy with 4 cycles of Adriamycin Cytoxan followed by radiation therapy. She was only able to tolerate 6 months of tamoxifen and then stopped her hormonal therapy.\par \par She subsequently has been well until about one month ago when she noted a mass in the right breast. A mammography on December 7, 2016 revealed a mass in the right breast with architectural distortion and multiple microcalcifications. The left breast revealed no evidence of malignancy. Sonogram confirmed a 3.5 x 3.8 cm mass which was palpable and the left breast was negative. \par \par On December 14 of patient underwent ultrasound-guided biopsy of the mass in the right breast which revealed invasive moderately differentiated ductal carcinoma [place] score of 7/9 the ER and NE were positive greater than 95%, HER-2/marvin 2+ and CISH  test was negative with a ratio of one.\par \par On January 11, 2017 the patient underwent MRI of the breast which revealed an irregular 6.7 x 6.2 cm mass in the right breast in the area of the biopsy proven malignancy. They were irregular and enhancing lesions throughout the right breast measuring up to 2 cm along with diffuse non-mass enhancement throughout the right breast. These are suspicious for multicentric disease. If breast conservation therapy is selected a second look ultrasound biopsy or MRI biopsy would be performed. The left breast revealed diffuse thickening of the skin with subcutaneous edema consistent with post lumpectomy and radiation therapy changes versus infiltrating malignancy with vascular or lymphatic obstruction. Skin punch biopsy and PET/CT were recommended. Also linear clumped enhancement was seen for which MRI guided biopsy was recommended. There were also multiple abnormal appearing lymph nodes in the right axilla suggestive of metastatic disease. An ultrasound-guided biopsy could be performed. The patient now presents for evaluation for further treatment.\par \par The patient overall feels well and denies symptoms relating to her abnormal breast imaging tests and confirmed malignancy. She has undergone BRCA testing and results are pending. Her family history is positive for a sister with breast cancer at age 53, a maternal first cousin with breast cancer and a maternal second cousin with breast cancer. There is no family history of ovarian cancer. 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 Otherwise stable examination as compared to 3/24/2021.\par \par 09/10/2021, CT-Scan of abdomen/pelvis/chest was done which showed: Continued diffuse heterogeneity throughout the liver in a patient with a past history of hepatic metastases without evidence of significant change Diffuse sclerotic metastases with subtle evidence suggestive of new blastic lesions involving T1, T12 and L1\par \par Patient has completed SBRT x 10 sessions to sacral lesion in August 2021.Presently, she feels okay. She still takes Xeloda 500 mg: 3 tabs in am/2 tabs in pm x 7 days on/7 days off. Appetite is fine.  Denies any abdominal pain. BM's are okay except occasional constipation. Pain to left hip has resolved.

## 2021-09-15 NOTE — ASSESSMENT
[Palliative] : Goals of care discussed with patient: Palliative [Palliative Care Plan] : not applicable at this time [FreeTextEntry1] : Pt will continue Xeloda 500 mg:3 tabs in am/2 tabs in pm, 7 days on/7 days off every 28 days. Will monitor for  followed for side effects and toxicity. Pt s/p SBRT to lower back/sacral lesion.  CT-Scan of abdomen/pelvis/chest was done 09/10/2021 which shows: stable liver lesion, however new blast lesions are noted in T1, T12 and L1. Ordered MRI w/wo contrast of T- spine and L-spine, f/u Patient may benefit from SBRT to spine. Reviewed CBC, CMP. and CA 27-29 shows an upward trend. Will consider add Xgeva 120 mg monthly, post clearance from disease. The patient will return in 4 weeks or sooner as needed.

## 2021-09-17 LAB
ALBUMIN SERPL ELPH-MCNC: 3.8 G/DL
ALP BLD-CCNC: 104 U/L
ALT SERPL-CCNC: 9 U/L
ANION GAP SERPL CALC-SCNC: 10 MMOL/L
AST SERPL-CCNC: 20 U/L
BILIRUB SERPL-MCNC: 0.6 MG/DL
BUN SERPL-MCNC: 23 MG/DL
CALCIUM SERPL-MCNC: 9.3 MG/DL
CANCER AG27-29 SERPL-ACNC: 998.8 U/ML
CHLORIDE SERPL-SCNC: 102 MMOL/L
CO2 SERPL-SCNC: 27 MMOL/L
CREAT SERPL-MCNC: 0.71 MG/DL
GLUCOSE SERPL-MCNC: 87 MG/DL
POTASSIUM SERPL-SCNC: 4.5 MMOL/L
PROT SERPL-MCNC: 6.3 G/DL
SODIUM SERPL-SCNC: 139 MMOL/L

## 2021-10-07 ENCOUNTER — OUTPATIENT (OUTPATIENT)
Dept: OUTPATIENT SERVICES | Facility: HOSPITAL | Age: 71
LOS: 1 days | Discharge: HOME | End: 2021-10-07

## 2021-10-07 DIAGNOSIS — Z95.828 PRESENCE OF OTHER VASCULAR IMPLANTS AND GRAFTS: Chronic | ICD-10-CM

## 2021-10-07 DIAGNOSIS — C50.919 MALIGNANT NEOPLASM OF UNSPECIFIED SITE OF UNSPECIFIED FEMALE BREAST: ICD-10-CM

## 2021-10-07 DIAGNOSIS — Z90.89 ACQUIRED ABSENCE OF OTHER ORGANS: Chronic | ICD-10-CM

## 2021-10-07 DIAGNOSIS — Z98.890 OTHER SPECIFIED POSTPROCEDURAL STATES: Chronic | ICD-10-CM

## 2021-10-11 ENCOUNTER — RESULT REVIEW (OUTPATIENT)
Age: 71
End: 2021-10-11

## 2021-10-11 ENCOUNTER — APPOINTMENT (OUTPATIENT)
Dept: HEMATOLOGY ONCOLOGY | Facility: CLINIC | Age: 71
End: 2021-10-11
Payer: MEDICARE

## 2021-10-11 VITALS
HEIGHT: 64.13 IN | DIASTOLIC BLOOD PRESSURE: 76 MMHG | RESPIRATION RATE: 18 BRPM | TEMPERATURE: 97.7 F | WEIGHT: 154.96 LBS | HEART RATE: 71 BPM | SYSTOLIC BLOOD PRESSURE: 120 MMHG | BODY MASS INDEX: 26.46 KG/M2 | OXYGEN SATURATION: 97 %

## 2021-10-11 LAB
BASOPHILS # BLD AUTO: 0.02 K/UL — SIGNIFICANT CHANGE UP (ref 0–0.2)
BASOPHILS NFR BLD AUTO: 0.6 % — SIGNIFICANT CHANGE UP (ref 0–2)
EOSINOPHIL # BLD AUTO: 0.11 K/UL — SIGNIFICANT CHANGE UP (ref 0–0.5)
EOSINOPHIL NFR BLD AUTO: 3.2 % — SIGNIFICANT CHANGE UP (ref 0–6)
HCT VFR BLD CALC: 32.8 % — LOW (ref 34.5–45)
HGB BLD-MCNC: 11.2 G/DL — LOW (ref 11.5–15.5)
IMM GRANULOCYTES NFR BLD AUTO: 0.3 % — SIGNIFICANT CHANGE UP (ref 0–1.5)
LYMPHOCYTES # BLD AUTO: 0.55 K/UL — LOW (ref 1–3.3)
LYMPHOCYTES # BLD AUTO: 16.2 % — SIGNIFICANT CHANGE UP (ref 13–44)
MCHC RBC-ENTMCNC: 34.1 G/DL — SIGNIFICANT CHANGE UP (ref 32–36)
MCHC RBC-ENTMCNC: 36.7 PG — HIGH (ref 27–34)
MCV RBC AUTO: 107.5 FL — HIGH (ref 80–100)
MONOCYTES # BLD AUTO: 0.53 K/UL — SIGNIFICANT CHANGE UP (ref 0–0.9)
MONOCYTES NFR BLD AUTO: 15.6 % — HIGH (ref 2–14)
NEUTROPHILS # BLD AUTO: 2.18 K/UL — SIGNIFICANT CHANGE UP (ref 1.8–7.4)
NEUTROPHILS NFR BLD AUTO: 64.1 % — SIGNIFICANT CHANGE UP (ref 43–77)
NRBC # BLD: 0 /100 WBCS — SIGNIFICANT CHANGE UP (ref 0–0)
PLATELET # BLD AUTO: 146 K/UL — LOW (ref 150–400)
RBC # BLD: 3.05 M/UL — LOW (ref 3.8–5.2)
RBC # FLD: 15.9 % — HIGH (ref 10.3–14.5)
WBC # BLD: 3.4 K/UL — LOW (ref 3.8–10.5)
WBC # FLD AUTO: 3.4 K/UL — LOW (ref 3.8–10.5)

## 2021-10-11 PROCEDURE — 99214 OFFICE O/P EST MOD 30 MIN: CPT

## 2021-10-14 ENCOUNTER — OUTPATIENT (OUTPATIENT)
Dept: OUTPATIENT SERVICES | Facility: HOSPITAL | Age: 71
LOS: 1 days | End: 2021-10-14
Payer: MEDICARE

## 2021-10-14 ENCOUNTER — APPOINTMENT (OUTPATIENT)
Dept: MRI IMAGING | Facility: CLINIC | Age: 71
End: 2021-10-14
Payer: MEDICARE

## 2021-10-14 DIAGNOSIS — C79.51 SECONDARY MALIGNANT NEOPLASM OF BONE: ICD-10-CM

## 2021-10-14 DIAGNOSIS — Z95.828 PRESENCE OF OTHER VASCULAR IMPLANTS AND GRAFTS: Chronic | ICD-10-CM

## 2021-10-14 DIAGNOSIS — Z90.89 ACQUIRED ABSENCE OF OTHER ORGANS: Chronic | ICD-10-CM

## 2021-10-14 DIAGNOSIS — Z98.890 OTHER SPECIFIED POSTPROCEDURAL STATES: Chronic | ICD-10-CM

## 2021-10-14 LAB
ALBUMIN SERPL ELPH-MCNC: 3.8 G/DL
ALP BLD-CCNC: 112 U/L
ALT SERPL-CCNC: 7 U/L
ANION GAP SERPL CALC-SCNC: 10 MMOL/L
AST SERPL-CCNC: 18 U/L
BILIRUB SERPL-MCNC: 0.7 MG/DL
BUN SERPL-MCNC: 25 MG/DL
CALCIUM SERPL-MCNC: 9.4 MG/DL
CANCER AG27-29 SERPL-ACNC: 1225.9 U/ML
CHLORIDE SERPL-SCNC: 99 MMOL/L
CO2 SERPL-SCNC: 28 MMOL/L
CREAT SERPL-MCNC: 0.73 MG/DL
GLUCOSE SERPL-MCNC: 80 MG/DL
POTASSIUM SERPL-SCNC: 4.4 MMOL/L
PROT SERPL-MCNC: 6.5 G/DL
SODIUM SERPL-SCNC: 138 MMOL/L

## 2021-10-14 PROCEDURE — 72157 MRI CHEST SPINE W/O & W/DYE: CPT

## 2021-10-14 PROCEDURE — A9585: CPT

## 2021-10-14 PROCEDURE — 72157 MRI CHEST SPINE W/O & W/DYE: CPT | Mod: 26,MH

## 2021-10-14 PROCEDURE — 72158 MRI LUMBAR SPINE W/O & W/DYE: CPT

## 2021-10-14 PROCEDURE — 72158 MRI LUMBAR SPINE W/O & W/DYE: CPT | Mod: 26,MH

## 2021-10-17 NOTE — REVIEW OF SYSTEMS
[Constipation] : constipation [Negative] : Allergic/Immunologic [de-identified] : has numbness of hands and feet [FreeTextEntry9] : left hp pain

## 2021-10-17 NOTE — ASSESSMENT
[Palliative] : Goals of care discussed with patient: Palliative [FreeTextEntry1] : The patient's recent CAT scan in September 2021 was reviewed indicating stable liver metastases however there was subtle new bone lesions seen at T1, T12 and L1 suggesting progression.. Pt to do MRI of spine for new bone lesions seen on CT.  Pt to continue on xeloda.  The patient will also receive Xgeva bone directed therapy and side effects were discussed in detail.  The patient will return in 1 month or sooner as needed. [Palliative Care Plan] : not applicable at this time

## 2021-10-17 NOTE — HISTORY OF PRESENT ILLNESS
[de-identified] : 66-year-old woman with history of breast carcinoma. Her history dates back to September 2003 when she was found to have a mass in the left breast. The biopsy revealed infiltrating lobular carcinoma measuring 3.5 cm. The patient underwent lumpectomy and 3 sentinel lymph nodes and 2 non-sentinel lymph nodes were negative for metastases. ER was +90% [place] weakly positive and HER-2/marvin was negative. The patient underwent chemotherapy with 4 cycles of Adriamycin Cytoxan followed by radiation therapy. She was only able to tolerate 6 months of tamoxifen and then stopped her hormonal therapy.\par \par She subsequently has been well until about one month ago when she noted a mass in the right breast. A mammography on December 7, 2016 revealed a mass in the right breast with architectural distortion and multiple microcalcifications. The left breast revealed no evidence of malignancy. Sonogram confirmed a 3.5 x 3.8 cm mass which was palpable and the left breast was negative. \par \par On December 14 of patient underwent ultrasound-guided biopsy of the mass in the right breast which revealed invasive moderately differentiated ductal carcinoma [place] score of 7/9 the ER and CA were positive greater than 95%, HER-2/marvin 2+ and CISH  test was negative with a ratio of one.\par \par On January 11, 2017 the patient underwent MRI of the breast which revealed an irregular 6.7 x 6.2 cm mass in the right breast in the area of the biopsy proven malignancy. They were irregular and enhancing lesions throughout the right breast measuring up to 2 cm along with diffuse non-mass enhancement throughout the right breast. These are suspicious for multicentric disease. If breast conservation therapy is selected a second look ultrasound biopsy or MRI biopsy would be performed. The left breast revealed diffuse thickening of the skin with subcutaneous edema consistent with post lumpectomy and radiation therapy changes versus infiltrating malignancy with vascular or lymphatic obstruction. Skin punch biopsy and PET/CT were recommended. Also linear clumped enhancement was seen for which MRI guided biopsy was recommended. There were also multiple abnormal appearing lymph nodes in the right axilla suggestive of metastatic disease. An ultrasound-guided biopsy could be performed. The patient now presents for evaluation for further treatment.\par \par The patient overall feels well and denies symptoms relating to her abnormal breast imaging tests and confirmed malignancy. She has undergone BRCA testing and results are pending. Her family history is positive for a sister with breast cancer at age 53, a maternal first cousin with breast cancer and a maternal second cousin with breast cancer. There is no family history of ovarian cancer. 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 Pt to do MRI of spine.  Patient returns to discuss results of recent repeat CAT scan.

## 2021-10-20 ENCOUNTER — NON-APPOINTMENT (OUTPATIENT)
Age: 71
End: 2021-10-20

## 2021-11-04 RX ORDER — APIXABAN 5 MG/1
5 TABLET, FILM COATED ORAL
Qty: 90 | Refills: 3 | Status: ACTIVE | COMMUNITY
Start: 2021-11-04 | End: 1900-01-01

## 2021-11-09 NOTE — ED PROVIDER NOTE - CADM POA CENTRAL LINE
No Consent 2/Introductory Paragraph: Mohs surgery was explained to the patient and consent was obtained. The risks, benefits and alternatives to therapy were discussed in detail. Specifically, the risks of infection, scarring, bleeding, prolonged wound healing, incomplete removal, allergy to anesthesia, nerve injury and recurrence were addressed. Prior to the procedure, the treatment site was clearly identified and confirmed by the patient using mirror when possible. All components of Universal Protocol/PAUSE Rule completed.

## 2021-11-11 ENCOUNTER — OUTPATIENT (OUTPATIENT)
Dept: OUTPATIENT SERVICES | Facility: HOSPITAL | Age: 71
LOS: 1 days | Discharge: HOME | End: 2021-11-11

## 2021-11-11 DIAGNOSIS — Z95.828 PRESENCE OF OTHER VASCULAR IMPLANTS AND GRAFTS: Chronic | ICD-10-CM

## 2021-11-11 DIAGNOSIS — Z90.89 ACQUIRED ABSENCE OF OTHER ORGANS: Chronic | ICD-10-CM

## 2021-11-11 DIAGNOSIS — Z98.890 OTHER SPECIFIED POSTPROCEDURAL STATES: Chronic | ICD-10-CM

## 2021-11-11 DIAGNOSIS — C50.919 MALIGNANT NEOPLASM OF UNSPECIFIED SITE OF UNSPECIFIED FEMALE BREAST: ICD-10-CM

## 2021-11-15 ENCOUNTER — RESULT REVIEW (OUTPATIENT)
Age: 71
End: 2021-11-15

## 2021-11-15 ENCOUNTER — APPOINTMENT (OUTPATIENT)
Dept: HEMATOLOGY ONCOLOGY | Facility: CLINIC | Age: 71
End: 2021-11-15
Payer: MEDICARE

## 2021-11-15 VITALS
DIASTOLIC BLOOD PRESSURE: 83 MMHG | HEIGHT: 64.09 IN | OXYGEN SATURATION: 99 % | SYSTOLIC BLOOD PRESSURE: 132 MMHG | RESPIRATION RATE: 16 BRPM | WEIGHT: 152.12 LBS | BODY MASS INDEX: 25.97 KG/M2 | TEMPERATURE: 97.7 F | HEART RATE: 68 BPM

## 2021-11-15 DIAGNOSIS — Z80.0 FAMILY HISTORY OF MALIGNANT NEOPLASM OF DIGESTIVE ORGANS: ICD-10-CM

## 2021-11-15 LAB
BASOPHILS # BLD AUTO: 0.02 K/UL — SIGNIFICANT CHANGE UP (ref 0–0.2)
BASOPHILS NFR BLD AUTO: 0.6 % — SIGNIFICANT CHANGE UP (ref 0–2)
EOSINOPHIL # BLD AUTO: 0.12 K/UL — SIGNIFICANT CHANGE UP (ref 0–0.5)
EOSINOPHIL NFR BLD AUTO: 3.7 % — SIGNIFICANT CHANGE UP (ref 0–6)
HCT VFR BLD CALC: 34.5 % — SIGNIFICANT CHANGE UP (ref 34.5–45)
HGB BLD-MCNC: 11.5 G/DL — SIGNIFICANT CHANGE UP (ref 11.5–15.5)
IMM GRANULOCYTES NFR BLD AUTO: 0.6 % — SIGNIFICANT CHANGE UP (ref 0–1.5)
LYMPHOCYTES # BLD AUTO: 0.62 K/UL — LOW (ref 1–3.3)
LYMPHOCYTES # BLD AUTO: 19.1 % — SIGNIFICANT CHANGE UP (ref 13–44)
MCHC RBC-ENTMCNC: 33.3 G/DL — SIGNIFICANT CHANGE UP (ref 32–36)
MCHC RBC-ENTMCNC: 35.8 PG — HIGH (ref 27–34)
MCV RBC AUTO: 107.5 FL — HIGH (ref 80–100)
MONOCYTES # BLD AUTO: 0.46 K/UL — SIGNIFICANT CHANGE UP (ref 0–0.9)
MONOCYTES NFR BLD AUTO: 14.2 % — HIGH (ref 2–14)
NEUTROPHILS # BLD AUTO: 2.01 K/UL — SIGNIFICANT CHANGE UP (ref 1.8–7.4)
NEUTROPHILS NFR BLD AUTO: 61.8 % — SIGNIFICANT CHANGE UP (ref 43–77)
NRBC # BLD: 0 /100 WBCS — SIGNIFICANT CHANGE UP (ref 0–0)
PLATELET # BLD AUTO: 166 K/UL — SIGNIFICANT CHANGE UP (ref 150–400)
RBC # BLD: 3.21 M/UL — LOW (ref 3.8–5.2)
RBC # FLD: 15.8 % — HIGH (ref 10.3–14.5)
WBC # BLD: 3.25 K/UL — LOW (ref 3.8–10.5)
WBC # FLD AUTO: 3.25 K/UL — LOW (ref 3.8–10.5)

## 2021-11-15 PROCEDURE — 99214 OFFICE O/P EST MOD 30 MIN: CPT

## 2021-11-15 NOTE — HISTORY OF PRESENT ILLNESS
[de-identified] : 66-year-old woman with history of breast carcinoma. Her history dates back to September 2003 when she was found to have a mass in the left breast. The biopsy revealed infiltrating lobular carcinoma measuring 3.5 cm. The patient underwent lumpectomy and 3 sentinel lymph nodes and 2 non-sentinel lymph nodes were negative for metastases. ER was +90% [place] weakly positive and HER-2/marvin was negative. The patient underwent chemotherapy with 4 cycles of Adriamycin Cytoxan followed by radiation therapy. She was only able to tolerate 6 months of tamoxifen and then stopped her hormonal therapy.\par \par She subsequently has been well until about one month ago when she noted a mass in the right breast. A mammography on December 7, 2016 revealed a mass in the right breast with architectural distortion and multiple microcalcifications. The left breast revealed no evidence of malignancy. Sonogram confirmed a 3.5 x 3.8 cm mass which was palpable and the left breast was negative. \par \par On December 14 of patient underwent ultrasound-guided biopsy of the mass in the right breast which revealed invasive moderately differentiated ductal carcinoma [place] score of 7/9 the ER and PA were positive greater than 95%, HER-2/marvin 2+ and CISH  test was negative with a ratio of one.\par \par On January 11, 2017 the patient underwent MRI of the breast which revealed an irregular 6.7 x 6.2 cm mass in the right breast in the area of the biopsy proven malignancy. They were irregular and enhancing lesions throughout the right breast measuring up to 2 cm along with diffuse non-mass enhancement throughout the right breast. These are suspicious for multicentric disease. If breast conservation therapy is selected a second look ultrasound biopsy or MRI biopsy would be performed. The left breast revealed diffuse thickening of the skin with subcutaneous edema consistent with post lumpectomy and radiation therapy changes versus infiltrating malignancy with vascular or lymphatic obstruction. Skin punch biopsy and PET/CT were recommended. Also linear clumped enhancement was seen for which MRI guided biopsy was recommended. There were also multiple abnormal appearing lymph nodes in the right axilla suggestive of metastatic disease. An ultrasound-guided biopsy could be performed. The patient now presents for evaluation for further treatment.\par \par The patient overall feels well and denies symptoms relating to her abnormal breast imaging tests and confirmed malignancy. She has undergone BRCA testing and results are pending. Her family history is positive for a sister with breast cancer at age 53, a maternal first cousin with breast cancer and a maternal second cousin with breast cancer. There is no family history of ovarian cancer. 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

## 2021-11-15 NOTE — ASSESSMENT
[FreeTextEntry1] : A discussion took place with the patient and her sister who also has been treated for breast cancer regarding further management. The patient has stable liver metastases but increased bone metastases involving the spine with no evidence of spinal cord compression. The patient has undergone radiation oncology consultation but was felt that she would not be eligible for radiation at this time since it is too close to her previous radiation therapy to the lower spine. Right now the activity is in the thoracic and upper lumbar area. We discussed options for further treatment including hormonal therapy versus chemotherapy. We discussed doing a biopsy of the bone but the patient states her last biopsy was uncomfortable and also the bone testing for ER/VA HER-2 may not be as accurate. As a result we discussed going on IV chemotherapy with Navelbine. Side effects and toxicity were discussed in detail and informed consent was obtained. We also spoke about going on Xgeva bone directed therapy but the patient could not get dental clearance because of extensive work she requires which she wishes to hold off on at this time. The patient will undergo scheduling for a Mediport for IV access. She'll be monitored for side effects and toxicity and will return in 1 month or sooner as needed. We also discussed performing genetic testing because of her family history of breast cancer in her sisters and this will be sent off today. [Palliative] : Goals of care discussed with patient: Palliative [Palliative Care Plan] : not applicable at this time

## 2021-11-16 LAB
ALBUMIN SERPL ELPH-MCNC: 3.9 G/DL
ALP BLD-CCNC: 136 U/L
ALT SERPL-CCNC: 9 U/L
ANION GAP SERPL CALC-SCNC: 11 MMOL/L
APTT BLD: 33.5 SEC
AST SERPL-CCNC: 22 U/L
BILIRUB SERPL-MCNC: 0.7 MG/DL
BUN SERPL-MCNC: 25 MG/DL
CALCIUM SERPL-MCNC: 9.6 MG/DL
CHLORIDE SERPL-SCNC: 99 MMOL/L
CO2 SERPL-SCNC: 28 MMOL/L
CREAT SERPL-MCNC: 0.71 MG/DL
GLUCOSE SERPL-MCNC: 77 MG/DL
INR PPP: 2.27 RATIO
POTASSIUM SERPL-SCNC: 4.5 MMOL/L
PROT SERPL-MCNC: 6.3 G/DL
PT BLD: 25.8 SEC
SODIUM SERPL-SCNC: 138 MMOL/L

## 2021-11-17 DIAGNOSIS — Z01.818 ENCOUNTER FOR OTHER PREPROCEDURAL EXAMINATION: ICD-10-CM

## 2021-11-18 ENCOUNTER — APPOINTMENT (OUTPATIENT)
Dept: DISASTER EMERGENCY | Facility: CLINIC | Age: 71
End: 2021-11-18

## 2021-11-19 LAB — SARS-COV-2 N GENE NPH QL NAA+PROBE: NOT DETECTED

## 2021-11-22 ENCOUNTER — RESULT REVIEW (OUTPATIENT)
Age: 71
End: 2021-11-22

## 2021-11-22 ENCOUNTER — APPOINTMENT (OUTPATIENT)
Dept: ENDOVASCULAR SURGERY | Facility: CLINIC | Age: 71
End: 2021-11-22
Payer: MEDICARE

## 2021-11-22 VITALS
WEIGHT: 150 LBS | DIASTOLIC BLOOD PRESSURE: 81 MMHG | SYSTOLIC BLOOD PRESSURE: 128 MMHG | HEIGHT: 64 IN | OXYGEN SATURATION: 95 % | BODY MASS INDEX: 25.61 KG/M2 | HEART RATE: 70 BPM | TEMPERATURE: 97.8 F | RESPIRATION RATE: 18 BRPM

## 2021-11-22 PROCEDURE — 36561 INSERT TUNNELED CV CATH: CPT

## 2021-11-22 PROCEDURE — 77001 FLUOROGUIDE FOR VEIN DEVICE: CPT

## 2021-11-22 PROCEDURE — 76937 US GUIDE VASCULAR ACCESS: CPT

## 2021-11-22 RX ORDER — PALBOCICLIB 100 MG/1
100 CAPSULE ORAL
Qty: 21 | Refills: 3 | Status: DISCONTINUED | COMMUNITY
Start: 2019-11-04 | End: 2021-11-22

## 2021-11-22 RX ORDER — PALBOCICLIB 125 MG/1
125 CAPSULE ORAL DAILY
Qty: 21 | Refills: 2 | Status: DISCONTINUED | COMMUNITY
Start: 2019-10-03 | End: 2021-11-22

## 2021-11-22 RX ORDER — ENOXAPARIN SODIUM 300 MG/3ML
INJECTION INTRAVENOUS; SUBCUTANEOUS
Refills: 0 | Status: DISCONTINUED | COMMUNITY
End: 2021-11-22

## 2021-11-22 RX ORDER — ENOXAPARIN SODIUM 150 MG/ML
150 INJECTION SUBCUTANEOUS
Qty: 3 | Refills: 0 | Status: DISCONTINUED | COMMUNITY
Start: 2017-06-02 | End: 2021-11-22

## 2021-11-24 ENCOUNTER — APPOINTMENT (OUTPATIENT)
Dept: INFUSION THERAPY | Facility: HOSPITAL | Age: 71
End: 2021-11-24

## 2021-11-24 ENCOUNTER — LABORATORY RESULT (OUTPATIENT)
Age: 71
End: 2021-11-24

## 2021-11-24 ENCOUNTER — RESULT REVIEW (OUTPATIENT)
Age: 71
End: 2021-11-24

## 2021-11-24 DIAGNOSIS — R11.2 NAUSEA WITH VOMITING, UNSPECIFIED: ICD-10-CM

## 2021-11-24 DIAGNOSIS — Z51.11 ENCOUNTER FOR ANTINEOPLASTIC CHEMOTHERAPY: ICD-10-CM

## 2021-11-24 LAB
BASOPHILS # BLD AUTO: 0 K/UL — SIGNIFICANT CHANGE UP (ref 0–0.2)
BASOPHILS NFR BLD AUTO: 0 % — SIGNIFICANT CHANGE UP (ref 0–2)
EOSINOPHIL # BLD AUTO: 0.11 K/UL — SIGNIFICANT CHANGE UP (ref 0–0.5)
EOSINOPHIL NFR BLD AUTO: 2 % — SIGNIFICANT CHANGE UP (ref 0–6)
HCT VFR BLD CALC: 33.5 % — LOW (ref 34.5–45)
HGB BLD-MCNC: 12.1 G/DL — SIGNIFICANT CHANGE UP (ref 11.5–15.5)
LYMPHOCYTES # BLD AUTO: 1.01 K/UL — SIGNIFICANT CHANGE UP (ref 1–3.3)
LYMPHOCYTES # BLD AUTO: 19 % — SIGNIFICANT CHANGE UP (ref 13–44)
MCHC RBC-ENTMCNC: 36.1 G/DL — HIGH (ref 32–36)
MCHC RBC-ENTMCNC: 37.2 PG — HIGH (ref 27–34)
MCV RBC AUTO: 103.1 FL — HIGH (ref 80–100)
MONOCYTES # BLD AUTO: 0.48 K/UL — SIGNIFICANT CHANGE UP (ref 0–0.9)
MONOCYTES NFR BLD AUTO: 9 % — SIGNIFICANT CHANGE UP (ref 2–14)
NEUTROPHILS # BLD AUTO: 3.72 K/UL — SIGNIFICANT CHANGE UP (ref 1.8–7.4)
NEUTROPHILS NFR BLD AUTO: 70 % — SIGNIFICANT CHANGE UP (ref 43–77)
NRBC # BLD: 0 /100 — SIGNIFICANT CHANGE UP (ref 0–0)
NRBC # BLD: SIGNIFICANT CHANGE UP /100 WBCS (ref 0–0)
PLAT MORPH BLD: NORMAL — SIGNIFICANT CHANGE UP
PLATELET # BLD AUTO: 211 K/UL — SIGNIFICANT CHANGE UP (ref 150–400)
RBC # BLD: 3.25 M/UL — LOW (ref 3.8–5.2)
RBC # FLD: 15.9 % — HIGH (ref 10.3–14.5)
RBC BLD AUTO: SIGNIFICANT CHANGE UP
WBC # BLD: 5.32 K/UL — SIGNIFICANT CHANGE UP (ref 3.8–10.5)
WBC # FLD AUTO: 5.32 K/UL — SIGNIFICANT CHANGE UP (ref 3.8–10.5)

## 2021-11-29 LAB — CANCER AG27-29 SERPL-ACNC: 2052.5 U/ML

## 2021-12-01 ENCOUNTER — RESULT REVIEW (OUTPATIENT)
Age: 71
End: 2021-12-01

## 2021-12-01 ENCOUNTER — LABORATORY RESULT (OUTPATIENT)
Age: 71
End: 2021-12-01

## 2021-12-01 ENCOUNTER — APPOINTMENT (OUTPATIENT)
Dept: INFUSION THERAPY | Facility: HOSPITAL | Age: 71
End: 2021-12-01

## 2021-12-01 LAB
BASOPHILS # BLD AUTO: 0.04 K/UL — SIGNIFICANT CHANGE UP (ref 0–0.2)
BASOPHILS NFR BLD AUTO: 1.4 % — SIGNIFICANT CHANGE UP (ref 0–2)
EOSINOPHIL # BLD AUTO: 0.08 K/UL — SIGNIFICANT CHANGE UP (ref 0–0.5)
EOSINOPHIL NFR BLD AUTO: 2.7 % — SIGNIFICANT CHANGE UP (ref 0–6)
HCT VFR BLD CALC: 31.5 % — LOW (ref 34.5–45)
HGB BLD-MCNC: 11.1 G/DL — LOW (ref 11.5–15.5)
IMM GRANULOCYTES NFR BLD AUTO: 1.7 % — HIGH (ref 0–1.5)
LYMPHOCYTES # BLD AUTO: 0.79 K/UL — LOW (ref 1–3.3)
LYMPHOCYTES # BLD AUTO: 27 % — SIGNIFICANT CHANGE UP (ref 13–44)
MCHC RBC-ENTMCNC: 35.2 G/DL — SIGNIFICANT CHANGE UP (ref 32–36)
MCHC RBC-ENTMCNC: 36.5 PG — HIGH (ref 27–34)
MCV RBC AUTO: 103.6 FL — HIGH (ref 80–100)
MONOCYTES # BLD AUTO: 0.36 K/UL — SIGNIFICANT CHANGE UP (ref 0–0.9)
MONOCYTES NFR BLD AUTO: 12.3 % — SIGNIFICANT CHANGE UP (ref 2–14)
NEUTROPHILS # BLD AUTO: 1.61 K/UL — LOW (ref 1.8–7.4)
NEUTROPHILS NFR BLD AUTO: 54.9 % — SIGNIFICANT CHANGE UP (ref 43–77)
NRBC # BLD: 0 /100 WBCS — SIGNIFICANT CHANGE UP (ref 0–0)
PLATELET # BLD AUTO: 171 K/UL — SIGNIFICANT CHANGE UP (ref 150–400)
RBC # BLD: 3.04 M/UL — LOW (ref 3.8–5.2)
RBC # FLD: 15.6 % — HIGH (ref 10.3–14.5)
WBC # BLD: 2.93 K/UL — LOW (ref 3.8–10.5)
WBC # FLD AUTO: 2.93 K/UL — LOW (ref 3.8–10.5)

## 2021-12-01 RX ORDER — LIDOCAINE AND PRILOCAINE 25; 25 MG/G; MG/G
2.5-2.5 CREAM TOPICAL
Qty: 1 | Refills: 2 | Status: ACTIVE | COMMUNITY
Start: 2021-12-01 | End: 1900-01-01

## 2021-12-03 NOTE — PAST MEDICAL HISTORY
[Increasing age ( >40 years old)] : Increasing age ( >40 years old) [Malignancy] : Malignancy [Previous history of DVT or PE] : Previous history of DVT or PE [No therapy indicated for cases scheduled for less than one hour] : No therapy indicated for cases scheduled for less than one hour. [FreeTextEntry1] : Malignant Hyperthermia Screening Tool and Risk of Bleeding Assessment\par \par Ms. SUNITHA SEVERINO denies family history of unexpected death following Anesthesia or Exercise.\par Denies Family history of Malignant Hyperthermia, Muscle or Neuromuscular disorder and High Temperature following exercise.\par \par Ms. SUNITHA SEVERINO denies history of Muscle Spasm, Dark or Chocolate - Colored urine and Unanticipated fever immediately following anesthesia or serious exercise. \par Ms. SEVERINO also denies bleeding tendencies/ Risks of Bleeding.\par

## 2021-12-03 NOTE — HISTORY OF PRESENT ILLNESS
[FreeTextEntry1] : accompanied by friend Mckay [phone]\par alert and orientated x 3\par fully covid vaccinated (Pfizer)\par covid test: not detected 11/18/21\par medications taken today: amlodipine \par last eliquis friday  [FreeTextEntry5] : yesterday 9pm  [FreeTextEntry6] : Dr. Vasquez

## 2021-12-08 ENCOUNTER — LABORATORY RESULT (OUTPATIENT)
Age: 71
End: 2021-12-08

## 2021-12-08 ENCOUNTER — RESULT REVIEW (OUTPATIENT)
Age: 71
End: 2021-12-08

## 2021-12-08 ENCOUNTER — APPOINTMENT (OUTPATIENT)
Dept: INFUSION THERAPY | Facility: HOSPITAL | Age: 71
End: 2021-12-08

## 2021-12-08 LAB
BASOPHILS # BLD AUTO: 0 K/UL — SIGNIFICANT CHANGE UP (ref 0–0.2)
BASOPHILS NFR BLD AUTO: 0 % — SIGNIFICANT CHANGE UP (ref 0–2)
EOSINOPHIL # BLD AUTO: 0.02 K/UL — SIGNIFICANT CHANGE UP (ref 0–0.5)
EOSINOPHIL NFR BLD AUTO: 1 % — SIGNIFICANT CHANGE UP (ref 0–6)
HCT VFR BLD CALC: 31.4 % — LOW (ref 34.5–45)
HGB BLD-MCNC: 10.8 G/DL — LOW (ref 11.5–15.5)
LYMPHOCYTES # BLD AUTO: 0.78 K/UL — LOW (ref 1–3.3)
LYMPHOCYTES # BLD AUTO: 52 % — HIGH (ref 13–44)
MCHC RBC-ENTMCNC: 34.4 G/DL — SIGNIFICANT CHANGE UP (ref 32–36)
MCHC RBC-ENTMCNC: 35.6 PG — HIGH (ref 27–34)
MCV RBC AUTO: 103.6 FL — HIGH (ref 80–100)
MONOCYTES # BLD AUTO: 0.08 K/UL — SIGNIFICANT CHANGE UP (ref 0–0.9)
MONOCYTES NFR BLD AUTO: 5 % — SIGNIFICANT CHANGE UP (ref 2–14)
NEUTROPHILS # BLD AUTO: 0.63 K/UL — LOW (ref 1.8–7.4)
NEUTROPHILS NFR BLD AUTO: 42 % — LOW (ref 43–77)
NRBC # BLD: 0 /100 — SIGNIFICANT CHANGE UP (ref 0–0)
NRBC # BLD: SIGNIFICANT CHANGE UP /100 WBCS (ref 0–0)
PLAT MORPH BLD: NORMAL — SIGNIFICANT CHANGE UP
PLATELET # BLD AUTO: 235 K/UL — SIGNIFICANT CHANGE UP (ref 150–400)
RBC # BLD: 3.03 M/UL — LOW (ref 3.8–5.2)
RBC # FLD: 15.3 % — HIGH (ref 10.3–14.5)
RBC BLD AUTO: SIGNIFICANT CHANGE UP
WBC # BLD: 1.5 K/UL — LOW (ref 3.8–10.5)
WBC # FLD AUTO: 1.5 K/UL — LOW (ref 3.8–10.5)

## 2021-12-10 ENCOUNTER — OUTPATIENT (OUTPATIENT)
Dept: OUTPATIENT SERVICES | Facility: HOSPITAL | Age: 71
LOS: 1 days | Discharge: HOME | End: 2021-12-10

## 2021-12-10 DIAGNOSIS — C50.919 MALIGNANT NEOPLASM OF UNSPECIFIED SITE OF UNSPECIFIED FEMALE BREAST: ICD-10-CM

## 2021-12-10 DIAGNOSIS — Z90.89 ACQUIRED ABSENCE OF OTHER ORGANS: Chronic | ICD-10-CM

## 2021-12-10 DIAGNOSIS — Z98.890 OTHER SPECIFIED POSTPROCEDURAL STATES: Chronic | ICD-10-CM

## 2021-12-10 DIAGNOSIS — Z95.828 PRESENCE OF OTHER VASCULAR IMPLANTS AND GRAFTS: Chronic | ICD-10-CM

## 2021-12-14 ENCOUNTER — RESULT REVIEW (OUTPATIENT)
Age: 71
End: 2021-12-14

## 2021-12-14 ENCOUNTER — APPOINTMENT (OUTPATIENT)
Dept: HEMATOLOGY ONCOLOGY | Facility: CLINIC | Age: 71
End: 2021-12-14
Payer: MEDICARE

## 2021-12-14 VITALS
SYSTOLIC BLOOD PRESSURE: 134 MMHG | RESPIRATION RATE: 18 BRPM | HEART RATE: 67 BPM | BODY MASS INDEX: 25.86 KG/M2 | TEMPERATURE: 97.3 F | OXYGEN SATURATION: 98 % | DIASTOLIC BLOOD PRESSURE: 76 MMHG | HEIGHT: 63.98 IN | WEIGHT: 151.46 LBS

## 2021-12-14 PROCEDURE — 99214 OFFICE O/P EST MOD 30 MIN: CPT

## 2021-12-14 NOTE — REVIEW OF SYSTEMS
[Negative] : Gastrointestinal [Joint Pain] : joint pain [Constipation] : no constipation [FreeTextEntry9] : Lower back pain to right side that radiates down right leg.

## 2021-12-14 NOTE — HISTORY OF PRESENT ILLNESS
[de-identified] : 66-year-old woman with history of breast carcinoma. Her history dates back to September 2003 when she was found to have a mass in the left breast. The biopsy revealed infiltrating lobular carcinoma measuring 3.5 cm. The patient underwent lumpectomy and 3 sentinel lymph nodes and 2 non-sentinel lymph nodes were negative for metastases. ER was +90% [place] weakly positive and HER-2/marvin was negative. The patient underwent chemotherapy with 4 cycles of Adriamycin Cytoxan followed by radiation therapy. She was only able to tolerate 6 months of tamoxifen and then stopped her hormonal therapy.\par \par She subsequently has been well until about one month ago when she noted a mass in the right breast. A mammography on December 7, 2016 revealed a mass in the right breast with architectural distortion and multiple microcalcifications. The left breast revealed no evidence of malignancy. Sonogram confirmed a 3.5 x 3.8 cm mass which was palpable and the left breast was negative. \par \par On December 14 of patient underwent ultrasound-guided biopsy of the mass in the right breast which revealed invasive moderately differentiated ductal carcinoma [place] score of 7/9 the ER and [place] were positive greater than 95%, HER-2/marvin 2+ and CISH  test was negative with a ratio of one.\par \par On January 11, 2017 the patient underwent MRI of the breast which revealed an irregular 6.7 x 6.2 cm mass in the right breast in the area of the biopsy proven malignancy. They were irregular and enhancing lesions throughout the right breast measuring up to 2 cm along with diffuse non-mass enhancement throughout the right breast. These are suspicious for multicentric disease. If breast conservation therapy is selected a second look ultrasound biopsy or MRI biopsy would be performed. The left breast revealed diffuse thickening of the skin with subcutaneous edema consistent with post lumpectomy and radiation therapy changes versus infiltrating malignancy with vascular or lymphatic obstruction. Skin punch biopsy and PET/CT were recommended. Also linear clumped enhancement was seen for which MRI guided biopsy was recommended. There were also multiple abnormal appearing lymph nodes in the right axilla suggestive of metastatic disease. An ultrasound-guided biopsy could be performed. The patient now presents for evaluation for further treatment.\par \par The patient overall feels well and denies symptoms relating to her abnormal breast imaging tests and confirmed malignancy. She has undergone BRCA testing and results are pending. Her family history is positive for a sister with breast cancer at age 53, a maternal first cousin with breast cancer and a maternal second cousin with breast cancer. There is no family history of ovarian cancer. 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 Presently, she feels okay except right lower back that radiates down leg. She has tried Tylenol with little relief.  Denies any nausea, vomiting, diarrhea, constipation, mouth sores, and abdominal pain.

## 2021-12-14 NOTE — ASSESSMENT
[Palliative] : Goals of care discussed with patient: Palliative [Palliative Care Plan] : not applicable at this time [FreeTextEntry1] : S/p  C1D8 Navelbine. Today's CBC ANC= 1.6. Patient will continue treatment tomorrow. Will monitor for side effects/toxicities. Pt has right lower back pain that radiates down right leg. RX Gabapentin 300 mg hs given, f/u. Review labs, CBC, CMP, CA27-29. Patient will repeat CT-Scan of abdomen/pelvis/chest after 3 months of treatment to assess response vs progression of disease. RTO in 3 weeks.

## 2021-12-15 ENCOUNTER — RESULT REVIEW (OUTPATIENT)
Age: 71
End: 2021-12-15

## 2021-12-15 ENCOUNTER — APPOINTMENT (OUTPATIENT)
Dept: INFUSION THERAPY | Facility: HOSPITAL | Age: 71
End: 2021-12-15

## 2021-12-15 DIAGNOSIS — Z51.11 ENCOUNTER FOR ANTINEOPLASTIC CHEMOTHERAPY: ICD-10-CM

## 2021-12-15 DIAGNOSIS — R11.2 NAUSEA WITH VOMITING, UNSPECIFIED: ICD-10-CM

## 2021-12-16 LAB
ALBUMIN SERPL ELPH-MCNC: 4 G/DL — SIGNIFICANT CHANGE UP (ref 3.3–5)
ALP SERPL-CCNC: 142 U/L — HIGH (ref 40–120)
ALT FLD-CCNC: 11 U/L — SIGNIFICANT CHANGE UP (ref 10–45)
ANION GAP SERPL CALC-SCNC: 13 MMOL/L — SIGNIFICANT CHANGE UP (ref 5–17)
AST SERPL-CCNC: 24 U/L — SIGNIFICANT CHANGE UP (ref 10–40)
BILIRUB SERPL-MCNC: 0.4 MG/DL — SIGNIFICANT CHANGE UP (ref 0.2–1.2)
BUN SERPL-MCNC: 20 MG/DL — SIGNIFICANT CHANGE UP (ref 7–23)
CALCIUM SERPL-MCNC: 9.5 MG/DL — SIGNIFICANT CHANGE UP (ref 8.4–10.5)
CANCER AG27-29 SERPL-ACNC: 2526.6 U/ML — HIGH (ref 0–38.6)
CHLORIDE SERPL-SCNC: 102 MMOL/L — SIGNIFICANT CHANGE UP (ref 96–108)
CO2 SERPL-SCNC: 25 MMOL/L — SIGNIFICANT CHANGE UP (ref 22–31)
CREAT SERPL-MCNC: 0.75 MG/DL — SIGNIFICANT CHANGE UP (ref 0.5–1.3)
GLUCOSE SERPL-MCNC: 76 MG/DL — SIGNIFICANT CHANGE UP (ref 70–99)
POTASSIUM SERPL-MCNC: 4.5 MMOL/L — SIGNIFICANT CHANGE UP (ref 3.5–5.3)
POTASSIUM SERPL-SCNC: 4.5 MMOL/L — SIGNIFICANT CHANGE UP (ref 3.5–5.3)
PROT SERPL-MCNC: 6.4 G/DL — SIGNIFICANT CHANGE UP (ref 6–8.3)
SODIUM SERPL-SCNC: 140 MMOL/L — SIGNIFICANT CHANGE UP (ref 135–145)

## 2021-12-17 ENCOUNTER — NON-APPOINTMENT (OUTPATIENT)
Age: 71
End: 2021-12-17

## 2021-12-22 ENCOUNTER — RESULT REVIEW (OUTPATIENT)
Age: 71
End: 2021-12-22

## 2021-12-22 ENCOUNTER — APPOINTMENT (OUTPATIENT)
Dept: INFUSION THERAPY | Facility: HOSPITAL | Age: 71
End: 2021-12-22

## 2021-12-22 LAB
BASOPHILS # BLD AUTO: 0.06 K/UL — SIGNIFICANT CHANGE UP (ref 0–0.2)
BASOPHILS NFR BLD AUTO: 1.7 % — SIGNIFICANT CHANGE UP (ref 0–2)
EOSINOPHIL # BLD AUTO: 0.1 K/UL — SIGNIFICANT CHANGE UP (ref 0–0.5)
EOSINOPHIL NFR BLD AUTO: 2.8 % — SIGNIFICANT CHANGE UP (ref 0–6)
HCT VFR BLD CALC: 29 % — LOW (ref 34.5–45)
HGB BLD-MCNC: 10.1 G/DL — LOW (ref 11.5–15.5)
IMM GRANULOCYTES NFR BLD AUTO: 1.4 % — SIGNIFICANT CHANGE UP (ref 0–1.5)
LYMPHOCYTES # BLD AUTO: 0.78 K/UL — LOW (ref 1–3.3)
LYMPHOCYTES # BLD AUTO: 22.2 % — SIGNIFICANT CHANGE UP (ref 13–44)
MCHC RBC-ENTMCNC: 34.8 G/DL — SIGNIFICANT CHANGE UP (ref 32–36)
MCHC RBC-ENTMCNC: 35.4 PG — HIGH (ref 27–34)
MCV RBC AUTO: 101.8 FL — HIGH (ref 80–100)
MONOCYTES # BLD AUTO: 0.33 K/UL — SIGNIFICANT CHANGE UP (ref 0–0.9)
MONOCYTES NFR BLD AUTO: 9.4 % — SIGNIFICANT CHANGE UP (ref 2–14)
NEUTROPHILS # BLD AUTO: 2.2 K/UL — SIGNIFICANT CHANGE UP (ref 1.8–7.4)
NEUTROPHILS NFR BLD AUTO: 62.5 % — SIGNIFICANT CHANGE UP (ref 43–77)
NRBC # BLD: 0 /100 WBCS — SIGNIFICANT CHANGE UP (ref 0–0)
PLATELET # BLD AUTO: 198 K/UL — SIGNIFICANT CHANGE UP (ref 150–400)
RBC # BLD: 2.85 M/UL — LOW (ref 3.8–5.2)
RBC # FLD: 14.6 % — HIGH (ref 10.3–14.5)
WBC # BLD: 3.52 K/UL — LOW (ref 3.8–10.5)
WBC # FLD AUTO: 3.52 K/UL — LOW (ref 3.8–10.5)

## 2021-12-29 NOTE — ASU PATIENT PROFILE, ADULT - SURGICAL SITE INCISION
From: Edgar Alejandre  To: JÚNIOR Gao  Sent: 12/29/2021 10:40 AM CST  Subject: Referral for today     Tomer Parent,  I have an appointment with Dr Miguel Ángel Groves for dermatology this afternoon. I broke out and she fit me in.  Can a referral please be placed for no

## 2022-01-01 ENCOUNTER — RESULT REVIEW (OUTPATIENT)
Age: 72
End: 2022-01-01

## 2022-01-01 ENCOUNTER — APPOINTMENT (OUTPATIENT)
Dept: CT IMAGING | Facility: IMAGING CENTER | Age: 72
End: 2022-01-01
Payer: MEDICARE

## 2022-01-01 ENCOUNTER — APPOINTMENT (OUTPATIENT)
Dept: HEMATOLOGY ONCOLOGY | Facility: CLINIC | Age: 72
End: 2022-01-01
Payer: MEDICARE

## 2022-01-01 ENCOUNTER — APPOINTMENT (OUTPATIENT)
Dept: HEMATOLOGY ONCOLOGY | Facility: CLINIC | Age: 72
End: 2022-01-01

## 2022-01-01 ENCOUNTER — APPOINTMENT (OUTPATIENT)
Dept: INFUSION THERAPY | Facility: HOSPITAL | Age: 72
End: 2022-01-01

## 2022-01-01 ENCOUNTER — OUTPATIENT (OUTPATIENT)
Dept: OUTPATIENT SERVICES | Facility: HOSPITAL | Age: 72
LOS: 1 days | Discharge: HOME | End: 2022-01-01

## 2022-01-01 ENCOUNTER — APPOINTMENT (OUTPATIENT)
Dept: GERIATRICS | Facility: CLINIC | Age: 72
End: 2022-01-01

## 2022-01-01 ENCOUNTER — INPATIENT (INPATIENT)
Facility: HOSPITAL | Age: 72
LOS: 10 days | Discharge: NURSING, REHAB OR LONG-TERM CARE | DRG: 492 | End: 2022-10-21
Attending: INTERNAL MEDICINE | Admitting: STUDENT IN AN ORGANIZED HEALTH CARE EDUCATION/TRAINING PROGRAM
Payer: MEDICARE

## 2022-01-01 ENCOUNTER — NON-APPOINTMENT (OUTPATIENT)
Age: 72
End: 2022-01-01

## 2022-01-01 ENCOUNTER — OUTPATIENT (OUTPATIENT)
Dept: OUTPATIENT SERVICES | Facility: HOSPITAL | Age: 72
LOS: 1 days | End: 2022-01-01
Payer: MEDICARE

## 2022-01-01 ENCOUNTER — APPOINTMENT (OUTPATIENT)
Dept: NUCLEAR MEDICINE | Facility: IMAGING CENTER | Age: 72
End: 2022-01-01
Payer: MEDICARE

## 2022-01-01 ENCOUNTER — TRANSCRIPTION ENCOUNTER (OUTPATIENT)
Age: 72
End: 2022-01-01

## 2022-01-01 ENCOUNTER — APPOINTMENT (OUTPATIENT)
Dept: CT IMAGING | Facility: CLINIC | Age: 72
End: 2022-01-01

## 2022-01-01 ENCOUNTER — APPOINTMENT (OUTPATIENT)
Dept: ORTHOPEDIC SURGERY | Facility: CLINIC | Age: 72
End: 2022-01-01

## 2022-01-01 ENCOUNTER — APPOINTMENT (OUTPATIENT)
Dept: CT IMAGING | Facility: CLINIC | Age: 72
End: 2022-01-01
Payer: MEDICARE

## 2022-01-01 ENCOUNTER — APPOINTMENT (OUTPATIENT)
Dept: RADIATION ONCOLOGY | Facility: CLINIC | Age: 72
End: 2022-01-01

## 2022-01-01 ENCOUNTER — INPATIENT (INPATIENT)
Facility: HOSPITAL | Age: 72
LOS: 2 days | Discharge: HOME | DRG: 554 | End: 2022-05-09
Attending: HOSPITALIST | Admitting: STUDENT IN AN ORGANIZED HEALTH CARE EDUCATION/TRAINING PROGRAM
Payer: MEDICARE

## 2022-01-01 VITALS
SYSTOLIC BLOOD PRESSURE: 143 MMHG | RESPIRATION RATE: 18 BRPM | DIASTOLIC BLOOD PRESSURE: 80 MMHG | OXYGEN SATURATION: 94 % | TEMPERATURE: 98 F | HEART RATE: 80 BPM

## 2022-01-01 VITALS
HEIGHT: 65 IN | WEIGHT: 149.03 LBS | RESPIRATION RATE: 16 BRPM | OXYGEN SATURATION: 99 % | TEMPERATURE: 103 F | DIASTOLIC BLOOD PRESSURE: 76 MMHG | SYSTOLIC BLOOD PRESSURE: 139 MMHG | HEART RATE: 103 BPM

## 2022-01-01 VITALS
HEART RATE: 92 BPM | SYSTOLIC BLOOD PRESSURE: 161 MMHG | OXYGEN SATURATION: 98 % | TEMPERATURE: 98 F | RESPIRATION RATE: 20 BRPM | HEIGHT: 65 IN | WEIGHT: 141.98 LBS | DIASTOLIC BLOOD PRESSURE: 85 MMHG

## 2022-01-01 VITALS
RESPIRATION RATE: 16 BRPM | DIASTOLIC BLOOD PRESSURE: 77 MMHG | BODY MASS INDEX: 25.78 KG/M2 | OXYGEN SATURATION: 97 % | TEMPERATURE: 97 F | HEART RATE: 78 BPM | WEIGHT: 150.99 LBS | SYSTOLIC BLOOD PRESSURE: 118 MMHG | HEIGHT: 63.98 IN

## 2022-01-01 VITALS
BODY MASS INDEX: 26.35 KG/M2 | WEIGHT: 154.32 LBS | DIASTOLIC BLOOD PRESSURE: 79 MMHG | OXYGEN SATURATION: 99 % | RESPIRATION RATE: 16 BRPM | SYSTOLIC BLOOD PRESSURE: 145 MMHG | HEIGHT: 63.98 IN | TEMPERATURE: 96.8 F | HEART RATE: 80 BPM

## 2022-01-01 VITALS
RESPIRATION RATE: 16 BRPM | HEART RATE: 85 BPM | HEIGHT: 64 IN | DIASTOLIC BLOOD PRESSURE: 73 MMHG | BODY MASS INDEX: 25.44 KG/M2 | SYSTOLIC BLOOD PRESSURE: 137 MMHG | WEIGHT: 148.99 LBS | TEMPERATURE: 97.9 F | OXYGEN SATURATION: 98 %

## 2022-01-01 VITALS
TEMPERATURE: 97.3 F | HEART RATE: 81 BPM | RESPIRATION RATE: 16 BRPM | WEIGHT: 134.48 LBS | DIASTOLIC BLOOD PRESSURE: 67 MMHG | BODY MASS INDEX: 22.96 KG/M2 | HEIGHT: 63.98 IN | OXYGEN SATURATION: 99 % | SYSTOLIC BLOOD PRESSURE: 102 MMHG

## 2022-01-01 VITALS
SYSTOLIC BLOOD PRESSURE: 108 MMHG | TEMPERATURE: 99 F | DIASTOLIC BLOOD PRESSURE: 69 MMHG | HEART RATE: 84 BPM | RESPIRATION RATE: 20 BRPM | OXYGEN SATURATION: 94 %

## 2022-01-01 VITALS
BODY MASS INDEX: 25.95 KG/M2 | RESPIRATION RATE: 16 BRPM | SYSTOLIC BLOOD PRESSURE: 131 MMHG | HEART RATE: 87 BPM | OXYGEN SATURATION: 97 % | DIASTOLIC BLOOD PRESSURE: 82 MMHG | WEIGHT: 151.99 LBS | HEIGHT: 64 IN | TEMPERATURE: 98.2 F

## 2022-01-01 VITALS
OXYGEN SATURATION: 99 % | SYSTOLIC BLOOD PRESSURE: 130 MMHG | HEART RATE: 78 BPM | TEMPERATURE: 97 F | RESPIRATION RATE: 16 BRPM | DIASTOLIC BLOOD PRESSURE: 78 MMHG

## 2022-01-01 VITALS
TEMPERATURE: 97.3 F | SYSTOLIC BLOOD PRESSURE: 104 MMHG | HEIGHT: 63.98 IN | RESPIRATION RATE: 16 BRPM | HEART RATE: 77 BPM | DIASTOLIC BLOOD PRESSURE: 63 MMHG | BODY MASS INDEX: 25.78 KG/M2 | WEIGHT: 150.99 LBS | OXYGEN SATURATION: 99 %

## 2022-01-01 VITALS — BODY MASS INDEX: 25.9 KG/M2 | WEIGHT: 150.77 LBS

## 2022-01-01 DIAGNOSIS — K21.9 GASTRO-ESOPHAGEAL REFLUX DISEASE WITHOUT ESOPHAGITIS: ICD-10-CM

## 2022-01-01 DIAGNOSIS — Z98.890 OTHER SPECIFIED POSTPROCEDURAL STATES: Chronic | ICD-10-CM

## 2022-01-01 DIAGNOSIS — Z90.89 ACQUIRED ABSENCE OF OTHER ORGANS: Chronic | ICD-10-CM

## 2022-01-01 DIAGNOSIS — G95.20 UNSPECIFIED CORD COMPRESSION: ICD-10-CM

## 2022-01-01 DIAGNOSIS — Z95.828 PRESENCE OF OTHER VASCULAR IMPLANTS AND GRAFTS: Chronic | ICD-10-CM

## 2022-01-01 DIAGNOSIS — Z71.89 OTHER SPECIFIED COUNSELING: ICD-10-CM

## 2022-01-01 DIAGNOSIS — D64.9 ANEMIA, UNSPECIFIED: ICD-10-CM

## 2022-01-01 DIAGNOSIS — Z79.899 OTHER LONG TERM (CURRENT) DRUG THERAPY: ICD-10-CM

## 2022-01-01 DIAGNOSIS — C50.919 MALIGNANT NEOPLASM OF UNSPECIFIED SITE OF UNSPECIFIED FEMALE BREAST: ICD-10-CM

## 2022-01-01 DIAGNOSIS — S42.309A UNSPECIFIED FRACTURE OF SHAFT OF HUMERUS, UNSPECIFIED ARM, INITIAL ENCOUNTER FOR CLOSED FRACTURE: ICD-10-CM

## 2022-01-01 DIAGNOSIS — C79.51 SECONDARY MALIGNANT NEOPLASM OF BONE: ICD-10-CM

## 2022-01-01 DIAGNOSIS — G89.3 NEOPLASM RELATED PAIN (ACUTE) (CHRONIC): ICD-10-CM

## 2022-01-01 DIAGNOSIS — F41.8 OTHER SPECIFIED ANXIETY DISORDERS: ICD-10-CM

## 2022-01-01 DIAGNOSIS — F32.A DEPRESSION, UNSPECIFIED: ICD-10-CM

## 2022-01-01 DIAGNOSIS — K59.03 DRUG INDUCED CONSTIPATION: ICD-10-CM

## 2022-01-01 DIAGNOSIS — I10 ESSENTIAL (PRIMARY) HYPERTENSION: ICD-10-CM

## 2022-01-01 DIAGNOSIS — M54.9 DORSALGIA, UNSPECIFIED: ICD-10-CM

## 2022-01-01 DIAGNOSIS — R11.2 NAUSEA WITH VOMITING, UNSPECIFIED: ICD-10-CM

## 2022-01-01 DIAGNOSIS — R19.7 DIARRHEA, UNSPECIFIED: ICD-10-CM

## 2022-01-01 DIAGNOSIS — T40.2X5A DRUG INDUCED CONSTIPATION: ICD-10-CM

## 2022-01-01 DIAGNOSIS — Z29.9 ENCOUNTER FOR PROPHYLACTIC MEASURES, UNSPECIFIED: ICD-10-CM

## 2022-01-01 DIAGNOSIS — Z86.718 PERSONAL HISTORY OF OTHER VENOUS THROMBOSIS AND EMBOLISM: ICD-10-CM

## 2022-01-01 DIAGNOSIS — J90 PLEURAL EFFUSION, NOT ELSEWHERE CLASSIFIED: ICD-10-CM

## 2022-01-01 DIAGNOSIS — M89.9 DISORDER OF BONE, UNSPECIFIED: ICD-10-CM

## 2022-01-01 DIAGNOSIS — Z51.5 ENCOUNTER FOR PALLIATIVE CARE: ICD-10-CM

## 2022-01-01 DIAGNOSIS — M19.90 UNSPECIFIED OSTEOARTHRITIS, UNSPECIFIED SITE: ICD-10-CM

## 2022-01-01 DIAGNOSIS — G62.9 POLYNEUROPATHY, UNSPECIFIED: ICD-10-CM

## 2022-01-01 DIAGNOSIS — J45.909 UNSPECIFIED ASTHMA, UNCOMPLICATED: ICD-10-CM

## 2022-01-01 DIAGNOSIS — Z51.11 ENCOUNTER FOR ANTINEOPLASTIC CHEMOTHERAPY: ICD-10-CM

## 2022-01-01 DIAGNOSIS — I82.409 ACUTE EMBOLISM AND THROMBOSIS OF UNSPECIFIED DEEP VEINS OF UNSPECIFIED LOWER EXTREMITY: ICD-10-CM

## 2022-01-01 DIAGNOSIS — C50.911 MALIGNANT NEOPLASM OF UNSPECIFIED SITE OF RIGHT FEMALE BREAST: ICD-10-CM

## 2022-01-01 DIAGNOSIS — F43.21 ADJUSTMENT DISORDER WITH DEPRESSED MOOD: ICD-10-CM

## 2022-01-01 DIAGNOSIS — C78.7 SECONDARY MALIGNANT NEOPLASM OF LIVER AND INTRAHEPATIC BILE DUCT: ICD-10-CM

## 2022-01-01 DIAGNOSIS — R52 PAIN, UNSPECIFIED: ICD-10-CM

## 2022-01-01 DIAGNOSIS — M25.561 PAIN IN RIGHT KNEE: ICD-10-CM

## 2022-01-01 DIAGNOSIS — R50.9 FEVER, UNSPECIFIED: ICD-10-CM

## 2022-01-01 DIAGNOSIS — A41.9 SEPSIS, UNSPECIFIED ORGANISM: ICD-10-CM

## 2022-01-01 LAB
ALBUMIN SERPL ELPH-MCNC: 2.6 G/DL — LOW (ref 3.3–5)
ALBUMIN SERPL ELPH-MCNC: 3.1 G/DL — LOW (ref 3.3–5)
ALBUMIN SERPL ELPH-MCNC: 3.1 G/DL — LOW (ref 3.3–5)
ALBUMIN SERPL ELPH-MCNC: 3.3 G/DL — SIGNIFICANT CHANGE UP (ref 3.3–5)
ALBUMIN SERPL ELPH-MCNC: 3.5 G/DL — SIGNIFICANT CHANGE UP (ref 3.3–5)
ALBUMIN SERPL ELPH-MCNC: 3.6 G/DL — SIGNIFICANT CHANGE UP (ref 3.3–5)
ALBUMIN SERPL ELPH-MCNC: 3.6 G/DL — SIGNIFICANT CHANGE UP (ref 3.3–5)
ALBUMIN SERPL ELPH-MCNC: 3.7 G/DL
ALBUMIN SERPL ELPH-MCNC: 3.7 G/DL — SIGNIFICANT CHANGE UP (ref 3.3–5)
ALBUMIN SERPL ELPH-MCNC: 3.8 G/DL — SIGNIFICANT CHANGE UP (ref 3.3–5)
ALBUMIN SERPL ELPH-MCNC: 3.8 G/DL — SIGNIFICANT CHANGE UP (ref 3.3–5)
ALBUMIN SERPL ELPH-MCNC: 3.9 G/DL — SIGNIFICANT CHANGE UP (ref 3.3–5)
ALBUMIN SERPL ELPH-MCNC: 4 G/DL
ALP BLD-CCNC: 136 U/L
ALP BLD-CCNC: 274 U/L
ALP SERPL-CCNC: 111 U/L — SIGNIFICANT CHANGE UP (ref 40–120)
ALP SERPL-CCNC: 125 U/L — HIGH (ref 40–120)
ALP SERPL-CCNC: 130 U/L — HIGH (ref 40–120)
ALP SERPL-CCNC: 132 U/L — HIGH (ref 40–120)
ALP SERPL-CCNC: 133 U/L — HIGH (ref 40–120)
ALP SERPL-CCNC: 133 U/L — HIGH (ref 40–120)
ALP SERPL-CCNC: 141 U/L — HIGH (ref 40–120)
ALP SERPL-CCNC: 151 U/L — HIGH (ref 40–120)
ALP SERPL-CCNC: 155 U/L — HIGH (ref 40–120)
ALP SERPL-CCNC: 210 U/L — HIGH (ref 40–120)
ALP SERPL-CCNC: 232 U/L — HIGH (ref 40–120)
ALP SERPL-CCNC: 234 U/L — HIGH (ref 40–120)
ALP SERPL-CCNC: 262 U/L — HIGH (ref 40–120)
ALT FLD-CCNC: 11 U/L — SIGNIFICANT CHANGE UP (ref 10–45)
ALT FLD-CCNC: 12 U/L — SIGNIFICANT CHANGE UP (ref 10–45)
ALT FLD-CCNC: 13 U/L — SIGNIFICANT CHANGE UP (ref 10–45)
ALT FLD-CCNC: 13 U/L — SIGNIFICANT CHANGE UP (ref 12–78)
ALT FLD-CCNC: 14 U/L — SIGNIFICANT CHANGE UP (ref 10–45)
ALT FLD-CCNC: 14 U/L — SIGNIFICANT CHANGE UP (ref 10–45)
ALT FLD-CCNC: 15 U/L — SIGNIFICANT CHANGE UP (ref 10–45)
ALT FLD-CCNC: 16 U/L — SIGNIFICANT CHANGE UP (ref 10–45)
ALT FLD-CCNC: 16 U/L — SIGNIFICANT CHANGE UP (ref 10–45)
ALT FLD-CCNC: 18 U/L — SIGNIFICANT CHANGE UP (ref 12–78)
ALT FLD-CCNC: 19 U/L — SIGNIFICANT CHANGE UP (ref 10–45)
ALT SERPL-CCNC: 13 U/L
ALT SERPL-CCNC: 13 U/L
ANION GAP SERPL CALC-SCNC: 11 MMOL/L — SIGNIFICANT CHANGE UP (ref 5–17)
ANION GAP SERPL CALC-SCNC: 12 MMOL/L — SIGNIFICANT CHANGE UP (ref 5–17)
ANION GAP SERPL CALC-SCNC: 12 MMOL/L — SIGNIFICANT CHANGE UP (ref 5–17)
ANION GAP SERPL CALC-SCNC: 13 MMOL/L — SIGNIFICANT CHANGE UP (ref 5–17)
ANION GAP SERPL CALC-SCNC: 14 MMOL/L — SIGNIFICANT CHANGE UP (ref 5–17)
ANION GAP SERPL CALC-SCNC: 15 MMOL/L
ANION GAP SERPL CALC-SCNC: 15 MMOL/L — SIGNIFICANT CHANGE UP (ref 5–17)
ANION GAP SERPL CALC-SCNC: 16 MMOL/L
ANION GAP SERPL CALC-SCNC: 18 MMOL/L — HIGH (ref 5–17)
ANION GAP SERPL CALC-SCNC: 19 MMOL/L — HIGH (ref 5–17)
ANION GAP SERPL CALC-SCNC: 4 MMOL/L — LOW (ref 5–17)
ANION GAP SERPL CALC-SCNC: 6 MMOL/L — SIGNIFICANT CHANGE UP (ref 5–17)
ANION GAP SERPL CALC-SCNC: 7 MMOL/L — SIGNIFICANT CHANGE UP (ref 5–17)
ANION GAP SERPL CALC-SCNC: 7 MMOL/L — SIGNIFICANT CHANGE UP (ref 5–17)
ANION GAP SERPL CALC-SCNC: 9 MMOL/L — SIGNIFICANT CHANGE UP (ref 5–17)
ANISOCYTOSIS BLD QL: SIGNIFICANT CHANGE UP
APPEARANCE UR: CLEAR — SIGNIFICANT CHANGE UP
APPEARANCE UR: CLEAR — SIGNIFICANT CHANGE UP
APTT BLD: 23.3 SEC — LOW (ref 27.5–35.5)
APTT BLD: 26.2 SEC — LOW (ref 27.5–35.5)
APTT BLD: 26.9 SEC — LOW (ref 27.5–35.5)
APTT BLD: 27.1 SEC — LOW (ref 27.5–35.5)
APTT BLD: 27.4 SEC — LOW (ref 27.5–35.5)
APTT BLD: 27.8 SEC — SIGNIFICANT CHANGE UP (ref 27.5–35.5)
APTT BLD: 28.7 SEC — SIGNIFICANT CHANGE UP (ref 27.5–35.5)
APTT BLD: 29.7 SEC — SIGNIFICANT CHANGE UP (ref 27.5–35.5)
APTT BLD: 29.9 SEC — SIGNIFICANT CHANGE UP (ref 27.5–35.5)
AST SERPL-CCNC: 23 U/L — SIGNIFICANT CHANGE UP (ref 10–40)
AST SERPL-CCNC: 24 U/L
AST SERPL-CCNC: 24 U/L — SIGNIFICANT CHANGE UP (ref 10–40)
AST SERPL-CCNC: 26 U/L — SIGNIFICANT CHANGE UP (ref 10–40)
AST SERPL-CCNC: 27 U/L — SIGNIFICANT CHANGE UP (ref 10–40)
AST SERPL-CCNC: 28 U/L — SIGNIFICANT CHANGE UP (ref 10–40)
AST SERPL-CCNC: 28 U/L — SIGNIFICANT CHANGE UP (ref 10–40)
AST SERPL-CCNC: 28 U/L — SIGNIFICANT CHANGE UP (ref 15–37)
AST SERPL-CCNC: 32 U/L — SIGNIFICANT CHANGE UP (ref 10–40)
AST SERPL-CCNC: 33 U/L
AST SERPL-CCNC: 34 U/L — SIGNIFICANT CHANGE UP (ref 10–40)
AST SERPL-CCNC: 36 U/L — SIGNIFICANT CHANGE UP (ref 15–37)
AST SERPL-CCNC: 37 U/L — SIGNIFICANT CHANGE UP (ref 10–40)
AST SERPL-CCNC: 40 U/L — SIGNIFICANT CHANGE UP (ref 10–40)
AST SERPL-CCNC: 45 U/L — HIGH (ref 10–40)
B PERT IGG+IGM PNL SER: ABNORMAL
BASOPHILS # BLD AUTO: 0 K/UL — SIGNIFICANT CHANGE UP (ref 0–0.2)
BASOPHILS # BLD AUTO: 0 K/UL — SIGNIFICANT CHANGE UP (ref 0–0.2)
BASOPHILS # BLD AUTO: 0.01 K/UL — SIGNIFICANT CHANGE UP (ref 0–0.2)
BASOPHILS # BLD AUTO: 0.02 K/UL — SIGNIFICANT CHANGE UP (ref 0–0.2)
BASOPHILS # BLD AUTO: 0.03 K/UL — SIGNIFICANT CHANGE UP (ref 0–0.2)
BASOPHILS # BLD AUTO: 0.04 K/UL — SIGNIFICANT CHANGE UP (ref 0–0.2)
BASOPHILS # BLD AUTO: 0.05 K/UL — SIGNIFICANT CHANGE UP (ref 0–0.2)
BASOPHILS # BLD AUTO: 0.06 K/UL — SIGNIFICANT CHANGE UP (ref 0–0.2)
BASOPHILS # BLD AUTO: 0.07 K/UL — SIGNIFICANT CHANGE UP (ref 0–0.2)
BASOPHILS # BLD AUTO: 0.07 K/UL — SIGNIFICANT CHANGE UP (ref 0–0.2)
BASOPHILS NFR BLD AUTO: 0 % — SIGNIFICANT CHANGE UP (ref 0–2)
BASOPHILS NFR BLD AUTO: 0 % — SIGNIFICANT CHANGE UP (ref 0–2)
BASOPHILS NFR BLD AUTO: 0.2 % — SIGNIFICANT CHANGE UP (ref 0–2)
BASOPHILS NFR BLD AUTO: 0.4 % — SIGNIFICANT CHANGE UP (ref 0–2)
BASOPHILS NFR BLD AUTO: 0.5 % — SIGNIFICANT CHANGE UP (ref 0–2)
BASOPHILS NFR BLD AUTO: 0.5 % — SIGNIFICANT CHANGE UP (ref 0–2)
BASOPHILS NFR BLD AUTO: 0.6 % — SIGNIFICANT CHANGE UP (ref 0–2)
BASOPHILS NFR BLD AUTO: 0.7 % — SIGNIFICANT CHANGE UP (ref 0–2)
BASOPHILS NFR BLD AUTO: 0.8 % — SIGNIFICANT CHANGE UP (ref 0–2)
BASOPHILS NFR BLD AUTO: 0.9 % — SIGNIFICANT CHANGE UP (ref 0–2)
BASOPHILS NFR BLD AUTO: 1 % — SIGNIFICANT CHANGE UP (ref 0–2)
BASOPHILS NFR BLD AUTO: 1 % — SIGNIFICANT CHANGE UP (ref 0–2)
BASOPHILS NFR BLD AUTO: 1.4 % — SIGNIFICANT CHANGE UP (ref 0–2)
BASOPHILS NFR BLD AUTO: 1.6 % — SIGNIFICANT CHANGE UP (ref 0–2)
BASOPHILS NFR BLD AUTO: 1.6 % — SIGNIFICANT CHANGE UP (ref 0–2)
BILIRUB SERPL-MCNC: 0.3 MG/DL — SIGNIFICANT CHANGE UP (ref 0.2–1.2)
BILIRUB SERPL-MCNC: 0.4 MG/DL
BILIRUB SERPL-MCNC: 0.4 MG/DL
BILIRUB SERPL-MCNC: 0.4 MG/DL — SIGNIFICANT CHANGE UP (ref 0.2–1.2)
BILIRUB SERPL-MCNC: 0.5 MG/DL — SIGNIFICANT CHANGE UP (ref 0.2–1.2)
BILIRUB SERPL-MCNC: 0.6 MG/DL — SIGNIFICANT CHANGE UP (ref 0.2–1.2)
BILIRUB SERPL-MCNC: 0.8 MG/DL — SIGNIFICANT CHANGE UP (ref 0.2–1.2)
BILIRUB SERPL-MCNC: 0.8 MG/DL — SIGNIFICANT CHANGE UP (ref 0.2–1.2)
BILIRUB SERPL-MCNC: 1 MG/DL — SIGNIFICANT CHANGE UP (ref 0.2–1.2)
BILIRUB SERPL-MCNC: 1 MG/DL — SIGNIFICANT CHANGE UP (ref 0.2–1.2)
BILIRUB SERPL-MCNC: 1.2 MG/DL — SIGNIFICANT CHANGE UP (ref 0.2–1.2)
BILIRUB UR-MCNC: NEGATIVE — SIGNIFICANT CHANGE UP
BILIRUB UR-MCNC: NEGATIVE — SIGNIFICANT CHANGE UP
BLD GP AB SCN SERPL QL: NEGATIVE — SIGNIFICANT CHANGE UP
BUN SERPL-MCNC: 13 MG/DL — SIGNIFICANT CHANGE UP (ref 7–23)
BUN SERPL-MCNC: 16 MG/DL — SIGNIFICANT CHANGE UP (ref 7–23)
BUN SERPL-MCNC: 17 MG/DL — SIGNIFICANT CHANGE UP (ref 7–23)
BUN SERPL-MCNC: 18 MG/DL — SIGNIFICANT CHANGE UP (ref 7–23)
BUN SERPL-MCNC: 18 MG/DL — SIGNIFICANT CHANGE UP (ref 7–23)
BUN SERPL-MCNC: 19 MG/DL
BUN SERPL-MCNC: 19 MG/DL — SIGNIFICANT CHANGE UP (ref 7–23)
BUN SERPL-MCNC: 20 MG/DL — SIGNIFICANT CHANGE UP (ref 7–23)
BUN SERPL-MCNC: 22 MG/DL — SIGNIFICANT CHANGE UP (ref 7–23)
BUN SERPL-MCNC: 23 MG/DL — SIGNIFICANT CHANGE UP (ref 7–23)
BUN SERPL-MCNC: 25 MG/DL — HIGH (ref 7–23)
BUN SERPL-MCNC: 27 MG/DL — HIGH (ref 7–23)
BUN SERPL-MCNC: 28 MG/DL
BUN SERPL-MCNC: 28 MG/DL — HIGH (ref 7–23)
BUN SERPL-MCNC: 28 MG/DL — HIGH (ref 7–23)
CALCIUM SERPL-MCNC: 10.2 MG/DL
CALCIUM SERPL-MCNC: 8.5 MG/DL — SIGNIFICANT CHANGE UP (ref 8.4–10.5)
CALCIUM SERPL-MCNC: 8.9 MG/DL — SIGNIFICANT CHANGE UP (ref 8.4–10.5)
CALCIUM SERPL-MCNC: 9 MG/DL — SIGNIFICANT CHANGE UP (ref 8.4–10.5)
CALCIUM SERPL-MCNC: 9 MG/DL — SIGNIFICANT CHANGE UP (ref 8.4–10.5)
CALCIUM SERPL-MCNC: 9 MG/DL — SIGNIFICANT CHANGE UP (ref 8.5–10.1)
CALCIUM SERPL-MCNC: 9.1 MG/DL — SIGNIFICANT CHANGE UP (ref 8.4–10.5)
CALCIUM SERPL-MCNC: 9.2 MG/DL — SIGNIFICANT CHANGE UP (ref 8.4–10.5)
CALCIUM SERPL-MCNC: 9.2 MG/DL — SIGNIFICANT CHANGE UP (ref 8.4–10.5)
CALCIUM SERPL-MCNC: 9.2 MG/DL — SIGNIFICANT CHANGE UP (ref 8.5–10.1)
CALCIUM SERPL-MCNC: 9.3 MG/DL — SIGNIFICANT CHANGE UP (ref 8.4–10.5)
CALCIUM SERPL-MCNC: 9.3 MG/DL — SIGNIFICANT CHANGE UP (ref 8.5–10.1)
CALCIUM SERPL-MCNC: 9.5 MG/DL — SIGNIFICANT CHANGE UP (ref 8.4–10.5)
CALCIUM SERPL-MCNC: 9.6 MG/DL — SIGNIFICANT CHANGE UP (ref 8.4–10.5)
CALCIUM SERPL-MCNC: 9.6 MG/DL — SIGNIFICANT CHANGE UP (ref 8.5–10.1)
CALCIUM SERPL-MCNC: 9.7 MG/DL
CALCIUM SERPL-MCNC: 9.7 MG/DL — SIGNIFICANT CHANGE UP (ref 8.4–10.5)
CALCIUM SERPL-MCNC: 9.8 MG/DL — SIGNIFICANT CHANGE UP (ref 8.4–10.5)
CALCIUM SERPL-MCNC: 9.9 MG/DL — SIGNIFICANT CHANGE UP (ref 8.4–10.5)
CANCER AG27-29 SERPL-ACNC: 3765.9 U/ML — HIGH (ref 0–38.6)
CANCER AG27-29 SERPL-ACNC: 4435 U/ML — HIGH (ref 0–38.6)
CANCER AG27-29 SERPL-ACNC: 4485.5 U/ML — HIGH (ref 0–38.6)
CANCER AG27-29 SERPL-ACNC: 5283.4 U/ML
CANCER AG27-29 SERPL-ACNC: 5554 U/ML — HIGH (ref 0–38.6)
CANCER AG27-29 SERPL-ACNC: 6080.4 U/ML — HIGH (ref 0–38.6)
CANCER AG27-29 SERPL-ACNC: 7114.7 U/ML — HIGH (ref 0–38.6)
CANCER AG27-29 SERPL-ACNC: ABNORMAL U/ML
CHLORIDE SERPL-SCNC: 100 MMOL/L
CHLORIDE SERPL-SCNC: 101 MMOL/L — SIGNIFICANT CHANGE UP (ref 96–108)
CHLORIDE SERPL-SCNC: 103 MMOL/L
CHLORIDE SERPL-SCNC: 103 MMOL/L — SIGNIFICANT CHANGE UP (ref 96–108)
CHLORIDE SERPL-SCNC: 103 MMOL/L — SIGNIFICANT CHANGE UP (ref 96–108)
CHLORIDE SERPL-SCNC: 104 MMOL/L — SIGNIFICANT CHANGE UP (ref 96–108)
CHLORIDE SERPL-SCNC: 94 MMOL/L — LOW (ref 96–108)
CHLORIDE SERPL-SCNC: 95 MMOL/L — LOW (ref 96–108)
CHLORIDE SERPL-SCNC: 96 MMOL/L — SIGNIFICANT CHANGE UP (ref 96–108)
CHLORIDE SERPL-SCNC: 96 MMOL/L — SIGNIFICANT CHANGE UP (ref 96–108)
CHLORIDE SERPL-SCNC: 97 MMOL/L — SIGNIFICANT CHANGE UP (ref 96–108)
CHLORIDE SERPL-SCNC: 98 MMOL/L — SIGNIFICANT CHANGE UP (ref 96–108)
CO2 SERPL-SCNC: 20 MMOL/L — LOW (ref 22–31)
CO2 SERPL-SCNC: 21 MMOL/L — LOW (ref 22–31)
CO2 SERPL-SCNC: 22 MMOL/L — SIGNIFICANT CHANGE UP (ref 22–31)
CO2 SERPL-SCNC: 23 MMOL/L
CO2 SERPL-SCNC: 23 MMOL/L — SIGNIFICANT CHANGE UP (ref 22–31)
CO2 SERPL-SCNC: 24 MMOL/L — SIGNIFICANT CHANGE UP (ref 22–31)
CO2 SERPL-SCNC: 25 MMOL/L
CO2 SERPL-SCNC: 25 MMOL/L — SIGNIFICANT CHANGE UP (ref 22–31)
CO2 SERPL-SCNC: 26 MMOL/L — SIGNIFICANT CHANGE UP (ref 22–31)
CO2 SERPL-SCNC: 27 MMOL/L — SIGNIFICANT CHANGE UP (ref 22–31)
CO2 SERPL-SCNC: 28 MMOL/L — SIGNIFICANT CHANGE UP (ref 22–31)
CO2 SERPL-SCNC: 29 MMOL/L — SIGNIFICANT CHANGE UP (ref 22–31)
CO2 SERPL-SCNC: 30 MMOL/L — SIGNIFICANT CHANGE UP (ref 22–31)
CO2 SERPL-SCNC: 31 MMOL/L — SIGNIFICANT CHANGE UP (ref 22–31)
COLOR FLD: YELLOW — SIGNIFICANT CHANGE UP
COLOR SPEC: SIGNIFICANT CHANGE UP
COLOR SPEC: YELLOW — SIGNIFICANT CHANGE UP
CREAT SERPL-MCNC: 0.47 MG/DL — LOW (ref 0.5–1.3)
CREAT SERPL-MCNC: 0.48 MG/DL — LOW (ref 0.5–1.3)
CREAT SERPL-MCNC: 0.5 MG/DL — SIGNIFICANT CHANGE UP (ref 0.5–1.3)
CREAT SERPL-MCNC: 0.51 MG/DL — SIGNIFICANT CHANGE UP (ref 0.5–1.3)
CREAT SERPL-MCNC: 0.52 MG/DL — SIGNIFICANT CHANGE UP (ref 0.5–1.3)
CREAT SERPL-MCNC: 0.54 MG/DL — SIGNIFICANT CHANGE UP (ref 0.5–1.3)
CREAT SERPL-MCNC: 0.57 MG/DL — SIGNIFICANT CHANGE UP (ref 0.5–1.3)
CREAT SERPL-MCNC: 0.57 MG/DL — SIGNIFICANT CHANGE UP (ref 0.5–1.3)
CREAT SERPL-MCNC: 0.58 MG/DL — SIGNIFICANT CHANGE UP (ref 0.5–1.3)
CREAT SERPL-MCNC: 0.59 MG/DL — SIGNIFICANT CHANGE UP (ref 0.5–1.3)
CREAT SERPL-MCNC: 0.61 MG/DL — SIGNIFICANT CHANGE UP (ref 0.5–1.3)
CREAT SERPL-MCNC: 0.64 MG/DL — SIGNIFICANT CHANGE UP (ref 0.5–1.3)
CREAT SERPL-MCNC: 0.66 MG/DL — SIGNIFICANT CHANGE UP (ref 0.5–1.3)
CREAT SERPL-MCNC: 0.67 MG/DL — SIGNIFICANT CHANGE UP (ref 0.5–1.3)
CREAT SERPL-MCNC: 0.68 MG/DL — SIGNIFICANT CHANGE UP (ref 0.5–1.3)
CREAT SERPL-MCNC: 0.68 MG/DL — SIGNIFICANT CHANGE UP (ref 0.5–1.3)
CREAT SERPL-MCNC: 0.71 MG/DL
CREAT SERPL-MCNC: 0.73 MG/DL — SIGNIFICANT CHANGE UP (ref 0.5–1.3)
CREAT SERPL-MCNC: 0.74 MG/DL
CREAT SERPL-MCNC: 0.74 MG/DL — SIGNIFICANT CHANGE UP (ref 0.5–1.3)
CREAT SERPL-MCNC: 0.77 MG/DL — SIGNIFICANT CHANGE UP (ref 0.5–1.3)
CREAT SERPL-MCNC: 0.89 MG/DL — SIGNIFICANT CHANGE UP (ref 0.5–1.3)
CULTURE RESULTS: NO GROWTH — SIGNIFICANT CHANGE UP
CULTURE RESULTS: SIGNIFICANT CHANGE UP
DACRYOCYTES BLD QL SMEAR: SLIGHT — SIGNIFICANT CHANGE UP
DIFF PNL FLD: NEGATIVE — SIGNIFICANT CHANGE UP
DIFF PNL FLD: NEGATIVE — SIGNIFICANT CHANGE UP
EGFR: 100 ML/MIN/1.73M2 — SIGNIFICANT CHANGE UP
EGFR: 68 ML/MIN/1.73M2 — SIGNIFICANT CHANGE UP
EGFR: 81 ML/MIN/1.73M2 — SIGNIFICANT CHANGE UP
EGFR: 85 ML/MIN/1.73M2
EGFR: 87 ML/MIN/1.73M2 — SIGNIFICANT CHANGE UP
EGFR: 90 ML/MIN/1.73M2
EGFR: 91 ML/MIN/1.73M2 — SIGNIFICANT CHANGE UP
EGFR: 92 ML/MIN/1.73M2 — SIGNIFICANT CHANGE UP
EGFR: 93 ML/MIN/1.73M2 — SIGNIFICANT CHANGE UP
EGFR: 93 ML/MIN/1.73M2 — SIGNIFICANT CHANGE UP
EGFR: 94 ML/MIN/1.73M2 — SIGNIFICANT CHANGE UP
EGFR: 95 ML/MIN/1.73M2 — SIGNIFICANT CHANGE UP
EGFR: 96 ML/MIN/1.73M2 — SIGNIFICANT CHANGE UP
EGFR: 97 ML/MIN/1.73M2 — SIGNIFICANT CHANGE UP
EGFR: 98 ML/MIN/1.73M2 — SIGNIFICANT CHANGE UP
EGFR: 99 ML/MIN/1.73M2 — SIGNIFICANT CHANGE UP
ELLIPTOCYTES BLD QL SMEAR: SLIGHT — SIGNIFICANT CHANGE UP
EOSINOPHIL # BLD AUTO: 0 K/UL — SIGNIFICANT CHANGE UP (ref 0–0.5)
EOSINOPHIL # BLD AUTO: 0.01 K/UL — SIGNIFICANT CHANGE UP (ref 0–0.5)
EOSINOPHIL # BLD AUTO: 0.02 K/UL — SIGNIFICANT CHANGE UP (ref 0–0.5)
EOSINOPHIL # BLD AUTO: 0.03 K/UL — SIGNIFICANT CHANGE UP (ref 0–0.5)
EOSINOPHIL # BLD AUTO: 0.04 K/UL — SIGNIFICANT CHANGE UP (ref 0–0.5)
EOSINOPHIL # BLD AUTO: 0.05 K/UL — SIGNIFICANT CHANGE UP (ref 0–0.5)
EOSINOPHIL # BLD AUTO: 0.07 K/UL — SIGNIFICANT CHANGE UP (ref 0–0.5)
EOSINOPHIL # BLD AUTO: 0.08 K/UL — SIGNIFICANT CHANGE UP (ref 0–0.5)
EOSINOPHIL # BLD AUTO: 0.1 K/UL — SIGNIFICANT CHANGE UP (ref 0–0.5)
EOSINOPHIL # FLD: 0 % — SIGNIFICANT CHANGE UP
EOSINOPHIL NFR BLD AUTO: 0 % — SIGNIFICANT CHANGE UP (ref 0–6)
EOSINOPHIL NFR BLD AUTO: 0.1 % — SIGNIFICANT CHANGE UP (ref 0–6)
EOSINOPHIL NFR BLD AUTO: 0.2 % — SIGNIFICANT CHANGE UP (ref 0–6)
EOSINOPHIL NFR BLD AUTO: 0.3 % — SIGNIFICANT CHANGE UP (ref 0–6)
EOSINOPHIL NFR BLD AUTO: 0.3 % — SIGNIFICANT CHANGE UP (ref 0–6)
EOSINOPHIL NFR BLD AUTO: 0.4 % — SIGNIFICANT CHANGE UP (ref 0–6)
EOSINOPHIL NFR BLD AUTO: 0.4 % — SIGNIFICANT CHANGE UP (ref 0–6)
EOSINOPHIL NFR BLD AUTO: 0.5 % — SIGNIFICANT CHANGE UP (ref 0–6)
EOSINOPHIL NFR BLD AUTO: 0.5 % — SIGNIFICANT CHANGE UP (ref 0–6)
EOSINOPHIL NFR BLD AUTO: 0.6 % — SIGNIFICANT CHANGE UP (ref 0–6)
EOSINOPHIL NFR BLD AUTO: 1 % — SIGNIFICANT CHANGE UP (ref 0–6)
EOSINOPHIL NFR BLD AUTO: 1 % — SIGNIFICANT CHANGE UP (ref 0–6)
EOSINOPHIL NFR BLD AUTO: 1.2 % — SIGNIFICANT CHANGE UP (ref 0–6)
EOSINOPHIL NFR BLD AUTO: 1.3 % — SIGNIFICANT CHANGE UP (ref 0–6)
EOSINOPHIL NFR BLD AUTO: 1.6 % — SIGNIFICANT CHANGE UP (ref 0–6)
EOSINOPHIL NFR BLD AUTO: 1.7 % — SIGNIFICANT CHANGE UP (ref 0–6)
EOSINOPHIL NFR BLD AUTO: 1.8 % — SIGNIFICANT CHANGE UP (ref 0–6)
EOSINOPHIL NFR BLD AUTO: 2 % — SIGNIFICANT CHANGE UP (ref 0–6)
EOSINOPHIL NFR BLD AUTO: 2 % — SIGNIFICANT CHANGE UP (ref 0–6)
EOSINOPHIL NFR BLD AUTO: 2.2 % — SIGNIFICANT CHANGE UP (ref 0–6)
EOSINOPHIL NFR BLD AUTO: 2.6 % — SIGNIFICANT CHANGE UP (ref 0–6)
FERRITIN SERPL-MCNC: 1341 NG/ML — HIGH (ref 15–150)
FLUAV AG NPH QL: SIGNIFICANT CHANGE UP
FLUBV AG NPH QL: SIGNIFICANT CHANGE UP
FLUID INTAKE SUBSTANCE CLASS: SIGNIFICANT CHANGE UP
FOLATE SERPL-MCNC: 11.7 NG/ML — SIGNIFICANT CHANGE UP
FOLATE+VIT B12 SERBLD-IMP: 0 % — SIGNIFICANT CHANGE UP
GAS PNL BLDA: SIGNIFICANT CHANGE UP
GAS PNL BLDA: SIGNIFICANT CHANGE UP
GI PCR PANEL: SIGNIFICANT CHANGE UP
GLUCOSE SERPL-MCNC: 103 MG/DL — HIGH (ref 70–99)
GLUCOSE SERPL-MCNC: 106 MG/DL — HIGH (ref 70–99)
GLUCOSE SERPL-MCNC: 113 MG/DL — HIGH (ref 70–99)
GLUCOSE SERPL-MCNC: 122 MG/DL — HIGH (ref 70–99)
GLUCOSE SERPL-MCNC: 123 MG/DL — HIGH (ref 70–99)
GLUCOSE SERPL-MCNC: 139 MG/DL — HIGH (ref 70–99)
GLUCOSE SERPL-MCNC: 65 MG/DL — LOW (ref 70–99)
GLUCOSE SERPL-MCNC: 75 MG/DL — SIGNIFICANT CHANGE UP (ref 70–99)
GLUCOSE SERPL-MCNC: 75 MG/DL — SIGNIFICANT CHANGE UP (ref 70–99)
GLUCOSE SERPL-MCNC: 80 MG/DL — SIGNIFICANT CHANGE UP (ref 70–99)
GLUCOSE SERPL-MCNC: 85 MG/DL — SIGNIFICANT CHANGE UP (ref 70–99)
GLUCOSE SERPL-MCNC: 87 MG/DL — SIGNIFICANT CHANGE UP (ref 70–99)
GLUCOSE SERPL-MCNC: 88 MG/DL — SIGNIFICANT CHANGE UP (ref 70–99)
GLUCOSE SERPL-MCNC: 90 MG/DL
GLUCOSE SERPL-MCNC: 90 MG/DL — SIGNIFICANT CHANGE UP (ref 70–99)
GLUCOSE SERPL-MCNC: 93 MG/DL — SIGNIFICANT CHANGE UP (ref 70–99)
GLUCOSE SERPL-MCNC: 94 MG/DL — SIGNIFICANT CHANGE UP (ref 70–99)
GLUCOSE SERPL-MCNC: 95 MG/DL — SIGNIFICANT CHANGE UP (ref 70–99)
GLUCOSE SERPL-MCNC: 95 MG/DL — SIGNIFICANT CHANGE UP (ref 70–99)
GLUCOSE SERPL-MCNC: 96 MG/DL — SIGNIFICANT CHANGE UP (ref 70–99)
GLUCOSE SERPL-MCNC: 98 MG/DL — SIGNIFICANT CHANGE UP (ref 70–99)
GLUCOSE SERPL-MCNC: 99 MG/DL
GLUCOSE SERPL-MCNC: 99 MG/DL — SIGNIFICANT CHANGE UP (ref 70–99)
GLUCOSE SERPL-MCNC: 99 MG/DL — SIGNIFICANT CHANGE UP (ref 70–99)
GLUCOSE UR QL: NEGATIVE — SIGNIFICANT CHANGE UP
GLUCOSE UR QL: NEGATIVE — SIGNIFICANT CHANGE UP
HCT VFR BLD CALC: 23.9 % — LOW (ref 34.5–45)
HCT VFR BLD CALC: 25.2 % — LOW (ref 34.5–45)
HCT VFR BLD CALC: 26.5 % — LOW (ref 34.5–45)
HCT VFR BLD CALC: 26.5 % — LOW (ref 34.5–45)
HCT VFR BLD CALC: 27.3 % — LOW (ref 34.5–45)
HCT VFR BLD CALC: 27.6 % — LOW (ref 34.5–45)
HCT VFR BLD CALC: 27.7 % — LOW (ref 34.5–45)
HCT VFR BLD CALC: 28 % — LOW (ref 34.5–45)
HCT VFR BLD CALC: 28.1 % — LOW (ref 34.5–45)
HCT VFR BLD CALC: 28.3 % — LOW (ref 34.5–45)
HCT VFR BLD CALC: 28.4 % — LOW (ref 34.5–45)
HCT VFR BLD CALC: 28.6 % — LOW (ref 34.5–45)
HCT VFR BLD CALC: 28.7 % — LOW (ref 34.5–45)
HCT VFR BLD CALC: 28.8 % — LOW (ref 34.5–45)
HCT VFR BLD CALC: 29 % — LOW (ref 34.5–45)
HCT VFR BLD CALC: 29.1 % — LOW (ref 34.5–45)
HCT VFR BLD CALC: 29.2 % — LOW (ref 34.5–45)
HCT VFR BLD CALC: 29.3 % — LOW (ref 34.5–45)
HCT VFR BLD CALC: 29.5 % — LOW (ref 34.5–45)
HCT VFR BLD CALC: 29.5 % — LOW (ref 34.5–45)
HCT VFR BLD CALC: 29.6 % — LOW (ref 34.5–45)
HCT VFR BLD CALC: 29.7 % — LOW (ref 34.5–45)
HCT VFR BLD CALC: 29.8 % — LOW (ref 34.5–45)
HCT VFR BLD CALC: 30.2 % — LOW (ref 34.5–45)
HCT VFR BLD CALC: 31.1 % — LOW (ref 34.5–45)
HCT VFR BLD CALC: 31.3 % — LOW (ref 34.5–45)
HCT VFR BLD CALC: 31.6 % — LOW (ref 34.5–45)
HCT VFR BLD CALC: 31.8 % — LOW (ref 34.5–45)
HCT VFR BLD CALC: 33.4 % — LOW (ref 34.5–45)
HCT VFR BLD CALC: 33.5 % — LOW (ref 34.5–45)
HCT VFR BLD CALC: 33.6 % — LOW (ref 34.5–45)
HCT VFR BLD CALC: 34.8 % — SIGNIFICANT CHANGE UP (ref 34.5–45)
HGB BLD-MCNC: 10 G/DL — LOW (ref 11.5–15.5)
HGB BLD-MCNC: 10 G/DL — LOW (ref 11.5–15.5)
HGB BLD-MCNC: 10.1 G/DL — LOW (ref 11.5–15.5)
HGB BLD-MCNC: 10.3 G/DL — LOW (ref 11.5–15.5)
HGB BLD-MCNC: 10.5 G/DL — LOW (ref 11.5–15.5)
HGB BLD-MCNC: 11 G/DL — LOW (ref 11.5–15.5)
HGB BLD-MCNC: 11.2 G/DL — LOW (ref 11.5–15.5)
HGB BLD-MCNC: 7.8 G/DL — LOW (ref 11.5–15.5)
HGB BLD-MCNC: 8.1 G/DL — LOW (ref 11.5–15.5)
HGB BLD-MCNC: 8.5 G/DL — LOW (ref 11.5–15.5)
HGB BLD-MCNC: 8.6 G/DL — LOW (ref 11.5–15.5)
HGB BLD-MCNC: 8.8 G/DL — LOW (ref 11.5–15.5)
HGB BLD-MCNC: 8.9 G/DL — LOW (ref 11.5–15.5)
HGB BLD-MCNC: 9 G/DL — LOW (ref 11.5–15.5)
HGB BLD-MCNC: 9.1 G/DL — LOW (ref 11.5–15.5)
HGB BLD-MCNC: 9.1 G/DL — LOW (ref 11.5–15.5)
HGB BLD-MCNC: 9.2 G/DL — LOW (ref 11.5–15.5)
HGB BLD-MCNC: 9.3 G/DL — LOW (ref 11.5–15.5)
HGB BLD-MCNC: 9.4 G/DL — LOW (ref 11.5–15.5)
HGB BLD-MCNC: 9.4 G/DL — LOW (ref 11.5–15.5)
HGB BLD-MCNC: 9.5 G/DL — LOW (ref 11.5–15.5)
HGB BLD-MCNC: 9.5 G/DL — LOW (ref 11.5–15.5)
HGB BLD-MCNC: 9.6 G/DL — LOW (ref 11.5–15.5)
HGB BLD-MCNC: 9.6 G/DL — LOW (ref 11.5–15.5)
HGB BLD-MCNC: 9.7 G/DL — LOW (ref 11.5–15.5)
HGB BLD-MCNC: 9.9 G/DL — LOW (ref 11.5–15.5)
IMM GRANULOCYTES NFR BLD AUTO: 0.5 % — SIGNIFICANT CHANGE UP (ref 0–1.5)
IMM GRANULOCYTES NFR BLD AUTO: 0.7 % — SIGNIFICANT CHANGE UP (ref 0–1.5)
IMM GRANULOCYTES NFR BLD AUTO: 0.8 % — SIGNIFICANT CHANGE UP (ref 0–1.5)
IMM GRANULOCYTES NFR BLD AUTO: 0.9 % — SIGNIFICANT CHANGE UP (ref 0–0.9)
IMM GRANULOCYTES NFR BLD AUTO: 0.9 % — SIGNIFICANT CHANGE UP (ref 0–1.5)
IMM GRANULOCYTES NFR BLD AUTO: 1.1 % — SIGNIFICANT CHANGE UP (ref 0–1.5)
IMM GRANULOCYTES NFR BLD AUTO: 1.1 % — SIGNIFICANT CHANGE UP (ref 0–1.5)
IMM GRANULOCYTES NFR BLD AUTO: 1.3 % — SIGNIFICANT CHANGE UP (ref 0–1.5)
IMM GRANULOCYTES NFR BLD AUTO: 1.3 % — SIGNIFICANT CHANGE UP (ref 0–1.5)
IMM GRANULOCYTES NFR BLD AUTO: 1.4 % — SIGNIFICANT CHANGE UP (ref 0–1.5)
IMM GRANULOCYTES NFR BLD AUTO: 1.6 % — HIGH (ref 0–1.5)
IMM GRANULOCYTES NFR BLD AUTO: 2.2 % — HIGH (ref 0–1.5)
IMM GRANULOCYTES NFR BLD AUTO: 2.8 % — HIGH (ref 0–1.5)
IMM GRANULOCYTES NFR BLD AUTO: 2.9 % — HIGH (ref 0–1.5)
IMM GRANULOCYTES NFR BLD AUTO: 3.6 % — HIGH (ref 0–1.5)
IMM GRANULOCYTES NFR BLD AUTO: 3.8 % — HIGH (ref 0–1.5)
INR BLD: 1.34 RATIO — HIGH (ref 0.88–1.16)
INR BLD: 1.35 RATIO — HIGH (ref 0.88–1.16)
INR BLD: 1.41 RATIO — HIGH (ref 0.88–1.16)
INR BLD: 1.42 RATIO — HIGH (ref 0.88–1.16)
INR BLD: 1.48 RATIO — HIGH (ref 0.88–1.16)
INR BLD: 1.52 RATIO — HIGH (ref 0.88–1.16)
INR BLD: 1.74 RATIO — HIGH (ref 0.88–1.16)
INR BLD: 1.78 RATIO — HIGH (ref 0.88–1.16)
INR BLD: 1.81 RATIO — HIGH (ref 0.88–1.16)
INR BLD: 1.88 RATIO — HIGH (ref 0.88–1.16)
INR BLD: 2.11 RATIO — HIGH (ref 0.88–1.16)
IRON SATN MFR SERPL: 13 % — LOW (ref 14–50)
IRON SATN MFR SERPL: 22 UG/DL — LOW (ref 30–160)
KETONES UR-MCNC: NEGATIVE — SIGNIFICANT CHANGE UP
KETONES UR-MCNC: NEGATIVE — SIGNIFICANT CHANGE UP
LACTATE SERPL-SCNC: 1 MMOL/L — SIGNIFICANT CHANGE UP (ref 0.7–2)
LEUKOCYTE ESTERASE UR-ACNC: NEGATIVE — SIGNIFICANT CHANGE UP
LEUKOCYTE ESTERASE UR-ACNC: NEGATIVE — SIGNIFICANT CHANGE UP
LYMPHOCYTES # BLD AUTO: 0.38 K/UL — LOW (ref 1–3.3)
LYMPHOCYTES # BLD AUTO: 0.56 K/UL — LOW (ref 1–3.3)
LYMPHOCYTES # BLD AUTO: 0.57 K/UL — LOW (ref 1–3.3)
LYMPHOCYTES # BLD AUTO: 0.6 K/UL — LOW (ref 1–3.3)
LYMPHOCYTES # BLD AUTO: 0.64 K/UL — LOW (ref 1–3.3)
LYMPHOCYTES # BLD AUTO: 0.64 K/UL — LOW (ref 1–3.3)
LYMPHOCYTES # BLD AUTO: 0.7 K/UL — LOW (ref 1–3.3)
LYMPHOCYTES # BLD AUTO: 0.71 K/UL — LOW (ref 1–3.3)
LYMPHOCYTES # BLD AUTO: 0.73 K/UL — LOW (ref 1–3.3)
LYMPHOCYTES # BLD AUTO: 0.75 K/UL — LOW (ref 1–3.3)
LYMPHOCYTES # BLD AUTO: 0.76 K/UL — LOW (ref 1–3.3)
LYMPHOCYTES # BLD AUTO: 0.78 K/UL — LOW (ref 1–3.3)
LYMPHOCYTES # BLD AUTO: 0.79 K/UL — LOW (ref 1–3.3)
LYMPHOCYTES # BLD AUTO: 0.79 K/UL — LOW (ref 1–3.3)
LYMPHOCYTES # BLD AUTO: 0.81 K/UL — LOW (ref 1–3.3)
LYMPHOCYTES # BLD AUTO: 0.81 K/UL — LOW (ref 1–3.3)
LYMPHOCYTES # BLD AUTO: 0.83 K/UL — LOW (ref 1–3.3)
LYMPHOCYTES # BLD AUTO: 0.84 K/UL — LOW (ref 1–3.3)
LYMPHOCYTES # BLD AUTO: 0.86 K/UL — LOW (ref 1–3.3)
LYMPHOCYTES # BLD AUTO: 0.88 K/UL — LOW (ref 1–3.3)
LYMPHOCYTES # BLD AUTO: 0.89 K/UL — LOW (ref 1–3.3)
LYMPHOCYTES # BLD AUTO: 0.91 K/UL — LOW (ref 1–3.3)
LYMPHOCYTES # BLD AUTO: 0.91 K/UL — LOW (ref 1–3.3)
LYMPHOCYTES # BLD AUTO: 10.4 % — LOW (ref 13–44)
LYMPHOCYTES # BLD AUTO: 13 % — SIGNIFICANT CHANGE UP (ref 13–44)
LYMPHOCYTES # BLD AUTO: 13.9 % — SIGNIFICANT CHANGE UP (ref 13–44)
LYMPHOCYTES # BLD AUTO: 14.2 % — SIGNIFICANT CHANGE UP (ref 13–44)
LYMPHOCYTES # BLD AUTO: 14.8 % — SIGNIFICANT CHANGE UP (ref 13–44)
LYMPHOCYTES # BLD AUTO: 17.6 % — SIGNIFICANT CHANGE UP (ref 13–44)
LYMPHOCYTES # BLD AUTO: 17.7 % — SIGNIFICANT CHANGE UP (ref 13–44)
LYMPHOCYTES # BLD AUTO: 18.1 % — SIGNIFICANT CHANGE UP (ref 13–44)
LYMPHOCYTES # BLD AUTO: 18.2 % — SIGNIFICANT CHANGE UP (ref 13–44)
LYMPHOCYTES # BLD AUTO: 18.2 % — SIGNIFICANT CHANGE UP (ref 13–44)
LYMPHOCYTES # BLD AUTO: 18.8 % — SIGNIFICANT CHANGE UP (ref 13–44)
LYMPHOCYTES # BLD AUTO: 19.8 % — SIGNIFICANT CHANGE UP (ref 13–44)
LYMPHOCYTES # BLD AUTO: 20.1 % — SIGNIFICANT CHANGE UP (ref 13–44)
LYMPHOCYTES # BLD AUTO: 20.3 % — SIGNIFICANT CHANGE UP (ref 13–44)
LYMPHOCYTES # BLD AUTO: 20.4 % — SIGNIFICANT CHANGE UP (ref 13–44)
LYMPHOCYTES # BLD AUTO: 20.8 % — SIGNIFICANT CHANGE UP (ref 13–44)
LYMPHOCYTES # BLD AUTO: 21 % — SIGNIFICANT CHANGE UP (ref 13–44)
LYMPHOCYTES # BLD AUTO: 21.1 % — SIGNIFICANT CHANGE UP (ref 13–44)
LYMPHOCYTES # BLD AUTO: 21.4 % — SIGNIFICANT CHANGE UP (ref 13–44)
LYMPHOCYTES # BLD AUTO: 22 % — SIGNIFICANT CHANGE UP (ref 13–44)
LYMPHOCYTES # BLD AUTO: 22.2 % — SIGNIFICANT CHANGE UP (ref 13–44)
LYMPHOCYTES # BLD AUTO: 5.6 % — LOW (ref 13–44)
LYMPHOCYTES # BLD AUTO: 6.9 % — LOW (ref 13–44)
LYMPHOCYTES # BLD AUTO: 7.8 % — LOW (ref 13–44)
LYMPHOCYTES # BLD AUTO: 8.4 % — LOW (ref 13–44)
LYMPHOCYTES # FLD: 2 % — SIGNIFICANT CHANGE UP
MACROCYTES BLD QL: SIGNIFICANT CHANGE UP
MAGNESIUM SERPL-MCNC: 1.5 MG/DL — LOW (ref 1.6–2.6)
MAGNESIUM SERPL-MCNC: 1.9 MG/DL — SIGNIFICANT CHANGE UP (ref 1.6–2.6)
MAGNESIUM SERPL-MCNC: 1.9 MG/DL — SIGNIFICANT CHANGE UP (ref 1.6–2.6)
MANUAL SMEAR VERIFICATION: SIGNIFICANT CHANGE UP
MCHC RBC-ENTMCNC: 30.1 PG — SIGNIFICANT CHANGE UP (ref 27–34)
MCHC RBC-ENTMCNC: 30.1 PG — SIGNIFICANT CHANGE UP (ref 27–34)
MCHC RBC-ENTMCNC: 30.3 PG — SIGNIFICANT CHANGE UP (ref 27–34)
MCHC RBC-ENTMCNC: 30.3 PG — SIGNIFICANT CHANGE UP (ref 27–34)
MCHC RBC-ENTMCNC: 30.4 PG — SIGNIFICANT CHANGE UP (ref 27–34)
MCHC RBC-ENTMCNC: 30.5 PG — SIGNIFICANT CHANGE UP (ref 27–34)
MCHC RBC-ENTMCNC: 30.6 PG — SIGNIFICANT CHANGE UP (ref 27–34)
MCHC RBC-ENTMCNC: 30.8 PG — SIGNIFICANT CHANGE UP (ref 27–34)
MCHC RBC-ENTMCNC: 31.1 PG — SIGNIFICANT CHANGE UP (ref 27–34)
MCHC RBC-ENTMCNC: 31.1 PG — SIGNIFICANT CHANGE UP (ref 27–34)
MCHC RBC-ENTMCNC: 31.2 PG — SIGNIFICANT CHANGE UP (ref 27–34)
MCHC RBC-ENTMCNC: 31.3 GM/DL — LOW (ref 32–36)
MCHC RBC-ENTMCNC: 31.3 PG — SIGNIFICANT CHANGE UP (ref 27–34)
MCHC RBC-ENTMCNC: 31.3 PG — SIGNIFICANT CHANGE UP (ref 27–34)
MCHC RBC-ENTMCNC: 31.4 G/DL — LOW (ref 32–36)
MCHC RBC-ENTMCNC: 31.4 PG — SIGNIFICANT CHANGE UP (ref 27–34)
MCHC RBC-ENTMCNC: 31.5 PG — SIGNIFICANT CHANGE UP (ref 27–34)
MCHC RBC-ENTMCNC: 31.6 GM/DL — LOW (ref 32–36)
MCHC RBC-ENTMCNC: 31.6 PG — SIGNIFICANT CHANGE UP (ref 27–34)
MCHC RBC-ENTMCNC: 31.7 PG — SIGNIFICANT CHANGE UP (ref 27–34)
MCHC RBC-ENTMCNC: 31.8 GM/DL — LOW (ref 32–36)
MCHC RBC-ENTMCNC: 31.8 PG — SIGNIFICANT CHANGE UP (ref 27–34)
MCHC RBC-ENTMCNC: 31.9 PG — SIGNIFICANT CHANGE UP (ref 27–34)
MCHC RBC-ENTMCNC: 32 PG — SIGNIFICANT CHANGE UP (ref 27–34)
MCHC RBC-ENTMCNC: 32.1 G/DL — SIGNIFICANT CHANGE UP (ref 32–36)
MCHC RBC-ENTMCNC: 32.1 GM/DL — SIGNIFICANT CHANGE UP (ref 32–36)
MCHC RBC-ENTMCNC: 32.2 G/DL — SIGNIFICANT CHANGE UP (ref 32–36)
MCHC RBC-ENTMCNC: 32.2 GM/DL — SIGNIFICANT CHANGE UP (ref 32–36)
MCHC RBC-ENTMCNC: 32.2 PG — SIGNIFICANT CHANGE UP (ref 27–34)
MCHC RBC-ENTMCNC: 32.3 GM/DL — SIGNIFICANT CHANGE UP (ref 32–36)
MCHC RBC-ENTMCNC: 32.4 G/DL — SIGNIFICANT CHANGE UP (ref 32–36)
MCHC RBC-ENTMCNC: 32.4 G/DL — SIGNIFICANT CHANGE UP (ref 32–36)
MCHC RBC-ENTMCNC: 32.4 GM/DL — SIGNIFICANT CHANGE UP (ref 32–36)
MCHC RBC-ENTMCNC: 32.5 G/DL — SIGNIFICANT CHANGE UP (ref 32–36)
MCHC RBC-ENTMCNC: 32.6 GM/DL — SIGNIFICANT CHANGE UP (ref 32–36)
MCHC RBC-ENTMCNC: 32.7 PG — SIGNIFICANT CHANGE UP (ref 27–34)
MCHC RBC-ENTMCNC: 32.7 PG — SIGNIFICANT CHANGE UP (ref 27–34)
MCHC RBC-ENTMCNC: 32.8 GM/DL — SIGNIFICANT CHANGE UP (ref 32–36)
MCHC RBC-ENTMCNC: 32.9 G/DL — SIGNIFICANT CHANGE UP (ref 32–36)
MCHC RBC-ENTMCNC: 33 PG — SIGNIFICANT CHANGE UP (ref 27–34)
MCHC RBC-ENTMCNC: 33.1 G/DL — SIGNIFICANT CHANGE UP (ref 32–36)
MCHC RBC-ENTMCNC: 33.1 G/DL — SIGNIFICANT CHANGE UP (ref 32–36)
MCHC RBC-ENTMCNC: 33.2 GM/DL — SIGNIFICANT CHANGE UP (ref 32–36)
MCHC RBC-ENTMCNC: 33.3 G/DL — SIGNIFICANT CHANGE UP (ref 32–36)
MCHC RBC-ENTMCNC: 33.3 G/DL — SIGNIFICANT CHANGE UP (ref 32–36)
MCHC RBC-ENTMCNC: 33.3 GM/DL — SIGNIFICANT CHANGE UP (ref 32–36)
MCHC RBC-ENTMCNC: 33.4 G/DL — SIGNIFICANT CHANGE UP (ref 32–36)
MCHC RBC-ENTMCNC: 33.4 PG — SIGNIFICANT CHANGE UP (ref 27–34)
MCHC RBC-ENTMCNC: 33.5 G/DL — SIGNIFICANT CHANGE UP (ref 32–36)
MCHC RBC-ENTMCNC: 33.6 G/DL — SIGNIFICANT CHANGE UP (ref 32–36)
MCHC RBC-ENTMCNC: 33.6 PG — SIGNIFICANT CHANGE UP (ref 27–34)
MCHC RBC-ENTMCNC: 33.9 GM/DL — SIGNIFICANT CHANGE UP (ref 32–36)
MCHC RBC-ENTMCNC: 34 G/DL — SIGNIFICANT CHANGE UP (ref 32–36)
MCHC RBC-ENTMCNC: 34 G/DL — SIGNIFICANT CHANGE UP (ref 32–36)
MCHC RBC-ENTMCNC: 34 PG — SIGNIFICANT CHANGE UP (ref 27–34)
MCHC RBC-ENTMCNC: 34.3 PG — HIGH (ref 27–34)
MCHC RBC-ENTMCNC: 34.3 PG — HIGH (ref 27–34)
MCHC RBC-ENTMCNC: 34.4 G/DL — SIGNIFICANT CHANGE UP (ref 32–36)
MCHC RBC-ENTMCNC: 34.5 PG — HIGH (ref 27–34)
MCHC RBC-ENTMCNC: 34.5 PG — HIGH (ref 27–34)
MCHC RBC-ENTMCNC: 34.6 G/DL — SIGNIFICANT CHANGE UP (ref 32–36)
MCV RBC AUTO: 100 FL — SIGNIFICANT CHANGE UP (ref 80–100)
MCV RBC AUTO: 100.4 FL — HIGH (ref 80–100)
MCV RBC AUTO: 101.4 FL — HIGH (ref 80–100)
MCV RBC AUTO: 102.1 FL — HIGH (ref 80–100)
MCV RBC AUTO: 103.1 FL — HIGH (ref 80–100)
MCV RBC AUTO: 92.1 FL — SIGNIFICANT CHANGE UP (ref 80–100)
MCV RBC AUTO: 93.3 FL — SIGNIFICANT CHANGE UP (ref 80–100)
MCV RBC AUTO: 93.6 FL — SIGNIFICANT CHANGE UP (ref 80–100)
MCV RBC AUTO: 94.2 FL — SIGNIFICANT CHANGE UP (ref 80–100)
MCV RBC AUTO: 94.6 FL — SIGNIFICANT CHANGE UP (ref 80–100)
MCV RBC AUTO: 94.6 FL — SIGNIFICANT CHANGE UP (ref 80–100)
MCV RBC AUTO: 95.2 FL — SIGNIFICANT CHANGE UP (ref 80–100)
MCV RBC AUTO: 95.3 FL — SIGNIFICANT CHANGE UP (ref 80–100)
MCV RBC AUTO: 95.7 FL — SIGNIFICANT CHANGE UP (ref 80–100)
MCV RBC AUTO: 95.8 FL — SIGNIFICANT CHANGE UP (ref 80–100)
MCV RBC AUTO: 96 FL — SIGNIFICANT CHANGE UP (ref 80–100)
MCV RBC AUTO: 96.1 FL — SIGNIFICANT CHANGE UP (ref 80–100)
MCV RBC AUTO: 96.1 FL — SIGNIFICANT CHANGE UP (ref 80–100)
MCV RBC AUTO: 96.2 FL — SIGNIFICANT CHANGE UP (ref 80–100)
MCV RBC AUTO: 96.3 FL — SIGNIFICANT CHANGE UP (ref 80–100)
MCV RBC AUTO: 96.5 FL — SIGNIFICANT CHANGE UP (ref 80–100)
MCV RBC AUTO: 96.6 FL — SIGNIFICANT CHANGE UP (ref 80–100)
MCV RBC AUTO: 96.7 FL — SIGNIFICANT CHANGE UP (ref 80–100)
MCV RBC AUTO: 96.9 FL — SIGNIFICANT CHANGE UP (ref 80–100)
MCV RBC AUTO: 98 FL — SIGNIFICANT CHANGE UP (ref 80–100)
MCV RBC AUTO: 98 FL — SIGNIFICANT CHANGE UP (ref 80–100)
MCV RBC AUTO: 98.2 FL — SIGNIFICANT CHANGE UP (ref 80–100)
MCV RBC AUTO: 98.3 FL — SIGNIFICANT CHANGE UP (ref 80–100)
MCV RBC AUTO: 98.6 FL — SIGNIFICANT CHANGE UP (ref 80–100)
MCV RBC AUTO: 98.7 FL — SIGNIFICANT CHANGE UP (ref 80–100)
MCV RBC AUTO: 98.7 FL — SIGNIFICANT CHANGE UP (ref 80–100)
MCV RBC AUTO: 99.6 FL — SIGNIFICANT CHANGE UP (ref 80–100)
MCV RBC AUTO: 99.7 FL — SIGNIFICANT CHANGE UP (ref 80–100)
MCV RBC AUTO: 99.7 FL — SIGNIFICANT CHANGE UP (ref 80–100)
MESOTHL CELL # FLD: 0 % — SIGNIFICANT CHANGE UP
MONOCYTES # BLD AUTO: 0.18 K/UL — SIGNIFICANT CHANGE UP (ref 0–0.9)
MONOCYTES # BLD AUTO: 0.21 K/UL — SIGNIFICANT CHANGE UP (ref 0–0.9)
MONOCYTES # BLD AUTO: 0.29 K/UL — SIGNIFICANT CHANGE UP (ref 0–0.9)
MONOCYTES # BLD AUTO: 0.32 K/UL — SIGNIFICANT CHANGE UP (ref 0–0.9)
MONOCYTES # BLD AUTO: 0.33 K/UL — SIGNIFICANT CHANGE UP (ref 0–0.9)
MONOCYTES # BLD AUTO: 0.34 K/UL — SIGNIFICANT CHANGE UP (ref 0–0.9)
MONOCYTES # BLD AUTO: 0.38 K/UL — SIGNIFICANT CHANGE UP (ref 0–0.9)
MONOCYTES # BLD AUTO: 0.39 K/UL — SIGNIFICANT CHANGE UP (ref 0–0.9)
MONOCYTES # BLD AUTO: 0.4 K/UL — SIGNIFICANT CHANGE UP (ref 0–0.9)
MONOCYTES # BLD AUTO: 0.43 K/UL — SIGNIFICANT CHANGE UP (ref 0–0.9)
MONOCYTES # BLD AUTO: 0.45 K/UL — SIGNIFICANT CHANGE UP (ref 0–0.9)
MONOCYTES # BLD AUTO: 0.58 K/UL — SIGNIFICANT CHANGE UP (ref 0–0.9)
MONOCYTES # BLD AUTO: 0.62 K/UL — SIGNIFICANT CHANGE UP (ref 0–0.9)
MONOCYTES # BLD AUTO: 0.64 K/UL — SIGNIFICANT CHANGE UP (ref 0–0.9)
MONOCYTES # BLD AUTO: 0.67 K/UL — SIGNIFICANT CHANGE UP (ref 0–0.9)
MONOCYTES # BLD AUTO: 0.68 K/UL — SIGNIFICANT CHANGE UP (ref 0–0.9)
MONOCYTES # BLD AUTO: 0.72 K/UL — SIGNIFICANT CHANGE UP (ref 0–0.9)
MONOCYTES # BLD AUTO: 0.76 K/UL — SIGNIFICANT CHANGE UP (ref 0–0.9)
MONOCYTES # BLD AUTO: 0.77 K/UL — SIGNIFICANT CHANGE UP (ref 0–0.9)
MONOCYTES # BLD AUTO: 0.81 K/UL — SIGNIFICANT CHANGE UP (ref 0–0.9)
MONOCYTES # BLD AUTO: 0.82 K/UL — SIGNIFICANT CHANGE UP (ref 0–0.9)
MONOCYTES # BLD AUTO: 0.88 K/UL — SIGNIFICANT CHANGE UP (ref 0–0.9)
MONOCYTES # BLD AUTO: 0.91 K/UL — HIGH (ref 0–0.9)
MONOCYTES # BLD AUTO: 1.16 K/UL — HIGH (ref 0–0.9)
MONOCYTES # BLD AUTO: 1.3 K/UL — HIGH (ref 0–0.9)
MONOCYTES NFR BLD AUTO: 10.5 % — SIGNIFICANT CHANGE UP (ref 2–14)
MONOCYTES NFR BLD AUTO: 10.7 % — SIGNIFICANT CHANGE UP (ref 2–14)
MONOCYTES NFR BLD AUTO: 10.7 % — SIGNIFICANT CHANGE UP (ref 2–14)
MONOCYTES NFR BLD AUTO: 10.9 % — SIGNIFICANT CHANGE UP (ref 2–14)
MONOCYTES NFR BLD AUTO: 11.3 % — SIGNIFICANT CHANGE UP (ref 2–14)
MONOCYTES NFR BLD AUTO: 12 % — SIGNIFICANT CHANGE UP (ref 2–14)
MONOCYTES NFR BLD AUTO: 13.8 % — SIGNIFICANT CHANGE UP (ref 2–14)
MONOCYTES NFR BLD AUTO: 14 % — SIGNIFICANT CHANGE UP (ref 2–14)
MONOCYTES NFR BLD AUTO: 15.4 % — HIGH (ref 2–14)
MONOCYTES NFR BLD AUTO: 15.5 % — HIGH (ref 2–14)
MONOCYTES NFR BLD AUTO: 15.5 % — HIGH (ref 2–14)
MONOCYTES NFR BLD AUTO: 16.2 % — HIGH (ref 2–14)
MONOCYTES NFR BLD AUTO: 16.3 % — HIGH (ref 2–14)
MONOCYTES NFR BLD AUTO: 16.5 % — HIGH (ref 2–14)
MONOCYTES NFR BLD AUTO: 18.1 % — HIGH (ref 2–14)
MONOCYTES NFR BLD AUTO: 22.1 % — HIGH (ref 2–14)
MONOCYTES NFR BLD AUTO: 6 % — SIGNIFICANT CHANGE UP (ref 2–14)
MONOCYTES NFR BLD AUTO: 7.4 % — SIGNIFICANT CHANGE UP (ref 2–14)
MONOCYTES NFR BLD AUTO: 7.8 % — SIGNIFICANT CHANGE UP (ref 2–14)
MONOCYTES NFR BLD AUTO: 7.8 % — SIGNIFICANT CHANGE UP (ref 2–14)
MONOCYTES NFR BLD AUTO: 8 % — SIGNIFICANT CHANGE UP (ref 2–14)
MONOCYTES NFR BLD AUTO: 9 % — SIGNIFICANT CHANGE UP (ref 2–14)
MONOCYTES NFR BLD AUTO: 9 % — SIGNIFICANT CHANGE UP (ref 2–14)
MONOCYTES NFR BLD AUTO: 9.3 % — SIGNIFICANT CHANGE UP (ref 2–14)
MONOCYTES NFR BLD AUTO: 9.7 % — SIGNIFICANT CHANGE UP (ref 2–14)
MONOS+MACROS # FLD: 7 % — SIGNIFICANT CHANGE UP
MYELOCYTES NFR BLD: 1 % — HIGH (ref 0–0)
NEUTROPHILS # BLD AUTO: 1.66 K/UL — LOW (ref 1.8–7.4)
NEUTROPHILS # BLD AUTO: 1.72 K/UL — LOW (ref 1.8–7.4)
NEUTROPHILS # BLD AUTO: 1.97 K/UL — SIGNIFICANT CHANGE UP (ref 1.8–7.4)
NEUTROPHILS # BLD AUTO: 2.08 K/UL — SIGNIFICANT CHANGE UP (ref 1.8–7.4)
NEUTROPHILS # BLD AUTO: 2.21 K/UL — SIGNIFICANT CHANGE UP (ref 1.8–7.4)
NEUTROPHILS # BLD AUTO: 2.34 K/UL — SIGNIFICANT CHANGE UP (ref 1.8–7.4)
NEUTROPHILS # BLD AUTO: 2.52 K/UL — SIGNIFICANT CHANGE UP (ref 1.8–7.4)
NEUTROPHILS # BLD AUTO: 2.57 K/UL — SIGNIFICANT CHANGE UP (ref 1.8–7.4)
NEUTROPHILS # BLD AUTO: 2.62 K/UL — SIGNIFICANT CHANGE UP (ref 1.8–7.4)
NEUTROPHILS # BLD AUTO: 2.64 K/UL — SIGNIFICANT CHANGE UP (ref 1.8–7.4)
NEUTROPHILS # BLD AUTO: 2.65 K/UL — SIGNIFICANT CHANGE UP (ref 1.8–7.4)
NEUTROPHILS # BLD AUTO: 2.67 K/UL — SIGNIFICANT CHANGE UP (ref 1.8–7.4)
NEUTROPHILS # BLD AUTO: 2.79 K/UL — SIGNIFICANT CHANGE UP (ref 1.8–7.4)
NEUTROPHILS # BLD AUTO: 2.81 K/UL — SIGNIFICANT CHANGE UP (ref 1.8–7.4)
NEUTROPHILS # BLD AUTO: 2.86 K/UL — SIGNIFICANT CHANGE UP (ref 1.8–7.4)
NEUTROPHILS # BLD AUTO: 2.99 K/UL — SIGNIFICANT CHANGE UP (ref 1.8–7.4)
NEUTROPHILS # BLD AUTO: 3.05 K/UL — SIGNIFICANT CHANGE UP (ref 1.8–7.4)
NEUTROPHILS # BLD AUTO: 3.41 K/UL — SIGNIFICANT CHANGE UP (ref 1.8–7.4)
NEUTROPHILS # BLD AUTO: 3.51 K/UL — SIGNIFICANT CHANGE UP (ref 1.8–7.4)
NEUTROPHILS # BLD AUTO: 3.82 K/UL — SIGNIFICANT CHANGE UP (ref 1.8–7.4)
NEUTROPHILS # BLD AUTO: 6.35 K/UL — SIGNIFICANT CHANGE UP (ref 1.8–7.4)
NEUTROPHILS # BLD AUTO: 6.51 K/UL — SIGNIFICANT CHANGE UP (ref 1.8–7.4)
NEUTROPHILS # BLD AUTO: 6.68 K/UL — SIGNIFICANT CHANGE UP (ref 1.8–7.4)
NEUTROPHILS # BLD AUTO: 6.83 K/UL — SIGNIFICANT CHANGE UP (ref 1.8–7.4)
NEUTROPHILS # BLD AUTO: 8.77 K/UL — HIGH (ref 1.8–7.4)
NEUTROPHILS NFR BLD AUTO: 55.2 % — SIGNIFICANT CHANGE UP (ref 43–77)
NEUTROPHILS NFR BLD AUTO: 57.6 % — SIGNIFICANT CHANGE UP (ref 43–77)
NEUTROPHILS NFR BLD AUTO: 61.1 % — SIGNIFICANT CHANGE UP (ref 43–77)
NEUTROPHILS NFR BLD AUTO: 61.3 % — SIGNIFICANT CHANGE UP (ref 43–77)
NEUTROPHILS NFR BLD AUTO: 62.4 % — SIGNIFICANT CHANGE UP (ref 43–77)
NEUTROPHILS NFR BLD AUTO: 62.8 % — SIGNIFICANT CHANGE UP (ref 43–77)
NEUTROPHILS NFR BLD AUTO: 63.7 % — SIGNIFICANT CHANGE UP (ref 43–77)
NEUTROPHILS NFR BLD AUTO: 64 % — SIGNIFICANT CHANGE UP (ref 43–77)
NEUTROPHILS NFR BLD AUTO: 64.3 % — SIGNIFICANT CHANGE UP (ref 43–77)
NEUTROPHILS NFR BLD AUTO: 65.2 % — SIGNIFICANT CHANGE UP (ref 43–77)
NEUTROPHILS NFR BLD AUTO: 65.4 % — SIGNIFICANT CHANGE UP (ref 43–77)
NEUTROPHILS NFR BLD AUTO: 66 % — SIGNIFICANT CHANGE UP (ref 43–77)
NEUTROPHILS NFR BLD AUTO: 66.7 % — SIGNIFICANT CHANGE UP (ref 43–77)
NEUTROPHILS NFR BLD AUTO: 67.2 % — SIGNIFICANT CHANGE UP (ref 43–77)
NEUTROPHILS NFR BLD AUTO: 67.6 % — SIGNIFICANT CHANGE UP (ref 43–77)
NEUTROPHILS NFR BLD AUTO: 68.6 % — SIGNIFICANT CHANGE UP (ref 43–77)
NEUTROPHILS NFR BLD AUTO: 68.8 % — SIGNIFICANT CHANGE UP (ref 43–77)
NEUTROPHILS NFR BLD AUTO: 69.7 % — SIGNIFICANT CHANGE UP (ref 43–77)
NEUTROPHILS NFR BLD AUTO: 73.9 % — SIGNIFICANT CHANGE UP (ref 43–77)
NEUTROPHILS NFR BLD AUTO: 75.1 % — SIGNIFICANT CHANGE UP (ref 43–77)
NEUTROPHILS NFR BLD AUTO: 76.2 % — SIGNIFICANT CHANGE UP (ref 43–77)
NEUTROPHILS NFR BLD AUTO: 80 % — HIGH (ref 43–77)
NEUTROPHILS NFR BLD AUTO: 81.4 % — HIGH (ref 43–77)
NEUTROPHILS NFR BLD AUTO: 82 % — HIGH (ref 43–77)
NEUTROPHILS NFR BLD AUTO: 82.3 % — HIGH (ref 43–77)
NEUTROPHILS-BODY FLUID: 91 % — SIGNIFICANT CHANGE UP
NEUTS BAND # BLD: 1.7 % — SIGNIFICANT CHANGE UP (ref 0–8)
NIGHT BLUE STAIN TISS: SIGNIFICANT CHANGE UP
NITRITE UR-MCNC: NEGATIVE — SIGNIFICANT CHANGE UP
NITRITE UR-MCNC: NEGATIVE — SIGNIFICANT CHANGE UP
NRBC # BLD: 0 /100 WBCS — SIGNIFICANT CHANGE UP (ref 0–0)
NRBC # BLD: 0 /100 — SIGNIFICANT CHANGE UP (ref 0–0)
NRBC # BLD: 1 /100 — HIGH (ref 0–0)
NRBC # BLD: SIGNIFICANT CHANGE UP /100 WBCS (ref 0–0)
NRBC # BLD: SIGNIFICANT CHANGE UP /100 WBCS (ref 0–0)
NRBC # FLD: 0 — SIGNIFICANT CHANGE UP
OTHER CELLS FLD MANUAL: 0 % — SIGNIFICANT CHANGE UP
PH UR: 6 — SIGNIFICANT CHANGE UP (ref 5–8)
PH UR: 6.5 — SIGNIFICANT CHANGE UP (ref 5–8)
PHOSPHATE SERPL-MCNC: 2.8 MG/DL — SIGNIFICANT CHANGE UP (ref 2.5–4.5)
PLAT MORPH BLD: NORMAL — SIGNIFICANT CHANGE UP
PLATELET # BLD AUTO: 182 K/UL — SIGNIFICANT CHANGE UP (ref 150–400)
PLATELET # BLD AUTO: 183 K/UL — SIGNIFICANT CHANGE UP (ref 150–400)
PLATELET # BLD AUTO: 187 K/UL — SIGNIFICANT CHANGE UP (ref 150–400)
PLATELET # BLD AUTO: 190 K/UL — SIGNIFICANT CHANGE UP (ref 150–400)
PLATELET # BLD AUTO: 192 K/UL — SIGNIFICANT CHANGE UP (ref 150–400)
PLATELET # BLD AUTO: 194 K/UL — SIGNIFICANT CHANGE UP (ref 150–400)
PLATELET # BLD AUTO: 199 K/UL — SIGNIFICANT CHANGE UP (ref 150–400)
PLATELET # BLD AUTO: 203 K/UL — SIGNIFICANT CHANGE UP (ref 150–400)
PLATELET # BLD AUTO: 206 K/UL — SIGNIFICANT CHANGE UP (ref 150–400)
PLATELET # BLD AUTO: 209 K/UL — SIGNIFICANT CHANGE UP (ref 150–400)
PLATELET # BLD AUTO: 211 K/UL — SIGNIFICANT CHANGE UP (ref 150–400)
PLATELET # BLD AUTO: 212 K/UL — SIGNIFICANT CHANGE UP (ref 150–400)
PLATELET # BLD AUTO: 212 K/UL — SIGNIFICANT CHANGE UP (ref 150–400)
PLATELET # BLD AUTO: 214 K/UL — SIGNIFICANT CHANGE UP (ref 150–400)
PLATELET # BLD AUTO: 215 K/UL — SIGNIFICANT CHANGE UP (ref 150–400)
PLATELET # BLD AUTO: 216 K/UL — SIGNIFICANT CHANGE UP (ref 150–400)
PLATELET # BLD AUTO: 217 K/UL — SIGNIFICANT CHANGE UP (ref 150–400)
PLATELET # BLD AUTO: 218 K/UL — SIGNIFICANT CHANGE UP (ref 150–400)
PLATELET # BLD AUTO: 222 K/UL — SIGNIFICANT CHANGE UP (ref 150–400)
PLATELET # BLD AUTO: 224 K/UL — SIGNIFICANT CHANGE UP (ref 150–400)
PLATELET # BLD AUTO: 224 K/UL — SIGNIFICANT CHANGE UP (ref 150–400)
PLATELET # BLD AUTO: 226 K/UL — SIGNIFICANT CHANGE UP (ref 150–400)
PLATELET # BLD AUTO: 226 K/UL — SIGNIFICANT CHANGE UP (ref 150–400)
PLATELET # BLD AUTO: 231 K/UL — SIGNIFICANT CHANGE UP (ref 150–400)
PLATELET # BLD AUTO: 233 K/UL — SIGNIFICANT CHANGE UP (ref 150–400)
PLATELET # BLD AUTO: 236 K/UL — SIGNIFICANT CHANGE UP (ref 150–400)
PLATELET # BLD AUTO: 237 K/UL — SIGNIFICANT CHANGE UP (ref 150–400)
PLATELET # BLD AUTO: 237 K/UL — SIGNIFICANT CHANGE UP (ref 150–400)
PLATELET # BLD AUTO: 242 K/UL — SIGNIFICANT CHANGE UP (ref 150–400)
PLATELET # BLD AUTO: 247 K/UL — SIGNIFICANT CHANGE UP (ref 150–400)
PLATELET # BLD AUTO: 263 K/UL — SIGNIFICANT CHANGE UP (ref 150–400)
PLATELET # BLD AUTO: 266 K/UL — SIGNIFICANT CHANGE UP (ref 150–400)
PLATELET # BLD AUTO: 266 K/UL — SIGNIFICANT CHANGE UP (ref 150–400)
PLATELET # BLD AUTO: 271 K/UL — SIGNIFICANT CHANGE UP (ref 150–400)
PLATELET # BLD AUTO: 290 K/UL — SIGNIFICANT CHANGE UP (ref 150–400)
PLATELET # BLD AUTO: 368 K/UL — SIGNIFICANT CHANGE UP (ref 150–400)
POIKILOCYTOSIS BLD QL AUTO: SLIGHT — SIGNIFICANT CHANGE UP
POLYCHROMASIA BLD QL SMEAR: SLIGHT — SIGNIFICANT CHANGE UP
POTASSIUM SERPL-MCNC: 3.4 MMOL/L — LOW (ref 3.5–5.3)
POTASSIUM SERPL-MCNC: 3.4 MMOL/L — LOW (ref 3.5–5.3)
POTASSIUM SERPL-MCNC: 3.7 MMOL/L — SIGNIFICANT CHANGE UP (ref 3.5–5.3)
POTASSIUM SERPL-MCNC: 3.8 MMOL/L — SIGNIFICANT CHANGE UP (ref 3.5–5.3)
POTASSIUM SERPL-MCNC: 3.9 MMOL/L — SIGNIFICANT CHANGE UP (ref 3.5–5.3)
POTASSIUM SERPL-MCNC: 4 MMOL/L — SIGNIFICANT CHANGE UP (ref 3.5–5.3)
POTASSIUM SERPL-MCNC: 4.1 MMOL/L — SIGNIFICANT CHANGE UP (ref 3.5–5.3)
POTASSIUM SERPL-MCNC: 4.1 MMOL/L — SIGNIFICANT CHANGE UP (ref 3.5–5.3)
POTASSIUM SERPL-MCNC: 4.2 MMOL/L — SIGNIFICANT CHANGE UP (ref 3.5–5.3)
POTASSIUM SERPL-MCNC: 4.3 MMOL/L — SIGNIFICANT CHANGE UP (ref 3.5–5.3)
POTASSIUM SERPL-MCNC: 4.3 MMOL/L — SIGNIFICANT CHANGE UP (ref 3.5–5.3)
POTASSIUM SERPL-MCNC: 4.4 MMOL/L — SIGNIFICANT CHANGE UP (ref 3.5–5.3)
POTASSIUM SERPL-SCNC: 3.4 MMOL/L — LOW (ref 3.5–5.3)
POTASSIUM SERPL-SCNC: 3.4 MMOL/L — LOW (ref 3.5–5.3)
POTASSIUM SERPL-SCNC: 3.7 MMOL/L — SIGNIFICANT CHANGE UP (ref 3.5–5.3)
POTASSIUM SERPL-SCNC: 3.8 MMOL/L — SIGNIFICANT CHANGE UP (ref 3.5–5.3)
POTASSIUM SERPL-SCNC: 3.9 MMOL/L — SIGNIFICANT CHANGE UP (ref 3.5–5.3)
POTASSIUM SERPL-SCNC: 4 MMOL/L — SIGNIFICANT CHANGE UP (ref 3.5–5.3)
POTASSIUM SERPL-SCNC: 4.1 MMOL/L
POTASSIUM SERPL-SCNC: 4.1 MMOL/L — SIGNIFICANT CHANGE UP (ref 3.5–5.3)
POTASSIUM SERPL-SCNC: 4.1 MMOL/L — SIGNIFICANT CHANGE UP (ref 3.5–5.3)
POTASSIUM SERPL-SCNC: 4.2 MMOL/L — SIGNIFICANT CHANGE UP (ref 3.5–5.3)
POTASSIUM SERPL-SCNC: 4.3 MMOL/L — SIGNIFICANT CHANGE UP (ref 3.5–5.3)
POTASSIUM SERPL-SCNC: 4.3 MMOL/L — SIGNIFICANT CHANGE UP (ref 3.5–5.3)
POTASSIUM SERPL-SCNC: 4.4 MMOL/L — SIGNIFICANT CHANGE UP (ref 3.5–5.3)
POTASSIUM SERPL-SCNC: 5.3 MMOL/L
PROT SERPL-MCNC: 5.8 G/DL — LOW (ref 6–8.3)
PROT SERPL-MCNC: 5.9 G/DL — LOW (ref 6–8.3)
PROT SERPL-MCNC: 5.9 G/DL — LOW (ref 6–8.3)
PROT SERPL-MCNC: 6.1 G/DL — SIGNIFICANT CHANGE UP (ref 6–8.3)
PROT SERPL-MCNC: 6.2 G/DL
PROT SERPL-MCNC: 6.2 G/DL — SIGNIFICANT CHANGE UP (ref 6–8.3)
PROT SERPL-MCNC: 6.3 G/DL — SIGNIFICANT CHANGE UP (ref 6–8.3)
PROT SERPL-MCNC: 6.6 G/DL — SIGNIFICANT CHANGE UP (ref 6–8.3)
PROT SERPL-MCNC: 6.6 G/DL — SIGNIFICANT CHANGE UP (ref 6–8.3)
PROT SERPL-MCNC: 6.8 G/DL — SIGNIFICANT CHANGE UP (ref 6–8.3)
PROT SERPL-MCNC: 7.1 G/DL — SIGNIFICANT CHANGE UP (ref 6–8.3)
PROT SERPL-MCNC: 7.3 G/DL
PROT SERPL-MCNC: 7.6 G/DL — SIGNIFICANT CHANGE UP (ref 6–8.3)
PROT UR-MCNC: NEGATIVE — SIGNIFICANT CHANGE UP
PROT UR-MCNC: NEGATIVE — SIGNIFICANT CHANGE UP
PROTHROM AB SERPL-ACNC: 15.6 SEC — HIGH (ref 10.5–13.4)
PROTHROM AB SERPL-ACNC: 15.6 SEC — HIGH (ref 10.5–13.4)
PROTHROM AB SERPL-ACNC: 16.4 SEC — HIGH (ref 10.5–13.4)
PROTHROM AB SERPL-ACNC: 16.4 SEC — HIGH (ref 10.5–13.4)
PROTHROM AB SERPL-ACNC: 17.2 SEC — HIGH (ref 10.5–13.4)
PROTHROM AB SERPL-ACNC: 17.9 SEC — HIGH (ref 10.5–13.4)
PROTHROM AB SERPL-ACNC: 20.1 SEC — HIGH (ref 10.5–13.4)
PROTHROM AB SERPL-ACNC: 20.6 SEC — HIGH (ref 10.5–13.4)
PROTHROM AB SERPL-ACNC: 20.9 SEC — HIGH (ref 10.5–13.4)
PROTHROM AB SERPL-ACNC: 21.7 SEC — HIGH (ref 10.5–13.4)
PROTHROM AB SERPL-ACNC: 24.5 SEC — HIGH (ref 10.5–13.4)
RAPID RVP RESULT: SIGNIFICANT CHANGE UP
RBC # BLD: 2.44 M/UL — LOW (ref 3.8–5.2)
RBC # BLD: 2.63 M/UL — LOW (ref 3.8–5.2)
RBC # BLD: 2.76 M/UL — LOW (ref 3.8–5.2)
RBC # BLD: 2.81 M/UL — LOW (ref 3.8–5.2)
RBC # BLD: 2.81 M/UL — LOW (ref 3.8–5.2)
RBC # BLD: 2.83 M/UL — LOW (ref 3.8–5.2)
RBC # BLD: 2.83 M/UL — LOW (ref 3.8–5.2)
RBC # BLD: 2.84 M/UL — LOW (ref 3.8–5.2)
RBC # BLD: 2.87 M/UL — LOW (ref 3.8–5.2)
RBC # BLD: 2.88 M/UL — LOW (ref 3.8–5.2)
RBC # BLD: 2.89 M/UL — LOW (ref 3.8–5.2)
RBC # BLD: 2.89 M/UL — LOW (ref 3.8–5.2)
RBC # BLD: 2.93 M/UL — LOW (ref 3.8–5.2)
RBC # BLD: 2.93 M/UL — LOW (ref 3.8–5.2)
RBC # BLD: 2.94 M/UL — LOW (ref 3.8–5.2)
RBC # BLD: 2.96 M/UL — LOW (ref 3.8–5.2)
RBC # BLD: 2.97 M/UL — LOW (ref 3.8–5.2)
RBC # BLD: 2.99 M/UL — LOW (ref 3.8–5.2)
RBC # BLD: 2.99 M/UL — LOW (ref 3.8–5.2)
RBC # BLD: 3.01 M/UL — LOW (ref 3.8–5.2)
RBC # BLD: 3.02 M/UL — LOW (ref 3.8–5.2)
RBC # BLD: 3.05 M/UL — LOW (ref 3.8–5.2)
RBC # BLD: 3.1 M/UL — LOW (ref 3.8–5.2)
RBC # BLD: 3.17 M/UL — LOW (ref 3.8–5.2)
RBC # BLD: 3.21 M/UL — LOW (ref 3.8–5.2)
RBC # BLD: 3.24 M/UL — LOW (ref 3.8–5.2)
RBC # BLD: 3.31 M/UL — LOW (ref 3.8–5.2)
RBC # BLD: 3.34 M/UL — LOW (ref 3.8–5.2)
RBC # BLD: 3.37 M/UL — LOW (ref 3.8–5.2)
RBC # BLD: 3.47 M/UL — LOW (ref 3.8–5.2)
RBC # BLD: 3.52 M/UL — LOW (ref 3.8–5.2)
RBC # BLD: 3.54 M/UL — LOW (ref 3.8–5.2)
RBC # FLD: 15 % — HIGH (ref 10.3–14.5)
RBC # FLD: 15 % — HIGH (ref 10.3–14.5)
RBC # FLD: 15.1 % — HIGH (ref 10.3–14.5)
RBC # FLD: 15.1 % — HIGH (ref 10.3–14.5)
RBC # FLD: 15.2 % — HIGH (ref 10.3–14.5)
RBC # FLD: 15.4 % — HIGH (ref 10.3–14.5)
RBC # FLD: 15.7 % — HIGH (ref 10.3–14.5)
RBC # FLD: 16.1 % — HIGH (ref 10.3–14.5)
RBC # FLD: 16.2 % — HIGH (ref 10.3–14.5)
RBC # FLD: 16.2 % — HIGH (ref 10.3–14.5)
RBC # FLD: 16.4 % — HIGH (ref 10.3–14.5)
RBC # FLD: 16.8 % — HIGH (ref 10.3–14.5)
RBC # FLD: 16.9 % — HIGH (ref 10.3–14.5)
RBC # FLD: 17 % — HIGH (ref 10.3–14.5)
RBC # FLD: 17.1 % — HIGH (ref 10.3–14.5)
RBC # FLD: 17.2 % — HIGH (ref 10.3–14.5)
RBC # FLD: 17.4 % — HIGH (ref 10.3–14.5)
RBC # FLD: 17.6 % — HIGH (ref 10.3–14.5)
RBC # FLD: 17.6 % — HIGH (ref 10.3–14.5)
RBC # FLD: 18.1 % — HIGH (ref 10.3–14.5)
RBC # FLD: 19.1 % — HIGH (ref 10.3–14.5)
RBC # FLD: 19.2 % — HIGH (ref 10.3–14.5)
RBC # FLD: 19.3 % — HIGH (ref 10.3–14.5)
RBC # FLD: 19.4 % — HIGH (ref 10.3–14.5)
RBC # FLD: 19.5 % — HIGH (ref 10.3–14.5)
RBC # FLD: 19.6 % — HIGH (ref 10.3–14.5)
RBC # FLD: 19.6 % — HIGH (ref 10.3–14.5)
RBC # FLD: 19.8 % — HIGH (ref 10.3–14.5)
RBC # FLD: 19.8 % — HIGH (ref 10.3–14.5)
RBC # FLD: 20 % — HIGH (ref 10.3–14.5)
RBC # FLD: 20.1 % — HIGH (ref 10.3–14.5)
RBC # FLD: 20.2 % — HIGH (ref 10.3–14.5)
RBC # FLD: 20.4 % — HIGH (ref 10.3–14.5)
RBC # FLD: 20.5 % — HIGH (ref 10.3–14.5)
RBC BLD AUTO: ABNORMAL
RBC BLD AUTO: SIGNIFICANT CHANGE UP
RBC BLD AUTO: SIGNIFICANT CHANGE UP
RCV VOL RI: 5000 /UL — HIGH (ref 0–0)
RH IG SCN BLD-IMP: NEGATIVE — SIGNIFICANT CHANGE UP
RSV RNA NPH QL NAA+NON-PROBE: SIGNIFICANT CHANGE UP
SARS-COV-2 RNA SPEC QL NAA+PROBE: SIGNIFICANT CHANGE UP
SODIUM SERPL-SCNC: 131 MMOL/L — LOW (ref 135–145)
SODIUM SERPL-SCNC: 131 MMOL/L — LOW (ref 135–145)
SODIUM SERPL-SCNC: 132 MMOL/L — LOW (ref 135–145)
SODIUM SERPL-SCNC: 133 MMOL/L — LOW (ref 135–145)
SODIUM SERPL-SCNC: 133 MMOL/L — LOW (ref 135–145)
SODIUM SERPL-SCNC: 134 MMOL/L — LOW (ref 135–145)
SODIUM SERPL-SCNC: 134 MMOL/L — LOW (ref 135–145)
SODIUM SERPL-SCNC: 135 MMOL/L — SIGNIFICANT CHANGE UP (ref 135–145)
SODIUM SERPL-SCNC: 136 MMOL/L — SIGNIFICANT CHANGE UP (ref 135–145)
SODIUM SERPL-SCNC: 137 MMOL/L — SIGNIFICANT CHANGE UP (ref 135–145)
SODIUM SERPL-SCNC: 137 MMOL/L — SIGNIFICANT CHANGE UP (ref 135–145)
SODIUM SERPL-SCNC: 138 MMOL/L — SIGNIFICANT CHANGE UP (ref 135–145)
SODIUM SERPL-SCNC: 139 MMOL/L — SIGNIFICANT CHANGE UP (ref 135–145)
SODIUM SERPL-SCNC: 140 MMOL/L — SIGNIFICANT CHANGE UP (ref 135–145)
SODIUM SERPL-SCNC: 141 MMOL/L
SODIUM SERPL-SCNC: 141 MMOL/L
SODIUM SERPL-SCNC: 141 MMOL/L — SIGNIFICANT CHANGE UP (ref 135–145)
SODIUM SERPL-SCNC: 141 MMOL/L — SIGNIFICANT CHANGE UP (ref 135–145)
SP GR SPEC: 1.01 — SIGNIFICANT CHANGE UP (ref 1.01–1.02)
SP GR SPEC: 1.02 — SIGNIFICANT CHANGE UP (ref 1.01–1.02)
SPECIMEN SOURCE: SIGNIFICANT CHANGE UP
SYNOVIAL CRYSTALS CLARITY: ABNORMAL
SYNOVIAL CRYSTALS COLOR: YELLOW
SYNOVIAL CRYSTALS ID: ABNORMAL
SYNOVIAL CRYSTALS TUBE: SIGNIFICANT CHANGE UP
TARGETS BLD QL SMEAR: SLIGHT — SIGNIFICANT CHANGE UP
TIBC SERPL-MCNC: 173 UG/DL — LOW (ref 220–430)
TOTAL NUCLEATED CELL COUNT, BODY FLUID: SIGNIFICANT CHANGE UP /UL
TUBE TYPE: SIGNIFICANT CHANGE UP
UIBC SERPL-MCNC: 152 UG/DL — SIGNIFICANT CHANGE UP (ref 110–370)
URATE SERPL-MCNC: 4.4 MG/DL — SIGNIFICANT CHANGE UP (ref 2.5–7)
UROBILINOGEN FLD QL: NEGATIVE — SIGNIFICANT CHANGE UP
UROBILINOGEN FLD QL: NEGATIVE — SIGNIFICANT CHANGE UP
VIT B12 SERPL-MCNC: 875 PG/ML — SIGNIFICANT CHANGE UP (ref 232–1245)
WBC # BLD: 10.69 K/UL — HIGH (ref 3.8–10.5)
WBC # BLD: 2.49 K/UL — LOW (ref 3.8–10.5)
WBC # BLD: 2.6 K/UL — LOW (ref 3.8–10.5)
WBC # BLD: 2.66 K/UL — LOW (ref 3.8–10.5)
WBC # BLD: 2.84 K/UL — LOW (ref 3.8–10.5)
WBC # BLD: 2.92 K/UL — LOW (ref 3.8–10.5)
WBC # BLD: 2.95 K/UL — LOW (ref 3.8–10.5)
WBC # BLD: 3.09 K/UL — LOW (ref 3.8–10.5)
WBC # BLD: 3.19 K/UL — LOW (ref 3.8–10.5)
WBC # BLD: 3.26 K/UL — LOW (ref 3.8–10.5)
WBC # BLD: 3.42 K/UL — LOW (ref 3.8–10.5)
WBC # BLD: 3.48 K/UL — LOW (ref 3.8–10.5)
WBC # BLD: 3.54 K/UL — LOW (ref 3.8–10.5)
WBC # BLD: 3.64 K/UL — LOW (ref 3.8–10.5)
WBC # BLD: 3.72 K/UL — LOW (ref 3.8–10.5)
WBC # BLD: 3.73 K/UL — LOW (ref 3.8–10.5)
WBC # BLD: 3.78 K/UL — LOW (ref 3.8–10.5)
WBC # BLD: 3.99 K/UL — SIGNIFICANT CHANGE UP (ref 3.8–10.5)
WBC # BLD: 3.99 K/UL — SIGNIFICANT CHANGE UP (ref 3.8–10.5)
WBC # BLD: 4.09 K/UL — SIGNIFICANT CHANGE UP (ref 3.8–10.5)
WBC # BLD: 4.12 K/UL — SIGNIFICANT CHANGE UP (ref 3.8–10.5)
WBC # BLD: 4.12 K/UL — SIGNIFICANT CHANGE UP (ref 3.8–10.5)
WBC # BLD: 4.13 K/UL — SIGNIFICANT CHANGE UP (ref 3.8–10.5)
WBC # BLD: 4.21 K/UL — SIGNIFICANT CHANGE UP (ref 3.8–10.5)
WBC # BLD: 4.32 K/UL — SIGNIFICANT CHANGE UP (ref 3.8–10.5)
WBC # BLD: 4.44 K/UL — SIGNIFICANT CHANGE UP (ref 3.8–10.5)
WBC # BLD: 4.45 K/UL — SIGNIFICANT CHANGE UP (ref 3.8–10.5)
WBC # BLD: 4.45 K/UL — SIGNIFICANT CHANGE UP (ref 3.8–10.5)
WBC # BLD: 5.03 K/UL — SIGNIFICANT CHANGE UP (ref 3.8–10.5)
WBC # BLD: 5.04 K/UL — SIGNIFICANT CHANGE UP (ref 3.8–10.5)
WBC # BLD: 5.22 K/UL — SIGNIFICANT CHANGE UP (ref 3.8–10.5)
WBC # BLD: 5.56 K/UL — SIGNIFICANT CHANGE UP (ref 3.8–10.5)
WBC # BLD: 8.2 K/UL — SIGNIFICANT CHANGE UP (ref 3.8–10.5)
WBC # BLD: 8.31 K/UL — SIGNIFICANT CHANGE UP (ref 3.8–10.5)
WBC # BLD: 8.46 K/UL — SIGNIFICANT CHANGE UP (ref 3.8–10.5)
WBC # BLD: 8.53 K/UL — SIGNIFICANT CHANGE UP (ref 3.8–10.5)
WBC # FLD AUTO: 10.69 K/UL — HIGH (ref 3.8–10.5)
WBC # FLD AUTO: 2.49 K/UL — LOW (ref 3.8–10.5)
WBC # FLD AUTO: 2.6 K/UL — LOW (ref 3.8–10.5)
WBC # FLD AUTO: 2.66 K/UL — LOW (ref 3.8–10.5)
WBC # FLD AUTO: 2.84 K/UL — LOW (ref 3.8–10.5)
WBC # FLD AUTO: 2.92 K/UL — LOW (ref 3.8–10.5)
WBC # FLD AUTO: 2.95 K/UL — LOW (ref 3.8–10.5)
WBC # FLD AUTO: 3.09 K/UL — LOW (ref 3.8–10.5)
WBC # FLD AUTO: 3.19 K/UL — LOW (ref 3.8–10.5)
WBC # FLD AUTO: 3.26 K/UL — LOW (ref 3.8–10.5)
WBC # FLD AUTO: 3.42 K/UL — LOW (ref 3.8–10.5)
WBC # FLD AUTO: 3.48 K/UL — LOW (ref 3.8–10.5)
WBC # FLD AUTO: 3.54 K/UL — LOW (ref 3.8–10.5)
WBC # FLD AUTO: 3.64 K/UL — LOW (ref 3.8–10.5)
WBC # FLD AUTO: 3.72 K/UL — LOW (ref 3.8–10.5)
WBC # FLD AUTO: 3.73 K/UL — LOW (ref 3.8–10.5)
WBC # FLD AUTO: 3.78 K/UL — LOW (ref 3.8–10.5)
WBC # FLD AUTO: 3.99 K/UL — SIGNIFICANT CHANGE UP (ref 3.8–10.5)
WBC # FLD AUTO: 3.99 K/UL — SIGNIFICANT CHANGE UP (ref 3.8–10.5)
WBC # FLD AUTO: 4.09 K/UL — SIGNIFICANT CHANGE UP (ref 3.8–10.5)
WBC # FLD AUTO: 4.12 K/UL — SIGNIFICANT CHANGE UP (ref 3.8–10.5)
WBC # FLD AUTO: 4.12 K/UL — SIGNIFICANT CHANGE UP (ref 3.8–10.5)
WBC # FLD AUTO: 4.13 K/UL — SIGNIFICANT CHANGE UP (ref 3.8–10.5)
WBC # FLD AUTO: 4.21 K/UL — SIGNIFICANT CHANGE UP (ref 3.8–10.5)
WBC # FLD AUTO: 4.32 K/UL — SIGNIFICANT CHANGE UP (ref 3.8–10.5)
WBC # FLD AUTO: 4.44 K/UL — SIGNIFICANT CHANGE UP (ref 3.8–10.5)
WBC # FLD AUTO: 4.45 K/UL — SIGNIFICANT CHANGE UP (ref 3.8–10.5)
WBC # FLD AUTO: 4.45 K/UL — SIGNIFICANT CHANGE UP (ref 3.8–10.5)
WBC # FLD AUTO: 5.03 K/UL — SIGNIFICANT CHANGE UP (ref 3.8–10.5)
WBC # FLD AUTO: 5.04 K/UL — SIGNIFICANT CHANGE UP (ref 3.8–10.5)
WBC # FLD AUTO: 5.22 K/UL — SIGNIFICANT CHANGE UP (ref 3.8–10.5)
WBC # FLD AUTO: 5.56 K/UL — SIGNIFICANT CHANGE UP (ref 3.8–10.5)
WBC # FLD AUTO: 8.2 K/UL — SIGNIFICANT CHANGE UP (ref 3.8–10.5)
WBC # FLD AUTO: 8.31 K/UL — SIGNIFICANT CHANGE UP (ref 3.8–10.5)
WBC # FLD AUTO: 8.46 K/UL — SIGNIFICANT CHANGE UP (ref 3.8–10.5)
WBC # FLD AUTO: 8.53 K/UL — SIGNIFICANT CHANGE UP (ref 3.8–10.5)

## 2022-01-01 PROCEDURE — 87507 IADNA-DNA/RNA PROBE TQ 12-25: CPT

## 2022-01-01 PROCEDURE — 93005 ELECTROCARDIOGRAM TRACING: CPT

## 2022-01-01 PROCEDURE — 73502 X-RAY EXAM HIP UNI 2-3 VIEWS: CPT | Mod: 26,LT

## 2022-01-01 PROCEDURE — 83735 ASSAY OF MAGNESIUM: CPT

## 2022-01-01 PROCEDURE — A9561: CPT

## 2022-01-01 PROCEDURE — 87086 URINE CULTURE/COLONY COUNT: CPT

## 2022-01-01 PROCEDURE — 99285 EMERGENCY DEPT VISIT HI MDM: CPT

## 2022-01-01 PROCEDURE — 99214 OFFICE O/P EST MOD 30 MIN: CPT

## 2022-01-01 PROCEDURE — 99232 SBSQ HOSP IP/OBS MODERATE 35: CPT

## 2022-01-01 PROCEDURE — G1004: CPT

## 2022-01-01 PROCEDURE — 87206 SMEAR FLUORESCENT/ACID STAI: CPT

## 2022-01-01 PROCEDURE — 73030 X-RAY EXAM OF SHOULDER: CPT | Mod: 26,RT

## 2022-01-01 PROCEDURE — 72125 CT NECK SPINE W/O DYE: CPT | Mod: 26,MA

## 2022-01-01 PROCEDURE — 74177 CT ABD & PELVIS W/CONTRAST: CPT | Mod: MG

## 2022-01-01 PROCEDURE — 87070 CULTURE OTHR SPECIMN AEROBIC: CPT

## 2022-01-01 PROCEDURE — 76377 3D RENDER W/INTRP POSTPROCES: CPT

## 2022-01-01 PROCEDURE — 99233 SBSQ HOSP IP/OBS HIGH 50: CPT

## 2022-01-01 PROCEDURE — 84100 ASSAY OF PHOSPHORUS: CPT

## 2022-01-01 PROCEDURE — C1713: CPT

## 2022-01-01 PROCEDURE — 82947 ASSAY GLUCOSE BLOOD QUANT: CPT

## 2022-01-01 PROCEDURE — 36415 COLL VENOUS BLD VENIPUNCTURE: CPT

## 2022-01-01 PROCEDURE — 87075 CULTR BACTERIA EXCEPT BLOOD: CPT

## 2022-01-01 PROCEDURE — 87637 SARSCOV2&INF A&B&RSV AMP PRB: CPT

## 2022-01-01 PROCEDURE — 99231 SBSQ HOSP IP/OBS SF/LOW 25: CPT

## 2022-01-01 PROCEDURE — 73060 X-RAY EXAM OF HUMERUS: CPT | Mod: 26,RT,77

## 2022-01-01 PROCEDURE — 97116 GAIT TRAINING THERAPY: CPT

## 2022-01-01 PROCEDURE — 99222 1ST HOSP IP/OBS MODERATE 55: CPT | Mod: 57

## 2022-01-01 PROCEDURE — 85025 COMPLETE CBC W/AUTO DIFF WBC: CPT

## 2022-01-01 PROCEDURE — 90662 IIV NO PRSV INCREASED AG IM: CPT

## 2022-01-01 PROCEDURE — 78306 BONE IMAGING WHOLE BODY: CPT | Mod: 26,MG

## 2022-01-01 PROCEDURE — 99223 1ST HOSP IP/OBS HIGH 75: CPT

## 2022-01-01 PROCEDURE — 84132 ASSAY OF SERUM POTASSIUM: CPT

## 2022-01-01 PROCEDURE — 82746 ASSAY OF FOLIC ACID SERUM: CPT

## 2022-01-01 PROCEDURE — 87046 STOOL CULTR AEROBIC BACT EA: CPT

## 2022-01-01 PROCEDURE — 99233 SBSQ HOSP IP/OBS HIGH 50: CPT | Mod: GC

## 2022-01-01 PROCEDURE — 24516 TX HUMRL SHAFT FX IMED IMPLT: CPT | Mod: RT

## 2022-01-01 PROCEDURE — 74177 CT ABD & PELVIS W/CONTRAST: CPT | Mod: 26

## 2022-01-01 PROCEDURE — 80048 BASIC METABOLIC PNL TOTAL CA: CPT

## 2022-01-01 PROCEDURE — 71260 CT THORAX DX C+: CPT

## 2022-01-01 PROCEDURE — 81003 URINALYSIS AUTO W/O SCOPE: CPT

## 2022-01-01 PROCEDURE — 73070 X-RAY EXAM OF ELBOW: CPT | Mod: 26,RT

## 2022-01-01 PROCEDURE — 78306 BONE IMAGING WHOLE BODY: CPT | Mod: MG

## 2022-01-01 PROCEDURE — 85730 THROMBOPLASTIN TIME PARTIAL: CPT

## 2022-01-01 PROCEDURE — 85610 PROTHROMBIN TIME: CPT

## 2022-01-01 PROCEDURE — 99497 ADVNCD CARE PLAN 30 MIN: CPT | Mod: 25

## 2022-01-01 PROCEDURE — 99222 1ST HOSP IP/OBS MODERATE 55: CPT

## 2022-01-01 PROCEDURE — 99214 OFFICE O/P EST MOD 30 MIN: CPT | Mod: 95

## 2022-01-01 PROCEDURE — 97162 PT EVAL MOD COMPLEX 30 MIN: CPT

## 2022-01-01 PROCEDURE — 99239 HOSP IP/OBS DSCHRG MGMT >30: CPT

## 2022-01-01 PROCEDURE — 73200 CT UPPER EXTREMITY W/O DYE: CPT | Mod: 26,RT

## 2022-01-01 PROCEDURE — 71250 CT THORAX DX C-: CPT | Mod: 26,MA

## 2022-01-01 PROCEDURE — 93010 ELECTROCARDIOGRAM REPORT: CPT

## 2022-01-01 PROCEDURE — 97110 THERAPEUTIC EXERCISES: CPT

## 2022-01-01 PROCEDURE — 73552 X-RAY EXAM OF FEMUR 2/>: CPT

## 2022-01-01 PROCEDURE — 82728 ASSAY OF FERRITIN: CPT

## 2022-01-01 PROCEDURE — 74177 CT ABD & PELVIS W/CONTRAST: CPT | Mod: 26,MG

## 2022-01-01 PROCEDURE — 73070 X-RAY EXAM OF ELBOW: CPT

## 2022-01-01 PROCEDURE — 99498 ADVNCD CARE PLAN ADDL 30 MIN: CPT | Mod: 25

## 2022-01-01 PROCEDURE — 71045 X-RAY EXAM CHEST 1 VIEW: CPT

## 2022-01-01 PROCEDURE — 74177 CT ABD & PELVIS W/CONTRAST: CPT | Mod: 26,MH

## 2022-01-01 PROCEDURE — 71260 CT THORAX DX C+: CPT | Mod: MG

## 2022-01-01 PROCEDURE — 72158 MRI LUMBAR SPINE W/O & W/DYE: CPT

## 2022-01-01 PROCEDURE — 73030 X-RAY EXAM OF SHOULDER: CPT

## 2022-01-01 PROCEDURE — 85027 COMPLETE CBC AUTOMATED: CPT

## 2022-01-01 PROCEDURE — 99232 SBSQ HOSP IP/OBS MODERATE 35: CPT | Mod: GC

## 2022-01-01 PROCEDURE — 90832 PSYTX W PT 30 MINUTES: CPT

## 2022-01-01 PROCEDURE — 74177 CT ABD & PELVIS W/CONTRAST: CPT

## 2022-01-01 PROCEDURE — 78830 RP LOCLZJ TUM SPECT W/CT 1: CPT

## 2022-01-01 PROCEDURE — 71250 CT THORAX DX C-: CPT | Mod: MA

## 2022-01-01 PROCEDURE — 80053 COMPREHEN METABOLIC PANEL: CPT

## 2022-01-01 PROCEDURE — 78830 RP LOCLZJ TUM SPECT W/CT 1: CPT | Mod: 26

## 2022-01-01 PROCEDURE — 82803 BLOOD GASES ANY COMBINATION: CPT

## 2022-01-01 PROCEDURE — 99442: CPT | Mod: 95

## 2022-01-01 PROCEDURE — 89060 EXAM SYNOVIAL FLUID CRYSTALS: CPT

## 2022-01-01 PROCEDURE — 72157 MRI CHEST SPINE W/O & W/DYE: CPT | Mod: 26

## 2022-01-01 PROCEDURE — 73060 X-RAY EXAM OF HUMERUS: CPT | Mod: 26,RT

## 2022-01-01 PROCEDURE — 89051 BODY FLUID CELL COUNT: CPT

## 2022-01-01 PROCEDURE — C1769: CPT

## 2022-01-01 PROCEDURE — 87015 SPECIMEN INFECT AGNT CONCNTJ: CPT

## 2022-01-01 PROCEDURE — 96375 TX/PRO/DX INJ NEW DRUG ADDON: CPT

## 2022-01-01 PROCEDURE — 72157 MRI CHEST SPINE W/O & W/DYE: CPT

## 2022-01-01 PROCEDURE — 73562 X-RAY EXAM OF KNEE 3: CPT | Mod: 26,RT

## 2022-01-01 PROCEDURE — 99223 1ST HOSP IP/OBS HIGH 75: CPT | Mod: GC

## 2022-01-01 PROCEDURE — 70450 CT HEAD/BRAIN W/O DYE: CPT | Mod: MA

## 2022-01-01 PROCEDURE — 90791 PSYCH DIAGNOSTIC EVALUATION: CPT | Mod: 95

## 2022-01-01 PROCEDURE — 84295 ASSAY OF SERUM SODIUM: CPT

## 2022-01-01 PROCEDURE — 97164 PT RE-EVAL EST PLAN CARE: CPT

## 2022-01-01 PROCEDURE — U0003: CPT

## 2022-01-01 PROCEDURE — 86850 RBC ANTIBODY SCREEN: CPT

## 2022-01-01 PROCEDURE — 82330 ASSAY OF CALCIUM: CPT

## 2022-01-01 PROCEDURE — 71045 X-RAY EXAM CHEST 1 VIEW: CPT | Mod: 26

## 2022-01-01 PROCEDURE — 71260 CT THORAX DX C+: CPT | Mod: 26,MH

## 2022-01-01 PROCEDURE — U0005: CPT

## 2022-01-01 PROCEDURE — 73552 X-RAY EXAM OF FEMUR 2/>: CPT | Mod: 26,LT

## 2022-01-01 PROCEDURE — 82607 VITAMIN B-12: CPT

## 2022-01-01 PROCEDURE — 97530 THERAPEUTIC ACTIVITIES: CPT

## 2022-01-01 PROCEDURE — 97168 OT RE-EVAL EST PLAN CARE: CPT

## 2022-01-01 PROCEDURE — 87040 BLOOD CULTURE FOR BACTERIA: CPT

## 2022-01-01 PROCEDURE — 0225U NFCT DS DNA&RNA 21 SARSCOV2: CPT

## 2022-01-01 PROCEDURE — 73502 X-RAY EXAM HIP UNI 2-3 VIEWS: CPT

## 2022-01-01 PROCEDURE — 86901 BLOOD TYPING SEROLOGIC RH(D): CPT

## 2022-01-01 PROCEDURE — 99204 OFFICE O/P NEW MOD 45 MIN: CPT | Mod: 95

## 2022-01-01 PROCEDURE — 73562 X-RAY EXAM OF KNEE 3: CPT

## 2022-01-01 PROCEDURE — 97161 PT EVAL LOW COMPLEX 20 MIN: CPT

## 2022-01-01 PROCEDURE — 85018 HEMOGLOBIN: CPT

## 2022-01-01 PROCEDURE — 96374 THER/PROPH/DIAG INJ IV PUSH: CPT

## 2022-01-01 PROCEDURE — 83605 ASSAY OF LACTIC ACID: CPT

## 2022-01-01 PROCEDURE — 86900 BLOOD TYPING SEROLOGIC ABO: CPT

## 2022-01-01 PROCEDURE — 72158 MRI LUMBAR SPINE W/O & W/DYE: CPT | Mod: 26

## 2022-01-01 PROCEDURE — 87116 MYCOBACTERIA CULTURE: CPT

## 2022-01-01 PROCEDURE — 87635 SARS-COV-2 COVID-19 AMP PRB: CPT

## 2022-01-01 PROCEDURE — 73200 CT UPPER EXTREMITY W/O DYE: CPT

## 2022-01-01 PROCEDURE — 99204 OFFICE O/P NEW MOD 45 MIN: CPT

## 2022-01-01 PROCEDURE — C9399: CPT

## 2022-01-01 PROCEDURE — 72156 MRI NECK SPINE W/O & W/DYE: CPT | Mod: 26

## 2022-01-01 PROCEDURE — 83550 IRON BINDING TEST: CPT

## 2022-01-01 PROCEDURE — 99232 SBSQ HOSP IP/OBS MODERATE 35: CPT | Mod: FS,57

## 2022-01-01 PROCEDURE — 87205 SMEAR GRAM STAIN: CPT

## 2022-01-01 PROCEDURE — 70450 CT HEAD/BRAIN W/O DYE: CPT | Mod: 26,MA

## 2022-01-01 PROCEDURE — 97166 OT EVAL MOD COMPLEX 45 MIN: CPT

## 2022-01-01 PROCEDURE — 73060 X-RAY EXAM OF HUMERUS: CPT

## 2022-01-01 PROCEDURE — 83540 ASSAY OF IRON: CPT

## 2022-01-01 PROCEDURE — 71260 CT THORAX DX C+: CPT | Mod: 26,MG

## 2022-01-01 PROCEDURE — 72125 CT NECK SPINE W/O DYE: CPT | Mod: MA

## 2022-01-01 PROCEDURE — 85014 HEMATOCRIT: CPT

## 2022-01-01 PROCEDURE — 96376 TX/PRO/DX INJ SAME DRUG ADON: CPT

## 2022-01-01 PROCEDURE — 76000 FLUOROSCOPY <1 HR PHYS/QHP: CPT

## 2022-01-01 PROCEDURE — 87045 FECES CULTURE AEROBIC BACT: CPT

## 2022-01-01 PROCEDURE — 82435 ASSAY OF BLOOD CHLORIDE: CPT

## 2022-01-01 PROCEDURE — 84550 ASSAY OF BLOOD/URIC ACID: CPT

## 2022-01-01 PROCEDURE — 76377 3D RENDER W/INTRP POSTPROCES: CPT | Mod: 26

## 2022-01-01 PROCEDURE — 72156 MRI NECK SPINE W/O & W/DYE: CPT

## 2022-01-01 DEVICE — KIT A-LINE 1LUM 20G X 12CM SAFE KIT: Type: IMPLANTABLE DEVICE | Site: RIGHT | Status: FUNCTIONAL

## 2022-01-01 DEVICE — IMPLANTABLE DEVICE: Type: IMPLANTABLE DEVICE | Site: RIGHT | Status: FUNCTIONAL

## 2022-01-01 DEVICE — GWIRE BALL TIP 2.5X800MM STRL: Type: IMPLANTABLE DEVICE | Site: RIGHT | Status: FUNCTIONAL

## 2022-01-01 DEVICE — K-WIRE STRYKER 3MM X 285MM: Type: IMPLANTABLE DEVICE | Site: RIGHT | Status: FUNCTIONAL

## 2022-01-01 DEVICE — GUIDE ROD 2X800MM SS STRL: Type: IMPLANTABLE DEVICE | Site: RIGHT | Status: FUNCTIONAL

## 2022-01-01 RX ORDER — DEXAMETHASONE 0.5 MG/5ML
4 ELIXIR ORAL EVERY 12 HOURS
Refills: 0 | Status: DISCONTINUED | OUTPATIENT
Start: 2022-01-01 | End: 2022-01-01

## 2022-01-01 RX ORDER — HYDROMORPHONE HYDROCHLORIDE 2 MG/ML
1 INJECTION INTRAMUSCULAR; INTRAVENOUS; SUBCUTANEOUS EVERY 4 HOURS
Refills: 0 | Status: DISCONTINUED | OUTPATIENT
Start: 2022-01-01 | End: 2022-01-01

## 2022-01-01 RX ORDER — HYDROMORPHONE HYDROCHLORIDE 2 MG/ML
0.2 INJECTION INTRAMUSCULAR; INTRAVENOUS; SUBCUTANEOUS
Refills: 0 | Status: DISCONTINUED | OUTPATIENT
Start: 2022-01-01 | End: 2022-01-01

## 2022-01-01 RX ORDER — AMLODIPINE BESYLATE 2.5 MG/1
1 TABLET ORAL
Qty: 0 | Refills: 0 | DISCHARGE

## 2022-01-01 RX ORDER — ABEMACICLIB 50 MG/1
50 TABLET ORAL TWICE DAILY
Qty: 4 | Refills: 4 | Status: ACTIVE | COMMUNITY
Start: 2022-01-01 | End: 1900-01-01

## 2022-01-01 RX ORDER — ACETAMINOPHEN 500 MG
650 TABLET ORAL EVERY 6 HOURS
Refills: 0 | Status: DISCONTINUED | OUTPATIENT
Start: 2022-01-01 | End: 2022-01-01

## 2022-01-01 RX ORDER — OMEPRAZOLE 10 MG/1
1 CAPSULE, DELAYED RELEASE ORAL
Qty: 0 | Refills: 0 | DISCHARGE

## 2022-01-01 RX ORDER — APIXABAN 2.5 MG/1
5 TABLET, FILM COATED ORAL EVERY 12 HOURS
Refills: 0 | Status: COMPLETED | OUTPATIENT
Start: 2022-01-01 | End: 2022-01-01

## 2022-01-01 RX ORDER — GABAPENTIN 400 MG/1
300 CAPSULE ORAL DAILY
Refills: 0 | Status: DISCONTINUED | OUTPATIENT
Start: 2022-01-01 | End: 2022-01-01

## 2022-01-01 RX ORDER — CAPECITABINE 500 MG/1
500 TABLET ORAL
Qty: 70 | Refills: 3 | Status: DISCONTINUED | COMMUNITY
Start: 2020-05-07 | End: 2022-01-01

## 2022-01-01 RX ORDER — POLYETHYLENE GLYCOL 3350 17 G/17G
17 POWDER, FOR SOLUTION ORAL DAILY
Refills: 0 | Status: DISCONTINUED | OUTPATIENT
Start: 2022-01-01 | End: 2022-01-01

## 2022-01-01 RX ORDER — LOSARTAN POTASSIUM 100 MG/1
50 TABLET, FILM COATED ORAL DAILY
Refills: 0 | Status: DISCONTINUED | OUTPATIENT
Start: 2022-01-01 | End: 2022-01-01

## 2022-01-01 RX ORDER — HYDROMORPHONE HYDROCHLORIDE 2 MG/ML
1 INJECTION INTRAMUSCULAR; INTRAVENOUS; SUBCUTANEOUS ONCE
Refills: 0 | Status: DISCONTINUED | OUTPATIENT
Start: 2022-01-01 | End: 2022-01-01

## 2022-01-01 RX ORDER — GABAPENTIN 300 MG/1
300 CAPSULE ORAL
Qty: 60 | Refills: 3 | Status: DISCONTINUED | COMMUNITY
Start: 2021-12-14 | End: 2022-01-01

## 2022-01-01 RX ORDER — MORPHINE SULFATE 50 MG/1
4 CAPSULE, EXTENDED RELEASE ORAL ONCE
Refills: 0 | Status: DISCONTINUED | OUTPATIENT
Start: 2022-01-01 | End: 2022-01-01

## 2022-01-01 RX ORDER — OXYCODONE HYDROCHLORIDE 5 MG/1
1 TABLET ORAL
Qty: 0 | Refills: 0 | DISCHARGE
Start: 2022-01-01

## 2022-01-01 RX ORDER — ACETAMINOPHEN 500 MG
2 TABLET ORAL
Qty: 0 | Refills: 0 | DISCHARGE
Start: 2022-01-01

## 2022-01-01 RX ORDER — AMLODIPINE BESYLATE 2.5 MG/1
5 TABLET ORAL DAILY
Refills: 0 | Status: DISCONTINUED | OUTPATIENT
Start: 2022-01-01 | End: 2022-01-01

## 2022-01-01 RX ORDER — CITALOPRAM HYDROBROMIDE 10 MG/1
10 TABLET, FILM COATED ORAL DAILY
Qty: 30 | Refills: 3 | Status: ACTIVE | COMMUNITY
Start: 1900-01-01 | End: 1900-01-01

## 2022-01-01 RX ORDER — OMEPRAZOLE 40 MG/1
40 CAPSULE, DELAYED RELEASE ORAL
Qty: 60 | Refills: 3 | Status: DISCONTINUED | COMMUNITY
Start: 2022-01-01 | End: 2022-01-01

## 2022-01-01 RX ORDER — HYDROMORPHONE HYDROCHLORIDE 2 MG/ML
4 INJECTION INTRAMUSCULAR; INTRAVENOUS; SUBCUTANEOUS EVERY 4 HOURS
Refills: 0 | Status: DISCONTINUED | OUTPATIENT
Start: 2022-01-01 | End: 2022-01-01

## 2022-01-01 RX ORDER — TRAMADOL HYDROCHLORIDE 50 MG/1
50 TABLET ORAL EVERY 8 HOURS
Refills: 0 | Status: DISCONTINUED | OUTPATIENT
Start: 2022-01-01 | End: 2022-01-01

## 2022-01-01 RX ORDER — ONDANSETRON 8 MG/1
4 TABLET, FILM COATED ORAL EVERY 8 HOURS
Refills: 0 | Status: DISCONTINUED | OUTPATIENT
Start: 2022-01-01 | End: 2022-01-01

## 2022-01-01 RX ORDER — ONDANSETRON 8 MG/1
4 TABLET, FILM COATED ORAL ONCE
Refills: 0 | Status: DISCONTINUED | OUTPATIENT
Start: 2022-01-01 | End: 2022-01-01

## 2022-01-01 RX ORDER — ACETAMINOPHEN 500 MG
500 TABLET ORAL EVERY 8 HOURS
Refills: 0 | Status: DISCONTINUED | OUTPATIENT
Start: 2022-01-01 | End: 2022-01-01

## 2022-01-01 RX ORDER — FAMOTIDINE 10 MG/ML
1 INJECTION INTRAVENOUS
Qty: 0 | Refills: 0 | DISCHARGE

## 2022-01-01 RX ORDER — PROCHLORPERAZINE MALEATE 10 MG/1
10 TABLET ORAL EVERY 6 HOURS
Qty: 30 | Refills: 3 | Status: DISCONTINUED | COMMUNITY
Start: 2020-01-13 | End: 2022-01-01

## 2022-01-01 RX ORDER — CHLORHEXIDINE GLUCONATE 213 G/1000ML
1 SOLUTION TOPICAL DAILY
Refills: 0 | Status: DISCONTINUED | OUTPATIENT
Start: 2022-01-01 | End: 2022-01-01

## 2022-01-01 RX ORDER — FLUTICASONE PROPIONATE AND SALMETEROL 50; 250 UG/1; UG/1
1 POWDER ORAL; RESPIRATORY (INHALATION)
Qty: 0 | Refills: 0 | DISCHARGE

## 2022-01-01 RX ORDER — LOSARTAN POTASSIUM 100 MG/1
1 TABLET, FILM COATED ORAL
Qty: 0 | Refills: 0 | DISCHARGE
Start: 2022-01-01

## 2022-01-01 RX ORDER — OXYCODONE HYDROCHLORIDE 5 MG/1
5 TABLET ORAL ONCE
Refills: 0 | Status: DISCONTINUED | OUTPATIENT
Start: 2022-01-01 | End: 2022-01-01

## 2022-01-01 RX ORDER — POLYETHYLENE GLYCOL 3350 17 G/17G
17 POWDER, FOR SOLUTION ORAL
Qty: 0 | Refills: 0 | DISCHARGE
Start: 2022-01-01

## 2022-01-01 RX ORDER — VALSARTAN AND HYDROCHLOROTHIAZIDE 320; 12.5 MG/1; MG/1
320-12.5 TABLET, FILM COATED ORAL
Refills: 0 | Status: DISCONTINUED | COMMUNITY
End: 2022-01-01

## 2022-01-01 RX ORDER — TRAMADOL HYDROCHLORIDE 50 MG/1
100 TABLET ORAL DAILY
Refills: 0 | Status: DISCONTINUED | OUTPATIENT
Start: 2022-01-01 | End: 2022-01-01

## 2022-01-01 RX ORDER — CITALOPRAM 10 MG/1
10 TABLET, FILM COATED ORAL DAILY
Refills: 0 | Status: DISCONTINUED | OUTPATIENT
Start: 2022-01-01 | End: 2022-01-01

## 2022-01-01 RX ORDER — METOPROLOL TARTRATE 50 MG
50 TABLET ORAL
Refills: 0 | Status: DISCONTINUED | OUTPATIENT
Start: 2022-01-01 | End: 2022-01-01

## 2022-01-01 RX ORDER — TRAMADOL HYDROCHLORIDE 50 MG/1
50 TABLET ORAL EVERY 4 HOURS
Refills: 0 | Status: DISCONTINUED | OUTPATIENT
Start: 2022-01-01 | End: 2022-01-01

## 2022-01-01 RX ORDER — GABAPENTIN 400 MG/1
1 CAPSULE ORAL
Qty: 0 | Refills: 0 | DISCHARGE
Start: 2022-01-01

## 2022-01-01 RX ORDER — LANOLIN ALCOHOL/MO/W.PET/CERES
3 CREAM (GRAM) TOPICAL AT BEDTIME
Refills: 0 | Status: DISCONTINUED | OUTPATIENT
Start: 2022-01-01 | End: 2022-01-01

## 2022-01-01 RX ORDER — ACETAMINOPHEN 500 MG
975 TABLET ORAL EVERY 8 HOURS
Refills: 0 | Status: DISCONTINUED | OUTPATIENT
Start: 2022-01-01 | End: 2022-01-01

## 2022-01-01 RX ORDER — OXYCODONE HYDROCHLORIDE 5 MG/1
5 CAPSULE ORAL EVERY 6 HOURS
Qty: 28 | Refills: 0 | Status: DISCONTINUED | COMMUNITY
Start: 2022-01-01 | End: 2022-01-01

## 2022-01-01 RX ORDER — APIXABAN 2.5 MG/1
5 TABLET, FILM COATED ORAL EVERY 12 HOURS
Refills: 0 | Status: DISCONTINUED | OUTPATIENT
Start: 2022-01-01 | End: 2022-01-01

## 2022-01-01 RX ORDER — PALBOCICLIB 100 MG/1
100 TABLET, FILM COATED ORAL DAILY
Qty: 21 | Refills: 3 | Status: ACTIVE | COMMUNITY
Start: 2022-01-01 | End: 1900-01-01

## 2022-01-01 RX ORDER — ACETAMINOPHEN 500 MG
650 TABLET ORAL ONCE
Refills: 0 | Status: COMPLETED | OUTPATIENT
Start: 2022-01-01 | End: 2022-01-01

## 2022-01-01 RX ORDER — MAGNESIUM SULFATE 500 MG/ML
2 VIAL (ML) INJECTION ONCE
Refills: 0 | Status: COMPLETED | OUTPATIENT
Start: 2022-01-01 | End: 2022-01-01

## 2022-01-01 RX ORDER — HYDROMORPHONE HYDROCHLORIDE 2 MG/ML
2 INJECTION INTRAMUSCULAR; INTRAVENOUS; SUBCUTANEOUS EVERY 4 HOURS
Refills: 0 | Status: DISCONTINUED | OUTPATIENT
Start: 2022-01-01 | End: 2022-01-01

## 2022-01-01 RX ORDER — DRONABINOL 2.5 MG/1
2.5 CAPSULE ORAL 3 TIMES DAILY
Qty: 90 | Refills: 2 | Status: DISCONTINUED | COMMUNITY
Start: 2022-01-01 | End: 2022-01-01

## 2022-01-01 RX ORDER — HYDROMORPHONE HYDROCHLORIDE 2 MG/ML
0.5 INJECTION INTRAMUSCULAR; INTRAVENOUS; SUBCUTANEOUS EVERY 6 HOURS
Refills: 0 | Status: DISCONTINUED | OUTPATIENT
Start: 2022-01-01 | End: 2022-01-01

## 2022-01-01 RX ORDER — HYDROMORPHONE HYDROCHLORIDE 2 MG/ML
0.4 INJECTION INTRAMUSCULAR; INTRAVENOUS; SUBCUTANEOUS
Refills: 0 | Status: DISCONTINUED | OUTPATIENT
Start: 2022-01-01 | End: 2022-01-01

## 2022-01-01 RX ORDER — HYDROMORPHONE HYDROCHLORIDE 2 MG/ML
0.5 INJECTION INTRAMUSCULAR; INTRAVENOUS; SUBCUTANEOUS ONCE
Refills: 0 | Status: DISCONTINUED | OUTPATIENT
Start: 2022-01-01 | End: 2022-01-01

## 2022-01-01 RX ORDER — PHYTONADIONE (VIT K1) 5 MG
2.5 TABLET ORAL ONCE
Refills: 0 | Status: COMPLETED | OUTPATIENT
Start: 2022-01-01 | End: 2022-01-01

## 2022-01-01 RX ORDER — LOSARTAN/HYDROCHLOROTHIAZIDE 100MG-25MG
1 TABLET ORAL
Qty: 0 | Refills: 0 | DISCHARGE

## 2022-01-01 RX ORDER — HYDROCHLOROTHIAZIDE 25 MG
12.5 TABLET ORAL DAILY
Refills: 0 | Status: DISCONTINUED | OUTPATIENT
Start: 2022-01-01 | End: 2022-01-01

## 2022-01-01 RX ORDER — OXYCODONE HYDROCHLORIDE 5 MG/1
5 TABLET ORAL
Refills: 0 | Status: DISCONTINUED | OUTPATIENT
Start: 2022-01-01 | End: 2022-01-01

## 2022-01-01 RX ORDER — SENNA PLUS 8.6 MG/1
2 TABLET ORAL AT BEDTIME
Refills: 0 | Status: DISCONTINUED | OUTPATIENT
Start: 2022-01-01 | End: 2022-01-01

## 2022-01-01 RX ORDER — ABEMACICLIB 100 MG/1
100 TABLET ORAL
Qty: 56 | Refills: 4 | Status: DISCONTINUED | COMMUNITY
Start: 2022-01-01 | End: 2022-01-01

## 2022-01-01 RX ORDER — SODIUM CHLORIDE 9 MG/ML
1000 INJECTION INTRAMUSCULAR; INTRAVENOUS; SUBCUTANEOUS
Refills: 0 | Status: DISCONTINUED | OUTPATIENT
Start: 2022-01-01 | End: 2022-01-01

## 2022-01-01 RX ORDER — DEXAMETHASONE 0.5 MG/5ML
4 ELIXIR ORAL EVERY 6 HOURS
Refills: 0 | Status: DISCONTINUED | OUTPATIENT
Start: 2022-01-01 | End: 2022-01-01

## 2022-01-01 RX ORDER — POTASSIUM CHLORIDE 20 MEQ
40 PACKET (EA) ORAL ONCE
Refills: 0 | Status: COMPLETED | OUTPATIENT
Start: 2022-01-01 | End: 2022-01-01

## 2022-01-01 RX ORDER — AMLODIPINE BESYLATE 5 MG/1
5 TABLET ORAL
Refills: 0 | Status: ACTIVE | COMMUNITY

## 2022-01-01 RX ORDER — ONDANSETRON 8 MG/1
4 TABLET, FILM COATED ORAL ONCE
Refills: 0 | Status: COMPLETED | OUTPATIENT
Start: 2022-01-01 | End: 2022-01-01

## 2022-01-01 RX ORDER — SODIUM CHLORIDE 9 MG/ML
2100 INJECTION INTRAMUSCULAR; INTRAVENOUS; SUBCUTANEOUS ONCE
Refills: 0 | Status: COMPLETED | OUTPATIENT
Start: 2022-01-01 | End: 2022-01-01

## 2022-01-01 RX ORDER — GABAPENTIN 400 MG/1
1 CAPSULE ORAL
Qty: 0 | Refills: 0 | DISCHARGE

## 2022-01-01 RX ORDER — HYDROMORPHONE HYDROCHLORIDE 2 MG/ML
0.25 INJECTION INTRAMUSCULAR; INTRAVENOUS; SUBCUTANEOUS
Refills: 0 | Status: DISCONTINUED | OUTPATIENT
Start: 2022-01-01 | End: 2022-01-01

## 2022-01-01 RX ORDER — TRAMADOL HYDROCHLORIDE 50 MG/1
1 TABLET ORAL
Qty: 0 | Refills: 0 | DISCHARGE

## 2022-01-01 RX ORDER — HYDROMORPHONE HYDROCHLORIDE 2 MG/1
2 TABLET ORAL EVERY 4 HOURS
Qty: 120 | Refills: 0 | Status: ACTIVE | COMMUNITY
Start: 2022-01-01 | End: 1900-01-01

## 2022-01-01 RX ORDER — IBUPROFEN 200 MG
1 TABLET ORAL
Qty: 0 | Refills: 0 | DISCHARGE
Start: 2022-01-01

## 2022-01-01 RX ORDER — POTASSIUM CHLORIDE 20 MEQ
20 PACKET (EA) ORAL ONCE
Refills: 0 | Status: COMPLETED | OUTPATIENT
Start: 2022-01-01 | End: 2022-01-01

## 2022-01-01 RX ORDER — MORPHINE SULFATE 15 MG/1
15 TABLET, FILM COATED, EXTENDED RELEASE ORAL
Qty: 30 | Refills: 0 | Status: ACTIVE | COMMUNITY
Start: 2022-01-01 | End: 1900-01-01

## 2022-01-01 RX ORDER — APIXABAN 5 MG/1
5 TABLET, FILM COATED ORAL
Qty: 120 | Refills: 3 | Status: DISCONTINUED | COMMUNITY
Start: 2018-05-03 | End: 2022-01-01

## 2022-01-01 RX ORDER — POLYETHYLENE GLYCOL 3350 17 G/17G
17 POWDER, FOR SOLUTION ORAL AT BEDTIME
Refills: 0 | Status: DISCONTINUED | OUTPATIENT
Start: 2022-01-01 | End: 2022-01-01

## 2022-01-01 RX ORDER — PANTOPRAZOLE SODIUM 20 MG/1
40 TABLET, DELAYED RELEASE ORAL
Refills: 0 | Status: DISCONTINUED | OUTPATIENT
Start: 2022-01-01 | End: 2022-01-01

## 2022-01-01 RX ORDER — POLYETHYLENE GLYCOL 3350 17 G/17G
17 POWDER, FOR SOLUTION ORAL
Refills: 0 | Status: DISCONTINUED | OUTPATIENT
Start: 2022-01-01 | End: 2022-01-01

## 2022-01-01 RX ORDER — HYDROMORPHONE HYDROCHLORIDE 2 MG/ML
0.2 INJECTION INTRAMUSCULAR; INTRAVENOUS; SUBCUTANEOUS EVERY 6 HOURS
Refills: 0 | Status: DISCONTINUED | OUTPATIENT
Start: 2022-01-01 | End: 2022-01-01

## 2022-01-01 RX ORDER — ALPRAZOLAM 0.25 MG/1
0.25 TABLET ORAL
Qty: 60 | Refills: 0 | Status: DISCONTINUED | COMMUNITY
Start: 2021-04-27 | End: 2022-01-01

## 2022-01-01 RX ORDER — SODIUM CHLORIDE 9 MG/ML
1000 INJECTION, SOLUTION INTRAVENOUS
Refills: 0 | Status: DISCONTINUED | OUTPATIENT
Start: 2022-01-01 | End: 2022-01-01

## 2022-01-01 RX ORDER — TRAMADOL HYDROCHLORIDE 50 MG/1
50 TABLET, COATED ORAL
Qty: 30 | Refills: 2 | Status: DISCONTINUED | COMMUNITY
Start: 2022-01-01 | End: 2022-01-01

## 2022-01-01 RX ORDER — SENNA PLUS 8.6 MG/1
2 TABLET ORAL
Qty: 0 | Refills: 0 | DISCHARGE
Start: 2022-01-01

## 2022-01-01 RX ORDER — ONDANSETRON 8 MG/1
8 TABLET ORAL EVERY 8 HOURS
Qty: 30 | Refills: 1 | Status: DISCONTINUED | COMMUNITY
Start: 2017-03-28 | End: 2022-01-01

## 2022-01-01 RX ORDER — APIXABAN 2.5 MG/1
1 TABLET, FILM COATED ORAL
Qty: 0 | Refills: 0 | DISCHARGE

## 2022-01-01 RX ORDER — INFLUENZA VIRUS VACCINE 15; 15; 15; 15 UG/.5ML; UG/.5ML; UG/.5ML; UG/.5ML
0.7 SUSPENSION INTRAMUSCULAR ONCE
Refills: 0 | Status: COMPLETED | OUTPATIENT
Start: 2022-01-01 | End: 2022-01-01

## 2022-01-01 RX ORDER — CEFEPIME 1 G/1
1000 INJECTION, POWDER, FOR SOLUTION INTRAMUSCULAR; INTRAVENOUS ONCE
Refills: 0 | Status: COMPLETED | OUTPATIENT
Start: 2022-01-01 | End: 2022-01-01

## 2022-01-01 RX ORDER — CITALOPRAM HYDROBROMIDE 20 MG/1
20 TABLET, FILM COATED ORAL
Refills: 0 | Status: DISCONTINUED | COMMUNITY
End: 2022-01-01

## 2022-01-01 RX ORDER — ONDANSETRON 8 MG/1
4 TABLET, FILM COATED ORAL EVERY 6 HOURS
Refills: 0 | Status: DISCONTINUED | OUTPATIENT
Start: 2022-01-01 | End: 2022-01-01

## 2022-01-01 RX ORDER — CAPECITABINE 500 MG/1
500 TABLET ORAL
Qty: 70 | Refills: 5 | Status: DISCONTINUED | COMMUNITY
Start: 2020-01-13 | End: 2022-01-01

## 2022-01-01 RX ORDER — CEFEPIME 1 G/1
1000 INJECTION, POWDER, FOR SOLUTION INTRAMUSCULAR; INTRAVENOUS EVERY 8 HOURS
Refills: 0 | Status: DISCONTINUED | OUTPATIENT
Start: 2022-01-01 | End: 2022-01-01

## 2022-01-01 RX ORDER — METOCLOPRAMIDE 10 MG/1
10 TABLET ORAL EVERY 6 HOURS
Qty: 120 | Refills: 3 | Status: DISCONTINUED | COMMUNITY
Start: 2022-01-01 | End: 2022-01-01

## 2022-01-01 RX ORDER — OXYCODONE HYDROCHLORIDE 5 MG/1
10 TABLET ORAL
Refills: 0 | Status: DISCONTINUED | OUTPATIENT
Start: 2022-01-01 | End: 2022-01-01

## 2022-01-01 RX ORDER — APIXABAN 2.5 MG/1
5 TABLET, FILM COATED ORAL
Refills: 0 | Status: DISCONTINUED | OUTPATIENT
Start: 2022-01-01 | End: 2022-01-01

## 2022-01-01 RX ORDER — CAPECITABINE 500 MG/1
500 TABLET ORAL
Qty: 70 | Refills: 3 | Status: DISCONTINUED | COMMUNITY
Start: 2021-11-04 | End: 2022-01-01

## 2022-01-01 RX ORDER — DEXAMETHASONE 0.5 MG/5ML
1 ELIXIR ORAL
Qty: 0 | Refills: 0 | DISCHARGE
Start: 2022-01-01

## 2022-01-01 RX ORDER — IBUPROFEN 200 MG
600 TABLET ORAL EVERY 8 HOURS
Refills: 0 | Status: DISCONTINUED | OUTPATIENT
Start: 2022-01-01 | End: 2022-01-01

## 2022-01-01 RX ORDER — SODIUM CHLORIDE 9 MG/ML
500 INJECTION, SOLUTION INTRAVENOUS
Refills: 0 | Status: DISCONTINUED | OUTPATIENT
Start: 2022-01-01 | End: 2022-01-01

## 2022-01-01 RX ORDER — TRAMADOL HYDROCHLORIDE 50 MG/1
1 TABLET ORAL
Qty: 0 | Refills: 0 | DISCHARGE
Start: 2022-01-01

## 2022-01-01 RX ADMIN — Medication 4 MILLIGRAM(S): at 11:39

## 2022-01-01 RX ADMIN — SENNA PLUS 2 TABLET(S): 8.6 TABLET ORAL at 22:11

## 2022-01-01 RX ADMIN — CITALOPRAM 10 MILLIGRAM(S): 10 TABLET, FILM COATED ORAL at 13:04

## 2022-01-01 RX ADMIN — AMLODIPINE BESYLATE 5 MILLIGRAM(S): 2.5 TABLET ORAL at 05:35

## 2022-01-01 RX ADMIN — MORPHINE SULFATE 4 MILLIGRAM(S): 50 CAPSULE, EXTENDED RELEASE ORAL at 17:20

## 2022-01-01 RX ADMIN — Medication 50 MILLIGRAM(S): at 08:57

## 2022-01-01 RX ADMIN — Medication 4 MILLIGRAM(S): at 17:10

## 2022-01-01 RX ADMIN — CITALOPRAM 10 MILLIGRAM(S): 10 TABLET, FILM COATED ORAL at 11:59

## 2022-01-01 RX ADMIN — CHLORHEXIDINE GLUCONATE 1 APPLICATION(S): 213 SOLUTION TOPICAL at 13:28

## 2022-01-01 RX ADMIN — APIXABAN 5 MILLIGRAM(S): 2.5 TABLET, FILM COATED ORAL at 06:33

## 2022-01-01 RX ADMIN — GABAPENTIN 300 MILLIGRAM(S): 400 CAPSULE ORAL at 11:41

## 2022-01-01 RX ADMIN — Medication 50 MILLIGRAM(S): at 17:15

## 2022-01-01 RX ADMIN — SODIUM CHLORIDE 2100 MILLILITER(S): 9 INJECTION INTRAMUSCULAR; INTRAVENOUS; SUBCUTANEOUS at 18:41

## 2022-01-01 RX ADMIN — Medication 4 MILLIGRAM(S): at 05:18

## 2022-01-01 RX ADMIN — AMLODIPINE BESYLATE 5 MILLIGRAM(S): 2.5 TABLET ORAL at 05:47

## 2022-01-01 RX ADMIN — CITALOPRAM 10 MILLIGRAM(S): 10 TABLET, FILM COATED ORAL at 12:19

## 2022-01-01 RX ADMIN — AMLODIPINE BESYLATE 5 MILLIGRAM(S): 2.5 TABLET ORAL at 06:48

## 2022-01-01 RX ADMIN — HYDROMORPHONE HYDROCHLORIDE 0.2 MILLIGRAM(S): 2 INJECTION INTRAMUSCULAR; INTRAVENOUS; SUBCUTANEOUS at 17:31

## 2022-01-01 RX ADMIN — LOSARTAN POTASSIUM 50 MILLIGRAM(S): 100 TABLET, FILM COATED ORAL at 09:48

## 2022-01-01 RX ADMIN — LOSARTAN POTASSIUM 50 MILLIGRAM(S): 100 TABLET, FILM COATED ORAL at 08:57

## 2022-01-01 RX ADMIN — CHLORHEXIDINE GLUCONATE 1 APPLICATION(S): 213 SOLUTION TOPICAL at 11:36

## 2022-01-01 RX ADMIN — SENNA PLUS 2 TABLET(S): 8.6 TABLET ORAL at 22:24

## 2022-01-01 RX ADMIN — CHLORHEXIDINE GLUCONATE 1 APPLICATION(S): 213 SOLUTION TOPICAL at 11:26

## 2022-01-01 RX ADMIN — TRAMADOL HYDROCHLORIDE 50 MILLIGRAM(S): 50 TABLET ORAL at 16:42

## 2022-01-01 RX ADMIN — AMLODIPINE BESYLATE 5 MILLIGRAM(S): 2.5 TABLET ORAL at 05:34

## 2022-01-01 RX ADMIN — Medication 3 MILLIGRAM(S): at 23:40

## 2022-01-01 RX ADMIN — CHLORHEXIDINE GLUCONATE 1 APPLICATION(S): 213 SOLUTION TOPICAL at 11:40

## 2022-01-01 RX ADMIN — Medication 650 MILLIGRAM(S): at 22:23

## 2022-01-01 RX ADMIN — SODIUM CHLORIDE 75 MILLILITER(S): 9 INJECTION, SOLUTION INTRAVENOUS at 20:51

## 2022-01-01 RX ADMIN — Medication 4 MILLIGRAM(S): at 15:42

## 2022-01-01 RX ADMIN — LOSARTAN POTASSIUM 50 MILLIGRAM(S): 100 TABLET, FILM COATED ORAL at 06:12

## 2022-01-01 RX ADMIN — Medication 650 MILLIGRAM(S): at 07:01

## 2022-01-01 RX ADMIN — HYDROMORPHONE HYDROCHLORIDE 0.2 MILLIGRAM(S): 2 INJECTION INTRAMUSCULAR; INTRAVENOUS; SUBCUTANEOUS at 09:17

## 2022-01-01 RX ADMIN — Medication 4 MILLIGRAM(S): at 11:52

## 2022-01-01 RX ADMIN — HYDROMORPHONE HYDROCHLORIDE 0.2 MILLIGRAM(S): 2 INJECTION INTRAMUSCULAR; INTRAVENOUS; SUBCUTANEOUS at 18:15

## 2022-01-01 RX ADMIN — APIXABAN 5 MILLIGRAM(S): 2.5 TABLET, FILM COATED ORAL at 17:32

## 2022-01-01 RX ADMIN — LOSARTAN POTASSIUM 50 MILLIGRAM(S): 100 TABLET, FILM COATED ORAL at 05:12

## 2022-01-01 RX ADMIN — HYDROMORPHONE HYDROCHLORIDE 1 MILLIGRAM(S): 2 INJECTION INTRAMUSCULAR; INTRAVENOUS; SUBCUTANEOUS at 19:11

## 2022-01-01 RX ADMIN — Medication 50 MILLIGRAM(S): at 17:10

## 2022-01-01 RX ADMIN — APIXABAN 5 MILLIGRAM(S): 2.5 TABLET, FILM COATED ORAL at 17:15

## 2022-01-01 RX ADMIN — Medication 50 MILLIGRAM(S): at 21:47

## 2022-01-01 RX ADMIN — Medication 650 MILLIGRAM(S): at 18:37

## 2022-01-01 RX ADMIN — POLYETHYLENE GLYCOL 3350 17 GRAM(S): 17 POWDER, FOR SOLUTION ORAL at 22:24

## 2022-01-01 RX ADMIN — Medication 650 MILLIGRAM(S): at 05:18

## 2022-01-01 RX ADMIN — Medication 50 MILLIGRAM(S): at 22:03

## 2022-01-01 RX ADMIN — APIXABAN 5 MILLIGRAM(S): 2.5 TABLET, FILM COATED ORAL at 05:17

## 2022-01-01 RX ADMIN — AMLODIPINE BESYLATE 5 MILLIGRAM(S): 2.5 TABLET ORAL at 09:02

## 2022-01-01 RX ADMIN — LOSARTAN POTASSIUM 50 MILLIGRAM(S): 100 TABLET, FILM COATED ORAL at 09:02

## 2022-01-01 RX ADMIN — Medication 4 MILLIGRAM(S): at 05:07

## 2022-01-01 RX ADMIN — CEFEPIME 100 MILLIGRAM(S): 1 INJECTION, POWDER, FOR SOLUTION INTRAMUSCULAR; INTRAVENOUS at 20:44

## 2022-01-01 RX ADMIN — ONDANSETRON 4 MILLIGRAM(S): 8 TABLET, FILM COATED ORAL at 18:29

## 2022-01-01 RX ADMIN — Medication 4 MILLIGRAM(S): at 01:21

## 2022-01-01 RX ADMIN — Medication 50 MILLIGRAM(S): at 09:05

## 2022-01-01 RX ADMIN — Medication 650 MILLIGRAM(S): at 15:00

## 2022-01-01 RX ADMIN — Medication 50 MILLIGRAM(S): at 17:26

## 2022-01-01 RX ADMIN — Medication 4 MILLIGRAM(S): at 05:11

## 2022-01-01 RX ADMIN — CHLORHEXIDINE GLUCONATE 1 APPLICATION(S): 213 SOLUTION TOPICAL at 13:43

## 2022-01-01 RX ADMIN — SENNA PLUS 2 TABLET(S): 8.6 TABLET ORAL at 21:56

## 2022-01-01 RX ADMIN — MORPHINE SULFATE 4 MILLIGRAM(S): 50 CAPSULE, EXTENDED RELEASE ORAL at 16:20

## 2022-01-01 RX ADMIN — SODIUM CHLORIDE 100 MILLILITER(S): 9 INJECTION INTRAMUSCULAR; INTRAVENOUS; SUBCUTANEOUS at 14:58

## 2022-01-01 RX ADMIN — Medication 650 MILLIGRAM(S): at 04:22

## 2022-01-01 RX ADMIN — Medication 12.5 MILLIGRAM(S): at 05:11

## 2022-01-01 RX ADMIN — CITALOPRAM 10 MILLIGRAM(S): 10 TABLET, FILM COATED ORAL at 14:27

## 2022-01-01 RX ADMIN — AMLODIPINE BESYLATE 5 MILLIGRAM(S): 2.5 TABLET ORAL at 06:13

## 2022-01-01 RX ADMIN — LOSARTAN POTASSIUM 50 MILLIGRAM(S): 100 TABLET, FILM COATED ORAL at 06:35

## 2022-01-01 RX ADMIN — Medication 650 MILLIGRAM(S): at 02:50

## 2022-01-01 RX ADMIN — PANTOPRAZOLE SODIUM 40 MILLIGRAM(S): 20 TABLET, DELAYED RELEASE ORAL at 06:48

## 2022-01-01 RX ADMIN — Medication 650 MILLIGRAM(S): at 22:01

## 2022-01-01 RX ADMIN — CITALOPRAM 10 MILLIGRAM(S): 10 TABLET, FILM COATED ORAL at 11:43

## 2022-01-01 RX ADMIN — CEFEPIME 100 MILLIGRAM(S): 1 INJECTION, POWDER, FOR SOLUTION INTRAMUSCULAR; INTRAVENOUS at 06:12

## 2022-01-01 RX ADMIN — Medication 12.5 MILLIGRAM(S): at 05:35

## 2022-01-01 RX ADMIN — Medication 50 MILLIGRAM(S): at 05:08

## 2022-01-01 RX ADMIN — Medication 50 MILLIGRAM(S): at 17:03

## 2022-01-01 RX ADMIN — Medication 650 MILLIGRAM(S): at 19:33

## 2022-01-01 RX ADMIN — APIXABAN 5 MILLIGRAM(S): 2.5 TABLET, FILM COATED ORAL at 05:48

## 2022-01-01 RX ADMIN — CITALOPRAM 10 MILLIGRAM(S): 10 TABLET, FILM COATED ORAL at 11:40

## 2022-01-01 RX ADMIN — ONDANSETRON 4 MILLIGRAM(S): 8 TABLET, FILM COATED ORAL at 16:20

## 2022-01-01 RX ADMIN — Medication 4 MILLIGRAM(S): at 23:52

## 2022-01-01 RX ADMIN — HYDROMORPHONE HYDROCHLORIDE 0.2 MILLIGRAM(S): 2 INJECTION INTRAMUSCULAR; INTRAVENOUS; SUBCUTANEOUS at 12:35

## 2022-01-01 RX ADMIN — Medication 50 MILLIGRAM(S): at 17:32

## 2022-01-01 RX ADMIN — APIXABAN 5 MILLIGRAM(S): 2.5 TABLET, FILM COATED ORAL at 05:34

## 2022-01-01 RX ADMIN — HYDROMORPHONE HYDROCHLORIDE 0.2 MILLIGRAM(S): 2 INJECTION INTRAMUSCULAR; INTRAVENOUS; SUBCUTANEOUS at 02:08

## 2022-01-01 RX ADMIN — LOSARTAN POTASSIUM 50 MILLIGRAM(S): 100 TABLET, FILM COATED ORAL at 05:35

## 2022-01-01 RX ADMIN — Medication 4 MILLIGRAM(S): at 17:03

## 2022-01-01 RX ADMIN — PANTOPRAZOLE SODIUM 40 MILLIGRAM(S): 20 TABLET, DELAYED RELEASE ORAL at 06:14

## 2022-01-01 RX ADMIN — GABAPENTIN 300 MILLIGRAM(S): 400 CAPSULE ORAL at 12:08

## 2022-01-01 RX ADMIN — LOSARTAN POTASSIUM 50 MILLIGRAM(S): 100 TABLET, FILM COATED ORAL at 05:18

## 2022-01-01 RX ADMIN — LOSARTAN POTASSIUM 50 MILLIGRAM(S): 100 TABLET, FILM COATED ORAL at 05:16

## 2022-01-01 RX ADMIN — Medication 50 MILLIGRAM(S): at 09:48

## 2022-01-01 RX ADMIN — AMLODIPINE BESYLATE 5 MILLIGRAM(S): 2.5 TABLET ORAL at 09:49

## 2022-01-01 RX ADMIN — Medication 650 MILLIGRAM(S): at 02:21

## 2022-01-01 RX ADMIN — CEFEPIME 100 MILLIGRAM(S): 1 INJECTION, POWDER, FOR SOLUTION INTRAMUSCULAR; INTRAVENOUS at 06:50

## 2022-01-01 RX ADMIN — TRAMADOL HYDROCHLORIDE 50 MILLIGRAM(S): 50 TABLET ORAL at 21:50

## 2022-01-01 RX ADMIN — Medication 650 MILLIGRAM(S): at 23:45

## 2022-01-01 RX ADMIN — HYDROMORPHONE HYDROCHLORIDE 0.5 MILLIGRAM(S): 2 INJECTION INTRAMUSCULAR; INTRAVENOUS; SUBCUTANEOUS at 00:49

## 2022-01-01 RX ADMIN — Medication 650 MILLIGRAM(S): at 10:25

## 2022-01-01 RX ADMIN — APIXABAN 5 MILLIGRAM(S): 2.5 TABLET, FILM COATED ORAL at 05:35

## 2022-01-01 RX ADMIN — Medication 650 MILLIGRAM(S): at 00:57

## 2022-01-01 RX ADMIN — CEFEPIME 100 MILLIGRAM(S): 1 INJECTION, POWDER, FOR SOLUTION INTRAMUSCULAR; INTRAVENOUS at 11:49

## 2022-01-01 RX ADMIN — Medication 50 MILLIGRAM(S): at 05:16

## 2022-01-01 RX ADMIN — HYDROMORPHONE HYDROCHLORIDE 0.2 MILLIGRAM(S): 2 INJECTION INTRAMUSCULAR; INTRAVENOUS; SUBCUTANEOUS at 12:07

## 2022-01-01 RX ADMIN — Medication 650 MILLIGRAM(S): at 06:16

## 2022-01-01 RX ADMIN — Medication 650 MILLIGRAM(S): at 17:14

## 2022-01-01 RX ADMIN — Medication 50 MILLIGRAM(S): at 05:54

## 2022-01-01 RX ADMIN — Medication 4 MILLIGRAM(S): at 11:26

## 2022-01-01 RX ADMIN — AMLODIPINE BESYLATE 5 MILLIGRAM(S): 2.5 TABLET ORAL at 05:16

## 2022-01-01 RX ADMIN — SODIUM CHLORIDE 75 MILLILITER(S): 9 INJECTION, SOLUTION INTRAVENOUS at 05:05

## 2022-01-01 RX ADMIN — Medication 4 MILLIGRAM(S): at 17:15

## 2022-01-01 RX ADMIN — HYDROMORPHONE HYDROCHLORIDE 0.5 MILLIGRAM(S): 2 INJECTION INTRAMUSCULAR; INTRAVENOUS; SUBCUTANEOUS at 01:30

## 2022-01-01 RX ADMIN — LOSARTAN POTASSIUM 50 MILLIGRAM(S): 100 TABLET, FILM COATED ORAL at 05:08

## 2022-01-01 RX ADMIN — Medication 650 MILLIGRAM(S): at 01:35

## 2022-01-01 RX ADMIN — Medication 50 MILLIGRAM(S): at 05:35

## 2022-01-01 RX ADMIN — TRAMADOL HYDROCHLORIDE 50 MILLIGRAM(S): 50 TABLET ORAL at 21:46

## 2022-01-01 RX ADMIN — APIXABAN 5 MILLIGRAM(S): 2.5 TABLET, FILM COATED ORAL at 17:10

## 2022-01-01 RX ADMIN — Medication 12.5 MILLIGRAM(S): at 06:13

## 2022-01-01 RX ADMIN — MORPHINE SULFATE 4 MILLIGRAM(S): 50 CAPSULE, EXTENDED RELEASE ORAL at 18:33

## 2022-01-01 RX ADMIN — Medication 50 MILLIGRAM(S): at 06:33

## 2022-01-01 RX ADMIN — CITALOPRAM 10 MILLIGRAM(S): 10 TABLET, FILM COATED ORAL at 11:46

## 2022-01-01 RX ADMIN — Medication 650 MILLIGRAM(S): at 03:28

## 2022-01-01 RX ADMIN — Medication 12.5 MILLIGRAM(S): at 05:08

## 2022-01-01 RX ADMIN — Medication 3 MILLIGRAM(S): at 21:41

## 2022-01-01 RX ADMIN — Medication 2.5 MILLIGRAM(S): at 08:54

## 2022-01-01 RX ADMIN — Medication 600 MILLIGRAM(S): at 14:50

## 2022-01-01 RX ADMIN — Medication 3 MILLIGRAM(S): at 00:58

## 2022-01-01 RX ADMIN — Medication 50 MILLIGRAM(S): at 18:12

## 2022-01-01 RX ADMIN — Medication 650 MILLIGRAM(S): at 12:22

## 2022-01-01 RX ADMIN — SODIUM CHLORIDE 75 MILLILITER(S): 9 INJECTION, SOLUTION INTRAVENOUS at 14:28

## 2022-01-01 RX ADMIN — HYDROMORPHONE HYDROCHLORIDE 0.25 MILLIGRAM(S): 2 INJECTION INTRAMUSCULAR; INTRAVENOUS; SUBCUTANEOUS at 23:17

## 2022-01-01 RX ADMIN — Medication 4 MILLIGRAM(S): at 05:35

## 2022-01-01 RX ADMIN — Medication 40 MILLIEQUIVALENT(S): at 02:47

## 2022-01-01 RX ADMIN — GABAPENTIN 300 MILLIGRAM(S): 400 CAPSULE ORAL at 13:04

## 2022-01-01 RX ADMIN — PANTOPRAZOLE SODIUM 40 MILLIGRAM(S): 20 TABLET, DELAYED RELEASE ORAL at 05:35

## 2022-01-01 RX ADMIN — Medication 650 MILLIGRAM(S): at 23:28

## 2022-01-01 RX ADMIN — Medication 650 MILLIGRAM(S): at 09:00

## 2022-01-01 RX ADMIN — HYDROMORPHONE HYDROCHLORIDE 0.25 MILLIGRAM(S): 2 INJECTION INTRAMUSCULAR; INTRAVENOUS; SUBCUTANEOUS at 20:30

## 2022-01-01 RX ADMIN — HYDROMORPHONE HYDROCHLORIDE 0.5 MILLIGRAM(S): 2 INJECTION INTRAMUSCULAR; INTRAVENOUS; SUBCUTANEOUS at 23:17

## 2022-01-01 RX ADMIN — AMLODIPINE BESYLATE 5 MILLIGRAM(S): 2.5 TABLET ORAL at 09:05

## 2022-01-01 RX ADMIN — CITALOPRAM 10 MILLIGRAM(S): 10 TABLET, FILM COATED ORAL at 12:08

## 2022-01-01 RX ADMIN — HYDROMORPHONE HYDROCHLORIDE 0.5 MILLIGRAM(S): 2 INJECTION INTRAMUSCULAR; INTRAVENOUS; SUBCUTANEOUS at 06:32

## 2022-01-01 RX ADMIN — HYDROMORPHONE HYDROCHLORIDE 0.5 MILLIGRAM(S): 2 INJECTION INTRAMUSCULAR; INTRAVENOUS; SUBCUTANEOUS at 04:18

## 2022-01-01 RX ADMIN — SENNA PLUS 2 TABLET(S): 8.6 TABLET ORAL at 21:47

## 2022-01-01 RX ADMIN — APIXABAN 5 MILLIGRAM(S): 2.5 TABLET, FILM COATED ORAL at 17:04

## 2022-01-01 RX ADMIN — Medication 50 MILLIGRAM(S): at 17:13

## 2022-01-01 RX ADMIN — Medication 50 MILLIGRAM(S): at 21:41

## 2022-01-01 RX ADMIN — HYDROMORPHONE HYDROCHLORIDE 0.2 MILLIGRAM(S): 2 INJECTION INTRAMUSCULAR; INTRAVENOUS; SUBCUTANEOUS at 22:15

## 2022-01-01 RX ADMIN — CEFEPIME 100 MILLIGRAM(S): 1 INJECTION, POWDER, FOR SOLUTION INTRAMUSCULAR; INTRAVENOUS at 12:19

## 2022-01-01 RX ADMIN — Medication 650 MILLIGRAM(S): at 17:09

## 2022-01-01 RX ADMIN — APIXABAN 5 MILLIGRAM(S): 2.5 TABLET, FILM COATED ORAL at 06:12

## 2022-01-01 RX ADMIN — HYDROMORPHONE HYDROCHLORIDE 0.25 MILLIGRAM(S): 2 INJECTION INTRAMUSCULAR; INTRAVENOUS; SUBCUTANEOUS at 21:45

## 2022-01-01 RX ADMIN — Medication 12.5 MILLIGRAM(S): at 05:18

## 2022-01-01 RX ADMIN — APIXABAN 5 MILLIGRAM(S): 2.5 TABLET, FILM COATED ORAL at 18:12

## 2022-01-01 RX ADMIN — CHLORHEXIDINE GLUCONATE 1 APPLICATION(S): 213 SOLUTION TOPICAL at 11:43

## 2022-01-01 RX ADMIN — Medication 600 MILLIGRAM(S): at 13:51

## 2022-01-01 RX ADMIN — TRAMADOL HYDROCHLORIDE 50 MILLIGRAM(S): 50 TABLET ORAL at 22:32

## 2022-01-01 RX ADMIN — Medication 650 MILLIGRAM(S): at 11:59

## 2022-01-01 RX ADMIN — Medication 650 MILLIGRAM(S): at 21:41

## 2022-01-01 RX ADMIN — Medication 650 MILLIGRAM(S): at 08:33

## 2022-01-01 RX ADMIN — Medication 650 MILLIGRAM(S): at 11:36

## 2022-01-01 RX ADMIN — LOSARTAN POTASSIUM 50 MILLIGRAM(S): 100 TABLET, FILM COATED ORAL at 05:48

## 2022-01-01 RX ADMIN — HYDROMORPHONE HYDROCHLORIDE 0.2 MILLIGRAM(S): 2 INJECTION INTRAMUSCULAR; INTRAVENOUS; SUBCUTANEOUS at 14:27

## 2022-01-01 RX ADMIN — HYDROMORPHONE HYDROCHLORIDE 0.2 MILLIGRAM(S): 2 INJECTION INTRAMUSCULAR; INTRAVENOUS; SUBCUTANEOUS at 06:20

## 2022-01-01 RX ADMIN — Medication 650 MILLIGRAM(S): at 17:04

## 2022-01-01 RX ADMIN — Medication 12.5 MILLIGRAM(S): at 08:57

## 2022-01-01 RX ADMIN — Medication 650 MILLIGRAM(S): at 14:27

## 2022-01-01 RX ADMIN — Medication 20 MILLIEQUIVALENT(S): at 11:44

## 2022-01-01 RX ADMIN — CITALOPRAM 10 MILLIGRAM(S): 10 TABLET, FILM COATED ORAL at 12:53

## 2022-01-01 RX ADMIN — CITALOPRAM 10 MILLIGRAM(S): 10 TABLET, FILM COATED ORAL at 11:26

## 2022-01-01 RX ADMIN — Medication 650 MILLIGRAM(S): at 05:08

## 2022-01-01 RX ADMIN — Medication 650 MILLIGRAM(S): at 18:41

## 2022-01-01 RX ADMIN — APIXABAN 5 MILLIGRAM(S): 2.5 TABLET, FILM COATED ORAL at 17:41

## 2022-01-01 RX ADMIN — Medication 650 MILLIGRAM(S): at 12:08

## 2022-01-01 RX ADMIN — POLYETHYLENE GLYCOL 3350 17 GRAM(S): 17 POWDER, FOR SOLUTION ORAL at 21:57

## 2022-01-01 RX ADMIN — Medication 650 MILLIGRAM(S): at 11:25

## 2022-01-01 RX ADMIN — Medication 650 MILLIGRAM(S): at 18:12

## 2022-01-01 RX ADMIN — POLYETHYLENE GLYCOL 3350 17 GRAM(S): 17 POWDER, FOR SOLUTION ORAL at 22:10

## 2022-01-01 RX ADMIN — AMLODIPINE BESYLATE 5 MILLIGRAM(S): 2.5 TABLET ORAL at 05:17

## 2022-01-01 RX ADMIN — Medication 650 MILLIGRAM(S): at 21:40

## 2022-01-01 RX ADMIN — AMLODIPINE BESYLATE 5 MILLIGRAM(S): 2.5 TABLET ORAL at 08:57

## 2022-01-01 RX ADMIN — Medication 650 MILLIGRAM(S): at 21:56

## 2022-01-01 RX ADMIN — CHLORHEXIDINE GLUCONATE 1 APPLICATION(S): 213 SOLUTION TOPICAL at 11:46

## 2022-01-01 RX ADMIN — TRAMADOL HYDROCHLORIDE 50 MILLIGRAM(S): 50 TABLET ORAL at 13:52

## 2022-01-01 RX ADMIN — HYDROMORPHONE HYDROCHLORIDE 0.2 MILLIGRAM(S): 2 INJECTION INTRAMUSCULAR; INTRAVENOUS; SUBCUTANEOUS at 01:24

## 2022-01-01 RX ADMIN — Medication 12.5 MILLIGRAM(S): at 05:47

## 2022-01-01 RX ADMIN — GABAPENTIN 300 MILLIGRAM(S): 400 CAPSULE ORAL at 14:26

## 2022-01-01 RX ADMIN — Medication 50 MILLIGRAM(S): at 05:12

## 2022-01-01 RX ADMIN — Medication 650 MILLIGRAM(S): at 05:16

## 2022-01-01 RX ADMIN — Medication 50 MILLIGRAM(S): at 06:13

## 2022-01-01 RX ADMIN — HYDROMORPHONE HYDROCHLORIDE 1 MILLIGRAM(S): 2 INJECTION INTRAMUSCULAR; INTRAVENOUS; SUBCUTANEOUS at 18:11

## 2022-01-01 RX ADMIN — ONDANSETRON 4 MILLIGRAM(S): 8 TABLET, FILM COATED ORAL at 13:28

## 2022-01-01 RX ADMIN — HYDROMORPHONE HYDROCHLORIDE 0.5 MILLIGRAM(S): 2 INJECTION INTRAMUSCULAR; INTRAVENOUS; SUBCUTANEOUS at 20:06

## 2022-01-01 RX ADMIN — SODIUM CHLORIDE 75 MILLILITER(S): 9 INJECTION, SOLUTION INTRAVENOUS at 23:17

## 2022-01-01 RX ADMIN — HYDROMORPHONE HYDROCHLORIDE 0.2 MILLIGRAM(S): 2 INJECTION INTRAMUSCULAR; INTRAVENOUS; SUBCUTANEOUS at 14:45

## 2022-01-01 RX ADMIN — APIXABAN 5 MILLIGRAM(S): 2.5 TABLET, FILM COATED ORAL at 17:27

## 2022-01-01 RX ADMIN — AMLODIPINE BESYLATE 5 MILLIGRAM(S): 2.5 TABLET ORAL at 05:08

## 2022-01-01 RX ADMIN — HYDROMORPHONE HYDROCHLORIDE 0.2 MILLIGRAM(S): 2 INJECTION INTRAMUSCULAR; INTRAVENOUS; SUBCUTANEOUS at 21:41

## 2022-01-01 RX ADMIN — APIXABAN 5 MILLIGRAM(S): 2.5 TABLET, FILM COATED ORAL at 21:47

## 2022-01-01 RX ADMIN — TRAMADOL HYDROCHLORIDE 50 MILLIGRAM(S): 50 TABLET ORAL at 22:15

## 2022-01-01 RX ADMIN — Medication 12.5 MILLIGRAM(S): at 09:05

## 2022-01-01 RX ADMIN — Medication 12.5 MILLIGRAM(S): at 09:49

## 2022-01-01 RX ADMIN — CITALOPRAM 10 MILLIGRAM(S): 10 TABLET, FILM COATED ORAL at 11:39

## 2022-01-01 RX ADMIN — INFLUENZA VIRUS VACCINE 0.7 MILLILITER(S): 15; 15; 15; 15 SUSPENSION INTRAMUSCULAR at 17:10

## 2022-01-01 RX ADMIN — ONDANSETRON 4 MILLIGRAM(S): 8 TABLET, FILM COATED ORAL at 06:32

## 2022-01-01 RX ADMIN — APIXABAN 5 MILLIGRAM(S): 2.5 TABLET, FILM COATED ORAL at 09:48

## 2022-01-01 RX ADMIN — Medication 50 MILLIGRAM(S): at 05:17

## 2022-01-01 RX ADMIN — Medication 3 MILLIGRAM(S): at 21:50

## 2022-01-01 RX ADMIN — HYDROMORPHONE HYDROCHLORIDE 0.5 MILLIGRAM(S): 2 INJECTION INTRAMUSCULAR; INTRAVENOUS; SUBCUTANEOUS at 23:18

## 2022-01-01 RX ADMIN — CEFEPIME 100 MILLIGRAM(S): 1 INJECTION, POWDER, FOR SOLUTION INTRAMUSCULAR; INTRAVENOUS at 21:25

## 2022-01-01 RX ADMIN — Medication 12.5 MILLIGRAM(S): at 09:02

## 2022-01-01 RX ADMIN — Medication 12.5 MILLIGRAM(S): at 06:33

## 2022-01-01 RX ADMIN — GABAPENTIN 300 MILLIGRAM(S): 400 CAPSULE ORAL at 13:52

## 2022-01-01 RX ADMIN — APIXABAN 5 MILLIGRAM(S): 2.5 TABLET, FILM COATED ORAL at 17:54

## 2022-01-01 RX ADMIN — SENNA PLUS 2 TABLET(S): 8.6 TABLET ORAL at 22:03

## 2022-01-01 RX ADMIN — Medication 650 MILLIGRAM(S): at 05:34

## 2022-01-01 RX ADMIN — Medication 4 MILLIGRAM(S): at 00:31

## 2022-01-01 RX ADMIN — HYDROMORPHONE HYDROCHLORIDE 0.2 MILLIGRAM(S): 2 INJECTION INTRAMUSCULAR; INTRAVENOUS; SUBCUTANEOUS at 05:52

## 2022-01-01 RX ADMIN — Medication 650 MILLIGRAM(S): at 13:22

## 2022-01-01 RX ADMIN — Medication 25 GRAM(S): at 13:51

## 2022-01-01 RX ADMIN — APIXABAN 5 MILLIGRAM(S): 2.5 TABLET, FILM COATED ORAL at 05:09

## 2022-01-01 RX ADMIN — Medication 50 MILLIGRAM(S): at 09:02

## 2022-01-01 RX ADMIN — HYDROMORPHONE HYDROCHLORIDE 0.25 MILLIGRAM(S): 2 INJECTION INTRAMUSCULAR; INTRAVENOUS; SUBCUTANEOUS at 21:15

## 2022-01-01 RX ADMIN — POLYETHYLENE GLYCOL 3350 17 GRAM(S): 17 POWDER, FOR SOLUTION ORAL at 14:27

## 2022-01-01 RX ADMIN — Medication 12.5 MILLIGRAM(S): at 05:16

## 2022-01-01 RX ADMIN — Medication 650 MILLIGRAM(S): at 05:11

## 2022-01-01 RX ADMIN — APIXABAN 5 MILLIGRAM(S): 2.5 TABLET, FILM COATED ORAL at 05:11

## 2022-01-01 RX ADMIN — CITALOPRAM 10 MILLIGRAM(S): 10 TABLET, FILM COATED ORAL at 13:52

## 2022-01-01 RX ADMIN — GABAPENTIN 300 MILLIGRAM(S): 400 CAPSULE ORAL at 11:57

## 2022-01-01 RX ADMIN — LOSARTAN POTASSIUM 50 MILLIGRAM(S): 100 TABLET, FILM COATED ORAL at 09:05

## 2022-01-01 RX ADMIN — Medication 50 MILLIGRAM(S): at 05:34

## 2022-01-01 RX ADMIN — HYDROMORPHONE HYDROCHLORIDE 0.2 MILLIGRAM(S): 2 INJECTION INTRAMUSCULAR; INTRAVENOUS; SUBCUTANEOUS at 09:35

## 2022-01-01 RX ADMIN — AMLODIPINE BESYLATE 5 MILLIGRAM(S): 2.5 TABLET ORAL at 06:33

## 2022-01-01 RX ADMIN — MORPHINE SULFATE 4 MILLIGRAM(S): 50 CAPSULE, EXTENDED RELEASE ORAL at 19:33

## 2022-01-01 RX ADMIN — Medication 650 MILLIGRAM(S): at 11:43

## 2022-01-27 PROBLEM — I10 HYPERTENSION: Status: ACTIVE | Noted: 2017-01-21

## 2022-01-27 PROBLEM — R50.9 FEVER: Status: ACTIVE | Noted: 2017-04-19

## 2022-01-27 NOTE — HISTORY OF PRESENT ILLNESS
[de-identified] : 66-year-old woman with history of breast carcinoma. Her history dates back to September 2003 when she was found to have a mass in the left breast. The biopsy revealed infiltrating lobular carcinoma measuring 3.5 cm. The patient underwent lumpectomy and 3 sentinel lymph nodes and 2 non-sentinel lymph nodes were negative for metastases. ER was +90% FL weakly positive and HER-2/marvin was negative. The patient underwent chemotherapy with 4 cycles of Adriamycin Cytoxan followed by radiation therapy. She was only able to tolerate 6 months of tamoxifen and then stopped her hormonal therapy.\par \par She subsequently has been well until about one month ago when she noted a mass in the right breast. A mammography on December 7, 2016 revealed a mass in the right breast with architectural distortion and multiple microcalcifications. The left breast revealed no evidence of malignancy. Sonogram confirmed a 3.5 x 3.8 cm mass which was palpable and the left breast was negative. \par \par On December 14 of patient underwent ultrasound-guided biopsy of the mass in the right breast which revealed invasive moderately differentiated ductal carcinoma Marion score of 7/9 the ER and FL were positive greater than 95%, HER-2/marvin 2+ and CISH  test was negative with a ratio of one.\par \par On January 11, 2017 the patient underwent MRI of the breast which revealed an irregular 6.7 x 6.2 cm mass in the right breast in the area of the biopsy proven malignancy. They were irregular and enhancing lesions throughout the right breast measuring up to 2 cm along with diffuse non-mass enhancement throughout the right breast. These are suspicious for multicentric disease. If breast conservation therapy is selected a second look ultrasound biopsy or MRI biopsy would be performed. The left breast revealed diffuse thickening of the skin with subcutaneous edema consistent with post lumpectomy and radiation therapy changes versus infiltrating malignancy with vascular or lymphatic obstruction. Skin punch biopsy and PET/CT were recommended. Also linear clumped enhancement was seen for which MRI guided biopsy was recommended. There were also multiple abnormal appearing lymph nodes in the right axilla suggestive of metastatic disease. An ultrasound-guided biopsy could be performed. The patient now presents for evaluation for further treatment.\par \par The patient overall feels well and denies symptoms relating to her abnormal breast imaging tests and confirmed malignancy. She has undergone BRCA testing and results are pending. Her family history is positive for a sister with breast cancer at age 53, a maternal first cousin with breast cancer and a maternal second cousin with breast cancer. There is no family history of ovarian cancer. 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   [de-identified] : Patient s/p C2 D15 Navelbine. Presently, she feels okay. She still c/o persistent ache from lower back that radiates down right leg. She takes the Neurontin 300 mg at bedtime which helps her sleep, but it does not take away the constant ache.  Denies any nausea, vomiting, diarrhea, constipation, mouth sores, and abdominal pain.

## 2022-01-27 NOTE — ASSESSMENT
[Palliative] : Goals of care discussed with patient: Palliative [Palliative Care Plan] : not applicable at this time [FreeTextEntry1] : S/p  C2 D15 Navelbine. Patient will continue current treatment.  Will monitor for side effects/toxicities. Pt has right lower back pain that radiates down right leg. RX Gabapentin 300 mg hs given, f/u. Review labs, CBC, CMP, CA 27-29. Tumor is trending upward. Patient will complete one more cycle before repeat CT-Scan of abdomen/pelvis chest is done to assess response vs progression of disease.  RTO in 3 weeks.

## 2022-01-27 NOTE — ASSESSMENT
[Palliative] : Goals of care discussed with patient: Palliative [Palliative Care Plan] : not applicable at this time [FreeTextEntry1] : A discussion took place regarding repeat Ct which now shows increasing liver metastases and bone metastases. Pt overall feels well except for slight increase in bone aches and slight neuropathy. Discussed further rx and signatera CTA sent to look for targeted therapy.  Pt has had adriamycin/cytoxan x 2 taxol, AI, ibrance, faslodex, xeloda and recently navelbine. Discussed eribulin gemzar and side effects and toxicity in detail and informed consent was obtained. Pt to be monitored for neuropathy and if this worsens then pt to be switched from eribulin to gemzar. Ptr to RTO in 1 month. Pt could not get bone density therapy as she requires too much dental work and not eligible for clearance.

## 2022-01-27 NOTE — REVIEW OF SYSTEMS
[Joint Pain] : joint pain [Negative] : Allergic/Immunologic [Constipation] : no constipation [FreeTextEntry9] : Lower back pain to right side that radiates down right leg.

## 2022-01-27 NOTE — HISTORY OF PRESENT ILLNESS
[de-identified] : 66-year-old woman with history of breast carcinoma. Her history dates back to September 2003 when she was found to have a mass in the left breast. The biopsy revealed infiltrating lobular carcinoma measuring 3.5 cm. The patient underwent lumpectomy and 3 sentinel lymph nodes and 2 non-sentinel lymph nodes were negative for metastases. ER was +90% VT weakly positive and HER-2/marvin was negative. The patient underwent chemotherapy with 4 cycles of Adriamycin Cytoxan followed by radiation therapy. She was only able to tolerate 6 months of tamoxifen and then stopped her hormonal therapy.\par \par She subsequently has been well until about one month ago when she noted a mass in the right breast. A mammography on December 7, 2016 revealed a mass in the right breast with architectural distortion and multiple microcalcifications. The left breast revealed no evidence of malignancy. Sonogram confirmed a 3.5 x 3.8 cm mass which was palpable and the left breast was negative. \par \par On December 14 of patient underwent ultrasound-guided biopsy of the mass in the right breast which revealed invasive moderately differentiated ductal carcinoma Baxter score of 7/9 the ER and VT were positive greater than 95%, HER-2/marvin 2+ and CISH  test was negative with a ratio of one.\par \par On January 11, 2017 the patient underwent MRI of the breast which revealed an irregular 6.7 x 6.2 cm mass in the right breast in the area of the biopsy proven malignancy. They were irregular and enhancing lesions throughout the right breast measuring up to 2 cm along with diffuse non-mass enhancement throughout the right breast. These are suspicious for multicentric disease. If breast conservation therapy is selected a second look ultrasound biopsy or MRI biopsy would be performed. The left breast revealed diffuse thickening of the skin with subcutaneous edema consistent with post lumpectomy and radiation therapy changes versus infiltrating malignancy with vascular or lymphatic obstruction. Skin punch biopsy and PET/CT were recommended. Also linear clumped enhancement was seen for which MRI guided biopsy was recommended. There were also multiple abnormal appearing lymph nodes in the right axilla suggestive of metastatic disease. An ultrasound-guided biopsy could be performed. The patient now presents for evaluation for further treatment.\par \par The patient overall feels well and denies symptoms relating to her abnormal breast imaging tests and confirmed malignancy. She has undergone BRCA testing and results are pending. Her family history is positive for a sister with breast cancer at age 53, a maternal first cousin with breast cancer and a maternal second cousin with breast cancer. There is no family history of ovarian cancer. 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   [de-identified] : Since the last visit the pt had repeat CT and returns to discuss results

## 2022-03-04 NOTE — HISTORY OF PRESENT ILLNESS
[de-identified] : 66-year-old woman with history of breast carcinoma. Her history dates back to September 2003 when she was found to have a mass in the left breast. The biopsy revealed infiltrating lobular carcinoma measuring 3.5 cm. The patient underwent lumpectomy and 3 sentinel lymph nodes and 2 non-sentinel lymph nodes were negative for metastases. ER was +90% TN weakly positive and HER-2/marvin was negative. The patient underwent chemotherapy with 4 cycles of Adriamycin Cytoxan followed by radiation therapy. She was only able to tolerate 6 months of tamoxifen and then stopped her hormonal therapy.\par \par She subsequently has been well until about one month ago when she noted a mass in the right breast. A mammography on December 7, 2016 revealed a mass in the right breast with architectural distortion and multiple microcalcifications. The left breast revealed no evidence of malignancy. Sonogram confirmed a 3.5 x 3.8 cm mass which was palpable and the left breast was negative. \par \par On December 14 of patient underwent ultrasound-guided biopsy of the mass in the right breast which revealed invasive moderately differentiated ductal carcinoma Wood[place] score of 7/9 the ER and TN were positive greater than 95%, HER-2/marvin 2+ and CISH  test was negative with a ratio of one.\par \par On January 11, 2017 the patient underwent MRI of the breast which revealed an irregular 6.7 x 6.2 cm mass in the right breast in the area of the biopsy proven malignancy. They were irregular and enhancing lesions throughout the right breast measuring up to 2 cm along with diffuse non-mass enhancement throughout the right breast. These are suspicious for multicentric disease. If breast conservation therapy is selected a second look ultrasound biopsy or MRI biopsy would be performed. The left breast revealed diffuse thickening of the skin with subcutaneous edema consistent with post lumpectomy and radiation therapy changes versus infiltrating malignancy with vascular or lymphatic obstruction. Skin punch biopsy and PET/CT were recommended. Also linear clumped enhancement was seen for which MRI guided biopsy was recommended. There were also multiple abnormal appearing lymph nodes in the right axilla suggestive of metastatic disease. An ultrasound-guided biopsy could be performed. The patient now presents for evaluation for further treatment.\par \par The patient overall feels well and denies symptoms relating to her abnormal breast imaging tests and confirmed malignancy. She has undergone BRCA testing and results are pending. Her family history is positive for a sister with breast cancer at age 53, a maternal first cousin with breast cancer and a maternal second cousin with breast cancer. There is no family history of ovarian cancer. 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   [de-identified] : S/p C2D1 Linobumayur. Patient feels okay except still some discomfort to right legs. Appetite is okay with no weight loss. Denies any diarrhea, vomiting or constipation.

## 2022-03-04 NOTE — ASSESSMENT
[Palliative] : Goals of care discussed with patient: Palliative [Palliative Care Plan] : not applicable at this time [FreeTextEntry1] : S/p C2 D1 Erubulin. Will monitor side effects and toxicity. Reviewed labs CBC, CMP Ca 27-29.  RTO 3 weeks.

## 2022-03-07 NOTE — ASSESSMENT
[FreeTextEntry1] : The visit was provided via telehealth using real-time 2-way audio visual technology. The patient, Tommy Peter, was located at the medical office in [place], [place] at the time of the visit. The Genetic Counselor, Juan Alberto Barry, was with the patient in person at [place], [place]. The Physician, Dr. Jose Juarez, was located in [place], [place] at the time of the visit. The patient, Tommy Peter, and both providers participated in the telehealth encounter. Verbal consent for telehealth services was given on 2022 by the patient, Tommy Peter. \par \par REASON FOR CONSULT\par Tommy Peter is a 71-year-old female who was referred by Dr. 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 \par \par RELEVANT MEDICAL HISTORY\par Ms. Peter was diagnosed with left breast cancer (ILC, ER+/MS+/HER2-) in  at age 53.  She was treated with lumpectomy and chemotherapy. Ms. Peter was then diagnosed with right breast cancer (IDC, ER+/MS+/HER2+) in 2017 at age 66. She was treated with partial neoadjuvant chemotherapy, lumpectomy and radiation therapy. Ms. Peter had a recurrence in 2019 with metastases to her liver and bones. She is currently undergoing chemotherapy. \par \par Ms. 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 In addition, a possibly mosaic variant of uncertain significance was detected in the MADDIE gene (c.7328G>C; p.[id number]). This test was ordered by Dr. Dunaway and Rosemarie Prescott and reported on 2021.\par \par OTHER MEDICAL AND SURGICAL HISTORY:\par HTN. Deep vein thrombosis. \par \par PAST OB/GYN HISTORY:\par Obstetrical History: \par Age at Menarche: 9\par Menopausal with LMP at age 50\par Age at First Live Birth: N/A\par Oral Contraceptive Use: No\par Hormone Replacement Therapy: No\par \par CANCER SCREENING HISTORY:  \par GYN: Last visit , normal. Frequency: yearly\par Colon: Last colonoscopy over 5 years ago, no polyps reported. Frequency: every 5 years. \par Skin: Last exam 4 years ago, basal cell carcinoma (head) treated with Mohs surgery. Frequency: as needed. \par \par SOCIAL HISTORY:\par •	Tobacco-product use: Yes, formerly, quit over 30 years ago. Smoked ½ pack/day for 20 years.\par •	Worked in [employer], exposed to chemical at a young age. \par \par FAMILY HISTORY:\par Maternal ancestry was reported as American ([place]) and paternal ancestry was reported as Italain. No Ashkenazi Denominational heritage reported. A detailed family history of cancer was ascertained, see below and scanned chart for pedigree. \par \par To Ms. Peter’s knowledge, no one else in the family has had germline testing for cancer susceptibility.  \par \par RESULTS INTERPRETATION AND ASSESSMENT:\par We reviewed with Ms. 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 This variant was detected at an allele frequency of 5-15%. It is unclear at this time if Ms. Peter has truly mosaic Li-Fraumeni Syndrome (LFS) or if this mosaic TP53 is a result of clonal hematopoiesis of indeterminate potential (CHIP) or an underlying pre-leukemic or leukemic state.\par \par In addition, Ms. 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 At this time the available evidence is insufficient to determine the role of this variant in disease and the clinical significance of this result is uncertain. Individuals with a pathogenic mutation in MADDIE may have an increased risk for breast, ovarian and pancreatic cancer. It is unknown if the patient has an increased risk for the cancers associated with MADDIE at this time. Of note, the genetic testing lab [facility] saw this variant at an allele frequency of 5-15%. Regardless of whether this variant is germline mosaic or not, the MADDIE variant is still considered a VUS. We focused our discussion on the possibly mosaic TP53 mutation given we would not recommend a skin biopsy for a VUS at this time. \par \par In order to clarify whether Ms. Peter has germline mosaic LFS or not, we discussed testing an additional sample type via skin biopsy. However, given the personal and family history reported and Ms. Peter’s age, the clinical utility as it related to her follow-up care was minimal. If a skin biopsy is pursued and the same TP53 mutation is identified, we would confirm constitutional mosaicism. However, if the skin biopsy is negative, we would think the TP53 mutation originally detected is most likely due to CHIP. Of note, we discussed that Ms. 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 By pursuing a skin biopsy, we would be able to further clarify if Ms. Peter’s TP53 finding is CHIP-related or truly mosaic. CHIP refers to a genetically distinct subpopulation of blood cells that share an acquired mutation, these mutations are not inherited. The prevalence of CHIP increases with age and exposure to agents such as chemotherapy. We discussed that CHIP is a diagnosis of exclusion and there is no one test to establish its’ diagnosis. In this setting, we recommend that a Hematologist is seen for a complete workup to rule out an underlying premalignant or malignant hematologic cause for this result. Ms. Peter is currently being followed by Dr. Dunaway given her current diagnosis, therefore no additional workup was recommended at this time. Ms. Peter understood the pros and cons of pursuing a skin biopsy at this time and decided against additional testing at this point. \par \par IMPLICATIONS FOR THE PATIENT:\par Given Ms. Peter’s personal and current reported family history of cancer, and her possibly mosaic TP53 and MADDIE variants, long-term management and surveillance should be based on her on- or post-treatment protocol as recommended by her oncologist. In the absence of other indications, Ms. Peter should practice age-appropriate cancer screening of other organ systems as recommended for the general population.\par \par IMPLICATIONS FOR FAMILY MEMBERS:\par Given the TP53 mutation is possibly mosaic, there is no role for additional genetic testing for Ms. Peter’s siblings at this time. The same is true for the MADDIE possibly mosaic VUS even if it were to be reclassified as pathogenic/likely pathogenic. \par \par RESOURCES & SUPPORT GROUPS \par The oncology social workers at [facility] are available to assist with referrals, if necessary. \par \par PLAN:\par 1.	See above note for recommended management. Given Ms. Peter is not pursuing a skin biopsy, we encouraged her to continue follow-up with her care team given her current diagnosis and potential for CHIP-related concerns. \par 2.	The MADDIE possibly mosaic VUS should not change the patient’s medical management. Testing of family members based on this result is not recommended. \par 3.	Patient informed consult note(s) will be available through their [facility] patient portal.\par 4.	Genetic knowledge changes rapidly. We encouraged re-contacting Cancer Genetics every 2-3 years for any changes in screening recommendations or sooner if there are significant changes in personal or family history\par \par For any additional questions please call Cancer Genetics at [phone]. \par \par \par Juan Alberto Barry, MS, [facility]\par Genetic Counselor, Cancer Genetics\par \par \par Attending Attestation:\par \par I have reviewed and edited the genetic counselor's note and I agree with the assessment and plan as documented. I spent approximately 45 minutes in total time of which approximately 25 minutes was face-to-face (via 2-way audiovisual telemedicine connection) with Ms. Peter reviewing her relevant personal and family history, the genetic testing results, our risk-assessment, and options for future cancer risk-reduction for the patient and her relevant family members. Over half this time was spent in counseling and coordination of care.\par \par Jose Juarez MD\par Chief, Cancer Genetics\par [facility] [place]\par \par \par CC: \par Patient\par Dr. Severo Dunaway \par Rosemarie NIÑO)\par \par

## 2022-03-07 NOTE — PLAN
[FreeTextEntry1] : The visit was provided via telehealth using real-time 2-way audio visual technology. The patient, Tommy Peter, was located at the medical office in [place], [place] at the time of the visit. The Genetic Counselor, Juan Alberto Barry, was with the patient in person at [place], [place]. The Physician, Dr. Jose Juarez, was located in [place], [place] at the time of the visit. The patient, Tommy Peter, and Provider, Dr. Jose Juarez participated in the telehealth encounter. Verbal consent for telehealth services was given on 2022 by the patient, Tommy Peter.   REASON FOR CONSULT Tommy Peter is a 71-year-old female who was referred by Dr. 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   RELEVANT MEDICAL HISTORY Ms. Peter was diagnosed with left breast cancer (ILC, ER+/PA+/HER2-) in  at age 53.  She was treated with lumpectomy and chemotherapy. Ms. Peter was then diagnosed with right breast cancer (IDC, ER+/PA+/HER2+) in 2017 at age 66. She was treated with partial neoadjuvant chemotherapy, lumpectomy and radiation therapy. Ms. Peter had a recurrence in 2019 with metastases to her liver and bones. She is currently undergoing chemotherapy.   Ms. 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 In addition, a possibly mosaic variant of uncertain significance was detected in the MADDIE gene (c.7328G>C; p.[id number]). This test was ordered by Dr. Dunaway and Rosemarie Prescott and reported on 2021.  OTHER MEDICAL AND SURGICAL HISTORY: HTN. Deep vein thrombosis.   PAST OB/GYN HISTORY: Obstetrical History:  Age at Menarche: 9 Menopausal with LMP at age 50 Age at First Live Birth: N/A Oral Contraceptive Use: No Hormone Replacement Therapy: No  CANCER SCREENING HISTORY:   GYN: Last visit , normal. Frequency: yearly Colon: Last colonoscopy over 5 years ago, no polyps reported. Frequency: every 5 years.  Skin: Last exam 4 years ago, basal cell carcinoma (head) treated with Mohs surgery. Frequency: as needed.   SOCIAL HISTORY: •	Tobacco-product use: Yes, formerly, quit over 30 years ago. Smoked ½ pack/day for 20 years. 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   FAMILY HISTORY: Maternal ancestry was reported as [place] ([place]) and paternal ancestry was reported as Italain. No Ashkenazi Episcopalian heritage reported. A detailed family history of cancer was ascertained, see below and scanned chart for pedigree.   To Ms. Peter’s knowledge, no one else in the family has had germline testing for cancer susceptibility.    RESULTS INTERPRETATION AND ASSESSMENT: We reviewed with Ms. 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 This variant was detected at an allele frequency of 5-15%. It is unclear at this time if Ms. Peter has truly mosaic Li-Fraumeni Syndrome (LFS) or if this mosaic TP53 is a result of clonal hematopoiesis of indeterminate potential (CHIP) or an underlying pre-leukemic or leukemic state.  In addition, Ms. 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 At this time the available evidence is insufficient to determine the role of this variant in disease and the clinical significance of this result is uncertain. Individuals with a pathogenic mutation in MADDIE may have an increased risk for breast, ovarian and pancreatic cancer. It is unknown if the patient has an increased risk for the cancers associated with MADDIE at this time. Of note, the genetic testing lab [facility] saw this variant at an allele frequency of 5-15%. Regardless of whether this variant is germline mosaic or not, the MADDIE variant is still considered a VUS. We focused our discussion on the possibly mosaic TP53 mutation given we would not recommend a skin biopsy for a VUS at this time.   In order to clarify whether Ms. Peter has germline mosaic LFS or not, we discussed testing an additional sample type via skin biopsy. However, given the personal and family history reported and Ms. Peter’s age, the clinical utility as it related to her follow-up care was minimal. If a skin biopsy is pursued and the same TP53 mutation is identified, we would confirm constitutional mosaicism. However, if the skin biopsy is negative, we would think the TP53 mutation originally detected is most likely due to CHIP. Of note, we discussed that Ms. Peter’s current treatment would not change even if this TP53 variant is proven to be truly mosaic.  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 By pursuing a skin biopsy, we would be able to further clarify if Ms. Peter’s TP53 finding is CHIP-related or truly mosaic. CHIP refers to a genetically distinct subpopulation of blood cells that share an acquired mutation, these mutations are not inherited. The prevalence of CHIP increases with age and exposure to agents such as chemotherapy. We discussed that CHIP is a diagnosis of exclusion and there is no one test to establish its’ diagnosis. In this setting, we recommend that a Hematologist is seen for a complete workup to rule out an underlying premalignant or malignant hematologic cause for this result. Ms. Peter is currently being followed by Dr. Dunaway given her current diagnosis, therefore no additional workup was recommended at this time. Ms. Peter understood the pros and cons of pursuing a skin biopsy at this time and decided against additional testing at this point.   IMPLICATIONS FOR THE PATIENT: Given Ms. Peter’s personal and current reported family history of cancer, and her possibly mosaic TP53 and MADDIE variants, long-term management and surveillance should be based on her on- or post-treatment protocol as recommended by her oncologist. In the absence of other indications, Ms. Peter should practice age-appropriate cancer screening of other organ systems as recommended for the general population.  IMPLICATIONS FOR FAMILY MEMBERS: Given the TP53 mutation is possibly mosaic, there is no role for additional genetic testing for Ms. Peter’s siblings at this time. The same is true for the MADDIE possibly mosaic VUS even if it were to be reclassified as pathogenic/likely pathogenic.   RESOURCES & SUPPORT GROUPS  The oncology social workers at [facility] are available to assist with referrals, if necessary.   PLAN: 1.	See above note for recommended management. Given Ms. Peter is not pursuing a skin biopsy, we encouraged her to continue follow-up with her care team given her current diagnosis and potential for CHIP-related concerns.  2.	The MADDIE possibly mosaic VUS does not change the patient’s medical management. Testing of family members based on this result is not indicated.  3.	Patient informed consult note(s) will be available through their [facility] patient portal. 4.	Genetic knowledge changes rapidly. We encouraged re-contacting Cancer Genetics every 2-3 years for any changes in screening recommendations or sooner if there are significant changes in personal or family history  For any additional questions please call Cancer Genetics at [phone].    Juan Alberto Barry MS, [facility] Genetic Counselor, Cancer Genetics  Jose Juarez MD Chief, Cancer Genetics  CC:  Patient Dr. Severo Prescott ([place])

## 2022-03-23 NOTE — ASSESSMENT
[FreeTextEntry1] : The patient will continue her current treatment with eribulin and be monitored for side effects and toxicity.  She will undergo repeat CAT scans to assess for response or progression of her bone and liver metastases.  The patient will also be monitored for pain and we have prescribed tramadol at this time to keep her comfortable.  She is currently refusing opioid treatment at this point.  She may require radiation therapy depending on her symptoms.  She will undergo CAT scans and bone scan in 1 month for evaluation. [Palliative] : Goals of care discussed with patient: Palliative [Palliative Care Plan] : not applicable at this time

## 2022-03-23 NOTE — PHYSICAL EXAM
[Restricted in physically strenuous activity but ambulatory and able to carry out work of a light or sedentary nature] : Status 1- Restricted in physically strenuous activity but ambulatory and able to carry out work of a light or sedentary nature, e.g., light house work, office work [Normal] : affect appropriate [de-identified] : The patient has right anterior sacral tenderness

## 2022-03-23 NOTE — HISTORY OF PRESENT ILLNESS
[de-identified] : 66-year-old woman with history of breast carcinoma. Her history dates back to September 2003 when she was found to have a mass in the left breast. The biopsy revealed infiltrating lobular carcinoma measuring 3.5 cm. The patient underwent lumpectomy and 3 sentinel lymph nodes and 2 non-sentinel lymph nodes were negative for metastases. ER was +90% DE weakly positive and HER-2/marvin was negative. The patient underwent chemotherapy with 4 cycles of Adriamycin Cytoxan followed by radiation therapy. She was only able to tolerate 6 months of tamoxifen and then stopped her hormonal therapy.\par \par She subsequently has been well until about one month ago when she noted a mass in the right breast. A mammography on December 7, 2016 revealed a mass in the right breast with architectural distortion and multiple microcalcifications. The left breast revealed no evidence of malignancy. Sonogram confirmed a 3.5 x 3.8 cm mass which was palpable and the left breast was negative. \par \par On December 14 of patient underwent ultrasound-guided biopsy of the mass in the right breast which revealed invasive moderately differentiated ductal carcinoma [place] score of 7/9 the ER and DE were positive greater than 95%, HER-2/marvin 2+ and CISH  test was negative with a ratio of one.\par \par On January 11, 2017 the patient underwent MRI of the breast which revealed an irregular 6.7 x 6.2 cm mass in the right breast in the area of the biopsy proven malignancy. They were irregular and enhancing lesions throughout the right breast measuring up to 2 cm along with diffuse non-mass enhancement throughout the right breast. These are suspicious for multicentric disease. If breast conservation therapy is selected a second look ultrasound biopsy or MRI biopsy would be performed. The left breast revealed diffuse thickening of the skin with subcutaneous edema consistent with post lumpectomy and radiation therapy changes versus infiltrating malignancy with vascular or lymphatic obstruction. Skin punch biopsy and PET/CT were recommended. Also linear clumped enhancement was seen for which MRI guided biopsy was recommended. There were also multiple abnormal appearing lymph nodes in the right axilla suggestive of metastatic disease. An ultrasound-guided biopsy could be performed. The patient now presents for evaluation for further treatment.\par \par The patient overall feels well and denies symptoms relating to her abnormal breast imaging tests and confirmed malignancy. She has undergone BRCA testing and results are pending. Her family history is positive for a sister with breast cancer at age 53, a maternal first cousin with breast cancer and a maternal second cousin with breast cancer. There is no family history of ovarian cancer. 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

## 2022-05-04 NOTE — HISTORY OF PRESENT ILLNESS
[de-identified] : 66-year-old woman with history of breast carcinoma. Her history dates back to September 2003 when she was found to have a mass in the left breast. The biopsy revealed infiltrating lobular carcinoma measuring 3.5 cm. The patient underwent lumpectomy and 3 sentinel lymph nodes and 2 non-sentinel lymph nodes were negative for metastases. ER was +90% CT weakly positive and HER-2/marvin was negative. The patient underwent chemotherapy with 4 cycles of Adriamycin Cytoxan followed by radiation therapy. She was only able to tolerate 6 months of tamoxifen and then stopped her hormonal therapy.\par \par She subsequently has been well until about one month ago when she noted a mass in the right breast. A mammography on December 7, 2016 revealed a mass in the right breast with architectural distortion and multiple microcalcifications. The left breast revealed no evidence of malignancy. Sonogram confirmed a 3.5 x 3.8 cm mass which was palpable and the left breast was negative. \par \par On December 14 of patient underwent ultrasound-guided biopsy of the mass in the right breast which revealed invasive moderately differentiated ductal carcinoma [place] score of 7/9 the ER and CT were positive greater than 95%, HER-2/marvin 2+ and CISH  test was negative with a ratio of one.\par \par On January 11, 2017 the patient underwent MRI of the breast which revealed an irregular 6.7 x 6.2 cm mass in the right breast in the area of the biopsy proven malignancy. They were irregular and enhancing lesions throughout the right breast measuring up to 2 cm along with diffuse non-mass enhancement throughout the right breast. These are suspicious for multicentric disease. If breast conservation therapy is selected a second look ultrasound biopsy or MRI biopsy would be performed. The left breast revealed diffuse thickening of the skin with subcutaneous edema consistent with post lumpectomy and radiation therapy changes versus infiltrating malignancy with vascular or lymphatic obstruction. Skin punch biopsy and PET/CT were recommended. Also linear clumped enhancement was seen for which MRI guided biopsy was recommended. There were also multiple abnormal appearing lymph nodes in the right axilla suggestive of metastatic disease. An ultrasound-guided biopsy could be performed. The patient now presents for evaluation for further treatment.\par \par The patient overall feels well and denies symptoms relating to her abnormal breast imaging tests and confirmed malignancy. She has undergone BRCA testing and results are pending. Her family history is positive for a sister with breast cancer at age 53, a maternal first cousin with breast cancer and a maternal second cousin with breast cancer. There is no family history of ovarian cancer. 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  The patient returns to discuss the results of the scans.

## 2022-05-04 NOTE — ASSESSMENT
[FreeTextEntry1] : A discussion took place with the patient and her sister regarding further management.  The CAT scan shows progression in the bones and liver with increasing lesions seen.  We discussed previous treatment including Adriamycin, Cytoxan, Taxol, aromatase inhibitor anastrozole letrozole and hormonal therapy including Faslodex and Ibrance.  The patient has also been treated with Xeloda [place] been recently readmitted.  We discussed other options and are recommending Gemzar and side effects were discussed in detail along with informed consent was obtained.  We are also sending off guardant molecular marker testing to see if there are other options for targeted therapy and possibly immunotherapy.  The previous [facility], [facility] within [facility] testing revealed no targeted therapy based on the molecular testing.  We again discussed bone directed therapy with the patient unfortunately would require extensive dental work which he is not able to do at this time.  We will also obtain discussing with her radiation oncologist treatment to the mid thoracic area.  There is epidural activity of the tumor to prevent any spinal cord disease.  Once we have more information further recommendations will be made. [Palliative] : Goals of care discussed with patient: Palliative [Palliative Care Plan] : not applicable at this time

## 2022-05-04 NOTE — REVIEW OF SYSTEMS
[Fatigue] : fatigue [Abdominal Pain] : abdominal pain [Negative] : Allergic/Immunologic [FreeTextEntry7] : Has intermittent right upper quadrant pain. [FreeTextEntry9] : Patient has diffuse bone pain particularly in both lower extremities and back and joints.

## 2022-05-06 NOTE — ED ADULT NURSE NOTE - DOES PATIENT HAVE ADVANCE DIRECTIVE
Patient:   YURIDIA RAMIREZ JR            MRN: [id number]            FIN: [id number]              Age:   72 years     Sex:  MALE     :  43   Associated Diagnoses:   None   Author:   WALLY MARCUS     Basic Information   Additional information: Chief Complaint from Nursing Triage Note : Chief Complaint ED  20 22:41 CST       Chief Complaint ED        Arrives to ED c/o CP/ pressure that began today. Denies any SOB.  .     History of Present Illness   Notes.     Patient is a 72-year-old male with history of hypertension comes the emergency room for chest pain.  This been ongoing since this morning.  He describes having left-sided chest pain.  Currently 4-10 in severity.  He describes as a pressure-like sensation although someone is standing on his chest.  It comes and goes intermittently.  There are no exacerbating or alleviating factors.  He denies any nausea vomiting or diaphoresis with it.  Denies  any shortness of breath he denies any radiation of the pain.  Is not exertional.  He denies having any prior similar washad a stress test in the past several years ago which he states was normal.  He has not tried taking anything for the pain today.  He did have an episode of the pain in the emergency department.  Patient denies any family history of heart problems.  Denies any shortness of breath.  Denies swelling in the legs or feet.  PSH:none  ALL:nkda  ALCOHOL/TOBACCO/DRUGS:denies  PCP:deny        Review of Systems   Constitutional symptoms:  No fever,    Skin symptoms:  No rash,    Eye symptoms:  No recent vision problems,    Respiratory symptoms:  No shortness of breath,    Cardiovascular symptoms:  Chest pain.   Gastrointestinal symptoms:  No abdominal pain,    Genitourinary symptoms:  No hematuria,    Musculoskeletal symptoms:  No back pain,    Neurologic symptoms:  No headache,    Endocrine symptoms:  No polyuria,      Health Status   Allergies:    Allergic Reactions (All)  NKA.      Past Medical/ Family/ Social History   Medical history:    All Problems  Arthritis / [id number] / Confirmed  Cataract / [id number] / Confirmed  Chronic pain / [id number] / Confirmed  Gout / [id number] / Confirmed  Hypertension / [id number] / Confirmed  Risk factors for obstructive sleep apnea / [id number] / Confirmed  TIA (transient ischemic attack) / [id number] / Confirmed.   Surgical history:    colonoscopy.  Comments:  01/07/2019 09:58 - MAYRA ALVARADO  colonoscopy end of year of 2018  10/31/2017 15:06 - DONNA TINOCO  2009  Cataract surgery ([id number])..   Family history: Include Family History   CA - Cancer of colon  MOTHER  CA - Lung cancer  SISTER  CA - Breast cancer  SISTER  CHILD  .   Social history:    Social & Psychosocial Habits  Alcohol  10/31/2017  Use: None, None  01/07/2019  Alcohol Abuse in Household No    Use: None, None  02/08/2019  Alcohol Abuse in Household No    Use: None, None  Exercise  02/08/2019  Do You Exercise: Regularly    Times Per Week: Daily, Daily    Comment: stretching - 02/08/2019 15:48 - Hannah Sylvester  Substance Abuse  11/02/2017  Use: None, None  01/07/2019  Substance Abuse in Household: No    Use: None, None  Tobacco  10/31/2017  Smoking Tobacco Use: Never smoker    Smoked/Smokeless Tobacco in Last 30 Days: No    Smoking Tobacco Use: Never smoker    Smoked/Smokeless Tobacco in Last 30 Days: No    Smoking Tobacco Use: Never smoker  01/07/2019  Smoking Tobacco Use: Never smoker    Smoker in Household: No    Smoking Tobacco Use: Never smoker    Smoked/Smokeless Tobacco in Last 30 Days: No    Smoking Tobacco Use: Never smoker    Smokeless tobacco use: Never  02/08/2019  Smoking Tobacco Use: Never smoker    Smoked/Smokeless Tobacco in Last 30 Days: No    Smoking Tobacco Use: Never smoker    Smoked/Smokeless Tobacco in Last 30 Days: No    Smoking Tobacco Use: Never smoker    Smokeless tobacco use: Never  .     Physical Examination              Vital Signs   ED Vital Sign    01/31/20 23:21 CST Heart/Pulse Rate 72  Normal     Pulse Source Radial     Respiration Rate 12 breaths/min  Normal     SpO2 98 %  Normal     NIBP Systolic 164  HI     NIBP Diastolic 85  Normal     NIBP   HI    01/31/20 22:03 CST Temperature - VS 36.9 deg_C  Normal     Temperature Source - VS Oral (Modified)    Heart/Pulse Rate 88  Normal     Pulse Source Monitor     Respiration Rate 18 breaths/min  HI     SpO2 96 %  Normal     NIBP Systolic 196  HI     NIBP Diastolic 92  HI     NIBP   HI    .   Height and Weight   01/31/20 22:03 CST CLINICALWEIGHT 101.7 kg    Weight Method Measured    MEDDOSEWT 101.7 kg    CLINICALHEIGHT 177.8 cm    Height Method Stated    BMI-Medical History 32.2 kg/m2   .   General:  Alert, no acute distress.    Skin:  Warm, dry.    Head:  Normocephalic, atraumatic.    Neck:  Supple.   Eye:  Normal conjunctiva.   Ears, nose, mouth and throat:  Oral mucosa moist.   Cardiovascular:  Regular rate and rhythm, Normal peripheral perfusion, No edema, 2+ bilateral radial and DP pulses.   Respiratory:  Lungs are clear to auscultation, respirations are non-labored, breath sounds are equal, Symmetrical chest wall expansion.   Gastrointestinal:  Soft, Nontender, Non distended.    Back:  Nontender.   Musculoskeletal:  No swelling, no deformity.    Neurological:  Alert and oriented to person, place, time, and situation, normal speech observed.   Psychiatric:  Cooperative, appropriate mood & affect.    Vitals between:   31-JAN-2020 00:02:01   TO   01-FEB-2020 00:02:01                   LAST RESULT MINIMUM MAXIMUM  Temperature 36.9 36.9 36.9  Heart Rate 72 72 88  Respiratory Rate 12 12 18  NISBP           164 164 196  NIDBP           85 85 92  NIMBP           108 108 127  SpO2                    98 96 98    Labs between:  31-JAN-2020 03:22 to 01-FEB-2020 03:22  CBC:                 WBC  HgB  Hct  Plt  MCV  RDW   31-JAN-2020 7.9  13.6  40.7  337  88.7  14.4   DIFF:                 Seg   Neutroph//ABS  Lymph//ABS  [place]//ABS  EOS/ABS  31-JAN-2020 NOT APPLICABLE  72 // 5.7 21 // 1.6 6 // 0.5 1 // 0.1  BMP:                 Na  Cl  BUN  Glu   31-JAN-2020 140  105  14  99                              K  CO2  Cr  Ca                              3.9  29  (H) 1.24  9.0   CMP:                 AST  ALT  AlkPhos  Bili  Albumin   31-JAN-2020 25  32  109  0.4  4.0   Other Chem:             Mg  Phos  Triglycerides  GGTP  DirectBili                           2.3           COAG:                 INR  PT  PTT  Ddimer  Fibrinogen    31-JAN-2020 1.1  11.0  30                       Cardiovascular Labs    NT proBNP 100 pg/mL (01/31/20 22:28)    Troponin <0.02 ng/mL (02/01/20 01:10) , <0.02 ng/mL (01/31/20 22:28)   Result title:  XR CHEST 1V  Result status:  Final  Verified by:  COMFORT CLAYTON on 02/01/2020 0:27  IMPRESSION: No clear evidence of acute infiltrate.  Mild basilar atelectasis on the right.  Dysmorphic appearance of ribs and the upper chest on the right with cervical ribs demonstrated.  Assessment and Plan:  Patient is a 72-year-old male with history of hypertension comes the emergency room for chest pain.  On exam patient in no acute distress.  Patient is afebrile emergency department SPO2 is 96%.  Patient is not tachycardic.  Differential at this time includes ACS versus angina versus musculoskeletal cause versus dyspepsia versus GERD.  Gastritis is a less likely possibility.  Given that there is no rash seen I do not believe shingles is unlikely  at this time.  Patient is afebrile very low concern for infectious cause such as pneumonia.  Pneumothorax is also unlikely given the normal exam.  Given the patient has equal pulses bilaterally and no active chest pain during I believe that dissection is highly unlikely.  Similarly given the patient is not tachycardic as normal SPO2 and is not short of breath I believe that PE is highly unlikely at this time.  Well score of 0.  We will obtain  full cardiac  labs EKG chest x-ray.  Patient given aspirin.  Patient will require admission for observation and further evaluation.  Labs are unremarkable except for mildly elevated creatinine which is consistent with patient's known CKD.  Case discussed with MsKia Ameya for admission under Dr. Moreno. All lab and imaging results discussed with the patient. Medication side effects also disussed with patient.  EKG: Normal sinus rhythm rate of 89 no STEMI no ST segment depressions.  Diagnosis:chest pain  Filipe Green M.D.  PGY-3 Emergency Medicine      No

## 2022-05-06 NOTE — CONSULT NOTE ADULT - SUBJECTIVE AND OBJECTIVE BOX
72 y/o F, PMHx breast cancer, htn, osteoarthritis gout, on Eliquis who presents with atraumatic pain in her R knee and difficulty ambulating. Patient states that she lives at home alone, started having pain in her R knee yesterday, up to an 8/10.    EXAM:  large effusion  mild warmth  ROM: 0-60 with pain  no erythema  mildly tender to touch    XRAYS: 3 views right knee show no signs of Fx. Moderate OA.    IMPRESSION: atraumatic right knee pain    PLAN:  aspiration right knee  gram stain, culture, crystals  WB as tolerated

## 2022-05-06 NOTE — ED PROVIDER NOTE - ATTESTATION, MLM
Smoking Cessation/carenotes on surgical procedure and d/c medications I have reviewed and confirmed nurses' notes for patient's medications, allergies, medical history, and surgical history.

## 2022-05-06 NOTE — ED PROVIDER NOTE - CONSTITUTIONAL, MLM
elderly, non toxic, awake, alert, oriented to person, place, time/situation and in no apparent distress. normal...

## 2022-05-06 NOTE — ED PROVIDER NOTE - CHPI ED SYMPTOMS NEG
no abdominal pain/no cough/no diarrhea/no headache/no rash/no shortness of breath/no chills/no decreased eating/drinking

## 2022-05-06 NOTE — ED PROVIDER NOTE - NSICDXPASTSURGICALHX_GEN_ALL_CORE_FT
PAST SURGICAL HISTORY:  Portacath in place 2017    S/P breast lumpectomy left 2003    S/P tonsillectomy

## 2022-05-06 NOTE — ED PROVIDER NOTE - CARE PLAN
1 Principal Discharge DX:	Fever   Principal Discharge DX:	Fever  Secondary Diagnosis:	Right knee pain

## 2022-05-06 NOTE — ED PROVIDER NOTE - OBJECTIVE STATEMENT
Pt is  a 72 yo female hx breast ca on chemo, last tx 9 days ago.  pt with mets to bone and liver.  pt states yesterday noted right knee pain, no trauma, and cant walk on it. pt today with fever.  pt denies redness, cough, abd pain, n/v/d, sob, rash or any other sx.  pt vaccinated for covid.      onc: oscar

## 2022-05-06 NOTE — ED PROVIDER NOTE - NSICDXPASTMEDICALHX_GEN_ALL_CORE_FT
PAST MEDICAL HISTORY:  Asthma on Advair    Breast cancer LEFT (2003) _ - s/p lumpectomy, chemo & radiation- Stage 2    Breast cancer, right invasive ductal ca- treated with TAXol from 3/2017-9/2017    Hearing loss of left ear     HTN (hypertension)     Obesity (BMI 30-39.9)     Osteoarthritis     Portal vein thrombosis May 2017- On Enoxaparin    Sepsis, due to unspecified organism June 2017

## 2022-05-06 NOTE — ED PROVIDER NOTE - WR INTERPRETATION DATE TIME  1
"Requested Prescriptions   Pending Prescriptions Disp Refills     PARoxetine (PAXIL) 20 MG tablet [Pharmacy Med Name: PAROXETINE HCL 20 MG TABLET]  Last Written Prescription Date:  08/28/2018  Last Fill Quantity: 180,  # refills: 01   Last Office Visit: 6/28/2018   Future Office Visit:      180 tablet 1     Sig: TAKE 2 TABLETS BY MOUTH AT BEDTIME    SSRIs Protocol Failed - 2/28/2019  1:13 AM       Failed - PHQ-9 score less than 5 in past 6 months    Please review last PHQ-9 score.          Passed - Medication is active on med list       Passed - Patient is age 18 or older       Passed - No active pregnancy on record       Passed - No positive pregnancy test in last 12 months       Passed - Recent (6 mo) or future (30 days) visit within the authorizing provider's specialty    Patient had office visit in the last 6 months or has a visit in the next 30 days with authorizing provider or within the authorizing provider's specialty.  See \"Patient Info\" tab in inbasket, or \"Choose Columns\" in Meds & Orders section of the refill encounter.              "
PHQ-9 SCORE 8/17/2017 2/16/2018 6/28/2018   PHQ-9 Total Score - - -   PHQ-9 Total Score 3 0 4     Medication is being filled for 1 time refill only due to:  needs ov    
06-May-2022 19:48

## 2022-05-06 NOTE — ED PROVIDER NOTE - CLINICAL SUMMARY MEDICAL DECISION MAKING FREE TEXT BOX
pt is a 70 yo female hx ca on chemo. pt p/w fever and right knee pain,  exam not c/w with joint infection,  pan culture, ivf, abx,  ortho consulted and knee tapped, no other source infection noted,  admit, follow knee tap results.

## 2022-05-06 NOTE — ED ADULT NURSE NOTE - NSIMPLEMENTINTERV_GEN_ALL_ED
Implemented All Fall Risk Interventions:  [place] to call system. Call bell, personal items and telephone within reach. Instruct patient to call for assistance. Room bathroom lighting operational. Non-slip footwear when patient is off stretcher. Physically safe environment: no spills, clutter or unnecessary equipment. Stretcher in lowest position, wheels locked, appropriate side rails in place. Provide visual cue, wrist band, yellow gown, etc. Monitor gait and stability. Monitor for mental status changes and reorient to person, place, and time. Review medications for side effects contributing to fall risk. Reinforce activity limits and safety measures with patient and family.

## 2022-05-06 NOTE — ED ADULT NURSE NOTE - OBJECTIVE STATEMENT
pt to er c/o right knee pain plus one swelling noted no discoloration denies trauma iv started 20 angio left ac labs drawn

## 2022-05-07 NOTE — H&P ADULT - PROBLEM SELECTOR PLAN 8
patient denies any current symptoms, does not use inhaler at home at this time  - continue to monitor Chronic  - Continue therapeutic interchange for home Omeprazole - Pantoprazole

## 2022-05-07 NOTE — H&P ADULT - NSICDXPASTMEDICALHX_GEN_ALL_CORE_FT
PAST MEDICAL HISTORY:  Asthma on Advair    Breast cancer LEFT (2003) _ - s/p lumpectomy, chemo & radiation- Stage 2    Breast cancer, right invasive ductal ca- treated with TAXol from 3/2017-9/2017    Hearing loss of left ear     HTN (hypertension)     Obesity (BMI 30-39.9)     Osteoarthritis     Portal vein thrombosis May 2017- On Enoxaparin    Sepsis, due to unspecified organism June 2017     PAST MEDICAL HISTORY:  Asthma on Advair    Breast cancer LEFT (2003) _ - s/p lumpectomy, chemo & radiation- Stage 2    Breast cancer, right invasive ductal ca- treated with TAXol from 3/2017-9/2017    DVT, lower extremity     Hearing loss of left ear     HTN (hypertension)     Obesity (BMI 30-39.9)     Osteoarthritis     Portal vein thrombosis May 2017- On Enoxaparin    Sepsis, due to unspecified organism June 2017

## 2022-05-07 NOTE — H&P ADULT - PROBLEM SELECTOR PLAN 6
Chief Complaint   Patient presents with     Knee Pain     Bilateral knee pain. Last injections with Kenalo21. Injections work well. He would like to have more injections today.       HISTORY OF PRESENT ILLNESS:  Ayo Hernández is a 45 year old male seen for chronic bilateral knee pain, advanced primary osteoarthritis.  He returns today with pain, right more than left. Not sure what happened but a couple weeks ago worsened right knee pain and swelling. not sure if he twisted it working out or not. Improving. He will have stiffness with standing. Also has pain with getting up after prolonged sitting. His last injections were on 2021 with kenalog. These worked well. Pain 4/10. Would like repeat injections.     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      No past medical history on file.    Past Surgical History:   Procedure Laterality Date     ARTHROSCOPY KNEE Right 7/15/2016    Procedure: ARTHROSCOPY KNEE;  Surgeon: Osvaldo Mccoy MD;  Location:  OR     ORTHOPEDIC SURGERY      left knee, arthroscopy     ORTHOPEDIC SURGERY  07.15.2016    right knee       Medications:   Current Outpatient Medications:      amLODIPine (NORVASC) 10 MG tablet, TAKE 1 TABLET BY MOUTH ONCE DAILY, Disp: 90 tablet, Rfl: 0     ibuprofen (ADVIL,MOTRIN) 200 MG tablet, Take 200 mg by mouth every 4 hours as needed., Disp: , Rfl:     Current Facility-Administered Medications:      bupivacaine (MARCAINE) 0.25 % injection 4 mL, 4 mL, , , Osvaldo Mccoy MD, 4 mL at 21 1608     bupivacaine (MARCAINE) 0.25 % injection 4 mL, 4 mL, , , Osvaldo Mccoy MD, 4 mL at 21 1608     triamcinolone (KENALOG-40) injection 80 mg, 80 mg, , , Osvaldo Mccoy MD, 80 mg at 21 1608     triamcinolone (KENALOG-40) injection 80 mg, 80 mg, , , Osvaldo Mccoy MD, 80 mg at 21 1608    Allergies: No Known Allergies  "      REVIEW OF SYSTEMS:   CONSTITUTIONAL:NEGATIVE for fever, chills, night sweats  INTEGUMENTARY/SKIN: NEGATIVE for worrisome wound problems or redness.  MUSCULOSKELETAL:See HPI above  NEURO: NEGATIVE for weakness, dizziness or paresthesias      PHYSICAL EXAM:  /77   Pulse 65   Ht 1.854 m (6' 1\")   Wt 103.7 kg (228 lb 9.6 oz)   BMI 30.16 kg/m     GENERAL APPEARANCE: healthy, alert, no distress  SKIN: no suspicious lesions or rashes  NEURO: Normal strength and tone, mentation intact and speech normal  PSYCH:  mentation appears normal and affect normal, not anxious  RESPIRATORY: No increased work of breathing.    Gait: slight favors the right.  Alignment: varus    No Quad atrophy, strength normal.  Intact sensation deep peroneal nerve, superficial peroneal nerve, med/lat tibial nerve, sural nerve, saphenous nerve  Intact EHL, EDL, TA, FHL, GS, quadriceps hamstrings and hip flexors  Toes warm and well perfused, brisk capillary refill. Palpable 2+ dp pulses.  calf soft and nttp or squeeze.  Edema: none    right  KNEE EXAM:    Skin: intact, no ecchymosis.  ROM: 0 degrees extension to 110 degrees flexion  Effusion:trace  Tender: medial joint line.   Positive crepitus.  Palpable marginal osteophytes    Left knee exam:  Skin intact. No erythema or ecchymosis.  ROM: 3 extension to 100 flexion  Effusion: trace  Tender to palpation medial joint line, lateral joint line  Positive medial and patello-femoral crepitus.  Palpable marginal osteophytes.      X-RAY: no new images today.    3 views bilateral knees taken on 2/5/2018    Left: Advanced lateral compartment degenerative changes similar to the prior study, bone on bone with articular flattening, subchondral sclerosis. Moderate patellofemoral degenerative changes with large marginal osteophytes.     Right: Moderate patellofemoral degenerative changes, prominent marginal osteophyte. Marginal osteophyte formation in the lateral compartment without significant joint " space loss.         Impression: Ayo Hernández is a 45 year old male with chronic bilateral knee pain, advanced primary osteoarthritis.      Plan:   * WB status: weightbearing per comfort.    * Rest  * Activity modification - avoid activities that aggravate symptoms.  * NSAIDS - regular use for inflammation, with food, as long as no contra-indications. Please discuss with primary care provider  if needed.  * Ice twice daily to three times daily as needed.  * Compression wrap as needed.  * Elevation of extremity to reduce swelling as needed.  * Injections: risks and perceived benefits discussed today. Patient elects to proceed bilaterally. Will use kenalog again today.  * Tylenol as needed for pain  * return to clinic as needed    Ayo to follow up with Primary Care provider regarding elevated blood pressure.      Osvaldo Mccoy MD  Dept. of Orthopaedic Surgery  [facility]    Large Joint Injection/Arthocentesis: bilateral knee    Date/Time: 5/3/2021 4:39 PM  Performed by: Van Napoles PA  Authorized by: Osvaldo Mccoy MD     Indications:  Pain and osteoarthritis  Needle Size:  22 G  Guidance: landmark guided    Approach:  Anteromedial  Location:  Knee  Laterality:  Bilateral      Medications (Right):  4 mL bupivacaine 0.25 %; 80 mg methylPREDNISolone 80 MG/ML  Medications (Left):  80 mg methylPREDNISolone 80 MG/ML; 4 mL bupivacaine 0.25 %  Outcome:  Tolerated well, no immediate complications  Procedure discussed: discussed risks, benefits, and alternatives    Consent Given by:  Patient  Prep: patient was prepped and draped in usual sterile fashion           chronic pain in legs, back  - continue home gabapentin  - tylenol, tramadol, dilaudid prn for pain Chronic, stable on admission  - Continue home medications metoprolol, losartan, hctz, amlodipine with hold parameters  - Monitor routine hemodynamics

## 2022-05-07 NOTE — H&P ADULT - PROBLEM SELECTOR PLAN 2
Patient presents w/ sudden onset r knee pain now continuing since yesterday  - suspect gout vs pseudogout  - s/p tap in the ED by ortho PA  - f/u cell count, fluid culture, crystals  - pain regimen w/ tylenol, tramadol, dilaudid

## 2022-05-07 NOTE — H&P ADULT - PROBLEM SELECTOR PLAN 5
Chronic, stable on admission  - Continue home medications metoprolol, losartan, hctz, amlodipine with hold parameters  - Monitor routine hemodynamics Per pt, she had a dvt within the past ~1 year, currently on Eliquis 5mg po BID at home for prophylaxis.  - Called [facility] to confirm meds, patient does not  eliquis from the [employer].  - confirmed w/ patient that she is given this medication from her cancer center directly.  - Continue eliquis 5mg po BID.

## 2022-05-07 NOTE — H&P ADULT - PROBLEM SELECTOR PLAN 1
Patient meets sepsis criteria on admission: HR >90, T >100.4  possibly 2/2 septic knee however expect fever is related to gout.  - CXR: chemoport, hilar opacities on wet read  - CT chest: No airspace consolidation. Right-sided pleural effusion and trace left pleural effusion which are new from the prior study. Multiple hypodense hepatic lesions better visualized on the contrast-enhanced study from 4/21/2022.  - Given IV NS 2.1 L bolus x1 in ED  - cefepime 1g x1 in ED  - Lactate 1 on admission  - Trend WBC and monitor for fever  - F/u UA, UCx, BCx x2  - ID (Dr. Malloy) consulted, f/u recs Patient meets sepsis criteria on admission: HR >90, T >100.4  possibly 2/2 septic knee however expect fever is related to gout.  - CT chest: No airspace consolidation. Right-sided pleural effusion and trace left pleural effusion which are new from the prior study. Multiple hypodense hepatic lesions better visualized on the contrast-enhanced study from 4/21/2022.  - Given IV NS 2.1 L bolus x1 in ED  - s/p cefepime 1g x1 in ED, will continue for now given immunocompromised state in the setting of cancer  - Lactate 1 on admission  - Trend WBC and monitor for fever  - F/u UA, UCx, BCx x2  - ID (Dr. Malloy) consulted, f/u recs Patient meets sepsis criteria on admission: HR >90, T >100.4  possibly 2/2 septic knee vs pseudogout   - CT chest: No airspace consolidation. Right-sided pleural effusion and trace left pleural effusion which are new from the prior study. Multiple hypodense hepatic lesions better visualized on the contrast-enhanced study from 4/21/2022.  - Given IV NS 2.1 L bolus x1 in ED  - s/p cefepime 1g x1 in ED, will continue for now given immunocompromised state in the setting of cancer  - Lactate 1 on admission  - Trend WBC and monitor for fever  - F/u UA, UCx, BCx x2  - ID (Dr. Malloy) consulted, f/u recs

## 2022-05-07 NOTE — PATIENT PROFILE ADULT - FUNCTIONAL ASSESSMENT - DAILY ACTIVITY 3.
1138: Received report from [address] at [facility]. Patient being discharged back home at 1200. Patient's left side weak/flaccid. Patient has new wounds from fall last week. Left hip hematoma opened up and wound care was completed at [facility] with foam dressing applied. L shin quarter size open wound with foam dressing applied at [facility] today. R shin 2 inch skin tear with new foam dressing applied today. Patient received suppository yesterday and had bm yesterday. Patient now on Senna S 8.6/50 tablet once daily per Dr. Mendy Valenzuela. Patient ate all of her breakfast. Patient will receive dose of morphine prior to transportation back home. 1300: Upon routine SN visit. Patient sitting up in bed with patient's Bret Linette and Kal Garcia, present. Patient reported pain 4/10 in left hip with no need for pain medications. Foam dressings clean, dry, and intact. Instructed Aunt and Grandma to change dressings daily. Throughout visit patient having trouble finding her words or would say a different word that she did not mean to say. Overall, patient is in good spirits and glad she is back home. Reviewed patient's medications and informed them of the new medication, Senna-S that will need to be taken daily. Aunt and Grandma aware and said whenever the patient is at home there was never an issue of the patient not having a bm. Explained to family that if we need to switch it back to PRN we can. Mortimer Olmsted reports that she has a full size bed rail to go along the bed to prevent that patient from trying to get out of bed, and a baby monitor. Buddy Mel[place]zen[place] also decided to remove the [facility] from the patient's room because when it was in there the patient would get out of bed to use it independently and then fall. Ordered supplies through medline, confirmation number is: [id number]. Ordered senna through [employer], confirmation number is: [id number]. No further needs or questions at this time. [facility] on call 24/7.      NEW MEDICATION INITIATION DOCUMENTATION:  Consulted AT MD to report change in patient status: YES  Obtained Order from Provider for initiation of Symptom relief medication / other medication needed:YES   Documentation completed in Telephone/Visit Note in [facility]  Reason medication is being initiated:constipation  MD / Provider name consulted re: change in status / initiation of new medication:Dr. Castellanos November Symptom(s): constipation at [facility]  New Order(s): Senna S 8.6/50 tablet once daily (started at [facility])  Name of person being taught: [place] and Para Pile  Instructions given: YES  Side Effects taught:YES  Response to teaching: verbalized understanding 4 = No assist / stand by assistance

## 2022-05-07 NOTE — H&P ADULT - ASSESSMENT
70 y/o f PMHx breast cancer, htn, osteoarthritis who presents w/ pain in her R knee and difficulty ambulating being admitted for fever meeting sepsis criteria. 72 y/o f PMHx breast cancer, htn, osteoarthritis who presents w/ pain in her R knee and difficulty ambulating being admitted for fever meeting sepsis criteria, 2/2 possible septic joint

## 2022-05-07 NOTE — ED ADULT NURSE REASSESSMENT NOTE - COMFORT CARE
meal provided/po fluids offered/repositioned/wait time explained/warm blanket provided
plan of care explained

## 2022-05-07 NOTE — H&P ADULT - PROBLEM SELECTOR PLAN 9
contributing to chronic pain  - pain regimen as above patient denies any current symptoms, does not use inhaler at home at this time  - continue to monitor

## 2022-05-07 NOTE — H&P ADULT - NSHPPHYSICALEXAM_GEN_ALL_CORE
T(C): 39.4 (05-06-22 @ 16:33), Max: 39.4 (05-06-22 @ 16:33)  HR: 103 (05-06-22 @ 16:33) (103 - 103)  BP: 139/76 (05-06-22 @ 16:33) (139/76 - 139/76)  RR: 16 (05-06-22 @ 16:33) (16 - 16)  SpO2: 99% (05-06-22 @ 16:33) (99% - 99%)    GENERAL: laying in bed, no acute distress, conversant  EYES: anicteric sclerae, no exudates  ENMT: oropharynx clear without erythema, no exudates, moist mucous membranes  NECK: supple, soft, no jvd noted  LUNGS: clear to auscultation b/l, no intercostal retractions  HEART: S1/S2, regular rate and rhythm, no murmurs noted  GASTROINTESTINAL: abdomen is soft, nontender, nondistended, normoactive bowel sounds  INTEGUMENT: good skin turgor, warm skin, appears well perfused  MUSCULOSKELETAL: ttp R knee, no swelling, redness, or warmth noted. LLE strength 5/5, RLE patient declines to lift against gravity 2/2 pain. trace ankle edema, no clubbing or cyanosis, no obvious deformity  NEUROLOGIC: awake, alert, oriented x3, good muscle tone in 4 extremities, no obvious sensory deficits  PSYCHIATRIC: mood is good, affect is congruent, linear and logical thought process  HEME/LYMPH: no obvious ecchymosis or petechiae

## 2022-05-07 NOTE — CONSULT NOTE ADULT - SUBJECTIVE AND OBJECTIVE BOX
Patient is a 71F community-ambulator with assistive devices (Walker, but uses a cane to navigate stairs) who presents to the [place] ED w/ a c/o of 2 days of progressive knee pain accompanied by swelling and tenderness and resulting in an inability to ambulate. Patient states she first noticed knee pain this pasty Thursday (5/5) as she was having trouble getting into and out of the seat of her car. The patient adds that the pain progressed further over the subsequent day to the point that it was too painful to bear weight on the leg and that she was no longer able to tolerate ambulation as a result. Denies any recent falls, nor any head-strikes or losses on consciousness. Localizing pain to the Right knee, denies radiation of pain elsewhere. Denies any numbness or tingling in the Right lower extremity. Denies having any other pain elsewhere aside from chronic, stable lower back pain due spinal metastases secondary to breast cancer. Denies any previous orthopaedic history. No other orthopaedic concerns at this time. Of note, patient was febrile to 102.9 in the ED and admitted meeting SIRS criteria (, T 102.9).    Patient's Right knee was aspirated overnight in the ED by Orthopaedic ACP yielded an unspecified volume of turbid yellow fluid that was subsequently sent for laboratory analysis inclusive of Cell Count, Crystals, Gram Stain, and Culture.    - Cell Count: ~33,000 (<50,000).   - Gram Stain: No organisms seen.  - Cultures: [Pending].   - Crystal: + CPPD Crystals.          PAST MEDICAL & SURGICAL HISTORY:  Breast cancer  LEFT (2003) _ - s/p lumpectomy, chemo &amp; radiation- Stage 2    Hearing loss of left ear    Asthma  on Advair    HTN (hypertension)    Portal vein thrombosis  May 2017- On Enoxaparin    Breast cancer, right  invasive ductal ca- treated with TAXol from 3/2017-9/2017    Obesity (BMI 30-39.9)    Sepsis, due to unspecified organism  June 2017    Osteoarthritis    DVT, lower extremity    S/P breast lumpectomy  left 2003    S/P tonsillectomy    Portacath in place  2017        Pen-Vee K (Rash)  penicillin (Unknown)      PHYSICAL EXAM:  T(C): 37 (05-07-22 @ 09:44), Max: 39.4 (05-06-22 @ 16:33)  HR: 78 (05-07-22 @ 09:44) (78 - 109)  BP: 115/71 (05-07-22 @ 09:44) (110/66 - 139/76)  RR: 17 (05-07-22 @ 09:44) (16 - 17)  SpO2: 93% (05-07-22 @ 09:44) (93% - 99%)      LABS:                        8.5    8.46  )-----------( 199      ( 07 May 2022 04:30 )             26.5       139  |  104  |  18  ----------------------------<  98  4.0   |  31  |  0.52    Ca    9.0      07 May 2022 04:30  Phos  2.8     05-07  Mg     1.5     05-07    TPro  5.9<L>  /  Alb  2.6<L>  /  TBili  0.8  /  DBili  x   /  AST  28  /  ALT  13  /  AlkPhos  111  05-07    PT/INR - ( 06 May 2022 18:29 )   PT: 17.9 sec;   INR: 1.52 ratio      PTT - ( 06 May 2022 18:29 )  PTT:29.7 sec        Gen: NAD, Resting comfortably    RLE:  Knee with mild suprapatellar effusion with mild TTP; No erythema and minimal warmth appreciated.   Skin clean, dry and intact with no signs of compromise; Aspiration site under dry dressings noted at the medial knee.   No bony tenderness to palpation.   +EHL/FHL/TA/GSC.   +SILT L3-S1.   + DP.   AROM from 10-80 degrees; PROM from 0-90 degrees with mild pain on terminal flexion and extension.   No micromotion tenderness.   Compartments soft and compressible.   No calf tenderness.     Secondary Survey:   LLE/RUE/LUE: No TTP over bony prominences, SILT, palpable pulses, full/painless range of motion, compartments soft.   Spine: No bony tenderness. No palpable step-offs; Mild chronic stable back pain.      Imaging:  XR Right Knee: No acute osseous pathology identified.     A/P:  Patient is a 71y Female w/ Right knee pseudogout.     - Patient s/p Right knee arthrocentesis yielding turbid yellow fluid and found on laboratory analysis to implicate CPPD joint crystallopathy. Despite the patient's fever, the low Cell Count (33,000) and absence of organisms seen on Gram stain, as well as the patient's minimally warm, non-erythematous physical exam lower the suspicion for an acute joint infection on the differential.   - Medical management appreciated.   - Multimodal analgesia.   - Recommend Rheumatology.   - WBAT RLE.   - DVT Ppx: per Primary Team.   - PT/OT.   - Ice and elevate as tolerated.   - No acute orthopaedic surgical intervention indicated at this time.   - Will continue to monitor aspirate cultures to completion to ensure no superimposition of infection on primary crystallopathy.   - Discussed with Dr. Whitehead who is aware of and in agreement with the above plan.        Patient is a 71F community-ambulator with assistive devices (Walker, but uses a cane to navigate stairs) who presents to the [place] ED w/ a c/o of 2 days of progressive knee pain accompanied by swelling and tenderness and resulting in an inability to ambulate. Patient states she first noticed knee pain this pasty Thursday (5/5) as she was having trouble getting into and out of the seat of her car. The patient adds that the pain progressed further over the subsequent day to the point that it was too painful to bear weight on the leg and that she was no longer able to tolerate ambulation as a result. Denies any recent falls, nor any head-strikes or losses on consciousness. Localizing pain to the Right knee, denies radiation of pain elsewhere. Denies any numbness or tingling in the Right lower extremity. Denies having any other pain elsewhere aside from chronic, stable lower back pain due spinal metastases secondary to breast cancer. Denies any previous orthopaedic history. No other orthopaedic concerns at this time. Of note, patient was febrile to 102.9 in the ED and admitted meeting SIRS criteria (, T 102.9).    Patient's Right knee was aspirated overnight in the ED by [facility] yielded an unspecified volume of turbid yellow fluid that was subsequently sent for laboratory analysis inclusive of Cell Count, Crystals, Gram Stain, and Culture.    - Cell Count: ~33,000 (<50,000).   - Gram Stain: No organisms seen.  - Cultures: [Pending].   - Crystal: + CPPD Crystals.          PAST MEDICAL & SURGICAL HISTORY:  Breast cancer  LEFT (2003) _ - s/p lumpectomy, chemo &amp; radiation- Stage 2    Hearing loss of left ear    Asthma  on Advair    HTN (hypertension)    Portal vein thrombosis  May 2017- On Enoxaparin    Breast cancer, right  invasive ductal ca- treated with TAXol from 3/2017-9/2017    Obesity (BMI 30-39.9)    Sepsis, due to unspecified organism  June 2017    Osteoarthritis    DVT, lower extremity    S/P breast lumpectomy  left 2003    S/P tonsillectomy    Portacath in place  2017        Pen-Vee K (Rash)  penicillin (Unknown)      PHYSICAL EXAM:  T(C): 37 (05-07-22 @ 09:44), Max: 39.4 (05-06-22 @ 16:33)  HR: 78 (05-07-22 @ 09:44) (78 - 109)  BP: 115/71 (05-07-22 @ 09:44) (110/66 - 139/76)  RR: 17 (05-07-22 @ 09:44) (16 - 17)  SpO2: 93% (05-07-22 @ 09:44) (93% - 99%)      LABS:                        8.5    8.46  )-----------( 199      ( 07 May 2022 04:30 )             26.5       139  |  104  |  18  ----------------------------<  98  4.0   |  31  |  0.52    Ca    9.0      07 May 2022 04:30  Phos  2.8     05-07  Mg     1.5     05-07    TPro  5.9<L>  /  Alb  2.6<L>  /  TBili  0.8  /  DBili  x   /  AST  28  /  ALT  13  /  AlkPhos  111  05-07    PT/INR - ( 06 May 2022 18:29 )   PT: 17.9 sec;   INR: 1.52 ratio      PTT - ( 06 May 2022 18:29 )  PTT:29.7 sec        Gen: NAD, Resting comfortably    RLE:  Knee with mild suprapatellar effusion with mild TTP; No erythema and minimal warmth appreciated.   Skin clean, dry and intact with no signs of compromise; Aspiration site under dry dressings noted at the medial knee.   No bony tenderness to palpation.   +EHL/FHL/TA/GSC.   +SILT L3-S1.   + DP.   AROM from 10-80 degrees; PROM from 0-90 degrees with mild pain on terminal flexion and extension.   No micromotion tenderness.   Compartments soft and compressible.   No calf tenderness.     Secondary Survey:   LLE/RUE/LUE: No TTP over bony prominences, SILT, palpable pulses, full/painless range of motion, compartments soft.   Spine: No bony tenderness. No palpable step-offs; Mild chronic stable back pain.      Imaging:  XR R Knee: No fracture or dislocation; Moderate osteoarthritic change; No lytic or blastic lesions; Stable dystrophic calcification; Suprapatellar effusion.    < end of copied text >      A/P:  Patient is a 71y Female w/ Right knee pseudogout.     - Patient s/p Right knee arthrocentesis yielding turbid yellow fluid and found on laboratory analysis to implicate CPPD joint crystallopathy. Despite the patient's fever, the low Cell Count (33,000) and absence of organisms seen on Gram stain, as well as the patient's minimally warm, non-erythematous physical exam lower the suspicion for an acute joint infection on the differential.   - Medical management appreciated.   - Multimodal analgesia.   - Recommend Rheumatology.   - WBAT RLE.   - DVT Ppx: per Primary Team.   - PT/OT.   - Ice and elevate as tolerated.   - No acute orthopaedic surgical intervention indicated at this time.   - Will continue to monitor aspirate cultures to completion to ensure no superimposition of infection on primary crystallopathy.   - Discussed with Dr. Whiteheda who is aware of and in agreement with the above plan.

## 2022-05-07 NOTE — PHYSICAL THERAPY INITIAL EVALUATION ADULT - PERTINENT HX OF CURRENT PROBLEM, REHAB EVAL
72 y/o f PMHx breast cancer, htn, osteoarthritis who presents w/ pain in her R knee and difficulty ambulating being admitted for fever meeting sepsis criteria, 2/2 possible septic joint. Dx with pseudogout per ortho consult note.
Medical Necessity Clause: This procedure was medically necessary because the lesions that were treated were:
Medical Necessity Information: It is in your best interest to select a reason for this procedure from the list below. All of these items fulfill various CMS LCD requirements except the new and changing color options.
Consent: The patient's consent was obtained including but not limited to risks of crusting, scabbing, blistering, scarring, darker or lighter pigmentary change, recurrence, incomplete removal and infection.
Show Applicator Variable?: Yes
Add 52 Modifier (Optional): no
Detail Level: Detailed
Post-Care Instructions: I reviewed with the patient in detail post-care instructions. Patient is to wear sunprotection, and avoid picking at any of the treated lesions. Pt may apply Vaseline to crusted or scabbing areas.

## 2022-05-07 NOTE — PROGRESS NOTE ADULT - ASSESSMENT
70 y/o f PMHx breast cancer, htn, osteoarthritis who presents w/ pain in her R knee and difficulty ambulating being admitted for fever meeting sepsis criteria, 2/2 possible septic joint      Problem/Plan - 1:  ·  Problem: Sepsis.   ·  Plan: Patient meets sepsis criteria on admission: HR >90, T >100.4  possibly 2/2 septic knee vs pseudogout   - CT chest: No airspace consolidation. Right-sided pleural effusion and trace left pleural effusion which are new from the prior study. Multiple hypodense hepatic lesions better visualized on the contrast-enhanced study from 4/21/2022.  - Given IV NS 2.1 L bolus x1 in ED  - s/p cefepime 1g x1 in ED, will continue for now given immunocompromised state in the setting of cancer  - Lactate 1 on admission  - Trend WBC and monitor for fever  - F/u UCx, BCx x2  - ID (Dr. Malloy) consulted, f/u recs.     Problem/Plan - 2:  ·  Problem: Right knee pain.   ·  Plan: Patient presents w/ sudden onset r knee pain now continuing since yesterday  - suspect pseudogout.  +Calcium pyrophosphate crystals   - s/p tap in the ED by ortho PA  - f/u fluid culture  - pain regimen w/ tylenol, tramadol, dilaudid. Start ibuprofen 600mg PO Q8h PRN     Problem/Plan - 3:  ·  Problem: Pleural effusion.   ·  Plan: CT chest: No airspace consolidation. Right-sided pleural effusion and trace left pleural effusion which are new from the prior study.  - patient asymptomatic, denies SOB  - expect malignant pleural effusion  - patient with some trace LE edema at ankles however is on amlodipine and states her edema has been present for a long time.  - can follow up with outpatient oncologist for further workup.     Problem/Plan - 4:  ·  Problem: Breast cancer, right.   ·  Plan: Lef sided Breast cancer found in 2003 s/p lumpectomy, chemo & radiation- Stage 2  breast cancer on R found in 2017, invasive ductal ca- treated with TAXol, currently receiving eribulin w/ oncologist Gume. Now metastatic to bones, liver.  - Patient states she has lost ~120 pounds since diagnosis.  - continue to monitor.     Problem/Plan - 5:  ·  Problem: History of DVT in adulthood.   ·  Plan: Per pt, she had a dvt within the past ~1 year, currently on Eliquis 5mg po BID at home for prophylaxis.  - Called [facility] to confirm meds, patient does not  [facility] from the [facility].  - confirmed w/ patient that she is given this medication from her cancer center directly.  - Continue eliquis 5mg po BID.     Problem/Plan - 6:  ·  Problem: HTN (hypertension).   ·  Plan: Chronic, stable on admission  - Continue home medications metoprolol, losartan, hctz, amlodipine with hold parameters  - Monitor routine hemodynamics.     Problem/Plan - 7:  ·  Problem: Cancer related pain.   ·  Plan: chronic pain in legs, back  - continue home gabapentin  - tylenol, tramadol, dilaudid prn for pain.     Problem/Plan - 8:  ·  Problem: GERD (gastroesophageal reflux disease).   ·  Plan: Chronic  - Continue therapeutic interchange for home Omeprazole - Pantoprazole.     Problem/Plan - 9:  ·  Problem: Asthma.   ·  Plan: patient denies any current symptoms, does not use inhaler at home at this time  - continue to monitor.     Problem/Plan - 10:  ·  Problem: Osteoarthritis.   ·  Plan; contributing to chronic pain  - pain regimen as above.     Problem/Plan - 11:  ·  Problem: DVT prophylaxis.   ·  Plan: AC w/ elliqukaylin.       72 y/o f PMHx breast cancer, htn, osteoarthritis who presents w/ pain in her R knee and difficulty ambulating being admitted for fever meeting sepsis criteria, 2/2 possible septic joint      Problem/Plan - 1:  ·  Problem: Sepsis.   ·  Plan: Patient meets sepsis criteria on admission: HR >90, T >100.4  possibly 2/2 septic knee vs pseudogout   - CT chest: No airspace consolidation. Right-sided pleural effusion and trace left pleural effusion which are new from the prior study. Multiple hypodense hepatic lesions better visualized on the contrast-enhanced study from 4/21/2022.  - Given IV NS 2.1 L bolus x1 in ED  - s/p cefepime 1g x1 in ED, will continue for now given immunocompromised state in the setting of cancer  - Lactate 1 on admission  - Trend WBC and monitor for fever  - F/u UCx, BCx x2  - ID (Dr. Malloy) consulted, f/u recs.     Problem/Plan - 2:  ·  Problem: Right knee pain.   ·  Plan: Patient presents w/ sudden onset r knee pain now continuing since yesterday  - suspect pseudogout.  +Calcium pyrophosphate crystals   - s/p tap in the ED by ortho PA  - f/u fluid culture  - pain regimen w/ tylenol, tramadol, dilaudid. Start ibuprofen 600mg PO Q8h PRN     Problem/Plan - 3:  ·  Problem: Pleural effusion.   ·  Plan: CT chest: No airspace consolidation. Right-sided pleural effusion and trace left pleural effusion which are new from the prior study.  - patient asymptomatic, denies SOB  - expect malignant pleural effusion  - patient with some trace LE edema at ankles however is on amlodipine and states her edema has been present for a long time.  - can follow up with outpatient oncologist for further workup.     Problem/Plan - 4:  ·  Problem: Breast cancer, right.   ·  Plan: Lef sided Breast cancer found in 2003 s/p lumpectomy, chemo & radiation- Stage 2  breast cancer on R found in 2017, invasive ductal ca- treated with TAXol, currently receiving eribulin w/ oncologist Gume. Now metastatic to bones, liver.  - Patient states she has lost ~120 pounds since diagnosis.  - continue to monitor.     Problem/Plan - 5:  ·  Problem: History of DVT in adulthood.   ·  Plan: Per pt, she had a dvt within the past ~1 year, currently on Eliquis 5mg po BID at home for prophylaxis.  - [facility], [facility] not  [facility]  - confirmed w/ patient that she is given this medication from her cancer center directly.  - Continue eliquis 5mg po BID.     Problem/Plan - 6:  ·  Problem: HTN (hypertension).   ·  Plan: Chronic, stable on admission  - Continue home medications metoprolol, losartan, hctz, amlodipine with hold parameters  - Monitor routine hemodynamics.     Problem/Plan - 7:  ·  Problem: Cancer related pain.   ·  Plan: chronic pain in legs, back  - continue home gabapentin  - tylenol, tramadol, dilaudid prn for pain.     Problem/Plan - 8:  ·  Problem: GERD (gastroesophageal reflux disease).   ·  Plan: Chronic  - Continue therapeutic interchange for home Omeprazole - Pantoprazole.     Problem/Plan - 9:  ·  Problem: Asthma.   ·  Plan: patient denies any current symptoms, does not use inhaler at home at this time  - continue to monitor.     Problem/Plan - 10:  ·  Problem: Osteoarthritis.   ·  Plan; contributing to chronic pain  - pain regimen as above.     Problem/Plan - 11:  ·  Problem: DVT prophylaxis.   ·  Plan: AC w/ elliquis.      #Anemia:  no gross bleeding.  Check iron studies, folate, b12, and stool OB

## 2022-05-07 NOTE — PATIENT PROFILE ADULT - FALL HARM RISK - HARM RISK INTERVENTIONS

## 2022-05-07 NOTE — PHYSICAL THERAPY INITIAL EVALUATION ADULT - ADDITIONAL COMMENTS
Patient reports that she lives in an apt within a house with 3STE, then 10 steps to the bedroom. Reports ambulating with a RW at baseline, cane for stairs. Independent with ADLs.

## 2022-05-07 NOTE — H&P ADULT - NSHPREVIEWOFSYSTEMS_GEN_ALL_CORE
CONSTITUTIONAL: admits to fatigue, denies fever, chills  HEENT: denies sore throat  SKIN: denies rash  CARDIOVASCULAR: denies chest pain, chest pressure, palpitations  RESPIRATORY: denies shortness of breath, sputum production  GASTROINTESTINAL: denies nausea, vomiting, diarrhea, abdominal pain  GENITOURINARY: denies dysuria, discharge  NEUROLOGICAL: denies numbness, headache, focal weakness  MUSCULOSKELETAL: admits to r knee pain, denies muscle aches  HEMATOLOGIC: denies gross bleeding  LYMPHATICS: denies new extremity swelling  PSYCHIATRIC: denies recent changes in anxiety, depression  ENDOCRINOLOGIC: admits to feeling cold, denies sweating

## 2022-05-07 NOTE — H&P ADULT - PROBLEM SELECTOR PLAN 3
No airspace consolidation. Right-sided pleural effusion and trace left pleural effusion which are new from the prior study.  - patient asymptomatic, denies SOB  - expect malignant pleural effusion  - patient with some trace LE edema at ankles however is on amlodipine and states her edema has been present for a long time.  - can follow up with outpatient oncologist for further workup CT chest: No airspace consolidation. Right-sided pleural effusion and trace left pleural effusion which are new from the prior study.  - patient asymptomatic, denies SOB  - expect malignant pleural effusion  - patient with some trace LE edema at ankles however is on amlodipine and states her edema has been present for a long time.  - can follow up with outpatient oncologist for further workup

## 2022-05-07 NOTE — H&P ADULT - HISTORY OF PRESENT ILLNESS
72 y/o f PMHx breast cancer, htn, osteoarthritis who presents w/ pain in her R knee and difficulty ambulating. Patient states that she lives at home alone, started having pain in her R knee yesterday, up to an 8/10, worse with movement. Patient did not take anything for the pain. It continued to get worse until today when she called her sister to come over and call ambulance for her as she could no longer get out of bed d/t the pain. She states that the pain came on suddenly and that she has never experienced this before. She states associated w/ some swelling, but denies any redness. States she did not feel as if she had a fever however she was cold. Denies headache, difficulty swallowing, shortness of breath, chest pain, palpitations, abdominal pain, n/v/d, changes in urination.    ED Course:  vitals - hr 103, bp 139/76, rr 16, spo2 99, t 102.9  labs significant for - wbc 10.69, hgb 9.1, K 3.4, alkphos 132, alb 3.1  imaging: ct chest < from: CT Chest No Cont (05.06.22 @ 21:59) >  No airspace consolidation. Right-sided pleural effusion and   trace left pleural effusion which are new from the prior study.    Multiple hypodense hepatic lesions better visualized on the   contrast-enhanced study from 4/21/2022.    Diffuse osseous metastatic disease with epidural disease is better   visualized on the prior thoracolumbar spine MRI.    < end of copied text >    R knee xray - no fracture on wet read  chest xray- hilar opacities, chemoport on wet read    Given: NS bolus 2100 x1, tylenol 650 x1, morphine 4mg ivp x1, zofran 4mg ivp x1, cefepime 1g x1   70 y/o f PMHx breast cancer, htn, osteoarthritis who presents w/ pain in her R knee and difficulty ambulating. Patient states that she lives at home alone, started having pain in her R knee yesterday, up to an 8/10, worse with movement. Patient did not take anything for the pain. It continued to get worse until today when she called her sister to come over and call ambulance for her as she could no longer get out of bed d/t the pain. She states that the pain came on suddenly and that she has never experienced this before. She states associated w/ some swelling, but denies any redness. States she did not feel as if she had a fever however she was cold. Denies headache, difficulty swallowing, shortness of breath, chest pain, palpitations, abdominal pain, n/v/d, changes in urination.    ED Course:  vitals - hr 103, bp 139/76, rr 16, spo2 99, t 102.9  labs significant for - wbc 10.69, hgb 9.1, K 3.4, alkphos 132, alb 3.1  imaging: ct chest < from: CT Chest No Cont (05.06.22 @ 21:59) >  No airspace consolidation. Right-sided pleural effusion and   trace left pleural effusion which are new from the prior study.    Multiple hypodense hepatic lesions better visualized on the   contrast-enhanced study from 4/21/2022.    Diffuse osseous metastatic disease with epidural disease is better   visualized on the prior thoracolumbar spine MRI.    < end of copied text >    R knee xray - no fracture on wet read  chest xray- hilar opacities, chemoport on wet read    Given: NS bolus 2100 x1, tylenol 650 x1, morphine 4mg ivp x1, zofran 4mg ivp x1, cefepime 1g x1   72 y/o f PMHx breast cancer, htn, osteoarthritis who presents w/ pain in her R knee and difficulty ambulating. Patient states that she lives at home alone, started having pain in her R knee yesterday, up to an 8/10, worse with movement. Patient did not take anything for the pain. It continued to get worse until today when she called her sister to come over and call ambulance for her as she could no longer get out of bed d/t the pain. She states that the pain came on suddenly and that she has never experienced this before. She states associated w/ some swelling, but denies any redness. States she did not feel as if she had a fever however she was cold. Denies headache, difficulty swallowing, shortness of breath, chest pain, palpitations, abdominal pain, n/v/d, changes in urination.    ED Course:  vitals - hr 103, bp 139/76, rr 16, spo2 99, t 102.9  labs significant for - wbc 10.69, hgb 9.1, K 3.4, alkphos 132, alb 3.1  imaging: ct chest  No airspace consolidation. Right-sided pleural effusion and   trace left pleural effusion which are new from the prior study.    Multiple hypodense hepatic lesions better visualized on the   contrast-enhanced study from 4/21/2022.    Diffuse osseous metastatic disease with epidural disease is better   visualized on the prior thoracolumbar spine MRI.    < end of copied text >    R knee xray - no fracture on wet read  chest xray- hilar opacities, chemoport on wet read    Given: NS bolus 2100 x1, tylenol 650 x1, morphine 4mg ivp x1, zofran 4mg ivp x1, cefepime 1g x1

## 2022-05-07 NOTE — H&P ADULT - PROBLEM SELECTOR PLAN 7
Chronic  - Continue therapeutic interchange for home Omeprazole - Pantoprazole chronic pain in legs, back  - continue home gabapentin  - tylenol, tramadol, dilaudid prn for pain

## 2022-05-07 NOTE — H&P ADULT - PROBLEM SELECTOR PLAN 4
Lef sided Breast cancer found in 2003 s/p lumpectomy, chemo & radiation- Stage 2  breast cancer on R found in 2017, invasive ductal ca- treated with TAXol, currently receiving eribulin w/ oncologist Gume. Now metastatic to bones, liver.  - Patient states she has lost ~120 pounds since diagnosis. Lef sided Breast cancer found in 2003 s/p lumpectomy, chemo & radiation- Stage 2  breast cancer on R found in 2017, invasive ductal ca- treated with TAXol, currently receiving eribulin w/ oncologist Gume. Now metastatic to bones, liver.  - Patient states she has lost ~120 pounds since diagnosis.  - continue to monitor

## 2022-05-07 NOTE — H&P ADULT - NSHPSOCIALHISTORY_GEN_ALL_CORE
lives alone    retired     tobacco- admits to remote history, quit 40 years ago smoked for 15 years <1ppd  alcohol, drugs - denies    ambulation - w/ walker d/t chronic lower extremity pain  ADLs - independent    COVID vaccinated w/ Pfizer x3

## 2022-05-08 NOTE — CONSULT NOTE ADULT - SUBJECTIVE AND OBJECTIVE BOX
SUNITHA SEVERINO  MRN-[id number]        Patient is a 71y old  Female who presents with a chief complaint of sepsis (08 May 2022 11:41)      HPI:  72 y/o f PMHx breast cancer, htn, osteoarthritis who presents w/ pain in her R knee and difficulty ambulating. Patient states that she lives at home alone, started having pain in her R knee yesterday, up to an 8/10, worse with movement. Patient did not take anything for the pain. It continued to get worse until today when she called her sister to come over and call ambulance for her as she could no longer get out of bed d/t the pain. She states that the pain came on suddenly and that she has never experienced this before. She states associated w/ some swelling, but denies any redness. States she did not feel as if she had a fever however she was cold. Denies headache, difficulty swallowing, shortness of breath, chest pain, palpitations, abdominal pain, n/v/d, changes in urination.    ED Course:  vitals - hr 103, bp 139/76, rr 16, spo2 99, t 102.9  labs significant for - wbc 10.69, hgb 9.1, K 3.4, alkphos 132, alb 3.1  imaging: ct chest  No airspace consolidation. Right-sided pleural effusion and   trace left pleural effusion which are new from the prior study.    Multiple hypodense hepatic lesions better visualized on the   contrast-enhanced study from 2022.    Diffuse osseous metastatic disease with epidural disease is better   visualized on the prior thoracolumbar spine MRI.    < end of copied text >    R knee xray - no fracture on wet read  chest xray- hilar opacities, chemoport on wet read    Given: NS bolus 2100 x1, tylenol 650 x1, morphine 4mg ivp x1, zofran 4mg ivp x1, cefepime 1g x1   (07 May 2022 00:15)      ID consulted for workup and antibiotic management     PAST MEDICAL & SURGICAL HISTORY:  Breast cancer  LEFT () _ - s/p lumpectomy, chemo &amp; radiation- Stage 2    Hearing loss of left ear    Asthma  on Advair    HTN (hypertension)    Portal vein thrombosis  May 2017- On Enoxaparin    Breast cancer, right  invasive ductal ca- treated with TAXol from 3/2017-2017    Obesity (BMI 30-39.9)    Sepsis, due to unspecified organism  2017    Osteoarthritis    DVT, lower extremity    S/P breast lumpectomy  left     S/P tonsillectomy    Portacath in place          Allergies  Pen-Vee K (Rash)  penicillin (Unknown)        ANTIMICROBIALS:  cefepime   IVPB 1000 every 8 hours      MEDICATIONS  (STANDING):  cefepime   IVPB   100 mL/Hr IV Intermittent (22 @ 12:19)   100 mL/Hr IV Intermittent (22 @ 06:12)   100 mL/Hr IV Intermittent (22 @ 21:25)   100 mL/Hr IV Intermittent (22 @ 11:49)   100 mL/Hr IV Intermittent (22 @ 06:50)    cefepime   IVPB   100 mL/Hr IV Intermittent (22 @ 20:44)        OTHER MEDS: MEDICATIONS  (STANDING):  acetaminophen     Tablet .. 650 every 6 hours PRN  aluminum hydroxide/magnesium hydroxide/simethicone Suspension 30 every 4 hours PRN  amLODIPine   Tablet 5 daily  apixaban 5 every 12 hours  citalopram 10 daily  gabapentin 300 daily PRN  hydrochlorothiazide 12.5 daily  HYDROmorphone  Injectable 0.5 every 6 hours PRN  ibuprofen  Tablet. 600 every 8 hours PRN  losartan 50 daily  melatonin 3 at bedtime PRN  metoprolol tartrate 50 two times a day  ondansetron Injectable 4 every 8 hours PRN  pantoprazole    Tablet 40 before breakfast  traMADol 50 every 8 hours PRN      SOCIAL HISTORY:       FAMILY HISTORY:  Family history of breast cancer (Sibling)        REVIEW OF SYSTEMS  [  ] ROS unobtainable because:    [ X ] All other systems negative except as noted below:	    Constitutional:  [ ] fever [ ] chills  [ ] weight loss  [ ] weakness  Skin:  [ ] rash [ ] phlebitis	  Eyes: [ ] icterus [ ] pain  [ ] discharge	  ENMT: [ ] sore throat  [ ] thrush [ ] ulcers [ ] exudates  Respiratory: [ ] dyspnea [ ] hemoptysis [ ] cough [ ] sputum	  Cardiovascular:  [ ] chest pain [ ] palpitations [ ] edema	  Gastrointestinal:  [ ] nausea [ ] vomiting [ ] diarrhea [ ] constipation [ ] pain	  Genitourinary:  [ ] dysuria [ ] frequency [ ] hematuria [ ] discharge [ ] flank pain  [ ] incontinence  Musculoskeletal:  [ ] myalgias [ ] arthralgias [ ] arthritis  [ X] knee pain  Neurological:  [ ] headache [ ] seizures  [ ] confusion/altered mental status  Psychiatric:  [ ] anxiety [ ] depression	  Hematology/Lymphatics:  [ ] lymphadenopathy  Endocrine:  [ ] adrenal [ ] thyroid  Allergic/Immunologic:	 [ ] transplant [ ] seasonal    Vital Signs Last 24 Hrs  T(F): 98.2 (22 @ 12:50), Max: 102.9 (22 @ 16:33)    Vital Signs Last 24 Hrs  HR: 93 (22 @ 12:50) (86 - 105)  BP: 122/70 (22 @ 12:50) (106/65 - 144/78)  RR: 18 (22 @ 12:50)  SpO2: 91% (22 @ 12:50) (91% - 95%)  Wt(kg): --    PHYSICAL EXAM:  Constitutional: non-toxic, no distress  HEAD/EYES: anicteric, no conjunctival injection  ENT:  supple, no thrush  Cardiovascular:   normal S1, S2, no murmur, no edema  Respiratory:  clear BS bilaterally, no wheezes, no rales  GI:  soft, non-tender, normal bowel sounds  :  no peace, no CVA tenderness  Musculoskeletal:  no synovitis, normal ROM  Neurologic: awake and alert, normal strength, no focal findings  Skin:  no rash, no erythema, no phlebitis  Heme/Onc: no lymphadenopathy   Psychiatric:  awake, alert, appropriate mood          WBC Count: 8.31 K/uL ( @ 08:09)  WBC Count: 8.46 K/uL ( @ 04:30)  WBC Count: 10.69 K/uL ( 18:29)  WBC Count: 5.04 K/uL ( @ 12:52)      Auto Neutrophil %: 82.3 % *H* (22 @ 08:09)  Auto Neutrophil #: 6.83 K/uL (22 @ 08:09)  Auto Neutrophil %: 75.1 % (22 @ 04:30)  Auto Neutrophil #: 6.35 K/uL (22 @ 04:30)  Auto Neutrophil %: 82.0 % *H* (22 @ 18:29)  Auto Neutrophil #: 8.77 K/uL *H* (22 @ 18:29)  Auto Neutrophil %: 67.6 % (22 @ 12:52)  Auto Neutrophil #: 3.41 K/uL (22 @ 12:52)                            8.8    8.31  )-----------( 237      ( 08 May 2022 08:09 )             27.7       05    139  |  104  |  19  ----------------------------<  88  3.8   |  28  |  0.57    Ca    9.3      08 May 2022 08:09  Phos  2.8       Mg     1.9         TPro  5.9<L>  /  Alb  2.6<L>  /  TBili  0.8  /  DBili  x   /  AST  28  /  ALT  13  /  AlkPhos  111        Creatinine Trend: 0.57<--, 0.52<--, 0.59<--, 0.73<--    Urinalysis Basic - ( 07 May 2022 01:08 )    Color: Yellow / Appearance: Clear / S.015 / pH: x  Gluc: x / Ketone: Negative  / Bili: Negative / Urobili: Negative   Blood: x / Protein: Negative / Nitrite: Negative   Leuk Esterase: Negative / RBC: x / WBC x   Sq Epi: x / Non Sq Epi: x / Bacteria: x        Lactate, Blood: 1.0 mmol/L (22 @ 18:29)    Cell Count, Body Fluid (22 @ 01:12)    Mesothelial Cells - Body Fluid: 0 %    Tube Type: Lavendar    Fluid Type: Synovial Fluid    Body Fluid Appearance: Turbid    Color - Body Fluid: Yellow    Total Nucleated Cell Count, Body Fluid: [id number]: Reference Ranges:  Synovial Fluid  Total nucleated cells < 150 cells/uL  Neutrophils <25%  Lymphocytes 10-15%  Monocytes/Macrophages </=70%  Other parameters- No established reference ranges.  Bronchoalveolar lavage  Macrophages >85%  Lymphocytes 10-15%  Neutrophils <3%  Eosinophils <1%  Other parameters- No established reference ranges.  Peritoneal/pleural/pericardial fluid/other body fluid types  No established reference ranges. /uL    Total RBC Count: 5000 /uL    Neutrophils-Body Fluid: 91 %    BF Lymphocytes: 2 %    Atypical Lymphocytes - Body Fluid: 0 %    Monocyte/Macrophage Count - Body Fluid: 7 %    Eosinophil Count - Body Fluid: 0 %    Nucleated Red Blood Cells - Body Fluid: 0    Other Body Cells: 0 %    Crystals, Synovial Fluid (22 @ 01:12)    Synovial Crystals Clarity: Turbid    Synovial Crystals Tube: Red Top Tube    Synovial Crystals Color: Yellow    Synovial Crystals Id: Crystals Present Intracellular and Extracellular CPPD (calcium pyrophosphate) present.        MICROBIOLOGY:    Culture - Urine (collected 22 @ 03:33)  Source: Clean Catch Clean Catch (Midstream)  Final Report (22 @ 06:20):    No growth    Culture - Blood (collected 22 @ 00:34)  Source: .Blood Blood-Peripheral  Preliminary Report (22 @ 01:02):    No growth to date.    Culture - Blood (collected 22 @ 00:34)  Source: .Blood Blood-Peripheral  Preliminary Report (22 @ 01:01):    No growth to date.    Culture - Joint Fluid (collected 22 @ 00:33)  Source: Knee Other, right knee  Gram Stain (22 @ 06:41):    Numerous polymorphonuclear leukocytes seen per low power field    No organisms seen per oil power field  Preliminary Report (22 @ 23:29):    No growth to date.    Culture - Acid Fast - Body Fluid w/Smear (collected 22 @ 00:32)  Source: .Body Fluid Knee Fluid      SARS-CoV-2: NotDetec (06 May 2022 18:33)        v    Rapid RVP Result: NotDetec ( @ 18:33)            Ferritin, Serum: 1341 ()          SARS-CoV-2: NotDetec (22 @ 18:33)  Rapid RVP Result: NotDetec (22 @ 18:33)      RADIOLOGY:    ACC: [id number] EXAM:  CT CHEST                          PROCEDURE DATE:  2022          INTERPRETATION:  CLINICAL INFORMATION: Shortness of breath    COMPARISON: 2022    TECHNIQUE: A volumetric CT acquisition of the chest was obtained fromthe   thoracic inlet to the upper abdomen, without administration of   intravenous contrast. MIP images were also obtained.    FINDINGS:    CHEST:    LUNGS AND LARGE AIRWAYS: Patent central airways. No pulmonary nodules.   Subsegmental atelectasis atthe lung bases.  PLEURA: Right-sided pleural effusion and trace left pleural effusion   which are new from the prior study.  VESSELS: Within normal limits.  HEART: Heart size is normal. No pericardial effusion.  MEDIASTINUM AND ZOE: No lymphadenopathy.  CHEST WALL AND LOWER NECK: Heterogeneous 1.8 cm left thyroid nodule is   unchanged from the prior study. Right chest wall port with catheter tip   in the SVC/right atrium. Marked bilateral breast skin thickening and   edema again noted. Small left breast seromas are unchanged in size, the   larger measuring 3.3 x 2.6 cm involving the lateral left breast.  UPPER ABDOMEN: Multiple hypodense hepatic lesions better visualized on   the contrast-enhanced study from 2022. Small hiatal hernia.  BONES: Diffuse osseous metastatic disease with epidural disease is better   visualized on the prior thoracolumbar spine MRI.    IMPRESSION: No airspace consolidation. Right-sided pleural effusion and   trace left pleural effusion which are new from the prior study.    Multiple hypodense hepatic lesions better visualized on the   contrast-enhanced study from 2022.    Diffuse osseous metastatic disease with epidural disease is better   visualized on the prior thoracolumbar spine MRI.    --- Endof Report ---            ARGENTINA BURNETT MD; Attending Radiologist  This document has been electronically signed. May  6 2022 11:13PM

## 2022-05-08 NOTE — DISCHARGE NOTE PROVIDER - HOSPITAL COURSE
70 y/o f PMHx breast cancer, htn, osteoarthritis who presents w/ pain in her R knee and difficulty ambulating. Patient states that she lives at home alone, started having pain in her R knee yesterday, up to an 8/10, worse with movement. Patient did not take anything for the pain. It continued to get worse until today when she called her sister to come over and call ambulance for her as she could no longer get out of bed d/t the pain. She states that the pain came on suddenly and that she has never experienced this before. She states associated w/ some swelling, but denies any redness. States she did not feel as if she had a fever however she was cold. Denies headache, difficulty swallowing, shortness of breath, chest pain, palpitations, abdominal pain, n/v/d, changes in urination.    ED Course:  vitals - hr 103, bp 139/76, rr 16, spo2 99, t 102.9  labs significant for - wbc 10.69, hgb 9.1, K 3.4, alkphos 132, alb 3.1  imaging: ct chest  No airspace consolidation. Right-sided pleural effusion and   trace left pleural effusion which are new from the prior study.    Multiple hypodense hepatic lesions better visualized on the   contrast-enhanced study from 4/21/2022.    Diffuse osseous metastatic disease with epidural disease is better   visualized on the prior thoracolumbar spine MRI.    < end of copied text >    R knee xray - No fracture dislocation. Moderate osteoarthritic change. No lytic or   blastic lesions. Dystrophic calcification similar to prior. Suprapatellar effusion.    chest xray- No active infiltrates    Given: NS bolus 2100 x1, tylenol 650 x1, morphine 4mg ivp x1, zofran 4mg ivp x1, cefepime 1g x1    Pt. was admitted with orthopedic and ID consultation.   She had aspirate of her right knee and was continued on IV antibiotics.  Aspirate showed calcium pyrophosphate crystals.  Culture from aspirate showed no growth.  Blood culture and urine culture have been negative.  IV antibiotics were then discontinued.  She was given NSAIDs PRN and narcotic pain medication with improvement in right knee pain and range of motion. 72 y/o f PMHx breast cancer, htn, osteoarthritis who presents w/ pain in her R knee and difficulty ambulating. Patient states that she lives at home alone, started having pain in her R knee yesterday, up to an 8/10, worse with movement. Patient did not take anything for the pain. It continued to get worse until today when she called her sister to come over and call ambulance for her as she could no longer get out of bed d/t the pain. She states that the pain came on suddenly and that she has never experienced this before. She states associated w/ some swelling, but denies any redness. States she did not feel as if she had a fever however she was cold. Denies headache, difficulty swallowing, shortness of breath, chest pain, palpitations, abdominal pain, n/v/d, changes in urination.    ED Course:  vitals - hr 103, bp 139/76, rr 16, spo2 99, t 102.9  labs significant for - wbc 10.69, hgb 9.1, K 3.4, alkphos 132, alb 3.1  imaging: ct chest  No airspace consolidation. Right-sided pleural effusion and   trace left pleural effusion which are new from the prior study.    Multiple hypodense hepatic lesions better visualized on the   contrast-enhanced study from 4/21/2022.    Diffuse osseous metastatic disease with epidural disease is better   visualized on the prior thoracolumbar spine MRI.    < end of copied text >    R knee xray - No fracture dislocation. Moderate osteoarthritic change. No lytic or   blastic lesions. Dystrophic calcification similar to prior. Suprapatellar effusion.    chest xray- No active infiltrates    Given: NS bolus 2100 x1, tylenol 650 x1, morphine 4mg ivp x1, zofran 4mg ivp x1, cefepime 1g x1    Pt. was admitted with orthopedic and ID consultation.   She had aspirate of her right knee and was continued on IV antibiotics.  Aspirate showed calcium pyrophosphate crystals.  Culture from aspirate showed no growth.  Blood culture and urine culture have been negative.  IV antibiotics were then discontinued.  She was given NSAIDs PRN and narcotic pain medication with improvement in right knee pain and range of motion.      On day of discharge patient is in no distress.  Reports able to walk to bathroom with walker.  Afebrile and hemodynamically stable.

## 2022-05-08 NOTE — DISCHARGE NOTE PROVIDER - NSDCMRMEDTOKEN_GEN_ALL_CORE_FT
acetaminophen 325 mg oral tablet: 2 tab(s) orally every 6 hours, As needed, Temp greater or equal to 38C (100.4F), Mild Pain (1 - 3)  amLODIPine 5 mg oral tablet: 1 tab(s) orally once a day  CeleXA 10 mg oral tablet: 1 tab(s) orally once a day  Eliquis 5 mg oral tablet: 1 tab(s) orally 2 times a day  gabapentin 300 mg/24 hours oral tablet, extended release: 1 tab(s) orally once a day  ibuprofen 600 mg oral tablet: 1 tab(s) orally every 8 hours, As needed, Moderate Pain (4 - 6)  losartan-hydrochlorothiazide 50 mg-12.5 mg oral tablet: 1 tab(s) orally once a day  metoprolol tartrate 50 mg oral tablet: 1 tab(s) orally 2 times a day  omeprazole 40 mg oral delayed release capsule: 1 cap(s) orally once a day  traMADol 50 mg oral tablet: 1 tab(s) orally every 8 hours

## 2022-05-08 NOTE — DISCHARGE NOTE PROVIDER - ATTENDING DISCHARGE PHYSICAL EXAMINATION:
Vitals:  T:  98.7  P:  98  BP:  127/70  RR:  19  SpO2:  92% RA  GENERAL: NAD  HEENT: EOMI, hearing normal, conjunctiva and sclera clear  Chest: Diminished BS at bases, no wheezing  CV: S1S2, RRR,   GI: soft, +BS, NT/ND  Musculoskeletal: no edema,  right knee without warmth or erythema.  Right knee not tender to palpation, increased ROM  Psychiatric: affect nL, mood nL  Skin: warm and dry

## 2022-05-08 NOTE — DISCHARGE NOTE PROVIDER - CARE PROVIDER_API CALL
Severo Dunaway (MD)  Hematology; Internal Medicine; Medical Oncology  [address] [place] [address]  Phone: [phone]  Fax: [phone]  Follow Up Time: 1 week

## 2022-05-08 NOTE — DISCHARGE NOTE PROVIDER - NSDCCPCAREPLAN_GEN_ALL_CORE_FT
PRINCIPAL DISCHARGE DIAGNOSIS  Diagnosis: Right knee pain  Assessment and Plan of Treatment: secondary to pseudogout.  Your knee pain and range of motion has improved.  Continue NSAIDs as needed.   Follow up with your PMD in 1 week.      SECONDARY DISCHARGE DIAGNOSES  Diagnosis: Breast cancer, right  Assessment and Plan of Treatment: invasive ductal ca- treated with TAXol from 3/2017-9/2017.  Follow up with your hematologist/oncologist.    Diagnosis: Pleural effusion  Assessment and Plan of Treatment: You have remained on room air with SpO2 in 90th%.  Please follow up with your oncologist in 1 week.

## 2022-05-08 NOTE — PROGRESS NOTE ADULT - ASSESSMENT
72 y/o f PMHx breast cancer, htn, osteoarthritis who presents w/ pain in her R knee and difficulty ambulating being admitted for fever meeting sepsis criteria, 2/2 possible septic joint      Problem/Plan - 1:  ·  Problem: Sepsis.   ·  Plan: Patient meets sepsis criteria on admission: HR >90, T >100.4  possibly 2/2 septic knee vs pseudogout   - CT chest: No airspace consolidation. Right-sided pleural effusion and trace left pleural effusion which are new from the prior study. Multiple hypodense hepatic lesions better visualized on the contrast-enhanced study from 4/21/2022.  - Given IV NS 2.1 L bolus x1 in ED  - s/p cefepime 1g x1 in ED, will continue for now given immunocompromised state in the setting of cancer.  Will discontinue in AM if aspirate culture remains negative.   - Lactate 1 on admission  - Trend WBC and monitor for fever  - F/u UCx no growth, BCx NGTD     Problem/Plan - 2:  ·  Problem: Right knee pain.   ·  Plan: Patient presents w/ sudden onset r knee pain now continuing since yesterday  - suspect pseudogout.  +Calcium pyrophosphate crystals   - s/p tap in the ED by ortho PA  - fluid culture so far negative  - pain regimen w/ tylenol, tramadol, dilaudid.  Cont ibuprofen 600mg PO Q8h PRN     Problem/Plan - 3:  ·  Problem: Pleural effusion.   ·  Plan: CT chest: No airspace consolidation. Right-sided pleural effusion and trace left pleural effusion which are new from the prior study.  - patient asymptomatic, denies SOB  - expect malignant pleural effusion  - patient with some trace LE edema at ankles however is on amlodipine and states her edema has been present for a long time.  - can follow up with outpatient oncologist for further workup.     Problem/Plan - 4:  ·  Problem: Breast cancer, right.   ·  Plan: Lef sided Breast cancer found in 2003 s/p lumpectomy, chemo & radiation- Stage 2  breast cancer on R found in 2017, invasive ductal ca- treated with TAXol, currently receiving eribulin w/ oncologist Gume. Now metastatic to bones, liver.  - Patient states she has lost ~120 pounds since diagnosis.  - continue to monitor.     Problem/Plan - 5:  ·  Problem: History of DVT in adulthood.   ·  Plan: Per pt, she had a dvt within the past ~1 year, currently on Eliquis 5mg po BID at home for prophylaxis.  - Called [facility] to confirm meds, patient does not  eliquis from the [facility].  - confirmed w/ patient that she is given this medication from her cancer center directly.  - Continue eliquis 5mg po BID.     Problem/Plan - 6:  ·  Problem: HTN (hypertension).   ·  Plan: Chronic, stable on admission  - Continue home medications metoprolol, losartan, hctz, amlodipine with hold parameters  - Monitor routine hemodynamics.     Problem/Plan - 7:  ·  Problem: Cancer related pain.   ·  Plan: chronic pain in legs, back  - continue home gabapentin  - tylenol, tramadol, dilaudid prn for pain.     Problem/Plan - 8:  ·  Problem: GERD (gastroesophageal reflux disease).   ·  Plan: Chronic  - Continue therapeutic interchange for home Omeprazole - Pantoprazole.     Problem/Plan - 9:  ·  Problem: Asthma.   ·  Plan: patient denies any current symptoms, does not use inhaler at home at this time  - continue to monitor.     Problem/Plan - 10:  ·  Problem: Osteoarthritis.   ·  Plan; contributing to chronic pain  - pain regimen as above.     Problem/Plan - 11:  ·  Problem: DVT prophylaxis.   ·  Plan: AC w/ elliquis.      #Anemia:  no gross bleeding.  Hbg stable.  Check iron studies, folate, b12, and stool OB

## 2022-05-08 NOTE — CONSULT NOTE ADULT - ASSESSMENT
70 y/o f PMHx breast cancer, htn, osteoarthritis who presents w/ pain in her R knee and difficulty ambulating.  admitted for rule out septic arthritis  knee tapped which had 33k wbc crystals positive for DPPD   she has very good ROM and minimal pain today after starting therapy for gout   not consistent with septic arthritis and more consistent for gout   agree with not giving antibiotics   cultures reviewed and all negative     Plan:  cefepime stopped  hold further doses of antibiotics   continue to treat gout as per medicine  rest of care per medicine     will sign off. Please call with questions  Msg sent to Dr. Cachorro Cummings, DO  Infectious Disease Attending  Reachable via Microsoft Teams or [place] office: [phone]  After 5pm/weekends please call [phone] for all inquiries and new consults

## 2022-05-09 NOTE — DISCHARGE NOTE NURSING/CASE MANAGEMENT/SOCIAL WORK - NSDCVIVACCINE_GEN_ALL_CORE_FT
Influenza, injectable,quadrivalent, preservative free, pediatric; 02-Dec-2019 11:01; Behrens, Richard (RN); [employer]; VIS (VIS Presented: 02-Dec-2019);

## 2022-05-09 NOTE — DISCHARGE NOTE NURSING/CASE MANAGEMENT/SOCIAL WORK - NSDCPEFALRISK_GEN_ALL_CORE
For information on Fall & Injury Prevention, visit: [email or web address] OR  [email or web address] OR  https://www.cdc.gov/steadi/patient.html

## 2022-05-09 NOTE — DISCHARGE NOTE NURSING/CASE MANAGEMENT/SOCIAL WORK - PATIENT PORTAL LINK FT
You can access the FollowMyHealth Patient Portal offered by [facility] by registering at the following website: [email or web address]. By joining [facility]’s FollowMyHealth portal, you will also be able to view your health information using other applications (apps) compatible with our system.

## 2022-05-11 NOTE — REVIEW OF SYSTEMS
[Joint Pain] : joint pain [Negative] : Cardiovascular [Shortness Of Breath] : no shortness of breath [Wheezing] : no wheezing [Cough] : no cough [SOB on Exertion] : no shortness of breath during exertion [FreeTextEntry6] : pain across chest in bones. [FreeTextEntry9] : right hip pain

## 2022-05-11 NOTE — PHYSICAL EXAM
[Restricted in physically strenuous activity but ambulatory and able to carry out work of a light or sedentary nature] : Status 1- Restricted in physically strenuous activity but ambulatory and able to carry out work of a light or sedentary nature, e.g., light house work, office work [Normal] : affect appropriate [de-identified] : palpated across chest and pain is not reproducible.  [de-identified] : The patient has right anterior sacral tenderness

## 2022-05-11 NOTE — HISTORY OF PRESENT ILLNESS
[de-identified] : 66-year-old woman with history of breast carcinoma. Her history dates back to September 2003 when she was found to have a mass in the left breast. The biopsy revealed infiltrating lobular carcinoma measuring 3.5 cm. The patient underwent lumpectomy and 3 sentinel lymph nodes and 2 non-sentinel lymph nodes were negative for metastases. ER was +90% MI weakly positive and HER-2/marvin was negative. The patient underwent chemotherapy with 4 cycles of Adriamycin Cytoxan followed by radiation therapy. She was only able to tolerate 6 months of tamoxifen and then stopped her hormonal therapy.\par \par She subsequently has been well until about one month ago when she noted a mass in the right breast. A mammography on December 7, 2016 revealed a mass in the right breast with architectural distortion and multiple microcalcifications. The left breast revealed no evidence of malignancy. Sonogram confirmed a 3.5 x 3.8 cm mass which was palpable and the left breast was negative. \par \par On December 14 of patient underwent ultrasound-guided biopsy of the mass in the right breast which revealed invasive moderately differentiated ductal carcinoma [place] score of 7/9 the ER and MI were positive greater than 95%, HER-2/marvin 2+ and CISH  test was negative with a ratio of one.\par \par On January 11, 2017 the patient underwent MRI of the breast which revealed an irregular 6.7 x 6.2 cm mass in the right breast in the area of the biopsy proven malignancy. They were irregular and enhancing lesions throughout the right breast measuring up to 2 cm along with diffuse non-mass enhancement throughout the right breast. These are suspicious for multicentric disease. If breast conservation therapy is selected a second look ultrasound biopsy or MRI biopsy would be performed. The left breast revealed diffuse thickening of the skin with subcutaneous edema consistent with post lumpectomy and radiation therapy changes versus infiltrating malignancy with vascular or lymphatic obstruction. Skin punch biopsy and PET/CT were recommended. Also linear clumped enhancement was seen for which MRI guided biopsy was recommended. There were also multiple abnormal appearing lymph nodes in the right axilla suggestive of metastatic disease. An ultrasound-guided biopsy could be performed. The patient now presents for evaluation for further treatment.\par \par The patient overall feels well and denies symptoms relating to her abnormal breast imaging tests and confirmed malignancy. She has undergone BRCA testing and results are pending. Her family history is positive for a sister with breast cancer at age 53, a maternal first cousin with breast cancer and a maternal second cousin with breast cancer. There is no family history of ovarian cancer. 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 Patient is here for f/u. She c/o pain across chest in bones, and persistent pain to right hip. She takes Tramadol with slight improvement. Denies any nausea, vomiting, constipation or diarrhea.

## 2022-05-11 NOTE — ASSESSMENT
[Palliative] : Goals of care discussed with patient: Palliative [Palliative Care Plan] : not applicable at this time [FreeTextEntry1] : S/p C3 D8  Erubulin. Patient will  current treatment with eribulin. Will  monitored for side effects and toxicities. Ordered a  repeat CAT scans to assess for response or progression of her bone and liver metastases to be done at end of C4, f/u. RX Oxycodone 5 mg; every 6 hours given for pain.  She may require radiation therapy depending on her symptoms.  She will undergo CAT scans and bone scan in 1 month for evaluation.

## 2022-05-17 PROBLEM — I82.409 ACUTE EMBOLISM AND THROMBOSIS OF UNSPECIFIED DEEP VEINS OF UNSPECIFIED LOWER EXTREMITY: Chronic | Status: ACTIVE | Noted: 2022-01-01

## 2022-06-02 PROBLEM — Z79.899 ON ANTINEOPLASTIC CHEMOTHERAPY: Status: ACTIVE | Noted: 2019-10-28

## 2022-06-10 NOTE — REVIEW OF SYSTEMS
[Fatigue] : fatigue [Abdominal Pain] : abdominal pain [Negative] : Allergic/Immunologic [Recent Change In Weight] : ~T recent weight change [FreeTextEntry2] : increase drowsiness, + weight loss 6 pounds in 1 month [FreeTextEntry7] : Has pain upper quadrant pain. [FreeTextEntry9] : Patient has diffuse bone pain particularly in both lower extremities and back and joints.

## 2022-06-10 NOTE — ASSESSMENT
[Palliative] : Goals of care discussed with patient: Palliative [Palliative Care Plan] : not applicable at this time [FreeTextEntry1] : S/p C1D8 Gemzar. Patient will continue present treatment. Will monitor for side effects/toxicities. Patient has poor appetite will sent RX for Marinol 2.5 mg; 1 cap tid. Patient has increased drowsiness and she has been advised to stop taking Tramadol and Hydromorphone. RX Tramadol was not refilled. Patient will have repeat scans after  3 months of treatment. Reviewed labs, CBC, CMP, CA 27-29 RTO in 1 month

## 2022-06-10 NOTE — HISTORY OF PRESENT ILLNESS
[de-identified] : 66-year-old woman with history of breast carcinoma. Her history dates back to September 2003 when she was found to have a mass in the left breast. The biopsy revealed infiltrating lobular carcinoma measuring 3.5 cm. The patient underwent lumpectomy and 3 sentinel lymph nodes and 2 non-sentinel lymph nodes were negative for metastases. ER was +90% NV weakly positive and HER-2/marvin was negative. The patient underwent chemotherapy with 4 cycles of Adriamycin Cytoxan followed by radiation therapy. She was only able to tolerate 6 months of tamoxifen and then stopped her hormonal therapy.\par \par She subsequently has been well until about one month ago when she noted a mass in the right breast. A mammography on December 7, 2016 revealed a mass in the right breast with architectural distortion and multiple microcalcifications. The left breast revealed no evidence of malignancy. Sonogram confirmed a 3.5 x 3.8 cm mass which was palpable and the left breast was negative. \par \par On December 14 of patient underwent ultrasound-guided biopsy of the mass in the right breast which revealed invasive moderately differentiated ductal carcinoma [place] score of 7/9 the ER and NV were positive greater than 95%, HER-2/marvin 2+ and CISH  test was negative with a ratio of one.\par \par On January 11, 2017 the patient underwent MRI of the breast which revealed an irregular 6.7 x 6.2 cm mass in the right breast in the area of the biopsy proven malignancy. They were irregular and enhancing lesions throughout the right breast measuring up to 2 cm along with diffuse non-mass enhancement throughout the right breast. These are suspicious for multicentric disease. If breast conservation therapy is selected a second look ultrasound biopsy or MRI biopsy would be performed. The left breast revealed diffuse thickening of the skin with subcutaneous edema consistent with post lumpectomy and radiation therapy changes versus infiltrating malignancy with vascular or lymphatic obstruction. Skin punch biopsy and PET/CT were recommended. Also linear clumped enhancement was seen for which MRI guided biopsy was recommended. There were also multiple abnormal appearing lymph nodes in the right axilla suggestive of metastatic disease. An ultrasound-guided biopsy could be performed. The patient now presents for evaluation for further treatment.\par \par The patient overall feels well and denies symptoms relating to her abnormal breast imaging tests and confirmed malignancy. She has undergone BRCA testing and results are pending. Her family history is positive for a sister with breast cancer at age 53, a maternal first cousin with breast cancer and a maternal second cousin with breast cancer. There is no family history of ovarian cancer. 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 Patient c/o increase fatigue and drowsiness. Admits that she has persistent pain to abdomen. She takes Dilaudid 4 mg twice daily and Tramadol for break through pain. Denies any vomiting, but her appetite is decreased with + weight loss. BM's are okay, but little occasionally hard.

## 2022-06-10 NOTE — HISTORY OF PRESENT ILLNESS
[de-identified] : 66-year-old woman with history of breast carcinoma. Her history dates back to September 2003 when she was found to have a mass in the left breast. The biopsy revealed infiltrating lobular carcinoma measuring 3.5 cm. The patient underwent lumpectomy and 3 sentinel lymph nodes and 2 non-sentinel lymph nodes were negative for metastases. ER was +90% OR weakly positive and HER-2/marvin was negative. The patient underwent chemotherapy with 4 cycles of Adriamycin Cytoxan followed by radiation therapy. She was only able to tolerate 6 months of tamoxifen and then stopped her hormonal therapy.\par \par She subsequently has been well until about one month ago when she noted a mass in the right breast. A mammography on December 7, 2016 revealed a mass in the right breast with architectural distortion and multiple microcalcifications. The left breast revealed no evidence of malignancy. Sonogram confirmed a 3.5 x 3.8 cm mass which was palpable and the left breast was negative. \par \par On December 14 of patient underwent ultrasound-guided biopsy of the mass in the right breast which revealed invasive moderately differentiated ductal carcinoma [place] score of 7/9 the ER and OR were positive greater than 95%, HER-2/marvin 2+ and CISH  test was negative with a ratio of one.\par \par On January 11, 2017 the patient underwent MRI of the breast which revealed an irregular 6.7 x 6.2 cm mass in the right breast in the area of the biopsy proven malignancy. They were irregular and enhancing lesions throughout the right breast measuring up to 2 cm along with diffuse non-mass enhancement throughout the right breast. These are suspicious for multicentric disease. If breast conservation therapy is selected a second look ultrasound biopsy or MRI biopsy would be performed. The left breast revealed diffuse thickening of the skin with subcutaneous edema consistent with post lumpectomy and radiation therapy changes versus infiltrating malignancy with vascular or lymphatic obstruction. Skin punch biopsy and PET/CT were recommended. Also linear clumped enhancement was seen for which MRI guided biopsy was recommended. There were also multiple abnormal appearing lymph nodes in the right axilla suggestive of metastatic disease. An ultrasound-guided biopsy could be performed. The patient now presents for evaluation for further treatment.\par \par The patient overall feels well and denies symptoms relating to her abnormal breast imaging tests and confirmed malignancy. She has undergone BRCA testing and results are pending. Her family history is positive for a sister with breast cancer at age 53, a maternal first cousin with breast cancer and a maternal second cousin with breast cancer. There is no family history of ovarian cancer. 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 Patient c/o increase fatigue and drowsiness. Admits that she has persistent pain to abdomen. She takes Dilaudid 4 mg twice daily and Tramadol for break through pain. Denies any vomiting, but her appetite is decreased with + weight loss. BM's are okay, but little occasionally hard.

## 2022-07-12 NOTE — HISTORY OF PRESENT ILLNESS
[de-identified] : 66-year-old woman with history of breast carcinoma. Her history dates back to September 2003 when she was found to have a mass in the left breast. The biopsy revealed infiltrating lobular carcinoma measuring 3.5 cm. The patient underwent lumpectomy and 3 sentinel lymph nodes and 2 non-sentinel lymph nodes were negative for metastases. ER was +90% SC weakly positive and HER-2/marvin was negative. The patient underwent chemotherapy with 4 cycles of Adriamycin Cytoxan followed by radiation therapy. She was only able to tolerate 6 months of tamoxifen and then stopped her hormonal therapy.\par \par She subsequently has been well until about one month ago when she noted a mass in the right breast. A mammography on December 7, 2016 revealed a mass in the right breast with architectural distortion and multiple microcalcifications. The left breast revealed no evidence of malignancy. Sonogram confirmed a 3.5 x 3.8 cm mass which was palpable and the left breast was negative. \par \par On December 14 of patient underwent ultrasound-guided biopsy of the mass in the right breast which revealed invasive moderately differentiated ductal carcinoma [place] score of 7/9 the ER and SC were positive greater than 95%, HER-2/marvin 2+ and CISH  test was negative with a ratio of one.\par \par On January 11, 2017 the patient underwent MRI of the breast which revealed an irregular 6.7 x 6.2 cm mass in the right breast in the area of the biopsy proven malignancy. They were irregular and enhancing lesions throughout the right breast measuring up to 2 cm along with diffuse non-mass enhancement throughout the right breast. These are suspicious for multicentric disease. If breast conservation therapy is selected a second look ultrasound biopsy or MRI biopsy would be performed. The left breast revealed diffuse thickening of the skin with subcutaneous edema consistent with post lumpectomy and radiation therapy changes versus infiltrating malignancy with vascular or lymphatic obstruction. Skin punch biopsy and PET/CT were recommended. Also linear clumped enhancement was seen for which MRI guided biopsy was recommended. There were also multiple abnormal appearing lymph nodes in the right axilla suggestive of metastatic disease. An ultrasound-guided biopsy could be performed. The patient now presents for evaluation for further treatment.\par \par The patient overall feels well and denies symptoms relating to her abnormal breast imaging tests and confirmed malignancy. She has undergone BRCA testing and results are pending. Her family history is positive for a sister with breast cancer at age 53, a maternal first cousin with breast cancer and a maternal second cousin with breast cancer. There is no family history of ovarian cancer. 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  She currently is undergoing rehab and physical therapy and states she is improving and becoming more functional.  She will return to discuss further management while in rehab center.

## 2022-07-12 NOTE — REASON FOR VISIT
[Other Location: e.g. School (Enter Location, City,State)___] : at [unfilled], at the time of the visit. [Other Location: e.g. Home (Enter Location, City,State)___] : at [unfilled] [Family Member] : family member

## 2022-07-12 NOTE — ASSESSMENT
[FreeTextEntry1] : The patient will remain in rehab center and continue physical therapy twice a day, walking with a walker, going up the stairs.  She is still not able to go to the bathroom or dress herself at this time.  She has required blood transfusion as hemoglobin was 7.6.  She does note slight fatigue.  She continues to have her CBC monitored while in rehab.  We discussed further treatment since the patient has been on hormonal therapy in the past including Ibrance and Faslodex along with aromatase inhibitors.  She recently has been treated with Xeloda, regular and now Gemzar.  She has been off Gemzar since fracturing her bones.  We discussed going back on CDK 4 6 inhibitors including ribociclib and abemaciclib.  Side effects were discussed in detail.  We will check with the medical staff in the rehab to see whether or not she could be monitored for medications and also undergo blood testing with CBCs and chemistries.  We also will attempt to have her do a repeat head CT in the next couple weeks to see if there is any progression systemically besides her bone metastases.  Once we have more information further recommendations will be made. [Palliative] : Goals of care discussed with patient: Palliative

## 2022-07-12 NOTE — REVIEW OF SYSTEMS
[Negative] : Allergic/Immunologic [FreeTextEntry7] : Patient had burning in the stomach while taking Gemzar chemo which has resolved since being off chemotherapy.  Patient has occasional constipation on MiraLAX and Colace. [FreeTextEntry9] : Patient has low back pain while in rehab center.

## 2022-08-15 NOTE — HISTORY OF PRESENT ILLNESS
[de-identified] : 66-year-old woman with history of breast carcinoma. Her history dates back to September 2003 when she was found to have a mass in the left breast. The biopsy revealed infiltrating lobular carcinoma measuring 3.5 cm. The patient underwent lumpectomy and 3 sentinel lymph nodes and 2 non-sentinel lymph nodes were negative for metastases. ER was +90% TX weakly positive and HER-2/marvin was negative. The patient underwent chemotherapy with 4 cycles of Adriamycin Cytoxan followed by radiation therapy. She was only able to tolerate 6 months of tamoxifen and then stopped her hormonal therapy.\par \par She subsequently has been well until about one month ago when she noted a mass in the right breast. A mammography on December 7, 2016 revealed a mass in the right breast with architectural distortion and multiple microcalcifications. The left breast revealed no evidence of malignancy. Sonogram confirmed a 3.5 x 3.8 cm mass which was palpable and the left breast was negative. \par \par On December 14 of patient underwent ultrasound-guided biopsy of the mass in the right breast which revealed invasive moderately differentiated ductal carcinoma [place] score of 7/9 the ER and TX were positive greater than 95%, HER-2/marvin 2+ and CISH  test was negative with a ratio of one.\par \par On January 11, 2017 the patient underwent MRI of the breast which revealed an irregular 6.7 x 6.2 cm mass in the right breast in the area of the biopsy proven malignancy. They were irregular and enhancing lesions throughout the right breast measuring up to 2 cm along with diffuse non-mass enhancement throughout the right breast. These are suspicious for multicentric disease. If breast conservation therapy is selected a second look ultrasound biopsy or MRI biopsy would be performed. The left breast revealed diffuse thickening of the skin with subcutaneous edema consistent with post lumpectomy and radiation therapy changes versus infiltrating malignancy with vascular or lymphatic obstruction. Skin punch biopsy and PET/CT were recommended. Also linear clumped enhancement was seen for which MRI guided biopsy was recommended. There were also multiple abnormal appearing lymph nodes in the right axilla suggestive of metastatic disease. An ultrasound-guided biopsy could be performed. The patient now presents for evaluation for further treatment.\par \par The patient overall feels well and denies symptoms relating to her abnormal breast imaging tests and confirmed malignancy. She has undergone BRCA testing and results are pending. Her family history is positive for a sister with breast cancer at age 53, a maternal first cousin with breast cancer and a maternal second cousin with breast cancer. There is no family history of ovarian cancer. 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  Patient continues physical therapy in the rehab center.

## 2022-08-15 NOTE — REASON FOR VISIT
[Other Location: e.g. School (Enter Location, City,State)___] : at [unfilled], at the time of the visit. [Other Location: e.g. Home (Enter Location, City,State)___] : at [unfilled] [Follow-Up Visit] : a follow-up

## 2022-08-15 NOTE — ASSESSMENT
[FreeTextEntry1] : The patient will continue on Verzenio and be monitored for side effects and toxicity particularly fatigue, hair loss, diarrhea, low blood counts and other side effects.  She will continue to be monitored for response in her metastases including bone status post hip fracture and liver metastases.  The patient continues in the rehab center to increase her walking and strength post repair of a left hip fracture related to her metastatic bone disease.  The patient will return in 1 month or sooner as needed and undergo repeat blood testing to monitor the Verzenio. [Palliative] : Goals of care discussed with patient: Palliative [Palliative Care Plan] : not applicable at this time

## 2022-09-06 PROBLEM — M89.9 BONE LESION: Status: ACTIVE | Noted: 2018-05-03

## 2022-09-07 PROBLEM — C79.51 BONE METASTASIS: Status: ACTIVE | Noted: 2019-12-30

## 2022-09-07 PROBLEM — C78.7 LIVER METASTASES: Status: ACTIVE | Noted: 2019-09-18

## 2022-09-07 NOTE — REVIEW OF SYSTEMS
[Negative] : Allergic/Immunologic [Fatigue] : fatigue [Anxiety] : anxiety [FreeTextEntry7] : Patient has had intermittent nausea requiring antiemetic therapy including Compazine. [FreeTextEntry9] : Mid back pain radiating to the left lower ribs. [de-identified] : Patient to continue physical therapy at home for leg weakness requiring use of a walker.

## 2022-09-07 NOTE — ASSESSMENT
[FreeTextEntry1] : The patient will continue her therapy with Verzenio and the monitored for side effects including nausea and diarrhea.  She currently is on 50 mg twice a day because of cele counts and diarrhea symptoms.  The patient will undergo follow-up CAT scans and bone scan to assess for response or progression of her disease.  We also discussed the possibility of going back on IV chemotherapy depending on results of further testing.  She will return to the office in 1 month or sooner as needed.  We also recommended she schedule follow-up with palliative care for management of her bone pain and narcotic use.  We also spoke about medical marijuana use as supportive care. [Palliative] : Goals of care discussed with patient: Palliative [Palliative Care Plan] : not applicable at this time

## 2022-09-07 NOTE — HISTORY OF PRESENT ILLNESS
[de-identified] : 66-year-old woman with history of breast carcinoma. Her history dates back to September 2003 when she was found to have a mass in the left breast. The biopsy revealed infiltrating lobular carcinoma measuring 3.5 cm. The patient underwent lumpectomy and 3 sentinel lymph nodes and 2 non-sentinel lymph nodes were negative for metastases. ER was +90% ND weakly positive and HER-2/marvin was negative. The patient underwent chemotherapy with 4 cycles of Adriamycin Cytoxan followed by radiation therapy. She was only able to tolerate 6 months of tamoxifen and then stopped her hormonal therapy.\par \par She subsequently has been well until about one month ago when she noted a mass in the right breast. A mammography on December 7, 2016 revealed a mass in the right breast with architectural distortion and multiple microcalcifications. The left breast revealed no evidence of malignancy. Sonogram confirmed a 3.5 x 3.8 cm mass which was palpable and the left breast was negative. \par \par On December 14 of patient underwent ultrasound-guided biopsy of the mass in the right breast which revealed invasive moderately differentiated ductal carcinoma [place] score of 7/9 the ER and ND were positive greater than 95%, HER-2/marvin 2+ and CISH  test was negative with a ratio of one.\par \par On January 11, 2017 the patient underwent MRI of the breast which revealed an irregular 6.7 x 6.2 cm mass in the right breast in the area of the biopsy proven malignancy. They were irregular and enhancing lesions throughout the right breast measuring up to 2 cm along with diffuse non-mass enhancement throughout the right breast. These are suspicious for multicentric disease. If breast conservation therapy is selected a second look ultrasound biopsy or MRI biopsy would be performed. The left breast revealed diffuse thickening of the skin with subcutaneous edema consistent with post lumpectomy and radiation therapy changes versus infiltrating malignancy with vascular or lymphatic obstruction. Skin punch biopsy and PET/CT were recommended. Also linear clumped enhancement was seen for which MRI guided biopsy was recommended. There were also multiple abnormal appearing lymph nodes in the right axilla suggestive of metastatic disease. An ultrasound-guided biopsy could be performed. The patient now presents for evaluation for further treatment.\par \par The patient overall feels well and denies symptoms relating to her abnormal breast imaging tests and confirmed malignancy. She has undergone BRCA testing and results are pending. Her family history is positive for a sister with breast cancer at age 53, a maternal first cousin with breast cancer and a maternal second cousin with breast cancer. There is no family history of ovarian cancer. 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  While she was recovering in the rehab she was started on Verzenio and at attempt to control her extensive bone metastases and liver metastases.  Overall she is currently stable but does complain of some mid back radiating pain to the left side in the area of the rib cage.  The pain is poorly controlled with hydromorphone.

## 2022-09-21 PROBLEM — G62.9 PERIPHERAL NEUROPATHY: Status: ACTIVE | Noted: 2022-01-01

## 2022-09-27 PROBLEM — R19.7 DIARRHEA: Status: ACTIVE | Noted: 2022-01-01

## 2022-09-27 NOTE — PHYSICAL EXAM
[Oriented To Time, Place, And Person] : oriented to person, place, and time [Affect] : the affect was normal [General Appearance - Alert] : alert [General Appearance - In No Acute Distress] : in no acute distress

## 2022-09-27 NOTE — HISTORY OF PRESENT ILLNESS
[FreeTextEntry1] : 72yoF with metastatic breast cancer presents for initial palliative care visit, referred by Dr. Dunaway. \par PMH significant for anxiety, Gout, and asthma. \par \par Pt initially diagnosed in 2003 with breast cancer, ER+NC+ and HER-2/marvin -. Pt underwent chemotherapy with 4 cycles of Adriamycin Cytoxan followed by RT. She was only able to tolerate 6 months of tamoxifen and then stopped her hormonal therapy. Was noted to have a R breast mass 12/2016, bx revealed invasive moderately differentiated ductal carcinoma, ER and NC were positive greater than 95%, HER-2/marvin+.\par \par She is status post fracture of the hip in June requiring surgical repair. While she was recovering in the rehab she was started on Verzenio and at attempt to control her extensive bone metastases and liver metastases. Overall she is currently stable but does complain of some mid back radiating pain to the left side in the area of the rib cage. \par \par Main reason for which patient is referred is pain and symptom management.\par Her pain limits her mobility.  She reports hearing a crackling sound in her back when she changes positions, sitting to standing. Feels unsteady on her legs. \par She is using hydromorphone 4mg PRN, typically twice a day. She does not find it to be beneficial for her. Was previously on tramadol and oxycodone without benefit for her pain. Tylenol has not helped her pain. Is interested in using medicinal cannabis. \par \par ROS:\par +neuropathy of feet and hands manifested as numbness - drops things often. Is using Gabapentin 300mg BID (recently was increased from [facility] but does not feel that it made a difference)\par +anxiety - was using alprazolam while at [facility], has not had to take it since being out of [facility]. \par +depression - uses citalopram 10mg QD. States that she feels depressed about whether she'll be able to live independently again, having to be on so many medications, about how her illness will affect her family.  \par +fatigue - would take naps "if I were allowed to"\par +diarrhea caused by the Verzenio - uses imodium PRN\par +low appetite \par +nausea, no vomiting \par Denies trouble sleeping, \par \par Patient is single, lives alone. She is currently staying in her friend Divina's house as she has a handicap accessible area of her home.  She has 2 sisters who live nearby and are supportive. Was working part time as a  prior to her fall. She has an HHA 4 hours/day, 5 days/work, through home care. Is requiring assistance with bathing. She uses a rolling walker for assistance with ambulation. She has a wheelchair that she uses when she goes out.\par \par I-STOP Ref#:  [id number]

## 2022-09-27 NOTE — REASON FOR VISIT
[Initial Evaluation] : an initial evaluation [Home] : at home, [unfilled] , at the time of the visit. [Medical Office: ([facility])___] : at the medical office located in  [Patient] : the patient [Other:____] : [unfilled] [FreeTextEntry2] : Tommy Peter

## 2022-09-27 NOTE — ASSESSMENT
[______] : HCP: [unfilled] [FreeTextEntry1] : 72yoF with:\par \par # Metastatic breast cancer - On Verzenio; Med Onc follow up. \par \par # Pain 2/2 Neoplasm - \par Provided cannabis education, overview of state program, and discussed adverse effects in detail. Counseled that vaporized cannabis is not the preferred route of administration due to the fact that both short-term and long-term risks associated with vaporizing oils are not yet fully understood. Recommend starting with 1:1 THC:CBD formulation at low dose of THC (~2mg/dose).  Patient will leave copy of her [place] license so that certification may be completed.\par \par # Peripheral neuropathy - Decrease gabapentin back down to just QHS as BID has caused daytime sleepiness without an appreciable benefit. \par \par # Drug-induced diarrhea - using Imodium PRN\par \par # Depression - On Citalopram.\par \par #Encounter for palliative care- Emotional support provided \par Explained the role of palliative care in enhancing quality of life in the setting of serious illness.\par \par Follow up in 2 weeks, call sooner with questions or issues.

## 2022-09-27 NOTE — DATA REVIEWED
[FreeTextEntry1] : CT C/A/P  (07/22/2022):\par COMPARISON: CT 4/21/2022.\par \par FINDINGS:\par CHEST:\par LUNGS AND LARGE AIRWAYS: Patent central airways. Linear atelectasis versus scarring in the right upper lobe. Peripheral linear opacities in the right upper and middle lobe again noted, question post radiation change. Mild right basilar atelectasis.\par PLEURA: Trace new right pleural effusion.\par VESSELS: Within normal limits.\par HEART: Heart size is normal. No pericardial effusion.\par MEDIASTINUM AND ZOE: No lymphadenopathy.\par CHEST WALL AND LOWER NECK: Right chest wall port with catheter tip in the SVC/right atrium. A 2 cm low-density left thyroid nodule is unchanged. Marked bilateral breast skin thickening and edema, unchanged with unchanged cystic foci in the left breast, likely seromas.\par \par ABDOMEN AND PELVIS:\par LIVER: Numerous new and enlarging bilobar metastases. Representative lesions as follows:\par \par Segment 8 (3, 103) 4.0 x 3.8 cm, previously 2.9 x 2.8 cm.\par Segment 4A (3, 102), 2.8 x 2.4 cm, new.\par Segment 6 (3, 123) 2.2 x 2.2 cm, previously 0.8 cm.\par \par BILE DUCTS: Normal caliber.\par GALLBLADDER: Cholelithiasis.\par SPLEEN: Within normal limits.\par PANCREAS: Within normal limits.\par ADRENALS: Within normal limits.\par KIDNEYS/URETERS: Within normal limits.\par \par BLADDER: Within normal limits.\par REPRODUCTIVE ORGANS: Uterus and adnexa within normal limits.\par \par BOWEL: Moderate size hiatal hernia. No bowel obstruction.\par PERITONEUM: No ascites.\par VESSELS: Scattered atherosclerotic calcifications.\par RETROPERITONEUM/LYMPH NODES: No lymphadenopathy.\par ABDOMINAL WALL: Few subcutaneous edema. \par BONES: Diffuse lytic and sclerotic bony metastases with epidural extension in the spine again noted. Patient is status post left hip fixation.\par \par IMPRESSION:\par New and enlarging hepatic metastatic disease.\par Diffuse bone metastases again noted, with epidural extension in the spine again noted, better visualized on prior MRI.  Trace right pleural effusion.

## 2022-10-07 PROBLEM — K59.03 CONSTIPATION DUE TO OPIOID THERAPY: Status: ACTIVE | Noted: 2022-01-01

## 2022-10-07 PROBLEM — Z71.89 ADVANCED CARE PLANNING/COUNSELING DISCUSSION: Status: ACTIVE | Noted: 2022-01-01

## 2022-10-07 PROBLEM — C50.919 BREAST CARCINOMA: Status: ACTIVE | Noted: 2019-10-07

## 2022-10-07 PROBLEM — F32.A DEPRESSION: Status: ACTIVE | Noted: 2022-01-01

## 2022-10-07 PROBLEM — Z51.5 ENCOUNTER FOR PALLIATIVE CARE: Status: ACTIVE | Noted: 2022-01-01

## 2022-10-07 PROBLEM — G89.3 PAIN, NEOPLASM-RELATED: Status: ACTIVE | Noted: 2022-01-01

## 2022-10-07 NOTE — REASON FOR VISIT
[Follow-Up] : a follow-up visit [Home] : at home, [unfilled] , at the time of the visit. [Medical Office: ([facility])___] : at the medical office located in  [Other:____] : [unfilled] [Patient] : the patient [FreeTextEntry2] : Tommy Peter

## 2022-10-07 NOTE — HISTORY OF PRESENT ILLNESS
[FreeTextEntry1] : 72yoF with metastatic breast cancer presents for follow up palliative care visit, referred by Dr. Dunaway. \par PMH significant for anxiety, Gout, and asthma. \par \par Pt initially diagnosed in 2003 with breast cancer, ER+CT+ and HER-2/marvin -. Pt underwent chemotherapy with 4 cycles of Adriamycin Cytoxan followed by RT. She was only able to tolerate 6 months of tamoxifen and then stopped her hormonal therapy. Was noted to have a R breast mass 12/2016, bx revealed invasive moderately differentiated ductal carcinoma, ER and CT were positive greater than 95%, HER-2/marvin+.\par \par She is status post fracture of the hip in June requiring surgical repair. While she was recovering in the rehab she was started on Verzenio and at attempt to control her extensive bone metastases and liver metastases. Overall she is currently stable but does complain of some mid back radiating pain to the left side in the area of the rib cage. \par \par Main reason for which patient is referred is pain and symptom management.\par Her pain limits her mobility.  She reports hearing a crackling sound in her back when she changes positions, sitting to standing. Feels unsteady on her legs. \par She is using hydromorphone 4mg PRN, typically twice a day. She does not find it to be beneficial for her. Was previously on tramadol and oxycodone without benefit for her pain. Tylenol has not helped her pain. Is interested in using medicinal cannabis. \par \par Interval history (10/5/22):\par Patient seen for follow up via telemedicine. She continues on Verzenio.\par Yesterday, she called her oncologist to report difficulty taking a deep breath, which began one week ago. She was advised by oncology to go to the ER however she attributes these symptoms to increased anxiety.  She denies lightheadedness, dizziness and chest pain. She reports feeling better today, she slept well and was able to get herself ready for the day. She is experiencing new pain to her right axillary area which began a few days ago. She describes it feeling muscular in nature and is exacerbated with movement. She rates the pain 5/10. She continues to experience lower back pain which spans bilaterally across her lower back. She is using PRN hydromorphone 4mg, typically three times a day. She reports minimal benefit from this. Using PRN Tylenol 600mg once a day.  Appetite remains low. She typically consumes breakfast and dinner, has an Ensure for lunch. \par Intermittent queasiness which is self resolving. Constipated, last bowel movement three days ago. Sleeping well overnight and takes one nap a day.  She struggles with anxiety which is reportedly worse in the morning.  She is looking into support groups that could provide an outlet. \par \par Self discontinued her gabapentin. \par \par ROS:\par +neuropathy of feet and hands manifested as numbness - drops things often. No longer using gabapentin, does not feel that it made a difference)\par +anxiety - was using alprazolam while at [facility], has not had to take it since being out of [facility]. \par +depression - uses citalopram 10mg QD. States that she feels depressed about whether she'll be able to live independently again, having to be on so many medications, about how her illness will affect her family.  \par +fatigue - takes daily naps.\par +constipation - finds ice cream helps to accelerate bowel movements. \par +low appetite \par +nausea, no vomiting \par Denies trouble sleeping\par \par Patient is single, lives alone. She is currently staying in her friend Divina's house as she has a handicap accessible area of her home.  She has 2 sisters who live nearby and are supportive. Was working part time as a  prior to her fall. She has an HHA 4 hours/day, 5 days/work, through home care. Is requiring assistance with bathing. She uses a rolling walker for assistance with ambulation. She has a wheelchair that she uses when she goes out.\par \par I-STOP Ref#:  [id number]

## 2022-10-07 NOTE — ASSESSMENT
[______] : HCP: [unfilled] [Completed Medical Orders for Life-Sustaining Treatment] : completed medical orders for life-sustaining treatment [DNR] : Code Status: DNR [DNI] : Intubation: DNI [FreeTextEntry1] : 72yoF with:\par \par # Metastatic breast cancer - On Verzenio; Med Onc follow up. \par \par # Pain 2/2 Neoplasm - Begin MS ER 15mg QHS; C/w PRN hydromorphone. Patient to continue to log usage of hydromorphone to adequately adjust long acting pain medication. \par -Previously certified for medical cannabis, has not purchased products as of yet. Provided cannabis education, overview of state program, and discussed adverse effects in detail. Counseled that vaporized cannabis is not the preferred route of administration due to the fact that both short-term and long-term risks associated with vaporizing oils are not yet fully understood. Recommend starting with 1:1 THC:CBD formulation at low dose of THC (~2mg/dose). \par \par # Peripheral neuropathy - Discontinued gabapentin because she did now appreciate any benefit. Symptoms stable.\par \par # Opioid induced constipation - Instructed to begin senna 2 tablets QHS, PRN miralax. Educated patient on the importance of a regular bowel regimen while taking opioid pain medication. \par \par # Depression/Anxiety - On Citalopram. Previously referred to Dr. Rojas. Referral sent for patient to establish care with Psycho-oncology. Resources given for support groups including [email or web address] and virtual Breast Cancer Support group at [facility].\par \par # Advanced care planning - HCP on file. Completed MOLST form today.\par \par #Encounter for palliative care- Emotional support provided \par Explained the role of palliative care in enhancing quality of life in the setting of serious illness.\par \par Follow up in 2 weeks, call sooner with questions or issues.

## 2022-10-10 NOTE — ED PROVIDER NOTE - PROGRESS NOTE DETAILS
Endorsed to Dr DAVID Chau MD, Facep PAUL Hunt PGY2 received sign out on this pt. sister endorsing significant pain with acute humerus fx and numerous metastatic lesions with C6 compression fx. will give additional 0.5 of morphine.

## 2022-10-10 NOTE — ED PROVIDER NOTE - OBJECTIVE STATEMENT
72y f pmh metastatic breast ca, Lumpectomy x2, Rt, chemo, Ex smoker, Asthma, DVT/eliquis, HTN, Osteoarthritis recent L fem fx s/p repair p/w fall. pt reports was at home L leg gave out fell onto bathoom floor hit R side of body. no loc no head strike. unable to get up down for 1 hour. reports pain to R arm LLE and chest. no prodrome before fall. no ha changes in vision loss of sensation n/v/d. no cp sob abd pain. unable to ambulate. recalls entire event.

## 2022-10-10 NOTE — ED PROVIDER NOTE - ATTENDING CONTRIBUTION TO CARE
Private Physician [facility]/pcp  72y f pmh metastatic breast ca, Lumpectomy x2, Rt, chemo, Ex smoker, Asthma, DVT/eliquis, HTN, Osteoarthritis. Pt lives alone comes to ed c/o fall at home. Pt was brushing teeth holding onto sink at approx noon. Left leg gave out and pt fell to floor w pain rt arm, Was unable to get up.

## 2022-10-10 NOTE — ED ADULT TRIAGE NOTE - BP NONINVASIVE DIASTOLIC (MM HG)
Impression: Regular astigmatism, bilateral: H52.223. Plan: Patient ed on findings. Patient would not like new glasses prescription today. Ed that residual astigmatism likely cause of shadowing of letters in night. 85

## 2022-10-10 NOTE — ED ADULT NURSE NOTE - NSIMPLEMENTINTERV_GEN_ALL_ED
Implemented All Fall Risk Interventions:  [place] to call system. Call bell, personal items and telephone within reach. Instruct patient to call for assistance. Room bathroom lighting operational. Non-slip footwear when patient is off stretcher. Physically safe environment: no spills, clutter or unnecessary equipment. Stretcher in lowest position, wheels locked, appropriate side rails in place. Provide visual cue, wrist band, yellow gown, etc. Monitor gait and stability. Monitor for mental status changes and reorient to person, place, and time. Review medications for side effects contributing to fall risk. Reinforce activity limits and safety measures with patient and family.

## 2022-10-10 NOTE — ED PROVIDER NOTE - CLINICAL SUMMARY MEDICAL DECISION MAKING FREE TEXT BOX
71 yo F above pmhx recent humeral fx p/w fall. no head strike no loc. VSS. R arm obvious deformity at humerus. soft comparments TTP chest L humerus. neurovascularly intact. c/f fx vs dislocation vs contusion of LUE RLE ribs. c/f ich vs contusion. plan analgesia pre op labs xrs CT and likely admission.

## 2022-10-10 NOTE — ED ADULT NURSE NOTE - OBJECTIVE STATEMENT
72 year old female presented to ED via [facility] from home with daughter at bedside with c/o of unwitnessed fall today while patient was brushing teeth in bathroom. pt reports weakness in bilateral legs, falling to floor, no LOC, no head injury, pt reports falling on right arm, reports right arm pain, no deformity at this time. hx metastatic breast ca on oral chemotherapy. pt denies CP, SOB, nausea/vomiting, numbness/tingling, fever, cough, chills, dizziness, headache, blurred vision, neuro intact. pt a&ox3, lung sounds clear, heart rate regular, abdomen soft nontender nondistended to palp. skin intact. IV in left AC 20G and patent. Will continue to monitor and assess while offering support and reassurance.

## 2022-10-10 NOTE — ED PROVIDER NOTE - PHYSICAL EXAMINATION
Gen: NAD, non-toxic appearing  Head: normal appearing  HEENT: normal conjunctiva, oral mucosa moist  Lung: no respiratory distress, speaking in full sentences, CTA b/l     CV: regular rate and rhythm, no murmurs  Abd: soft, non distended, non tender   MSK: R arm obvious deformity at humerus. soft comparments TTP chest L humerus. neurovascularly intact.   Neuro: No focal deficits, AAOx3  Skin: Warm  Psych: normal affect

## 2022-10-10 NOTE — ED PROVIDER NOTE - NSICDXPASTMEDICALHX_GEN_ALL_CORE_FT
PAST MEDICAL HISTORY:  Asthma on Advair    Breast cancer LEFT (2003) _ - s/p lumpectomy, chemo & radiation- Stage 2    Breast cancer, right invasive ductal ca- treated with TAXol from 3/2017-9/2017    COVID-19 vaccine series completed     DVT, lower extremity     Hearing loss of left ear     HTN (hypertension)     Obesity (BMI 30-39.9)     Osteoarthritis     Portal vein thrombosis May 2017- On Enoxaparin    Sepsis, due to unspecified organism June 2017

## 2022-10-11 NOTE — CONSULT NOTE ADULT - SUBJECTIVE AND OBJECTIVE BOX
HPI:  72y f pmh metastatic breast ca, Lumpectomy x2, RT, chemo, Ex smoker, Asthma, DVT ( on Eliquis), HTN, Osteoarthritis recent L fem fx s/p repair p/w fall. Pt reports she was at home, her L leg gave out fell, she fell on bathroom floor hit R side of body.  Unable to get up x1hr.   Now reporting pain to R arm LLE and chest. Unable to ambulate.  Denies head trauma, LOC. no prodrome before fall.   Denies HA, CP, SOB, N/V,  vision change, loss of sensation, abd pain.    (11 Oct 2022 01:44)    PERTINENT PM/SXH:   Breast cancer    HTN (hypertension)    Hearing loss of left ear    Asthma    HTN (hypertension)    Portal vein thrombosis    Breast cancer, right    Obesity (BMI 30-39.9)    Sepsis, due to unspecified organism    Osteoarthritis    DVT, lower extremity    COVID-19 vaccine series completed      S/P breast lumpectomy    S/P tonsillectomy    Portacath in place      FAMILY HISTORY:  Family history of breast cancer (Sibling)      Family Hx substance abuse [ ]yes [ ]no  ITEMS NOT CHECKED ARE NOT PRESENT    SOCIAL HISTORY:   Significant other/partner[ ]  Children[ ]  Congregational/Spirituality:  Substance hx:  [ ]   Tobacco hx:  [ ]   Alcohol hx: [ ]   Home Opioid hx:  [ ] I-Stop Reference No: #: [id number]  Patient Name: Tommy Ponce Date: 1950  Address: [address] [place] [id number]: Female  Rx Written	Rx Dispensed	Drug	Quantity	Days Supply	Prescriber Name	Prescriber Santa #	Payment Method	Dispenser  10/04/2022	10/07/2022	luxlyte harmony gel capsule 2.5mg thc and 2.5mg cbd/capsule	2	10	Shani Espinosa)	[id number]	[facility]  09/28/2022	10/05/2022	hydromorphone 2 mg tablet	120	20	Shani Espinosa)	[id number]	Medicare	[facility]  09/08/2022	09/13/2022	hydromorphone 2 mg tablet	90	30	Rosemarie Prescott	[id number]	Medicare	[facility]  Living Situation: [ ]Home  [ ]Long term care  [ ]Rehab [ ]Other    ADVANCE DIRECTIVES:    DNR/MOLST  [ ]  Living Will  [ ]   DECISION MAKER(s):  [ ] Health Care Proxy(s)  [ ] Surrogate(s)  [ ] Guardian           Name(s): Phone Number(s):    BASELINE (I)ADL(s) (prior to admission):  [place]: [ ]Total  [ ] Moderate [ ]Dependent    Allergies    Pen-Vee K (Rash)  penicillin (Unknown)    Intolerances    MEDICATIONS  (STANDING):  amLODIPine   Tablet 5 milliGRAM(s) Oral daily  apixaban 5 milliGRAM(s) Oral every 12 hours  citalopram 10 milliGRAM(s) Oral daily  gabapentin 300 milliGRAM(s) Oral daily  hydrochlorothiazide 12.5 milliGRAM(s) Oral daily  losartan 50 milliGRAM(s) Oral daily  metoprolol tartrate 50 milliGRAM(s) Oral two times a day  senna 2 Tablet(s) Oral at bedtime  sodium chloride 0.9%. 1000 milliLiter(s) (100 mL/Hr) IV Continuous <Continuous>  traMADol 50 milliGRAM(s) Oral every 8 hours    MEDICATIONS  (PRN):  acetaminophen     Tablet .. 650 milliGRAM(s) Oral every 6 hours PRN Temp greater or equal to 38C (100.4F), Mild Pain (1 - 3)  aluminum hydroxide/magnesium hydroxide/simethicone Suspension 30 milliLiter(s) Oral every 4 hours PRN Dyspepsia  HYDROmorphone   Tablet 4 milliGRAM(s) Oral every 4 hours PRN Severe Pain (7 - 10)  HYDROmorphone   Tablet 2 milliGRAM(s) Oral every 4 hours PRN Moderate Pain (4 - 6)  melatonin 3 milliGRAM(s) Oral at bedtime PRN Insomnia  ondansetron Injectable 4 milliGRAM(s) IV Push every 8 hours PRN Nausea and/or Vomiting    PRESENT SYMPTOMS: [ ]Unable to self-report  [ ] CPOT [ ] PAINADs [ ] RDOS  Source if other than patient:  [ ]Family   [ ]Team     Pain: [ ]yes [ ]no  QOL impact -   Location -                    Aggravating factors -  Quality -  Radiation -  Timing-  Severity (0-10 scale):  Minimal acceptable level (0-10 scale):     CPOT:    https://www.[facility].org/getattachment/mhd02q99-0e1a-0b2x-2s6i-1196t3195c5b/Critical-Care-Pain-Observation-Tool-(CPOT)    PAIN AD Score:   http://geriatrictoolkit.[facility]/cog/painad.pdf (press ctrl +  left click to view)    Dyspnea:                           [ ]Mild [ ]Moderate [ ]Severe      RDOS:  0 to 2  minimal or no respiratory distress   3  mild distress  4 to 6 moderate distress  >7 severe distress  https://homecareinformation.net/handouts/hen/Respiratory_Distress_Observation_Scale.pdf (Ctrl +  left click to view)     Anxiety:                             [ ]Mild [ ]Moderate [ ]Severe  Fatigue:                             [ ]Mild [ ]Moderate [ ]Severe  Nausea:                             [ ]Mild [ ]Moderate [ ]Severe  Loss of appetite:              [ ]Mild [ ]Moderate [ ]Severe  Constipation:                    [ ]Mild [ ]Moderate [ ]Severe    PCSSQ[Palliative Care Spiritual Screening Question]   Severity (0-10):  Score of 4 or > indicate consideration of Chaplaincy referral.  Chaplaincy Referral: [ ] yes [ ] refused [ ] following [ ] Deferred     Caregiver [place]? : [ ] yes [ ] no [ ] Deferred [ ] Declined             Social work referral [ ] Patient & Family Centered Care Referral [ ]     Anticipatory Grief present?:  [ ] yes [ ] no  [ ] Deferred                  Social work referral [ ] Chaplaincy Referral[ ]      Other Symptoms:  [ ]All other review of systems negative     Palliative Performance Status Version 2:         %    http://[facility].org/files/news/palliative_performance_scale_ppsv2.pdf  PHYSICAL EXAM:  Vital Signs Last 24 Hrs  T(C): 36.9 (11 Oct 2022 16:13), Max: 37.1 (11 Oct 2022 13:44)  T(F): 98.5 (11 Oct 2022 16:13), Max: 98.8 (11 Oct 2022 13:44)  HR: 93 (11 Oct 2022 16:13) (80 - 93)  BP: 150/80 (11 Oct 2022 16:13) (134/75 - 170/83)  BP(mean): --  RR: 18 (11 Oct 2022 16:13) (16 - 19)  SpO2: 96% (11 Oct 2022 16:13) (93% - 98%)    Parameters below as of 11 Oct 2022 16:13  Patient On (Oxygen Delivery Method): room air     I&O's Summary    GENERAL: [ ]Cachexia    [ ]Alert  [ ]Oriented x   [ ]Lethargic  [ ]Unarousable  [ ]Verbal  [ ]Non-Verbal  Behavioral:   [ ] Anxiety  [ ] Delirium [ ] Agitation [ ] Other  HEENT:  [ ]Normal   [ ]Dry mouth   [ ]ET Tube/Trach  [ ]Oral lesions  PULMONARY:   [ ]Clear [ ]Tachypnea  [ ]Audible excessive secretions   [ ]Rhonchi        [ ]Right [ ]Left [ ]Bilateral  [ ]Crackles        [ ]Right [ ]Left [ ]Bilateral  [ ]Wheezing     [ ]Right [ ]Left [ ]Bilateral  [ ]Diminished breath sounds [ ]right [ ]left [ ]bilateral  CARDIOVASCULAR:    [ ]Regular [ ]Irregular [ ]Tachy  [ ]Campbell [ ]Murmur [ ]Other  GASTROINTESTINAL:  [ ]Soft  [ ]Distended   [ ]+BS  [ ]Non tender [ ]Tender  [ ]Other [ ]PEG [ ]OGT/ NGT  Last BM:  GENITOURINARY:  [ ]Normal [ ] Incontinent   [ ]Oliguria/Anuria   [ ]Whitehead  MUSCULOSKELETAL:   [ ]Normal   [ ]Weakness  [ ]Bed/Wheelchair bound [ ]Edema  NEUROLOGIC:   [ ]No focal deficits  [ ]Cognitive impairment  [ ]Dysphagia [ ]Dysarthria [ ]Paresis [ ]Other   SKIN:   [ ]Normal  [ ]Rash  [ ]Other  [ ]Pressure ulcer(s)       Present on admission [ ]y [ ]n    CRITICAL CARE:  [ ] Shock Present  [ ]Septic [ ]Cardiogenic [ ]Neurologic [ ]Hypovolemic  [ ]  Vasopressors [ ]  Inotropes   [ ]Respiratory failure present [ ]Mechanical ventilation [ ]Non-invasive ventilatory support [ ]High flow    [ ]Acute  [ ]Chronic [ ]Hypoxic  [ ]Hypercarbic [ ]Other  [ ]Other organ failure     LABS:                        9.4    3.73  )-----------( 212      ( 11 Oct 2022 07:06 )             29.7   10-11    133<L>  |  95<L>  |  20  ----------------------------<  65<L>  4.1   |  27  |  0.77    Ca    9.6      11 Oct 2022 07:06    TPro  6.8  /  Alb  3.5  /  TBili  1.2  /  DBili  x   /  AST  40  /  ALT  14  /  AlkPhos  232<H>  10-11  PT/INR - ( 11 Oct 2022 07:06 )   PT: 21.7 sec;   INR: 1.88 ratio         PTT - ( 10 Oct 2022 16:47 )  PTT:29.9 sec      RADIOLOGY & ADDITIONAL STUDIES:  < from: CT 3D Reconstruct w/ Workstation (10.11.22 @ 09:24) >    ACC: [id number] EXAM:  CT 3D RECONSTRUCT W ROB                        ACC: [id number] EXAM:  CT HUMERUS ONLY RT                          PROCEDURE DATE:  10/11/2022    IMPRESSION:    Acute displaced pathologic fracture through the mid diaphysis of the   right humerus.    Additional permeative metastatic disease is seen along the lateral cortex   of the more distal humeral diaphysis, along the posterior aspect humeral   head, and along the anterior cortex of the distal humeral trochlea.    Metastatic disease with superimposed fractures of the right 6 through   10th ribs are again noted.    --- End of Report ---            ELVER IBARRA MD; Attending Radiologist  This document has been electronically signed. Oct 11 2022 10:12AM    < end of copied text >    PROTEIN CALORIE MALNUTRITION PRESENT: [ ]mild [ ]moderate [ ]severe [ ]underweight [ ]morbid obesity  https://www.andeal.org/vault/2440/web/files/ONC/Table_Clinical%20Characteristics%20to%20Document%20Malnutrition-White%20JV%20et%20al%202012.pdf    Height (cm): 165.1 (10-10-22 @ 15:35), 162.5 (06-01-22 @ 15:00), 165.1 (05-06-22 @ 16:33)  Weight (kg): 64.4 (10-10-22 @ 15:35), 60.9991 (06-01-22 @ 15:00), 67.6 (05-06-22 @ 16:33)  BMI (kg/m2): 23.6 (10-10-22 @ 15:35), 23.1 (06-01-22 @ 15:00), 24.8 (05-06-22 @ 16:33)    [ ]PPSV2 < or = to 30% [ ]significant weight loss  [ ]poor nutritional intake  [ ]anasarca[ ]Artificial Nutrition      Other REFERRALS:  [ ]Hospice  [ ]Child Life  [ ]Social Work  [ ]Case management [ ]Holistic Therapy     Goals of Care Document:  HPI:  72y f pmh metastatic breast ca, Lumpectomy x2, RT, chemo, Ex smoker, Asthma, DVT ( on Eliquis), HTN, Osteoarthritis recent L fem fx s/p repair p/w fall. Pt reports she was at home, her L leg gave out fell, she fell on bathroom floor hit R side of body.  Unable to get up x1hr.  Now reporting pain to R arm LLE and chest. Unable to ambulate. Denies head trauma, LOC. no prodrome before fall.  Denies HA, CP, SOB, N/V,  vision change, loss of sensation, abd pain. (11 Oct 2022 01:44)    10/11. The patient was c/o pain over her right arm. She denied any other complaints. Other inputs as per HPI.     PERTINENT PM/SXH:   Breast cancer    HTN (hypertension)    Hearing loss of left ear    Asthma    HTN (hypertension)    Portal vein thrombosis    Breast cancer, right    Obesity (BMI 30-39.9)    Sepsis, due to unspecified organism    Osteoarthritis    DVT, lower extremity    COVID-19 vaccine series completed      S/P breast lumpectomy    S/P tonsillectomy    Portacath in place      FAMILY HISTORY:  Family history of breast cancer (Sibling)      Family Hx substance abuse [ ]yes [ ]no  ITEMS NOT CHECKED ARE NOT PRESENT    SOCIAL HISTORY:   Significant other/partner[ ]  Children[ ]  Buddhism/Spirituality:  Substance hx:  [ ]   Tobacco hx:  [ ]   Alcohol hx: [ ]   Home Opioid hx:  [ ] I-Stop Reference No: #: [id number]  Patient Name: Tommy Ponce Date: 1950  Address: [address] [place] [id number]: Female  Rx Written	Rx Dispensed	Drug	Quantity	Days Supply	Prescriber Name	Prescriber Santa #	Payment Method	Dispenser  10/04/2022	10/07/2022	luxlyte harmony gel capsule 2.5mg thc and 2.5mg cbd/capsule	2	10	Shani Espinosa)	[id number]	[facility]  09/28/2022	10/05/2022	hydromorphone 2 mg tablet	120	20	Shani Espinosa)	[id number]	Medicare	[facility]  09/08/2022	09/13/2022	hydromorphone 2 mg tablet	90	30	Rosemarie Prescott [id number]	Medicare	[facility]  Living Situation: [ ]Home  [ ]Long term care  [ ]Rehab [ ]Other    ADVANCE DIRECTIVES:    DNR/MOLST  [ ]  Living Will  [ ]   DECISION MAKER(s):  [ ] Health Care Proxy(s)  [ ] Surrogate(s)  [ ] Guardian           Name(s): Phone Number(s): Patient stated her health proxy is her nephew Keron Fink. Copy requested for chart.     BASELINE (I)ADL(s) (prior to admission):  [place]: [x ]Total  [ ] Moderate [ ]Dependent    Allergies    Pen-Vee K (Rash)  penicillin (Unknown)    Intolerances    MEDICATIONS  (STANDING):  amLODIPine   Tablet 5 milliGRAM(s) Oral daily  apixaban 5 milliGRAM(s) Oral every 12 hours  citalopram 10 milliGRAM(s) Oral daily  gabapentin 300 milliGRAM(s) Oral daily  hydrochlorothiazide 12.5 milliGRAM(s) Oral daily  losartan 50 milliGRAM(s) Oral daily  metoprolol tartrate 50 milliGRAM(s) Oral two times a day  senna 2 Tablet(s) Oral at bedtime  sodium chloride 0.9%. 1000 milliLiter(s) (100 mL/Hr) IV Continuous <Continuous>  traMADol 50 milliGRAM(s) Oral every 8 hours    MEDICATIONS  (PRN):  acetaminophen     Tablet .. 650 milliGRAM(s) Oral every 6 hours PRN Temp greater or equal to 38C (100.4F), Mild Pain (1 - 3)  aluminum hydroxide/magnesium hydroxide/simethicone Suspension 30 milliLiter(s) Oral every 4 hours PRN Dyspepsia  HYDROmorphone   Tablet 4 milliGRAM(s) Oral every 4 hours PRN Severe Pain (7 - 10)  HYDROmorphone   Tablet 2 milliGRAM(s) Oral every 4 hours PRN Moderate Pain (4 - 6)  melatonin 3 milliGRAM(s) Oral at bedtime PRN Insomnia  ondansetron Injectable 4 milliGRAM(s) IV Push every 8 hours PRN Nausea and/or Vomiting    PRESENT SYMPTOMS: [ ]Unable to self-report  [ ] CPOT [ ] PAINADs [ ] RDOS  Source if other than patient:  [ ]Family   [ ]Team     Pain: [x ]yes [ ]no  QOL impact - not able to rest or move her right arm  Location -                  right arm  Aggravating factors - movement   Quality - sharp   Radiation - none   Timing- with movement   Severity (0-10 scale): 8/10. Pain improves to a comfortable level with Dilaudid IV.   Minimal acceptable level (0-10 scale): < 6     CPOT:    https://www.[facility].org/getattachment/[email or web address]-[email or web address]-8774h2290b6a/Critical-Care-Pain-Observation-Tool-(CPOT)    PAIN AD Score:   http://geriatrictoolkit.[facility]/cog/painad.pdf (press ctrl +  left click to view)    Dyspnea:                           [ ]Mild [ ]Moderate [ ]Severe      RDOS:  0 to 2  minimal or no respiratory distress   3  mild distress  4 to 6 moderate distress  >7 severe distress  https://homecareinformation.net/handouts/hen/Respiratory_Distress_Observation_Scale.pdf (Ctrl +  left click to view)     Anxiety:                             [ ]Mild [ ]Moderate [ ]Severe  Fatigue:                             [ ]Mild [ ]Moderate [ ]Severe  Nausea:                             [ ]Mild [ ]Moderate [ ]Severe  Loss of appetite:              [ ]Mild [ ]Moderate [ ]Severe  Constipation:                    [ ]Mild [ ]Moderate [ ]Severe    PCSSQ[Palliative Care Spiritual Screening Question]   Severity (0-10):  Score of 4 or > indicate consideration of Chaplaincy referral.  Chaplaincy Referral: [ ] yes [ ] refused [ ] following [ x] Deferred     Caregiver [place]? : [ ] yes [ ] no [ x] Deferred [ ] Declined             Social work referral [ ] Patient & Family Centered Care Referral [ ]     Anticipatory Grief present?:  [ ] yes [ ] no  [x ] Deferred                  Social work referral [ ] Chaplaincy Referral[ ]      Other Symptoms:  [x ]All other review of systems negative but neuropathic pain over fingers and toes (this is a chronic issues for which she is taking Gabapentin)     Palliative Performance Status Version 2:  70       %    http://npcrc.org/files/news/palliative_performance_scale_ppsv2.pdf  PHYSICAL EXAM:  Vital Signs Last 24 Hrs  T(C): 36.9 (11 Oct 2022 16:13), Max: 37.1 (11 Oct 2022 13:44)  T(F): 98.5 (11 Oct 2022 16:13), Max: 98.8 (11 Oct 2022 13:44)  HR: 93 (11 Oct 2022 16:13) (80 - 93)  BP: 150/80 (11 Oct 2022 16:13) (134/75 - 170/83)  BP(mean): --  RR: 18 (11 Oct 2022 16:13) (16 - 19)  SpO2: 96% (11 Oct 2022 16:13) (93% - 98%)    Parameters below as of 11 Oct 2022 16:13  Patient On (Oxygen Delivery Method): room air     I&O's Summary    GENERAL: [ ]Cachexia    [x ]Alert  [ ]Oriented x   [ ]Lethargic  [ ]Unarousable  [ ]Verbal  [ ]Non-Verbal  Behavioral:   [ ] Anxiety  [ ] Delirium [ ] Agitation [ ] Other  HEENT:  [x ]Normal   [ ]Dry mouth   [ ]ET Tube/Trach  [ ]Oral lesions  PULMONARY:   [x ]Clear [ ]Tachypnea  [ ]Audible excessive secretions   [ ]Rhonchi        [ ]Right [ ]Left [ ]Bilateral  [ ]Crackles        [ ]Right [ ]Left [ ]Bilateral  [ ]Wheezing     [ ]Right [ ]Left [ ]Bilateral  [ ]Diminished breath sounds [ ]right [ ]left [ ]bilateral  CARDIOVASCULAR:    [x ]Regular [ ]Irregular [ ]Tachy  [ ]Campbell [ ]Murmur [ ]Other  GASTROINTESTINAL:  [x ]Soft  [ ]Distended   [ ]+BS  [ ]Non tender [ ]Tender  [ ]Other [ ]PEG [ ]OGT/ NGT  Last BM:  GENITOURINARY:  [x ]Normal [ ] Incontinent   [ ]Oliguria/Anuria   [ ]Whitehead  MUSCULOSKELETAL:   [ ]Normal   [ x]Weakness  [ ]Bed/Wheelchair bound [ ]Edema [x] right arm covered with a cast   NEUROLOGIC:   [ ]No focal deficits  [ ]Cognitive impairment  [ ]Dysphagia [ ]Dysarthria [ ]Paresis [ ]Other:   SKIN:   [x ]Normal  [ ]Rash  [ ]Other  [ ]Pressure ulcer(s)       Present on admission [ ]y [ ]n    CRITICAL CARE:  [ ] Shock Present  [ ]Septic [ ]Cardiogenic [ ]Neurologic [ ]Hypovolemic  [ ]  Vasopressors [ ]  Inotropes   [ ]Respiratory failure present [ ]Mechanical ventilation [ ]Non-invasive ventilatory support [ ]High flow    [ ]Acute  [ ]Chronic [ ]Hypoxic  [ ]Hypercarbic [ ]Other  [ ]Other organ failure     LABS:                        9.4    3.73  )-----------( 212      ( 11 Oct 2022 07:06 )             29.7   10-11    133<L>  |  95<L>  |  20  ----------------------------<  65<L>  4.1   |  27  |  0.77    Ca    9.6      11 Oct 2022 07:06    TPro  6.8  /  Alb  3.5  /  TBili  1.2  /  DBili  x   /  AST  40  /  ALT  14  /  AlkPhos  232<H>  10-11  PT/INR - ( 11 Oct 2022 07:06 )   PT: 21.7 sec;   INR: 1.88 ratio         PTT - ( 10 Oct 2022 16:47 )  PTT:29.9 sec      RADIOLOGY & ADDITIONAL STUDIES:  < from: CT 3D Reconstruct w/ Workstation (10.11.22 @ 09:24) >    ACC: [id number] [facility]  [facility]                        ACC: [id number] EXAM:  CT HUMERUS ONLY RT                          PROCEDURE DATE:  10/11/2022    IMPRESSION:    Acute displaced pathologic fracture through the mid diaphysis of the   right humerus.    Additional permeative metastatic disease is seen along the lateral cortex   of the more distal humeral diaphysis, along the posterior aspect humeral   head, and along the anterior cortex of the distal humeral trochlea.    Metastatic disease with superimposed fractures of the right 6 through   10th ribs are again noted.    --- End of Report ---            ELVER IBARRA MD; Attending Radiologist  This document has been electronically signed. Oct 11 2022 10:12AM    < end of copied text >    PROTEIN CALORIE MALNUTRITION PRESENT: [ ]mild [ ]moderate [ ]severe [ ]underweight [ ]morbid obesity  https://www.andeal.org/vault/2440/web/files/ONC/Table_Clinical%20Characteristics%20to%20Document%20Malnutrition-White%20JV%20et%20al%202012.pdf    Height (cm): 165.1 (10-10-22 @ 15:35), 162.5 (06-01-22 @ 15:00), 165.1 (05-06-22 @ 16:33)  Weight (kg): 64.4 (10-10-22 @ 15:35), 60.9991 (06-01-22 @ 15:00), 67.6 (05-06-22 @ 16:33)  BMI (kg/m2): 23.6 (10-10-22 @ 15:35), 23.1 (06-01-22 @ 15:00), 24.8 (05-06-22 @ 16:33)    [ ]PPSV2 < or = to 30% [ ]significant weight loss  [ ]poor nutritional intake  [ ]anasarca[ ]Artificial Nutrition      Other REFERRALS:  [ ]Hospice  [ ]Child Life  [ ]Social Work  [ ]Case management [ ]Holistic Therapy     Goals of Care Document:

## 2022-10-11 NOTE — OCCUPATIONAL THERAPY INITIAL EVALUATION ADULT - ADDITIONAL COMMENTS
CT HEAD 10/10: mild periventricular and moderate bifrontal and biparietal subcortical white matter ischemia. Hypodensity is seen within the bert and RIGHT midbrain which is nonspecific and may represent white matter ischemia, acute infarction or edema from underlying lesion. MRI with gadolinium is recommended.  CT cervical spine 10/10: No vertebral fracture is recognized.  Innumerable lytic lesions are seen throughout the cervical and visualized thoracic spine consistent with metastatic disease. Mild compression fracture of C6 is noted with loss of 50% of vertebral body height. Suspicion of RIGHT epidural neoplastic disease at this the 1-2 level and the RIGHT C7-T1 level. Recommend MRI with gadolinium for complete evaluation.

## 2022-10-11 NOTE — H&P ADULT - HISTORY OF PRESENT ILLNESS
72y f pmh metastatic breast ca, Lumpectomy x2, RT, chemo, Ex smoker, Asthma, DVT ( on Eliquis), HTN, Osteoarthritis recent L fem fx s/p repair p/w fall. Pt reports she was at home, her L leg gave out fell, she fell on bathroom floor hit R side of body.  Unable to get up x1hr.   Now reporting pain to R arm LLE and chest. Unable to ambulate.  Denies head trauma, LOC. no prodrome before fall.   Denies HA, CP, SOB, N/V,  vision change, loss of sensation, abd pain.

## 2022-10-11 NOTE — CONSULT NOTE ADULT - SUBJECTIVE AND OBJECTIVE BOX
Orthopedics    Patient is a 72yFemale who presents to [facility] ED w/ a c/o of R arm pain s/p fall today at home. Patient states known history of diffuse metastatic breast ca for which she sees outpatient. She has a bone scan in May which demonstrated diffuse met dz. Including R humerus and spine. Denies HH/LOC. Denies any numbness or tingling. Reports some mild back pain at baseline. Hx of pseudogout diagnosed at [facility]. No other orthopedic concerns at this time.    Breast cancer    HTN (hypertension)    Hearing loss of left ear    Asthma    HTN (hypertension)    Portal vein thrombosis    Breast cancer, right    Obesity (BMI 30-39.9)    Sepsis, due to unspecified organism    Osteoarthritis    DVT, lower extremity    COVID-19 vaccine series completed            Pen-Vee K (Rash)  penicillin (Unknown)        PHYSICAL EXAM:  T(C): 36.5 (10-10-22 @ 15:35), Max: 36.5 (10-10-22 @ 15:35)  HR: 82 (10-10-22 @ 20:08) (82 - 92)  BP: 170/71 (10-10-22 @ 20:08) (161/85 - 170/71)  RR: 16 (10-10-22 @ 20:08) (16 - 20)  SpO2: 94% (10-10-22 @ 20:08) (94% - 98%)    Gen: NAD, Resting comfortably  RIGHT Upper Extremity:   Skin intact, gross valgus deformity   TTP over the bony prominences of the humerus  Painless passive/active ROM of the shoulder/elbow/wrist/hand/fingers  C5-T1 SILT  Motor grossly intact throughout axillary/musculocutaneous/radial/median/ulnar/AIN/PIN nerves  + radial pulse  Compartments soft and compressible      Secondary Survey:   No TTP over bony prominences, SILT, palpable pulses, full/painless A/PROM, compartments soft. No TTP over spinous processes or paraspinal muscles at C/T/L spine. No palpable step off. No other injuries or complaints.    Procedure:  Closed reduction was performed and a well molded, well padded coaptation plaster splint was applied. The patient tolerated the procedure well and there we no complications. The patient's post-reduction neurovascular exam was unchanged. Post-reduction xrays demonstrated acceptable alignment.        A/P: 72F w/ known metastatic breast ca w/ R path humerus fx    Images reviewed  Poss repeat bone scan, will FU w/ Dr Boyd   Analgesia prn  NWB RUE coaptation splint  DVT ppx  PT/OT  Ice and elevate as tolerated  Will discuss w/ Dr Boyd and update plan accordingly   Orthopedics    Patient is a 72yFemale who presents to [facility] ED w/ a c/o of R arm pain s/p fall today at home. Patient states known history of diffuse metastatic breast ca for which she sees outpatient. She has a bone scan in May which demonstrated diffuse met dz. Including R humerus and spine. Denies HH/LOC. Denies any numbness or tingling. Reports some mild back pain at baseline. Hx of pseudogout diagnosed at [facility]. No other orthopedic concerns at this time.    Breast cancer    HTN (hypertension)    Hearing loss of left ear    Asthma    HTN (hypertension)    Portal vein thrombosis    Breast cancer, right    Obesity (BMI 30-39.9)    Sepsis, due to unspecified organism    Osteoarthritis    DVT, lower extremity    COVID-19 vaccine series completed            Pen-Vee K (Rash)  penicillin (Unknown)        PHYSICAL EXAM:  T(C): 36.5 (10-10-22 @ 15:35), Max: 36.5 (10-10-22 @ 15:35)  HR: 82 (10-10-22 @ 20:08) (82 - 92)  BP: 170/71 (10-10-22 @ 20:08) (161/85 - 170/71)  RR: 16 (10-10-22 @ 20:08) (16 - 20)  SpO2: 94% (10-10-22 @ 20:08) (94% - 98%)    Gen: NAD, Resting comfortably  RIGHT Upper Extremity:   Skin intact, gross valgus deformity   TTP over the bony prominences of the humerus  Painless passive/active ROM of the shoulder/elbow/wrist/hand/fingers  C5-T1 SILT  Motor grossly intact throughout axillary/musculocutaneous/radial/median/ulnar/AIN/PIN nerves  + radial pulse  Compartments soft and compressible      Secondary Survey:   No TTP over bony prominences, SILT, palpable pulses, full/painless A/PROM, compartments soft. No TTP over spinous processes or paraspinal muscles at C/T/L spine. No palpable step off. No other injuries or complaints.    Procedure:  Closed reduction was performed and a well molded, well padded coaptation plaster splint was applied. The patient tolerated the procedure well and there we no complications. The patient's post-reduction neurovascular exam was unchanged. Post-reduction xrays demonstrated acceptable alignment.        A/P: 72F w/ known metastatic breast ca w/ R path humerus fx    Analgesia prn  NWB RUE coaptation splint  Ice and elevate as tolerated  FU INR  DVT ppx (please hold for OR)  Plan for OR on 10/14 for ORIF  Patient consented  Please document medical clearance  Please obtain pre-op labs at 4AM the morning of surgery    Orthopaedic Surgery  [facility] [id number]  [facility]        [id number]  [facility]  [id number]/ [phone]     Orthopedics    Patient is a 72yFemale who presents to [facility] ED w/ a c/o of R arm pain s/p fall today at home. Patient states known history of diffuse metastatic breast ca for which she sees outpatient. She has a bone scan in May which demonstrated diffuse met dz. Including R humerus and spine. Denies HH/LOC. Denies any numbness or tingling. Reports some mild back pain at baseline. Hx of pseudogout diagnosed at [facility]. No other orthopedic concerns at this time. Hx of L femur IMN 6 weeks ago at [facility] unsure of surgeon.    Breast cancer    HTN (hypertension)    Hearing loss of left ear    Asthma    HTN (hypertension)    Portal vein thrombosis    Breast cancer, right    Obesity (BMI 30-39.9)    Sepsis, due to unspecified organism    Osteoarthritis    DVT, lower extremity    COVID-19 vaccine series completed            Pen-Vee K (Rash)  penicillin (Unknown)        PHYSICAL EXAM:  T(C): 36.5 (10-10-22 @ 15:35), Max: 36.5 (10-10-22 @ 15:35)  HR: 82 (10-10-22 @ 20:08) (82 - 92)  BP: 170/71 (10-10-22 @ 20:08) (161/85 - 170/71)  RR: 16 (10-10-22 @ 20:08) (16 - 20)  SpO2: 94% (10-10-22 @ 20:08) (94% - 98%)    Gen: NAD, Resting comfortably  RIGHT Upper Extremity:   Skin intact, gross valgus deformity   TTP over the bony prominences of the humerus  Painless passive/active ROM of the shoulder/elbow/wrist/hand/fingers  C5-T1 SILT  Motor grossly intact throughout axillary/musculocutaneous/radial/median/ulnar/AIN/PIN nerves  + radial pulse  Compartments soft and compressible      Secondary Survey:   No TTP over bony prominences, SILT, palpable pulses, full/painless A/PROM, compartments soft. No TTP over spinous processes or paraspinal muscles at C/T/L spine. No palpable step off. No other injuries or complaints.    Procedure:  Closed reduction was performed and a well molded, well padded coaptation plaster splint was applied. The patient tolerated the procedure well and there we no complications. The patient's post-reduction neurovascular exam was unchanged. Post-reduction xrays demonstrated acceptable alignment.        A/P: 72F w/ known metastatic breast ca w/ R path humerus fx    Analgesia prn  NWB RUE coaptation splint  Ice and elevate as tolerated  FU INR  DVT ppx (please hold for OR)  Plan for OR on 10/14 for ORIF  Patient consented  Please document medical clearance  Please obtain pre-op labs at 4AM the morning of surgery    [place]thopaedic Surgery  [facility] [id number]  J        [id number]  [facility]  [id number]/ [phone]     Orthopedics    Patient is a 72yFemale who presents to [facility] ED w/ a c/o of R arm pain s/p fall today at home. Patient states known history of diffuse metastatic breast ca for which she sees outpatient. She has a bone scan in May which demonstrated diffuse met dz. Including R humerus and spine. Denies HH/LOC. Denies any numbness or tingling. Reports some mild back pain at baseline. Hx of pseudogout diagnosed at [facility]. No other orthopedic concerns at this time. Hx of L femur IMN 6/2022 at [facility] unsure of surgeon.    Breast cancer    HTN (hypertension)    Hearing loss of left ear    Asthma    HTN (hypertension)    Portal vein thrombosis    Breast cancer, right    Obesity (BMI 30-39.9)    Sepsis, due to unspecified organism    Osteoarthritis    DVT, lower extremity    COVID-19 vaccine series completed            Pen-Vee K (Rash)  penicillin (Unknown)        PHYSICAL EXAM:  T(C): 36.5 (10-10-22 @ 15:35), Max: 36.5 (10-10-22 @ 15:35)  HR: 82 (10-10-22 @ 20:08) (82 - 92)  BP: 170/71 (10-10-22 @ 20:08) (161/85 - 170/71)  RR: 16 (10-10-22 @ 20:08) (16 - 20)  SpO2: 94% (10-10-22 @ 20:08) (94% - 98%)    Gen: NAD, Resting comfortably  RIGHT Upper Extremity:   Skin intact, gross valgus deformity   TTP over the bony prominences of the humerus  Painless passive/active ROM of the shoulder/elbow/wrist/hand/fingers  C5-T1 SILT  Motor grossly intact throughout axillary/musculocutaneous/radial/median/ulnar/AIN/PIN nerves  + radial pulse  Compartments soft and compressible      Secondary Survey:   No TTP over bony prominences, SILT, palpable pulses, full/painless A/PROM, compartments soft. No TTP over spinous processes or paraspinal muscles at C/T/L spine. No palpable step off. No other injuries or complaints.    Procedure:  Closed reduction was performed and a well molded, well padded coaptation plaster splint was applied. The patient tolerated the procedure well and there we no complications. The patient's post-reduction neurovascular exam was unchanged. Post-reduction xrays demonstrated acceptable alignment.        A/P: 72F w/ known metastatic breast ca w/ R path humerus fx    Analgesia prn  NWB RUE coaptation splint  Ice and elevate as tolerated  FU INR  DVT ppx (please hold for OR)  Plan for OR on 10/14 for ORIF  Patient consented  Please document medical clearance  Please obtain pre-op labs at 4AM the morning of surgery    Orthopaedic Surgery  [facility] [id number]  J        [id number]  [facility]  [id number]/ [phone]

## 2022-10-11 NOTE — CONSULT NOTE ADULT - SUBJECTIVE AND OBJECTIVE BOX
Orthopedics    Patient is a 72yFemale who presents to [facility] ED w/ a c/o of R arm pain s/p fall today at home. Patient states known history of diffuse metastatic breast ca for which she sees outpatient. She has a bone scan in May which demonstrated diffuse met dz. Including R humerus and spine. Denies HH/LOC. Denies any numbness or tingling. Reports some mild back pain at baseline. Hx of pseudogout diagnosed at [facility]. No other orthopedic concerns at this time.    Breast cancer    HTN (hypertension)    Hearing loss of left ear    Asthma    HTN (hypertension)    Portal vein thrombosis    Breast cancer, right    Obesity (BMI 30-39.9)    Sepsis, due to unspecified organism    Osteoarthritis    DVT, lower extremity    COVID-19 vaccine series completed            Pen-Vee K (Rash)  penicillin (Unknown)      PHYSICAL EXAM:  T(C): 36.5 (10-10-22 @ 15:35), Max: 36.5 (10-10-22 @ 15:35)  HR: 82 (10-10-22 @ 20:08) (82 - 92)  BP: 170/71 (10-10-22 @ 20:08) (161/85 - 170/71)  RR: 16 (10-10-22 @ 20:08) (16 - 20)  SpO2: 94% (10-10-22 @ 20:08) (94% - 98%)      GEN: NAD, AAOx3    SPINE:  Skin intact  NTTP over the bony prominences of the cervical // thoracic // lumbar // sacral spine  No bony step-offs  Grossly moving all extremities  + Median/Radial/Ulnar/Musculocutaneous/Axillary nerves intact  + Hip Flexors/Quads/Hamstrings/TA/EHL/FHL/GS  SILT C5-T1  SILT L2-S1  + Radial Pulse  + DP/PT Pulses  No saddle anesthesia      Motor:                          Deltoid       Biceps      Triceps      Wrist Ext      Finger Flex    Finger Abduction   RIGHT             5/5             5/5             5/5             5/5                 5/5                     5/5  LEFT                5/5             5/5             5/5             5/5                 5/5                     5/5                          Hip Flex       Knee Ext          Dorsiflex      Hallux Ext        PlantarFlex  RIGHT            5/5                5/5                 5/5               5/5                 5/5                    LEFT               5/5                5/5                 5/5               5/5                 5/5                      Sensory:                      C5      C6      C7      C8       T1          RIGHT          2         2        2         2         2          (0=absent, 1=impaired, 2=normal, NT=not testable)  LEFT             2         2        2         2         2          (0=absent, 1=impaired, 2=normal, NT=not testable)                        L2        L3       L4      L5       S1          RIGHT        2          2         2        2        2           (0=absent, 1=impaired, 2=normal, NT=not testable)  LEFT           2          2         2        2        2           (0=absent, 1=impaired, 2=normal, NT=not testable)    Negative Deleon's sign bilaterally  Negative Babinski bilaterally   Negative Myoclonus bilaterally        Secondary Survey:   No TTP over bony prominences, SILT, palpable pulses, full/painless A/PROM, compartments soft. No TTP over spinous processes or paraspinal muscles at C/T/L spine. No palpable step off. No other injuries or complaints.        A/P: 72F w/ metastatic breast ca to spine    FU MR C/T/L Spine w/wo   Analgesia prn  WBAT  DVT ppx  PT/OT  Ice and elevate as tolerated  Furth recs pending MR imaging

## 2022-10-11 NOTE — CONSULT NOTE ADULT - ASSESSMENT
72y F with Breast CA with bone mets (Lumpectomy x2, RT, chemo), Ex smoker, Asthma, DVT ( on Eliquis), HTN, Osteoarthritis recent L fem fx s/p repair p/w fall and right humeral Fx and superimposed fractures of the right 6 through 10th ribs. Geriatrics and Palliative Medicine ([facility]) Team was called for pain Rx.

## 2022-10-11 NOTE — CHART NOTE - NSCHARTNOTEFT_GEN_A_CORE
Pt seen and examined. H+P reviewed from this AM    Pt admitted for acute midshaft humeral fracture after fall.     Pt states she fell while in the bathroom, no lightheadedness/dizziness, no head strike, fell on her R side. This morning, pt endorsing ongoing R arm pain, but tolerable when laying still.   Denies any CP, SOB, at baseline uses walker at home to ambulate.   Had recent L hip replacement without issue.     Vital Signs Last 24 Hrs  T(C): 36.8 (11 Oct 2022 12:07), Max: 36.9 (11 Oct 2022 08:59)  T(F): 98.2 (11 Oct 2022 12:07), Max: 98.5 (11 Oct 2022 08:59)  HR: 80 (11 Oct 2022 12:07) (80 - 92)  BP: 142/89 (11 Oct 2022 12:07) (142/89 - 170/83)  BP(mean): --  RR: 19 (11 Oct 2022 12:07) (16 - 20)  SpO2: 98% (11 Oct 2022 12:07) (93% - 98%)    Parameters below as of 11 Oct 2022 12:07  Patient On (Oxygen Delivery Method): room air    PHYSICAL EXAM:  GENERAL: NAD, cachectic  HEAD:  Atraumatic, normocephalic  EYES: EOMI, conjunctiva and sclera clear  NECK: Supple, no JVD, dry MM  CHEST/LUNG: Clear to auscultation bilaterally; no wheezing or rales  HEART: Regular rate and rhythm; no murmurs, no LE edema   ABDOMEN: Soft, nontender, nondistended; bowel sounds present  EXTREMITIES:  2+ Peripheral Pulses, no clubbing, cyanosis; RUE wrapped   PSYCH: AAOx3, calm affect, not anxious  SKIN: No rashes or lesions    72F PMH metastatic breast ca s/p lumpectomy x2/RT/chemo, former smoker, asthma, DVT/eliquis, HTN, osteoarthritis recent L fem fx s/p repair p/w fall, found to have acute displaced right midshaft humerus fracture, plan for ORIF on 10/14.  - pt with mechanical fall, with know osseous mets to R humerus, now with pathologic R humeral shaft fracture  - pain well controlled on current regimen, will c/w for now   - plan for ORIF with ortho on 10/14  - will hold eliquis in prep for OR, will monitor INR  - pt with RCRI 0-1 (4-6% 30d mortality), METS<4; clearance pending EKG (ordered)   - c/w home antiHTN regimen Pt seen and examined. H+P reviewed from this AM    Pt admitted for acute midshaft humeral fracture after fall.     Pt states she fell while in the bathroom, no lightheadedness/dizziness, no head strike, fell on her R side. This morning, pt endorsing ongoing R arm pain, but tolerable when laying still.   Denies any CP, SOB, at baseline uses walker at home to ambulate.   Had recent L hip replacement without issue.     Vital Signs Last 24 Hrs  T(C): 36.8 (11 Oct 2022 12:07), Max: 36.9 (11 Oct 2022 08:59)  T(F): 98.2 (11 Oct 2022 12:07), Max: 98.5 (11 Oct 2022 08:59)  HR: 80 (11 Oct 2022 12:07) (80 - 92)  BP: 142/89 (11 Oct 2022 12:07) (142/89 - 170/83)  BP(mean): --  RR: 19 (11 Oct 2022 12:07) (16 - 20)  SpO2: 98% (11 Oct 2022 12:07) (93% - 98%)    Parameters below as of 11 Oct 2022 12:07  Patient On (Oxygen Delivery Method): room air    PHYSICAL EXAM:  GENERAL: NAD, cachectic  HEAD:  Atraumatic, normocephalic  EYES: EOMI, conjunctiva and sclera clear  NECK: Supple, no JVD, dry MM  CHEST/LUNG: Clear to auscultation bilaterally; no wheezing or rales  HEART: Regular rate and rhythm; no murmurs, no LE edema   ABDOMEN: Soft, nontender, nondistended; bowel sounds present  EXTREMITIES:  2+ Peripheral Pulses, no clubbing, cyanosis; RUE wrapped   PSYCH: AAOx3, calm affect, not anxious  SKIN: No rashes or lesions    72F PMH metastatic breast ca s/p lumpectomy x2/RT/chemo, former smoker, asthma, DVT/eliquis, HTN, osteoarthritis recent L fem fx s/p repair p/w fall, found to have acute displaced right midshaft humerus fracture, plan for ORIF on 10/14.  - pt with mechanical fall, with know osseous mets to R humerus, now with pathologic R humeral shaft fracture  - pain well controlled on current regimen, will c/w for now   - plan for ORIF with ortho on 10/14  - will hold eliquis in prep for OR, will monitor INR  - pt with RCRI 0-1 (4-6% 30d mortality), METS<4; clearance pending EKG (ordered)   - c/w home antiHTN regimen  - IVF as pt appears dehydrated on exam, dry MM

## 2022-10-11 NOTE — H&P ADULT - PROBLEM SELECTOR PLAN 1
Hx/o metastatic breast ca, Lumpectomy x2, Rt, chemo, Ex smoker, Asthma, DVT/eliquis, HTN, Osteoarthritis recent L fem fx s/p repair p/w fall  - Imaging showing acute right midshaft humerus fracture, hepatic mets  - Fx likely pathologic iso Metastatic breast CA vs traumatic given hx/o fall  - VSS   - EKG  - S/p Closed reduction w/ RUE coaptation splint placed by Ortho,   post reduction film showing acceptable alignment   - Apprec Ortho rec: NWB RUE coaptation splint, Ice and elevate as tolerated  - MR cervical/thoracic/ lumbar ordered, result pending   - F/u ortho for further rec pending MR results    - Pain control: Tylenol ATC, Oxycodone 5mg/10mg (Mod/Sev pain)  - DVT ppx  - PT/OT Hx/o metastatic breast ca, Lumpectomy x2, Rt, chemo, Ex smoker, Asthma, DVT/eliquis, HTN, Osteoarthritis recent L fem fx s/p repair p/w fall  - Imaging showing acute right midshaft humerus fracture, hepatic mets  - Fx likely pathologic iso Metastatic breast CA vs traumatic given hx/o fall  - S/p Closed reduction w/ RUE coaptation splint placed by Ortho  post reduction film showing acceptable alignment   - Apprec Ortho rec: NWB RUE coaptation splint, Ice and elevate as tolerated  - MR cervical/thoracic/ lumbar ordered, result pending    - Pain control: Gabapentin 300mg qd, Tramadol 50 q8, Dilaudid 2mg/ 4mg (mod & severe pain).   iSTOP reviewed.   - PT/OT Hx/o metastatic breast ca, Lumpectomy x2, Rt, chemo, Ex smoker, Asthma, DVT/eliquis, HTN, Osteoarthritis recent L fem fx s/p repair p/w fall  - Imaging showing acute right midshaft humerus fracture, hepatic mets  - Fx likely pathologic iso Metastatic breast CA vs traumatic given hx/o fall  - S/p Closed reduction w/ RUE coaptation splint placed by Ortho  post reduction film showing acceptable alignment   - Apprec Ortho rec: NWB RUE coaptation splint, Ice and elevate as tolerated  - MR cervical/thoracic/ lumbar ordered, result pending    - Pain control: Gabapentin 300mg qd, Tramadol 50mg q8, Dilaudid 2mg/ 4mg prn (mod & severe pain).   iSTOP reviewed.   - PT/OT

## 2022-10-11 NOTE — H&P ADULT - NSHPPHYSICALEXAM_GEN_ALL_CORE
Vital Signs Last 24 Hrs  T(C): 36.5 (10 Oct 2022 15:35), Max: 36.5 (10 Oct 2022 15:35)  T(F): 97.7 (10 Oct 2022 15:35), Max: 97.7 (10 Oct 2022 15:35)  HR: 82 (10 Oct 2022 20:08) (82 - 92)  BP: 170/71 (10 Oct 2022 20:08) (161/85 - 170/71)  BP(mean): --  RR: 16 (10 Oct 2022 20:08) (16 - 20)  SpO2: 94% (10 Oct 2022 20:08) (94% - 98%)    Parameters below as of 10 Oct 2022 20:08  Patient On (Oxygen Delivery Method): room air

## 2022-10-11 NOTE — H&P ADULT - PROBLEM SELECTOR PLAN 2
Hx/o metastatic breast ca, Lumpectomy x2, RT, chemo, now w/ hepatic mets on imaging     - Pain control  - Onco consult Hx/o metastatic breast ca, Lumpectomy x2, RT, chemo, now w/ hepatic mets on imaging     - MR cervical/thoracic/ lumbar ordered, result pending   - Pain control: c/w home med -- Gabapentin, Tramadol & Dilaudid   - Onco consult to be called in AM

## 2022-10-11 NOTE — CONSULT NOTE ADULT - ATTENDING COMMENTS
Agree with above.    72F w/ hx of htn, asthma, portal vein thrombosis/DVT (Eliquis), metastatic breast ca s/p left femur IMN ([facility], 6 wks ago), had a mechanical fall, sustaining a left humerus pathologic fx.     I have consented the patient for left humerus IMN. We discussed risks, benefits and alternatives, including nonop and op management. Rationale of care was reviewed and all questions were answered. Surgical risks include but are not limited to infection, bleeding, nerve damage, chronic pain, fracture, nonunion, malunion, weakness, rotator cuff dysfunction, VTE, loss of life/limb, and need for further surgical procedures.  The patient understood all of this and would like to proceed.    Tentative date for 10/14/22. Agree with above.    72F w/ hx of htn, asthma, portal vein thrombosis/DVT (2017, Eliquis), metastatic breast ca s/p L lumpec, chemo/RT (2003), R side s/p chemo (2017), s/p left femur IMN ([facility], 6 wks ago), had a mechanical fall, sustaining a left humerus pathologic fx.     I have consented the patient for left humerus IMN. We discussed risks, benefits and alternatives, including nonop and op management. Rationale of care was reviewed and all questions were answered. Surgical risks include but are not limited to infection, bleeding, nerve damage, chronic pain, fracture, nonunion, malunion, weakness, rotator cuff dysfunction, VTE, loss of life/limb, and need for further surgical procedures.  The patient understood all of this and would like to proceed.    Tentative date for 10/14/22. Agree with above.    72F w/ hx of htn, asthma, portal vein thrombosis/DVT (2017, Eliquis), metastatic breast ca s/p L lumpec, chemo/RT (2003), R side s/p chemo (2017), s/p left femur IMN ([facility], 6/2022), had a mechanical fall, sustaining a left humerus pathologic fx.     I have consented the patient for left humerus IMN. We discussed risks, benefits and alternatives, including nonop and op management. Rationale of care was reviewed and all questions were answered. Surgical risks include but are not limited to infection, bleeding, nerve damage, chronic pain, fracture, nonunion, malunion, weakness, rotator cuff dysfunction, VTE, loss of life/limb, and need for further surgical procedures.  The patient understood all of this and would like to proceed.    Tentative date for 10/14/22. Agree with above.    72F w/ hx of htn, asthma, portal vein thrombosis/DVT (2017, Eliquis), metastatic breast ca s/p L lumpec, chemo/RT (2003), R side s/p chemo (2017), s/p left femur IMN ([facility], 6/2022), had a mechanical fall, sustaining a left humerus pathologic fx.     I have consented the patient for right humerus IMN. We discussed risks, benefits and alternatives, including nonop and op management. Rationale of care was reviewed and all questions were answered. Surgical risks include but are not limited to infection, bleeding, nerve damage, chronic pain, fracture, nonunion, malunion, weakness, rotator cuff dysfunction, VTE, loss of life/limb, and need for further surgical procedures.  The patient understood all of this and would like to proceed.    Tentative date for 10/14/22.

## 2022-10-11 NOTE — CONSULT NOTE ADULT - PROBLEM SELECTOR RECOMMENDATION 9
For possible OR on 10/12  The patient received Dilaudid 0.5mg IV x 2 and 1mg IV x 1 since admission.   Will start Dilaudid 0.2mg IV q 6hrs ATC and 0.2mg-0.4mg IV q 3 PRN moderate to severe pain.   Monitoring for sedation and respiratory depression.

## 2022-10-11 NOTE — H&P ADULT - NSHPLABSRESULTS_GEN_ALL_CORE
EKG personally reviewed:    Images personally reviewed:   < from: CT Chest No Cont (10.10.22 @ 21:37) >  IMPRESSION:  Chronic right sixth, seventh and ninth rib fractures. Extensive   destructive bony changes of the posterior right 10th rib. No associated   pneumothorax.    Diffuse sclerotic and lytic bony metastatic disease of the visualized   axial and appendicular skeleton, appears to have progressed since prior   study on 7/22/2022.    Irregularly-shaped 7 mm right apical nodule which appears more   well-defined when compared to recent CT scan. New small right pleural   effusion.    Hepatic metastases.  --- End of Report ---  < end of copied text >      < from: CT Cervical Spine No Cont (10.10.22 @ 18:46) >  IMPRESSION: Diffuse lytic or blastic changes to the visualized cervical   thoracic vertebral bodies and skull base. No evidence of acute fracture   or dislocation.  --- End of Report ---  < end of copied text >      < from: Xray Hip w/ Pelvis 2 or 3 Views, Left (10.10.22 @ 18:22) >  INTERPRETATION:  Diffuse lytic and sclerotic bone lesions in the pelvis,   similar in appearance when compared to prior CT abdomen and pelvis, and   consistent with bone metastases. Chronic left femoral shaft fracture with   callus formation. Questionable lucent line in the old fracture on lateral   view could be secondary to overlying tissue/structures.  If clinical   concern for acute on chronic fracture, can obtain CT for further   evaluation.  ******PRELIMINARY REPORT******    < end of copied text >    < from: Xray Elbow AP + Lateral, Right (10.10.22 @ 18:22) >  INTERPRETATION:  No acute elbow fracture or dislocation.  < end of copied text >    < from: Xray Shoulder 2 Views, Right (10.10.22 @ 18:22) >  INTERPRETATION:  Acute displaced right midshaft humerus fracture with   apex anteromedial angulation of the distal fracture fragment and small   degree of foreshortening.  < end of copied text >    < from: Xray Chest 1 View AP/PA (10.10.22 @ 18:21) >  INTERPRETATION:  Displaced right posterolateral rib fracture, rotation   makes it difficult to identify which rib however likely 9th. Low lung   volumes with elevated right hemidiaphragm. No PTX.  < end of copied text >      < from: Xray Femur 2 Views, Left (10.10.22 @ 18:21) >  INTERPRETATION:  Chronic proximal femoral shaft fracture with surrounding   callus formation and heterotopic ossification. Femoral fixation hardware   noted. Patchy lucencies along the anterolateral femoral cortex of   uncertain etiology. Chronic degenerative changes of the hip and knee   joints.  < end of copied text >    < from: Xray Humerus, Right (10.10.22 @ 18:21) >  INTERPRETATION:  Acute displaced right midshaft humerus fracture with   apex anteromedial angulation of the distal fracture fragment and small   degree of foreshortening.  < end of copied text >

## 2022-10-11 NOTE — H&P ADULT - ASSESSMENT
alert 72y f pmh metastatic breast ca, Lumpectomy x2, Rt, chemo, Ex smoker, Asthma, DVT/eliquis, HTN, Osteoarthritis recent L fem fx s/p repair p/w fall, found to have acute displaced right midshaft humerus fracture

## 2022-10-11 NOTE — PHYSICAL THERAPY INITIAL EVALUATION ADULT - PERTINENT HX OF CURRENT PROBLEM, REHAB EVAL
73 y/o F, PMH metastatic breast ca, Lumpectomy x2, RT, chemo, Ex smoker, Asthma, DVT ( on Eliquis), HTN, Osteoarthritis recent L fem fx s/p repair p/w fall. Pt reports she was at home, her L leg gave out fell, she fell on bathroom floor hit R side of body. Unable to get up x1hr. Patient states known history of diffuse metastatic breast ca for which she sees outpatient. She has a bone scan in May which demonstrated diffuse met dz. Including R humerus and spine. Denies HH/LOC. Denies any numbness or tingling. Reports some mild back pain at baseline. Hx of pseudogout diagnosed at [facility]. No other orthopedic concerns at this time. Pt with R path humerus fx, per ortho s/p closed reduction was performed and a well molded, well padded coaptation plaster splint was applied. COLIN SALEEM. Pt with metastatic breast ca to spine, per ortho WBAT. 73 y/o F, PMH metastatic breast ca, Lumpectomy x2, RT, chemo, Ex smoker, Asthma, DVT ( on Eliquis), HTN, Osteoarthritis recent L fem fx s/p repair p/w fall. Pt reports she was at home, her L leg gave out fell, she fell on bathroom floor hit R side of body. Unable to get up x1hr. Patient states known history of diffuse metastatic breast ca for which she sees outpatient. She has a bone scan in May which demonstrated diffuse met dz. Including R humerus and spine. Denies HH/LOC. Denies any numbness or tingling. Reports some mild back pain at baseline. Hx of pseudogout diagnosed at [facility]. No other orthopedic concerns at this time. Pt with R path humerus fx, per ortho s/p closed reduction was performed and a well molded, well padded coaptation plaster splint was applied. COLIN SALEEM. Pt with metastatic breast ca to spine, per ortho WBAT. CT cervical spine: Diffuse lytic or blastic changes to the visualized cervical thoracic vertebral bodies and skull base. No evidence of acute fracture or dislocation. CT chest: Chronic right sixth, seventh and ninth rib fractures. Extensive destructive bony changes of the posterior right 10th rib. No associated pneumothorax. Diffuse sclerotic and lytic bony metastatic disease of the visualized axial and appendicular skeleton, appears to have progressed since prior study on 7/22/2022. Irregularly-shaped 7 mm right apical nodule which appears more well-defined when compared to recent CT scan. New small right pleural effusion. Hepatic metastases.

## 2022-10-11 NOTE — PATIENT PROFILE ADULT - ABILITY TO HEAR (WITH HEARING AID OR HEARING APPLIANCE IF NORMALLY USED):
left ear  hearing loss/Mildly to Moderately Impaired: difficulty hearing in some environments or speaker may need to increase volume or speak distinctly

## 2022-10-11 NOTE — CONSULT NOTE ADULT - ATTENDING COMMENTS
Agree with above. Given pathologic lesion would recommend neural axis MRI while in house. Right humerus tx per Ortho Onc

## 2022-10-11 NOTE — CONSULT NOTE ADULT - PROBLEM SELECTOR RECOMMENDATION 3
Will continue to f/u for pain.         Parish Sharpe MD  Associate Chief Geriatrics and Palliative Care (GaP) [facility]   GaP Consult Service   , [employer] at [facility]      Please page the following number for clinical matters between the hours of 9 am and 5 pm from Monday through Friday : [phone]    After 5pm and on weekends, please see the contact information below:    In the event of newly developing, evolving, or worsening symptoms, please contact the Palliative Medicine team via pager (if the patient is at [facility] #[phone] or if the patient is at [facility] #[phone]) The Geriatric and Palliative Medicine service has coverage 24 hours a day/ 7 days a week to provide medical recommendations regarding symptom management needs via telephone

## 2022-10-11 NOTE — CONSULT NOTE ADULT - PROBLEM SELECTOR RECOMMENDATION 2
Goals are for continuing DMT. In fact, she is looking for appropriate pain Rx and getting humeral repair so she can continue CA Rxs.

## 2022-10-11 NOTE — H&P ADULT - PROBLEM SELECTOR PLAN 3
- Metop tart 50mg bid  - Amlodipine 10mg qd  - Losartan- HTZ 50mg-12.5mg qd - Metop tart 50mg bid  - Amlodipine 10mg qd  - Losartan 50mg qd  - HTZ 12.5mg qd

## 2022-10-12 NOTE — CHART NOTE - NSCHARTNOTEFT_GEN_A_CORE
Search Terms: Tommy Peter, 1950Search Date: 10/12/2022 10:48:32 AM  Searching on behalf of: [address] [place] [address]  The Drug Utilization Report below displays all of the controlled substance prescriptions, if any, that your patient has filled in the last twelve months. The information displayed on this report is compiled from pharmacy submissions to the Department, and accurately reflects the information as submitted by the pharmacies.    This report was requested by: Dennies Medina | Reference #: [id number]    Others' Prescriptions  Patient Name: Jeni Ponce Date: 1950  Address: [address] [place] [id number]: Female  Rx Written	Rx Dispensed	Drug	Quantity	Days Supply	Prescriber Name	Prescriber Santa #	Payment Method	Dispenser  04/13/2022	04/13/2022	oxycodone hcl (ir) 5 mg tablet	28	7	Andreis Rosemarie	[id number]	[facility]    Patient Name: Jeni Ponce Date: 1950  Address: [address] [place] [id number]: Female  Rx Written	Rx Dispensed	Drug	Quantity	Days Supply	Prescriber Name	Prescriber Santa #	Payment Method  08/08/2022	08/09/2022	alprazolam 0.25 mg tablet	15	15	Malissa Guzman	[id number]	[facility]  07/30/2022	07/31/2022	hydromorphone 2 mg tablet	20	5	Fawn Camp	[id number]	[facility]  07/22/2022	07/23/2022	alprazolam 0.25 mg tablet	15	15	Lena Ortiz NP	[id number]	[facility]  07/11/2022	07/12/2022	alprazolam 0.25 mg tablet	15	15	[place] ParkNikolai Person	[id number]	[place]  [facility]  06/08/2022	07/04/2022	hydromorphone 2 mg tablet	21	7	Malissa Guzman	[id number]	[facility]  06/07/2022	06/07/2022	hydromorphone 2 mg tablet	15	5	Geovanna Mancia	[id number]	[facility]  06/07/2022	06/07/2022	tramadol hcl 50 mg tablet	15	5	Geovanna Mancia	[id number]	[facility]  06/07/2022	06/07/2022	alprazolam 0.25 mg tablet	5	5	Geovanna Mancia	[id number]	[facility]    Patient Name: Tommy Ponce Date: 1950  Address: [address] [place] [id number]: Female  Rx Written	Rx Dispensed	Drug	Quantity	Days Supply	Prescriber Name	Prescriber Santa #	Payment Method  10/06/2022	10/11/2022	morphine sulf er 15 mg tablet	30	30	Michelle Quan	[id number]	[facility]are [facility]  10/04/2022	10/07/2022	luxlyte harmony gel capsule 2.5mg thc and 2.5mg cbd/capsule	2	10	Shani Espinosa)	[id number]	[place]  [facility]  09/28/2022	10/05/2022	hydromorphone 2 mg tablet	120	20	Shani Espinosa)	[id number]	Medicare Dispens[facility]  09/08/2022	09/13/2022	hydromorphone 2 mg tablet	90	30	Rosemarie Prescott	[id number]	Medicare Dispenser [facility]  06/01/2022	06/01/2022	dronabinol 2.5 mg capsule	90	30	Rosemarie Prescott	[id number]	[facility]  [facility]penser [facility]    Patient Name: Tommy Ponce Date: 1950  Address: [address] [place], [place] [address]Gyl: Female  Rx Written	Rx Dispensed	Drug	Quantity	Days Supply	Prescriber Name	Prescriber Santa #	Payment Method  08/28/2022	08/29/2022	hydromorphone 2 mg tablet	20	5	Kenya Sigala	[id number]	[facility]icare  Disp[id number]  08/28/2022	08/29/2022	tramadol hcl 50 mg tablet	15	5	Kenya Sigala	[id number]	Medicare  Dispenser [id number]  03/23/2022	05/26/2022	tramadol hcl 50 mg tablet	21	7	Severo Dunaway MD	[id number]	Medicare  Dispenser [id number]  05/25/2022	05/26/2022	hydromorphone 2 mg tablet	90	30	PeoplesRosemarie	[id number]	Medicare  Dispenser [id number]  03/23/2022	05/10/2022	tramadol hcl 50 mg tablet	21	7	Severo Dunaway MD	[id number]	Medicare  Dispenser [id number]  05/10/2022	05/10/2022	hydromorphone 2 mg tablet	45	15	Peoples, Rosemarie	[id number]	Medicare  Dispenser [id number]  03/23/2022	04/19/2022	tramadol hcl 50 mg tablet	21	7	Severo Dunaway MD	[id number]	Medicare  Dispenser [id number]  03/23/2022	03/23/2022	tramadol hcl 50 mg tablet	21	7	Severo Dunaway MD	[id number]	Medicare  Dispenser [id number]

## 2022-10-12 NOTE — PROGRESS NOTE ADULT - PROBLEM SELECTOR PLAN 1
Hx of recent L fem fx s/p repair p/w fall  - Imaging shows acute right midshaft humerus fracture and hepatic mets  - S/p Closed reduction w/ RUE coaptation splint placed by Ortho  - Pending ORIF w/ Ortho on 10/14  ISTOP Reference #: [id number], chart note on 10/12   Pain control:  - c/w 0.2 q6 dilaudid (pt received 2 doses yesterday, will observe for 24 hours)  - c/w prn 0.2q3 for moderate pain  - c/w prn 0.4 q3 for severe pain

## 2022-10-12 NOTE — PROVIDER CONTACT NOTE (OTHER) - ACTION/TREATMENT ORDERED:
Bladder Scan showed 564 cc residue. NP made aware. Endorsed to next shift Straight cath and bladder scan Q8 hrs

## 2022-10-12 NOTE — PROGRESS NOTE ADULT - PROBLEM SELECTOR PLAN 4
Hx/o metastatic breast ca, DVT on Eliquis  - HOLD Eliquis 5mg BID 48 hours before procedure  - Started compression stockings for DVT ppx

## 2022-10-12 NOTE — PROGRESS NOTE ADULT - ASSESSMENT
72y f pmh metastatic breast ca, Lumpectomy x2, RT, chemo, Ex smoker, Asthma, DVT ( on Eliquis), HTN, Osteoarthritis recent L fem fx s/p repair p/w fall 72yF with metastatic breast ca, Lumpectomy x2, RT, chemo, Ex smoker, Asthma, DVT ( on Eliquis), HTN, Osteoarthritis recent L fem fx s/p repair p/w fall, found to have L humeral fracture with plans for ORIF, Geriatrics and Palliative Medicine Team is consulted for assistance with pain control

## 2022-10-12 NOTE — PROGRESS NOTE ADULT - ASSESSMENT
A/P: 72yF w/ pathologic R humeral shaft fracture, plan for OR 10/14 for ORIF    -FU AM labs  -Pain control  -NWB RUE in coaptation splint  -DVT ppx per primary team  -Medical clearance pending EKG, please document medical clearance for OR  -Plan for OR Friday 10/14

## 2022-10-12 NOTE — PROGRESS NOTE ADULT - SUBJECTIVE AND OBJECTIVE BOX
SUBJECTIVE AND OBJECTIVE: Pt states her pain is a bit better controlled, she is still able to feel p[ain with movement after getting dilaudid but it helps her sleep which brings her relief. At this time she would like to keep her current regimen and does not want to increase the dosages or switch to oral pain medication until after her procedure on Friday.     OVERNIGHT EVENTS: No O/N events     DNR on chart:  Allergies    Pen-Vee K (Rash)  penicillin (Unknown)    Intolerances    MEDICATIONS  (STANDING):  amLODIPine   Tablet 5 milliGRAM(s) Oral daily  citalopram 10 milliGRAM(s) Oral daily  gabapentin 300 milliGRAM(s) Oral daily  hydrochlorothiazide 12.5 milliGRAM(s) Oral daily  HYDROmorphone  Injectable 0.2 milliGRAM(s) IV Push every 6 hours  influenza  Vaccine (HIGH DOSE) 0.7 milliLiter(s) IntraMuscular once  losartan 50 milliGRAM(s) Oral daily  metoprolol tartrate 50 milliGRAM(s) Oral two times a day  senna 2 Tablet(s) Oral at bedtime  sodium chloride 0.9%. 1000 milliLiter(s) (100 mL/Hr) IV Continuous <Continuous>    MEDICATIONS  (PRN):  acetaminophen     Tablet .. 650 milliGRAM(s) Oral every 6 hours PRN Temp greater or equal to 38C (100.4F), Mild Pain (1 - 3)  aluminum hydroxide/magnesium hydroxide/simethicone Suspension 30 milliLiter(s) Oral every 4 hours PRN Dyspepsia  HYDROmorphone  Injectable 0.2 milliGRAM(s) IV Push every 3 hours PRN Moderate Pain (4 - 6)  HYDROmorphone  Injectable 0.4 milliGRAM(s) IV Push every 3 hours PRN Severe Pain (7 - 10)  melatonin 3 milliGRAM(s) Oral at bedtime PRN Insomnia  ondansetron Injectable 4 milliGRAM(s) IV Push every 8 hours PRN Nausea and/or Vomiting      ITEMS UNCHECKED ARE NOT PRESENT    PRESENT SYMPTOMS: [ ]Unable to self-report - see [ ] CPOT [ ] PAINADS [ ] RDOS  Source if other than patient:  [ ]Family   [ ]Team     Pain:  [x ]yes [ ]no  QOL impact -   Location -   Right shoulder                 Aggravating factors - movement  Quality - throbbing  Radiation -no radiation  Timing- constant   Severity (0-10 scale):  Minimal acceptable level (0-10 scale):     CPOT:    https://www.[facility].org/getattachment/[email or web address]/Critical-Care-Pain-Observation-Tool-(CPOT)    PAIN AD Score:	  http://geriatrictoolkit.[facility]/cog/painad.pdf (Ctrl + left click to view)    Dyspnea:                           [ ]Mild [ ]Moderate [ ]Severe    RDOS:  0 to 2  minimal or no respiratory distress   3  mild distress  4 to 6 moderate distress  >7 severe distress  https://[email or web address]/handouts/hen/Respiratory_Distress_Observation_Scale.pdf (Ctrl +  left click to view)     Anxiety:                             [ ]Mild [ ]Moderate [ ]Severe  Fatigue:                             [ ]Mild [ ]Moderate [ ]Severe  Nausea:                             [ ]Mild [ ]Moderate [ ]Severe  Loss of appetite:              [ ]Mild [ ]Moderate [ ]Severe  Constipation:                    [ ]Mild [ ]Moderate [ ]Severe    PCSSQ[Palliative Care Spiritual Screening Question]   Severity (0-10):  Score of 4 or > indicate consideration of Chaplaincy referral.  Chaplaincy Referral: [ ] yes [ ] refused [ ] following [ ] Deferred     Caregiver [place]? : [ ] yes [ ] no [ ] Deferred [ ] Declined             Social work referral [ ] Patient & Family Centered Care Referral [ ]     Anticipatory Grief present?:  [ ] yes [ ] no  [ ] Deferred                  Social work referral [ ] Chaplaincy Referral[ ]      Other Symptoms:  [ ]All other review of systems negative     Palliative Performance Status Version 2:         %      http://npcrc.org/files/news/palliative_performance_scale_ppsv2.pdf  PHYSICAL EXAM:  Vital Signs Last 24 Hrs  T(C): 36.6 (12 Oct 2022 09:14), Max: 37.1 (11 Oct 2022 13:44)  T(F): 97.9 (12 Oct 2022 09:14), Max: 98.8 (11 Oct 2022 13:44)  HR: 85 (12 Oct 2022 09:14) (80 - 93)  BP: 138/79 (12 Oct 2022 09:14) (134/75 - 170/83)  BP(mean): --  RR: 18 (12 Oct 2022 09:14) (18 - 19)  SpO2: 99% (12 Oct 2022 09:14) (93% - 99%)    Parameters below as of 12 Oct 2022 09:14  Patient On (Oxygen Delivery Method): room air     I&O's Summary    11 Oct 2022 07:01  -  12 Oct 2022 07:00  --------------------------------------------------------  IN: 840 mL / OUT: 0 mL / NET: 840 mL       GENERAL: [ ]Cachexia    [x ]Alert  [x ]Oriented   [ ]Lethargic  [ ]Unarousable  [x ]Verbal  [ ]Non-Verbal    Behavioral:   [ ]Anxiety  [ ]Delirium [ ]Agitation [ ]Other    HEENT:  [x ]Normal   [ ]Dry mouth   [ ]ET Tube/Trach  [ ]Oral lesions    PULMONARY:   [x ]Clear [ ]Tachypnea  [ ]Audible excessive secretions   [ ]Rhonchi        [ ]Right [ ]Left [ ]Bilateral  [ ]Crackles        [ ]Right [ ]Left [ ]Bilateral  [ ]Wheezing     [ ]Right [ ]Left [ ]Bilateral  [ ]Diminished BS [ ] Right [ ]Left [ ]Bilateral    CARDIOVASCULAR:    [x ]Regular [ ]Irregular [ ]Tachy  [ ]Campbell [ ]Murmur [ ]Other    GASTROINTESTINAL:  [x ]Soft  [ ]Distended   [ ]+BS  [x ]Non tender [ ]Tender  [ ]Other [ ]PEG [ ]OGT/ NGT   Last BM:   GENITOURINARY:  [ ]Normal [ ]Incontinent   [ ]Oliguria/Anuria   [ ]Whitehead  MUSCULOSKELETAL:   [ ]Normal   [x ]Weakness  [ ]Bed/Wheelchair bound [ ]Edema  NEUROLOGIC:   [x ]No focal deficits  [ ] Cognitive impairment  [ ] Dysphagia [ ]Dysarthria [ ] Paresis [ ]Other   SKIN:   [x ]Normal  [ ]Rash  [ ]Other  [ ]Pressure ulcer(s) [ ]y [ ]n present on admission    CRITICAL CARE:  [ ]Shock Present  [ ]Septic [ ]Cardiogenic [ ]Neurologic [ ]Hypovolemic  [ ]Vasopressors [ ]Inotropes  [ ]Respiratory failure present [ ]Mechanical Ventilation [ ]Non-invasive ventilatory support [ ]High-Flow   [ ]Acute  [ ]Chronic [ ]Hypoxic  [ ]Hypercarbic [ ]Other  [ ]Other organ failure     LABS:                        9.5    3.72  )-----------( 217      ( 12 Oct 2022 06:53 )             29.5   10-12    132<L>  |  95<L>  |  19  ----------------------------<  75  4.1   |  24  |  0.64    Ca    9.6      12 Oct 2022 06:53    TPro  6.8  /  Alb  3.5  /  TBili  1.2  /  DBili  x   /  AST  40  /  ALT  14  /  AlkPhos  232<H>  10-11  PT/INR - ( 11 Oct 2022 07:06 )   PT: 21.7 sec;   INR: 1.88 ratio         PTT - ( 10 Oct 2022 16:47 )  PTT:29.9 sec      RADIOLOGY & ADDITIONAL STUDIES:    Protein Calorie Malnutrition Present: [ ]mild [ ]moderate [ ]severe [ ]underweight [ ]morbid obesity  https://www.andeal.org/vault/2440/web/files/ONC/Table_Clinical%20Characteristics%20to%20Document%20Malnutrition-White%20JV%20et%20al%202012.pdf    Height (cm): 165.1 (10-10-22 @ 15:35), 162.5 (06-01-22 @ 15:00), 165.1 (05-06-22 @ 16:33)  Weight (kg): 64.4 (10-10-22 @ 15:35), 60.9991 (06-01-22 @ 15:00), 67.6 (05-06-22 @ 16:33)  BMI (kg/m2): 23.6 (10-10-22 @ 15:35), 23.1 (06-01-22 @ 15:00), 24.8 (05-06-22 @ 16:33)    [ ]PPSV2 < or = 30%  [ ]significant weight loss [ ]poor nutritional intake [ ]anasarca[ ]Artificial Nutrition    Other REFERRALS:  [ ]Hospice  [ ]Child Life  [ ]Social Work  [ ]Case management [ ]Holistic Therapy     Goals of Care Document: SUBJECTIVE AND OBJECTIVE: Pt states her pain is a bit better controlled, she is still able to feel p[ain with movement after getting dilaudid but it helps her sleep which brings her relief. At this time she would like to keep her current regimen and does not want to increase the dosages or switch to oral pain medication until after her procedure on Friday. Pt's last BM was yesterday morning.     OVERNIGHT EVENTS: No O/N events     DNR on chart:  Allergies    Pen-Vee K (Rash)  penicillin (Unknown)    Intolerances    MEDICATIONS  (STANDING):  amLODIPine   Tablet 5 milliGRAM(s) Oral daily  citalopram 10 milliGRAM(s) Oral daily  gabapentin 300 milliGRAM(s) Oral daily  hydrochlorothiazide 12.5 milliGRAM(s) Oral daily  HYDROmorphone  Injectable 0.2 milliGRAM(s) IV Push every 6 hours  influenza  Vaccine (HIGH DOSE) 0.7 milliLiter(s) IntraMuscular once  losartan 50 milliGRAM(s) Oral daily  metoprolol tartrate 50 milliGRAM(s) Oral two times a day  senna 2 Tablet(s) Oral at bedtime  sodium chloride 0.9%. 1000 milliLiter(s) (100 mL/Hr) IV Continuous <Continuous>    MEDICATIONS  (PRN):  acetaminophen     Tablet .. 650 milliGRAM(s) Oral every 6 hours PRN Temp greater or equal to 38C (100.4F), Mild Pain (1 - 3)  aluminum hydroxide/magnesium hydroxide/simethicone Suspension 30 milliLiter(s) Oral every 4 hours PRN Dyspepsia  HYDROmorphone  Injectable 0.2 milliGRAM(s) IV Push every 3 hours PRN Moderate Pain (4 - 6)  HYDROmorphone  Injectable 0.4 milliGRAM(s) IV Push every 3 hours PRN Severe Pain (7 - 10)  melatonin 3 milliGRAM(s) Oral at bedtime PRN Insomnia  ondansetron Injectable 4 milliGRAM(s) IV Push every 8 hours PRN Nausea and/or Vomiting      ITEMS UNCHECKED ARE NOT PRESENT    PRESENT SYMPTOMS: [ ]Unable to self-report - see [ ] CPOT [ ] PAINADS [ ] RDOS  Source if other than patient:  [ ]Family   [ ]Team     Pain:  [x ]yes [ ]no  QOL impact -   Location -   Right shoulder                 Aggravating factors - movement  Quality - throbbing  Radiation -no radiation  Timing- constant   Severity (0-10 scale):  Minimal acceptable level (0-10 scale):     CPOT:    https://www.[facility].org/getattachment/[email or web address]/Critical-Care-Pain-Observation-Tool-(CPOT)    PAIN AD Score:	  http://geriatrictoolkit.[facility]/cog/painad.pdf (Ctrl + left click to view)    Dyspnea:                           [ ]Mild [ ]Moderate [ ]Severe    RDOS:  0 to 2  minimal or no respiratory distress   3  mild distress  4 to 6 moderate distress  >7 severe distress  https://homecareinformation.net/handouts/hen/Respiratory_Distress_Observation_Scale.pdf (Ctrl +  left click to view)     Anxiety:                             [ ]Mild [ ]Moderate [ ]Severe  Fatigue:                             [ ]Mild [ ]Moderate [ ]Severe  Nausea:                             [ ]Mild [ ]Moderate [ ]Severe  Loss of appetite:              [ ]Mild [ ]Moderate [ ]Severe  Constipation:                    [ ]Mild [ ]Moderate [ ]Severe    PCSSQ[Palliative Care Spiritual Screening Question]   Severity (0-10):  Score of 4 or > indicate consideration of Chaplaincy referral.  Chaplaincy Referral: [ ] yes [ ] refused [ ] following [x ] Deferred     Caregiver [place]? : [ ] yes [ x] no [ ] Deferred [ ] Declined             Social work referral [ ] Patient & Family Centered Care Referral [ ]     Anticipatory Grief present?:  [ ] yes [ x] no  [ ] Deferred                  Social work referral [ ] Chaplaincy Referral[ ]      Other Symptoms:  [ ]All other review of systems negative     Palliative Performance Status Version 2:    40     %      http://npcrc.org/files/news/palliative_performance_scale_ppsv2.pdf    PHYSICAL EXAM:  Vital Signs Last 24 Hrs  T(C): 36.6 (12 Oct 2022 09:14), Max: 37.1 (11 Oct 2022 13:44)  T(F): 97.9 (12 Oct 2022 09:14), Max: 98.8 (11 Oct 2022 13:44)  HR: 85 (12 Oct 2022 09:14) (80 - 93)  BP: 138/79 (12 Oct 2022 09:14) (134/75 - 170/83)  BP(mean): --  RR: 18 (12 Oct 2022 09:14) (18 - 19)  SpO2: 99% (12 Oct 2022 09:14) (93% - 99%)    Parameters below as of 12 Oct 2022 09:14  Patient On (Oxygen Delivery Method): room air     I&O's Summary    11 Oct 2022 07:01  -  12 Oct 2022 07:00  --------------------------------------------------------  IN: 840 mL / OUT: 0 mL / NET: 840 mL       GENERAL: [ ]Cachexia    [x ]Alert  [x ]Oriented   [ ]Lethargic  [ ]Unarousable  [x ]Verbal  [ ]Non-Verbal    Behavioral:   [ ]Anxiety  [ ]Delirium [ ]Agitation [ ]Other    HEENT:  [x ]Normal   [ ]Dry mouth   [ ]ET Tube/Trach  [ ]Oral lesions    PULMONARY:   [x ]Clear [ ]Tachypnea  [ ]Audible excessive secretions   [ ]Rhonchi        [ ]Right [ ]Left [ ]Bilateral  [ ]Crackles        [ ]Right [ ]Left [ ]Bilateral  [ ]Wheezing     [ ]Right [ ]Left [ ]Bilateral  [ ]Diminished BS [ ] Right [ ]Left [ ]Bilateral    CARDIOVASCULAR:    [x ]Regular [ ]Irregular [ ]Tachy  [ ]Campbell [ ]Murmur [ ]Other    GASTROINTESTINAL:  [x ]Soft  [ ]Distended   [ ]+BS  [x ]Non tender [ ]Tender  [ ]Other [ ]PEG [ ]OGT/ NGT   Last BM:   GENITOURINARY:  [ ]Normal [ ]Incontinent   [ ]Oliguria/Anuria   [ ]Whitehead  MUSCULOSKELETAL:   [ ]Normal   [x ]Weakness  [ ]Bed/Wheelchair bound [ ]Edema  NEUROLOGIC:   [x ]No focal deficits  [ ] Cognitive impairment  [ ] Dysphagia [ ]Dysarthria [ ] Paresis [ ]Other   SKIN:   [x ]Normal  [ ]Rash  [ ]Other  [ ]Pressure ulcer(s) [ ]y [ ]n present on admission    CRITICAL CARE:  [ ]Shock Present  [ ]Septic [ ]Cardiogenic [ ]Neurologic [ ]Hypovolemic  [ ]Vasopressors [ ]Inotropes  [ ]Respiratory failure present [ ]Mechanical Ventilation [ ]Non-invasive ventilatory support [ ]High-Flow   [ ]Acute  [ ]Chronic [ ]Hypoxic  [ ]Hypercarbic [ ]Other  [ ]Other organ failure     LABS:                        9.5    3.72  )-----------( 217      ( 12 Oct 2022 06:53 )             29.5   10-12    132<L>  |  95<L>  |  19  ----------------------------<  75  4.1   |  24  |  0.64    Ca    9.6      12 Oct 2022 06:53    TPro  6.8  /  Alb  3.5  /  TBili  1.2  /  DBili  x   /  AST  40  /  ALT  14  /  AlkPhos  232<H>  10-11  PT/INR - ( 11 Oct 2022 07:06 )   PT: 21.7 sec;   INR: 1.88 ratio         PTT - ( 10 Oct 2022 16:47 )  PTT:29.9 sec      RADIOLOGY & ADDITIONAL STUDIES: no new imaging    Protein Calorie Malnutrition Present: [ ]mild [ ]moderate [ ]severe [ ]underweight [ ]morbid obesity  https://www.andeal.org/vault/2440/web/files/ONC/Table_Clinical%20Characteristics%20to%20Document%20Malnutrition-White%20JV%20et%20al%202012.pdf    Height (cm): 165.1 (10-10-22 @ 15:35), 162.5 (06-01-22 @ 15:00), 165.1 (05-06-22 @ 16:33)  Weight (kg): 64.4 (10-10-22 @ 15:35), 60.9991 (06-01-22 @ 15:00), 67.6 (05-06-22 @ 16:33)  BMI (kg/m2): 23.6 (10-10-22 @ 15:35), 23.1 (06-01-22 @ 15:00), 24.8 (05-06-22 @ 16:33)    [ ]PPSV2 < or = 30%  [ ]significant weight loss [ ]poor nutritional intake [ ]anasarca[ ]Artificial Nutrition    Other REFERRALS:  [ ]Hospice  [ ]Child Life  [ ]Social Work  [ ]Case management [ ]Holistic Therapy     Goals of Care Document:

## 2022-10-12 NOTE — PROGRESS NOTE ADULT - PROBLEM SELECTOR PLAN 1
Hx/o metastatic breast ca, Lumpectomy x2, Rt, chemo, Ex smoker, Asthma, DVT/eliquis, HTN, Osteoarthritis recent L fem fx s/p repair p/w fall. Fx likely pathologic iso Metastatic breast CA vs traumatic given hx/o fall  - Will medically clear patient pending ECG   - S/p Closed reduction w/ RUE coaptation splint placed by Ortho  post reduction film showing acceptable alignment   - ORIF 10/14   - MR cervical/thoracic/ lumbar ordered, result pending    - Pain control: Gabapentin 300mg qd, Start 0.2 q6 Dilaudid (pt received 2 doses yesterday, will observe for 24 hours), prn 0.2q3 for moderate pain, prn 0.4 q3 for severe pain.  - PT/OT

## 2022-10-12 NOTE — PROGRESS NOTE ADULT - PROBLEM SELECTOR PLAN 2
Hx/o metastatic breast ca, Lumpectomy x2, RT, chemo, now w/ hepatic mets on imaging   - F/up MR cervical/thoracic/ lumbar imaging once completed  - DC home gabapentin, tramadol  - Pain regimen as above  - F/up onc consult recs Hx/o metastatic breast ca, Lumpectomy x2, RT, chemo, now w/ hepatic mets on imaging   - F/up MR cervical/thoracic/ lumbar imaging once completed  - Pain regimen as above  - F/up onc consult recs

## 2022-10-12 NOTE — PROGRESS NOTE ADULT - SUBJECTIVE AND OBJECTIVE BOX
Orthopedics      Patient seen and examined at bedside. Feeling well. Pain controlled. No n/v. No acute events overnight.    Vital Signs Last 24 Hrs  T(C): 36.4 (10-12-22 @ 05:10), Max: 37.1 (10-11-22 @ 13:44)  T(F): 97.6 (10-12-22 @ 05:10), Max: 98.8 (10-11-22 @ 13:44)  HR: 81 (10-12-22 @ 05:10) (80 - 93)  BP: 147/82 (10-12-22 @ 05:10) (134/75 - 170/83)  BP(mean): --  RR: 18 (10-12-22 @ 05:10) (18 - 19)  SpO2: 96% (10-12-22 @ 05:10) (93% - 98%)                        9.4    3.73  )-----------( 212      ( 11 Oct 2022 07:06 )             29.7     11 Oct 2022 07:06    133    |  95     |  20     ----------------------------<  65     4.1     |  27     |  0.77     Ca    9.6        11 Oct 2022 07:06    TPro  6.8    /  Alb  3.5    /  TBili  1.2    /  DBili  x      /  AST  40     /  ALT  14     /  AlkPhos  232    11 Oct 2022 07:06    PT/INR - ( 11 Oct 2022 07:06 )   PT: 21.7 sec;   INR: 1.88 ratio         PTT - ( 10 Oct 2022 16:47 )  PTT:29.9 sec    Exam:  Gen: NAD, resting comfortably  UE:  Coaptation splint c/d/i  +AIN/PIN/M/R/U  SILT M/U/R   Radial pulses 2+

## 2022-10-12 NOTE — PROGRESS NOTE ADULT - PROBLEM SELECTOR PLAN 2
Hx/o metastatic breast ca, Lumpectomy x2, RT, chemo, now w/ hepatic mets on imaging     - MR cervical/thoracic/ lumbar ordered, result pending   - Pain control: c/w home med -- Gabapentin & Dilaudid   - Consider Onco consult

## 2022-10-12 NOTE — PROGRESS NOTE ADULT - ASSESSMENT
72y f pmh metastatic breast ca, Lumpectomy x2, Rt, chemo, Ex smoker, Asthma, DVT/eliquis, HTN, Osteoarthritis recent L fem fx s/p repair p/w fall, found to have acute displaced right midshaft humerus fracture. ORIF planned for 10/14.

## 2022-10-12 NOTE — PROGRESS NOTE ADULT - SUBJECTIVE AND OBJECTIVE BOX
[facility] Division of Hospital Medicine  Khadijah Mccoy MD  Available via MS Teams  Pager: [phone]    SUBJECTIVE / OVERNIGHT EVENTS: Patient seen and examined at bedside. No acute overnight events. States she has limited mobility with moderate pain, but well controlled with pain medication. States she has been experiencing falls with more decline after her breast cancer with mets to the bone. Uses cane to ambulate but has difficulty with long distance and uses wheelchair. States she had L femur fracture with repair last year. Takes pill for chemotherapy daily, follows up with Oncologist closely.     ADDITIONAL REVIEW OF SYSTEMS: +joint pain     MEDICATIONS  (STANDING):  amLODIPine   Tablet 5 milliGRAM(s) Oral daily  citalopram 10 milliGRAM(s) Oral daily  gabapentin 300 milliGRAM(s) Oral daily  hydrochlorothiazide 12.5 milliGRAM(s) Oral daily  HYDROmorphone  Injectable 0.2 milliGRAM(s) IV Push every 6 hours  influenza  Vaccine (HIGH DOSE) 0.7 milliLiter(s) IntraMuscular once  losartan 50 milliGRAM(s) Oral daily  metoprolol tartrate 50 milliGRAM(s) Oral two times a day  polyethylene glycol 3350 17 Gram(s) Oral daily  senna 2 Tablet(s) Oral at bedtime  sodium chloride 0.9%. 1000 milliLiter(s) (100 mL/Hr) IV Continuous <Continuous>    MEDICATIONS  (PRN):  acetaminophen     Tablet .. 650 milliGRAM(s) Oral every 6 hours PRN Temp greater or equal to 38C (100.4F), Mild Pain (1 - 3)  aluminum hydroxide/magnesium hydroxide/simethicone Suspension 30 milliLiter(s) Oral every 4 hours PRN Dyspepsia  HYDROmorphone  Injectable 0.2 milliGRAM(s) IV Push every 3 hours PRN Moderate Pain (4 - 6)  HYDROmorphone  Injectable 0.4 milliGRAM(s) IV Push every 3 hours PRN Severe Pain (7 - 10)  melatonin 3 milliGRAM(s) Oral at bedtime PRN Insomnia  ondansetron Injectable 4 milliGRAM(s) IV Push every 8 hours PRN Nausea and/or Vomiting      I&O's Summary    11 Oct 2022 07:01  -  12 Oct 2022 07:00  --------------------------------------------------------  IN: 840 mL / OUT: 0 mL / NET: 840 mL    12 Oct 2022 07:01  -  12 Oct 2022 16:15  --------------------------------------------------------  IN: 240 mL / OUT: 0 mL / NET: 240 mL        PHYSICAL EXAM:  Vital Signs Last 24 Hrs  T(C): 36.6 (12 Oct 2022 11:19), Max: 37.1 (11 Oct 2022 21:25)  T(F): 97.9 (12 Oct 2022 11:19), Max: 98.7 (11 Oct 2022 21:25)  HR: 74 (12 Oct 2022 11:19) (74 - 88)  BP: 126/74 (12 Oct 2022 11:19) (126/74 - 147/82)  BP(mean): --  RR: 18 (12 Oct 2022 11:19) (18 - 18)  SpO2: 96% (12 Oct 2022 11:19) (95% - 99%)    Parameters below as of 12 Oct 2022 11:19  Patient On (Oxygen Delivery Method): room air      CONSTITUTIONAL: elderly female in NAD, with a RUE cast  EYES: PERRLA; conjunctiva and sclera clear  ENMT: Moist oral mucosa, no pharyngeal injection or exudates; normal dentition  NECK: Supple, no palpable masses; no thyromegaly  RESPIRATORY: Normal respiratory effort; lungs are clear to auscultation bilaterally  CARDIOVASCULAR: Regular rate and rhythm, normal S1 and S2, no murmur/rub/gallop; No lower extremity edema; Peripheral pulses are 2+ bilaterally  ABDOMEN: Nontender to palpation, normoactive bowel sounds, no rebound/guarding; No hepatosplenomegaly  MUSCULOSKELETAL:  +RUE cast, limited ROM of the L leg   PSYCH: A+O to person, place, and time; affect appropriate  NEUROLOGY: CN 2-12 are intact and symmetric; no gross sensory deficits   SKIN: chemoport intact     LABS:                        9.5    3.72  )-----------( 217      ( 12 Oct 2022 06:53 )             29.5     10-12    132<L>  |  95<L>  |  19  ----------------------------<  75  4.1   |  24  |  0.64    Ca    9.6      12 Oct 2022 06:53    TPro  6.8  /  Alb  3.5  /  TBili  1.2  /  DBili  x   /  AST  40  /  ALT  14  /  AlkPhos  232<H>  10-11    PT/INR - ( 11 Oct 2022 07:06 )   PT: 21.7 sec;   INR: 1.88 ratio         PTT - ( 10 Oct 2022 16:47 )  PTT:29.9 sec          SARS-CoV-2: NotDetec (06 May 2022 18:33)      RADIOLOGY & ADDITIONAL TESTS:  New Results Reviewed Today:   New Imaging Personally Reviewed Today:  New Electrocardiogram Personally Reviewed Today:  Prior or Outpatient Records Reviewed Today:    COMMUNICATION:  Care Discussed with Consultants/Other Providers and Details of Discussion:  Discussions with Patient/Family:  PCP Communication:

## 2022-10-13 NOTE — PROGRESS NOTE ADULT - SUBJECTIVE AND OBJECTIVE BOX
Orthopedics  Patient seen and examined at bedside. Feeling well. Pain controlled. No n/v. No acute events overnight.        Exam:  Gen: NAD, resting comfortably  UE:  Coaptation splint c/d/i  +AIN/PIN/M/R/U  SILT M/U/R/Ax  Radial pulses 2+

## 2022-10-13 NOTE — PROGRESS NOTE ADULT - PROBLEM SELECTOR PLAN 2
Hx/o metastatic breast ca, Lumpectomy x2, RT, chemo, now w/ hepatic mets on imaging   - Pain regimen as above  - F/up onc consult recs

## 2022-10-13 NOTE — PROGRESS NOTE ADULT - ASSESSMENT
A/P: 72yF w/ pathologic R humeral shaft fracture, plan for OR 10/14 for ORIF    -NPO after midnight, IVF  -Covid and t&S within 72hrs of OR  -preop labs cbc/bmp/ptt/pt/inr pn 10/14  -FU AM labs  -Pain control  -NWB RUE in coaptation splint  -DVT ppx per primary team  -Medical clearance pending EKG, please document medical clearance for OR  -Plan for OR Friday 10/14

## 2022-10-13 NOTE — PROGRESS NOTE ADULT - PROBLEM SELECTOR PLAN 2
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     Patient is a HIGH risk for surgery.   -METS 1-2, German perioperative risk 2.0%, RCRI score: Class I   -ECG: 10/12/22: NSR  with non-specific T wave changes, no acute ST changes.  LVH. Qtc 472     - ORIF 10/14  - NPO after midnight   - S/p Closed reduction w/ RUE coaptation splint placed by Ortho  post reduction film showing acceptable alignment   - MR cervical/thoracic/ lumbar ordered, result pending    - Pain control: Gabapentin 300mg qd, Start 0.2 q6 Dilaudid (pt received 2 doses yesterday, will observe for 24 hours), prn 0.2q3 for moderate pain, prn 0.4 q3 for severe pain  - HOLD Eliquis 5mg BID 48 hours before procedure  - PT/OT

## 2022-10-13 NOTE — DIETITIAN INITIAL EVALUATION ADULT - ENERGY INTAKE
Fair (50-75%) Currently while admitted to [facility]:  PO intakes/appetite: Pt. appetite improving, consuming 50-75% as per flow sheets  Chewing/swallowing: Denies difficulty chewing/swallowing.   GI Issues: Pt denies nausea, vomiting, diarrhea, or constipation.   Vitamins/Minerals:  None prescribed   Oral Nutritional Supplements:  None prescribed

## 2022-10-13 NOTE — DIETITIAN INITIAL EVALUATION ADULT - EDUCATION DIETARY MODIFICATIONS
Provided recommendations to optimize PO and protein intake, recommended small frequent  meals/snacks  by ordering nutrient-dense foods and leaving non-perishable food away from tray for later consumption during the day or between meals, to start with protein, and sips of supplement throughout the day./(1) partially meets; needs review/practice/verbalization

## 2022-10-13 NOTE — PROGRESS NOTE ADULT - SUBJECTIVE AND OBJECTIVE BOX
SUBJECTIVE AND OBJECTIVE: Pain is controlled on current regimen. No additional PRN use in addition to ATC low dose IV dilaudid. Awaiting surgery tomorrow. Pt had a BM yesterday.      DNR on chart:  Allergies    Pen-Vee K (Rash)  penicillin (Unknown)    Intolerances    MEDICATIONS  (STANDING):  amLODIPine   Tablet 5 milliGRAM(s) Oral daily  citalopram 10 milliGRAM(s) Oral daily  gabapentin 300 milliGRAM(s) Oral daily  hydrochlorothiazide 12.5 milliGRAM(s) Oral daily  HYDROmorphone  Injectable 0.2 milliGRAM(s) IV Push every 6 hours  influenza  Vaccine (HIGH DOSE) 0.7 milliLiter(s) IntraMuscular once  losartan 50 milliGRAM(s) Oral daily  metoprolol tartrate 50 milliGRAM(s) Oral two times a day  polyethylene glycol 3350 17 Gram(s) Oral daily  senna 2 Tablet(s) Oral at bedtime  sodium chloride 0.9%. 1000 milliLiter(s) (100 mL/Hr) IV Continuous <Continuous>    MEDICATIONS  (PRN):  acetaminophen     Tablet .. 650 milliGRAM(s) Oral every 6 hours PRN Temp greater or equal to 38C (100.4F), Mild Pain (1 - 3)  aluminum hydroxide/magnesium hydroxide/simethicone Suspension 30 milliLiter(s) Oral every 4 hours PRN Dyspepsia  HYDROmorphone  Injectable 0.2 milliGRAM(s) IV Push every 3 hours PRN Moderate Pain (4 - 6)  HYDROmorphone  Injectable 0.4 milliGRAM(s) IV Push every 3 hours PRN Severe Pain (7 - 10)  melatonin 3 milliGRAM(s) Oral at bedtime PRN Insomnia  ondansetron Injectable 4 milliGRAM(s) IV Push every 8 hours PRN Nausea and/or Vomiting      ITEMS UNCHECKED ARE NOT PRESENT    PRESENT SYMPTOMS: [ ]Unable to self-report - see [ ] CPOT [ ] PAINADS [ ] RDOS  Source if other than patient:  [ ]Family   [ ]Team     Pain:  [x ]yes [ ]no  QOL impact -   Location -   Right shoulder                 Aggravating factors - movement  Quality - throbbing  Radiation -no radiation  Timing- constant   Severity (0-10 scale):  Minimal acceptable level (0-10 scale):     CPOT:    https://www.[facility].org/getattachment/glt32c46-2s9r-4w3e-0o4p-2657e5392d0x/Critical-Care-Pain-Observation-Tool-(CPOT)    PAIN AD Score:	  [email or web address] (Ctrl + left click to view)    Dyspnea:                           [ ]Mild [ ]Moderate [ ]Severe    RDOS:  0 to 2  minimal or no respiratory distress   3  mild distress  4 to 6 moderate distress  >7 severe distress  https://[email or web address]/handouts/hen/Respiratory_Distress_Observation_Scale.pdf (Ctrl +  left click to view)     Anxiety:                             [ ]Mild [ ]Moderate [ ]Severe  Fatigue:                             [ ]Mild [ ]Moderate [ ]Severe  Nausea:                             [ ]Mild [ ]Moderate [ ]Severe  Loss of appetite:              [ ]Mild [ ]Moderate [ ]Severe  Constipation:                    [ ]Mild [ ]Moderate [ ]Severe    PCSSQ[Palliative Care Spiritual Screening Question]   Severity (0-10):  Score of 4 or > indicate consideration of Chaplaincy referral.  Chaplaincy Referral: [ ] yes [ ] refused [ ] following [x ] Deferred     Caregiver [place]? : [ ] yes [ x] no [ ] Deferred [ ] Declined             Social work referral [ ] Patient & Family Centered Care Referral [ ]     Anticipatory Grief present?:  [ ] yes [ x] no  [ ] Deferred                  Social work referral [ ] Chaplaincy Referral[ ]      Other Symptoms:  [ x]All other review of systems negative     Palliative Performance Status Version 2:    40     %      http://[facility].org/files/news/palliative_performance_scale_ppsv2.pdf    PHYSICAL EXAM:  Vital Signs Last 24 Hrs  T(C): 36.8 (13 Oct 2022 08:25), Max: 36.9 (12 Oct 2022 21:04)  T(F): 98.3 (13 Oct 2022 08:25), Max: 98.4 (12 Oct 2022 21:04)  HR: 85 (13 Oct 2022 08:25) (80 - 90)  BP: 149/78 (13 Oct 2022 08:25) (121/67 - 149/78)  BP(mean): --  RR: 18 (13 Oct 2022 08:25) (18 - 18)  SpO2: 95% (13 Oct 2022 08:25) (93% - 96%)    Parameters below as of 13 Oct 2022 08:25  Patient On (Oxygen Delivery Method): room air       GENERAL: [ ]Cachexia    [x ]Alert  [x ]Oriented   [ ]Lethargic  [ ]Unarousable  [x ]Verbal  [ ]Non-Verbal    Behavioral:   [ ]Anxiety  [ ]Delirium [ ]Agitation [ ]Other    HEENT:  [x ]Normal   [ ]Dry mouth   [ ]ET Tube/Trach  [ ]Oral lesions    PULMONARY:   [x ]Clear [ ]Tachypnea  [ ]Audible excessive secretions   [ ]Rhonchi        [ ]Right [ ]Left [ ]Bilateral  [ ]Crackles        [ ]Right [ ]Left [ ]Bilateral  [ ]Wheezing     [ ]Right [ ]Left [ ]Bilateral  [ ]Diminished BS [ ] Right [ ]Left [ ]Bilateral    CARDIOVASCULAR:    [x ]Regular [ ]Irregular [ ]Tachy  [ ]Campbell [ ]Murmur [ ]Other    GASTROINTESTINAL:  [x ]Soft  [ ]Distended   [ ]+BS  [x ]Non tender [ ]Tender  [ ]Other [ ]PEG [ ]OGT/ NGT   Last BM:   GENITOURINARY:  [ ]Normal [ ]Incontinent   [ ]Oliguria/Anuria   [ ][place]  MUSCULOSKELETAL:   [ ]Normal   [x ]Weakness  [ ]Bed/Wheelchair bound [ ]Edema  NEUROLOGIC:   [x ]No focal deficits  [ ] Cognitive impairment  [ ] Dysphagia [ ]Dysarthria [ ] Paresis [ ]Other   SKIN:   [x ]Normal  [ ]Rash  [ ]Other  [ ]Pressure ulcer(s) [ ]y [ ]n present on admission    CRITICAL CARE:  [ ]Shock Present  [ ]Septic [ ]Cardiogenic [ ]Neurologic [ ]Hypovolemic  [ ]Vasopressors [ ]Inotropes  [ ]Respiratory failure present [ ]Mechanical Ventilation [ ]Non-invasive ventilatory support [ ]High-Flow   [ ]Acute  [ ]Chronic [ ]Hypoxic  [ ]Hypercarbic [ ]Other  [ ]Other organ failure     LABS:                                   9.3    3.54  )-----------( 224      ( 13 Oct 2022 07:02 )             28.7     10-13    132<L>  |  94<L>  |  16  ----------------------------<  80  3.7   |  26  |  0.68    Ca    9.5      13 Oct 2022 07:06    TPro  6.6  /  Alb  3.1<L>  /  TBili  0.8  /  DBili  x   /  AST  34  /  ALT  15  /  AlkPhos  210<H>  10-13      RADIOLOGY & ADDITIONAL STUDIES: no new imaging    Protein Calorie Malnutrition Present: [ ]mild [ ]moderate [ ]severe [ ]underweight [ ]morbid obesity  https://www.andeal.org/vault/2440/web/files/ONC/Table_Clinical%20Characteristics%20to%20Document%20Malnutrition-White%20JV%20et%20al%049745.pdf    Height (cm): 165.1 (10-10-22 @ 15:35), 162.5 (06-01-22 @ 15:00), 165.1 (05-06-22 @ 16:33)  Weight (kg): 64.4 (10-10-22 @ 15:35), 60.9991 (06-01-22 @ 15:00), 67.6 (05-06-22 @ 16:33)  BMI (kg/m2): 23.6 (10-10-22 @ 15:35), 23.1 (06-01-22 @ 15:00), 24.8 (05-06-22 @ 16:33)    [ ]PPSV2 < or = 30%  [ ]significant weight loss [ ]poor nutritional intake [ ]anasarca[ ]Artificial Nutrition    Other REFERRALS:  [ ]Hospice  [ ]Child Life  [ ]Social Work  [ ]Case management [ ]Holistic Therapy

## 2022-10-13 NOTE — PROGRESS NOTE ADULT - SUBJECTIVE AND OBJECTIVE BOX
Resting in bed, comfortable. Splint on right arm. No reports of new weakness, numbness/tingling.    Exam:   Alert/Oriented, No Acute Distress           Sensation: [X] intact to light touch  [ ] decreased:          Motor exam: [X]          [ ] Upper extremity                Bi          Tri        Delt                                                    R         Splint but moving fingers                                               L          5/5        5/5        5/5                  [ ] Lower extremeity            PF          DF         EHL       FHL                                                                                            R        5/5        5/5        5/5       5/5                                                        L         Wiggles toes, hx of left hip fx                                                                  Calves Soft/Non-tender bilaterally           [ ] warm well perfused; capillary refill <3 seconds              LABS:                        9.3    3.54  )-----------( 224      ( 13 Oct 2022 07:02 )             28.7     10-13    132<L>  |  94<L>  |  16  ----------------------------<  80  3.7   |  26  |  0.68    Ca    9.5      13 Oct 2022 07:06    TPro  6.6  /  Alb  3.1<L>  /  TBili  0.8  /  DBili  x   /  AST  34  /  ALT  15  /  AlkPhos  210<H>  10-13        RADIOLOGY & ADDITIONAL STUDIES:  MRI C/T/L Spine reviewed  Diffues metastatic disease involving thoracic/lumbar spine  Severe cord compression noted in thoracic spine  No myelomalacia noted    A/P :  72y Female with a history of metastatic disease diffuse through the thoracic/lumbar spine  I discussed with the patient at length options for treatment. I did discuss the role of surgery for her spinal lesions. Her CT scan shows no areas of bone amenable to fixation and the thoracic spine does show severe stenosis on MRI. I explained that surgery would be high risk. We both came to the conclusion that surgery should be avoided given the high complication profile. I would recommend rad onc evaluation for possible treatment to the spine.    O/S spine to continue to follow. Please teams message me anytime with questions    Ellis Cassidy

## 2022-10-13 NOTE — PROGRESS NOTE ADULT - PROBLEM SELECTOR PLAN 1
Hx of recent L fem fx s/p repair p/w fall  - Imaging shows acute right midshaft humerus fracture and hepatic mets  - S/p Closed reduction w/ RUE coaptation splint placed by Ortho  - Pending ORIF w/ Ortho on 10/14  ISTOP Reference #: [id number], chart note on 10/12   Pain control:  - c/w 0.2 q6 dilaudid   - c/w prn 0.2q3 for moderate pain  - c/w prn 0.4 q3 for severe pain  - plan to transition to orals after surgery

## 2022-10-13 NOTE — DIETITIAN INITIAL EVALUATION ADULT - NSFNSPHYEXAMSKINFT_GEN_A_CORE
Pressure Injury 1: Bilateral:,buttocks, Stage I  Pressure Injury 2: none, none  Pressure Injury 3: none, none  Pressure Injury 4: none, none  Pressure Injury 5: none, none  Pressure Injury 6: none, none  Pressure Injury 7: none, none  Pressure Injury 8: none, none  Pressure Injury 9: none, none  Pressure Injury 10: none, none  Pressure Injury 11: none, none

## 2022-10-13 NOTE — DIETITIAN INITIAL EVALUATION ADULT - OTHER CALCULATIONS
estimated caloric, protein and fluid needs based on pt. current dosing weight of 142 pounds/64.4 kg

## 2022-10-13 NOTE — PROGRESS NOTE ADULT - PROBLEM SELECTOR PLAN 5
Hx/o metastatic breast ca, Lumpectomy x2, RT, chemo, now w/ hepatic mets on imaging     - MR cervical/thoracic/ lumbar -Extensive spinal metastatic disease with epidural involvement, markedly   progressed since comparison MRI from 10/14/2021  - Pain control: c/w home med -- Gabapentin & Dilaudid   - Consider Onco consult

## 2022-10-13 NOTE — PROGRESS NOTE ADULT - ASSESSMENT
72yF with metastatic breast ca, Lumpectomy x2, RT, chemo, Ex smoker, Asthma, DVT ( on Eliquis), HTN, Osteoarthritis recent L fem fx s/p repair p/w fall, found to have L humeral fracture with plans for ORIF, Geriatrics and Palliative Medicine Team is consulted for assistance with pain control

## 2022-10-13 NOTE — DIETITIAN INITIAL EVALUATION ADULT - REASON FOR ADMISSION
Chart reviewed, Events noted:  72y f pmh metastatic breast ca, Lumpectomy x2, RT, chemo, Ex smoker, Asthma, DVT ( on Eliquis), HTN, Osteoarthritis recent L fem fx s/p repair p/w fall. Pt reports she was at home, her L leg gave out fell, she fell on bathroom floor hit R side of body.  Unable to get up x1hr.   Now reporting pain to R arm LLE and chest. Unable to ambulate.  Denies head trauma, LOC. no prodrome before fall.   Denies HA, CP, SOB, N/V,  vision change, loss of sensation, abd pain.

## 2022-10-13 NOTE — DIETITIAN INITIAL EVALUATION ADULT - REASON INDICATOR FOR ASSESSMENT
significant decrease in oral intake > 3 days PTA  Information obtained from: review of pt. current medical record and interview with pt. bedside

## 2022-10-13 NOTE — DIETITIAN INITIAL EVALUATION ADULT - ORAL INTAKE PTA/DIET HISTORY
No known food allergies confirmed by pt.   Prior to admission:  Diet restrictions: Pt. reports not following any dietary restrictions at home   Appetite/ PO intakes: Pt. reports poor PO intake PTA, states her chemo pills made her nauseous, which affected her appetite and intake  Chewing/Swallowing: Denies difficulty chewing/swallowing.   GI: confirms nausea due to chemo medicine, she denies any constipation/diarrhea or vomiting   Vitamins/Minerals:  None prescribed   Oral nutritional supplements: none as per pt.

## 2022-10-13 NOTE — PROGRESS NOTE ADULT - PROBLEM SELECTOR PLAN 1
MRI spine: new dorsal epidural component at T8 resulting in cord compression. No new neurological deficits on physical examination     -Neurosurgery consulted

## 2022-10-13 NOTE — DIETITIAN INITIAL EVALUATION ADULT - ADD RECOMMEND
1) Recommend discontinue DASH/TLC, provide instead Low Sodium to optimize PO intakes  2) Recommend Ensure Plus High Protein 1x/Day  3) Consider multivitamin and vitamin C supplements if no medical contraindications to aid in wound healing.   4) Encouraged PO intake as tolerated. Honor food preferences as appropriate and available.  Diet education provided.  5) Monitor PO intake, GI tolerance, skin integrity and labs. RD remains available if needed, pt is aware.   6) Malnutrition Sticker placed in pt. chart

## 2022-10-13 NOTE — PROGRESS NOTE ADULT - ASSESSMENT
71 y/o F with PMhx metastatic breast ca, Lumpectomy x2, Rt, chemo, Ex smoker, Asthma, DVT/eliquis, HTN, Osteoarthritis recent L fem fx s/p repair p/w fall, found to have acute displaced right midshaft humerus fracture. ORIF planned for 10/14.

## 2022-10-13 NOTE — DIETITIAN INITIAL EVALUATION ADULT - NUTRITIONGOAL OUTCOME2
Patient will increase their protein intake >75% of estimated nutrient needs to promote wound healing

## 2022-10-13 NOTE — PROGRESS NOTE ADULT - PROBLEM SELECTOR PLAN 4
- Geriatrics and Palliative Medicine Team will follow and adjust pain regimen as necessary    Yanet Espinoza MD  [facility] Team Consults  Please call if we can be of assistance, [phone]

## 2022-10-13 NOTE — DIETITIAN INITIAL EVALUATION ADULT - SIGNS/SYMPTOMS
mild/moderate muscle wasting, moderate edema, </=50% of estimated energy requirements for >/=5 days Stage 1 pressure injury and breast CA with chemo

## 2022-10-13 NOTE — DIETITIAN INITIAL EVALUATION ADULT - PERTINENT MEDS FT
MEDICATIONS  (STANDING):  amLODIPine   Tablet 5 milliGRAM(s) Oral daily  citalopram 10 milliGRAM(s) Oral daily  gabapentin 300 milliGRAM(s) Oral daily  hydrochlorothiazide 12.5 milliGRAM(s) Oral daily  HYDROmorphone  Injectable 0.2 milliGRAM(s) IV Push every 6 hours  influenza  Vaccine (HIGH DOSE) 0.7 milliLiter(s) IntraMuscular once  losartan 50 milliGRAM(s) Oral daily  metoprolol tartrate 50 milliGRAM(s) Oral two times a day  polyethylene glycol 3350 17 Gram(s) Oral daily  senna 2 Tablet(s) Oral at bedtime  sodium chloride 0.9%. 1000 milliLiter(s) (100 mL/Hr) IV Continuous <Continuous>    MEDICATIONS  (PRN):  acetaminophen     Tablet .. 650 milliGRAM(s) Oral every 6 hours PRN Temp greater or equal to 38C (100.4F), Mild Pain (1 - 3)  aluminum hydroxide/magnesium hydroxide/simethicone Suspension 30 milliLiter(s) Oral every 4 hours PRN Dyspepsia  HYDROmorphone  Injectable 0.2 milliGRAM(s) IV Push every 3 hours PRN Moderate Pain (4 - 6)  HYDROmorphone  Injectable 0.4 milliGRAM(s) IV Push every 3 hours PRN Severe Pain (7 - 10)  melatonin 3 milliGRAM(s) Oral at bedtime PRN Insomnia  ondansetron Injectable 4 milliGRAM(s) IV Push every 8 hours PRN Nausea and/or Vomiting

## 2022-10-13 NOTE — PROGRESS NOTE ADULT - SUBJECTIVE AND OBJECTIVE BOX
[facility] Division of Hospital Medicine  Khadijah Mccoy MD  Available via MS Teams  Pager: [phone]    SUBJECTIVE / OVERNIGHT EVENTS: Patient seen and examined at bedside. No acute overnight events. As stated yesterday, patient has reduced ROM of LLE from her previous surgery but denies new neurological deficits. Patient states she has limited mobility and cannot climb a flight of stairs or ambulate long distance without wheelchair. Denies dyspnea at rest, chest pain. Denies significant cardiac history. Afebrile.     ADDITIONAL REVIEW OF SYSTEMS: as stated in the HPI.     MEDICATIONS  (STANDING):  amLODIPine   Tablet 5 milliGRAM(s) Oral daily  citalopram 10 milliGRAM(s) Oral daily  gabapentin 300 milliGRAM(s) Oral daily  hydrochlorothiazide 12.5 milliGRAM(s) Oral daily  HYDROmorphone  Injectable 0.2 milliGRAM(s) IV Push every 6 hours  influenza  Vaccine (HIGH DOSE) 0.7 milliLiter(s) IntraMuscular once  losartan 50 milliGRAM(s) Oral daily  metoprolol tartrate 50 milliGRAM(s) Oral two times a day  polyethylene glycol 3350 17 Gram(s) Oral daily  senna 2 Tablet(s) Oral at bedtime  sodium chloride 0.9%. 1000 milliLiter(s) (100 mL/Hr) IV Continuous <Continuous>    MEDICATIONS  (PRN):  acetaminophen     Tablet .. 650 milliGRAM(s) Oral every 6 hours PRN Temp greater or equal to 38C (100.4F), Mild Pain (1 - 3)  aluminum hydroxide/magnesium hydroxide/simethicone Suspension 30 milliLiter(s) Oral every 4 hours PRN Dyspepsia  HYDROmorphone  Injectable 0.2 milliGRAM(s) IV Push every 3 hours PRN Moderate Pain (4 - 6)  HYDROmorphone  Injectable 0.4 milliGRAM(s) IV Push every 3 hours PRN Severe Pain (7 - 10)  melatonin 3 milliGRAM(s) Oral at bedtime PRN Insomnia  ondansetron Injectable 4 milliGRAM(s) IV Push every 8 hours PRN Nausea and/or Vomiting      I&O's Summary    12 Oct 2022 07:01  -  13 Oct 2022 07:00  --------------------------------------------------------  IN: 490 mL / OUT: 1050 mL / NET: -560 mL    13 Oct 2022 07:01  -  13 Oct 2022 16:31  --------------------------------------------------------  IN: 240 mL / OUT: 0 mL / NET: 240 mL        PHYSICAL EXAM:  Vital Signs Last 24 Hrs  T(C): 36.8 (13 Oct 2022 12:44), Max: 36.9 (12 Oct 2022 21:04)  T(F): 98.3 (13 Oct 2022 12:44), Max: 98.4 (12 Oct 2022 21:04)  HR: 84 (13 Oct 2022 12:44) (80 - 90)  BP: 129/79 (13 Oct 2022 12:44) (121/67 - 149/78)  BP(mean): --  RR: 18 (13 Oct 2022 12:44) (18 - 18)  SpO2: 97% (13 Oct 2022 12:44) (93% - 97%)    Parameters below as of 13 Oct 2022 12:44  Patient On (Oxygen Delivery Method): room air      CONSTITUTIONAL: frail elderly female with limited mobility   EYES: PERRLA; conjunctiva and sclera clear  ENMT: Moist oral mucosa, no pharyngeal injection or exudates; normal dentition  NECK: Supple  RESPIRATORY: Normal respiratory effort; lungs are clear to auscultation bilaterally  CARDIOVASCULAR: Regular rate and rhythm, normal S1 and S2, no murmur/rub/gallop; No lower extremity edema; Peripheral pulses are 2+ bilaterally  ABDOMEN: Nontender to palpation, normoactive bowel sounds, no rebound/guarding; No hepatosplenomegaly  MUSCULOSKELETAL:   +RUE cast with limited ROM, +LLE with decreased ROM at baseline   PSYCH: A+O to person, place, and time; affect appropriate  NEUROLOGY: CN 2-12 are intact and symmetric; no gross sensory deficits   SKIN: No rashes; no palpable lesions    LABS:                        9.3    3.54  )-----------( 224      ( 13 Oct 2022 07:02 )             28.7     10-13    132<L>  |  94<L>  |  16  ----------------------------<  80  3.7   |  26  |  0.68    Ca    9.5      13 Oct 2022 07:06    TPro  6.6  /  Alb  3.1<L>  /  TBili  0.8  /  DBili  x   /  AST  34  /  ALT  15  /  AlkPhos  210<H>  10-13    PT/INR - ( 13 Oct 2022 12:54 )   PT: 17.2 sec;   INR: 1.48 ratio         PTT - ( 13 Oct 2022 12:54 )  PTT:27.4 sec          SARS-CoV-2: NotDetec (06 May 2022 18:33)      RADIOLOGY & ADDITIONAL TESTS:  New Results Reviewed Today:     New Imaging Personally Reviewed Today:    < from: MR Lumbar Spine w/wo IV Cont (10.12.22 @ 21:37) >  IMPRESSION:    -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    -Of note, there is new dorsal epidural component at T8 resulting in cord   compression. No abnormal cord signal identified. Findings discussed with   Dr. Mccoy at 11:06 AM 10/13/2022.    - L2-L3 and L4-L5 epidural metastatic disease and degenerative changes   contribute to severethecal sac stenosis, worsened since comparison MRI   10/14/2021.    -Grossly stable L3-L4 severe thecal sac stenosis.    --- End of Report ---    < end of copied text >    New Electrocardiogram Personally Reviewed Today:  10/12/22: NSR  with non-specific T wave changes, no acute ST changes.  LVH. Qtc 472     Prior or Outpatient Records Reviewed Today:    COMMUNICATION:  Care Discussed with Consultants/Other Providers and Details of Discussion: Consulted Neurosurgery regarding new findings for new dorsal epidural component at T8 resulting in cord   compression.  Discussions with Patient/Family: Discussed with family regarding findings and plan for ORIF for humeral fracture for tomorrow. Patient and family are concerned regarding procedure and would like to speak to Orthopedic surgeon case. Contacted Orthopedic surgeon on case to reach out to family to address questions or concerns.   PCP Communication:

## 2022-10-13 NOTE — CHART NOTE - NSCHARTNOTEFT_GEN_A_CORE
Patient has t8 cord compession and now with urinary retention. Spoke with neurosurgery at [facility] whom indicated that patient has refused surgery and would like to be kept comfortable and consider hospice after she speaks with her Oncologist. Patient now with urinary retention ; she is aware that urinary retention is symptom from cord compression. Patient has t8 cord compession and now with urinary retention. Spoke with neurosurgery at [facility] whom indicated that patient has refused surgery and would like to be kept comfortable and consider hospice after she speaks with her Oncologist. Patient now with urinary retention ; she is aware that urinary retention is symptom from cord compression. peace catheter was ordered Patient has t8 cord compession and now with urinary retention. Spoke with neurosurgery at [facility] informed them that patient has urianry retention ; neurosurgery  indicated that patient has refused surgery and would like to be kept comfortable and consider hospice after she speaks with her Oncologist. Patient now with urinary retention ; she is aware that urinary retention is symptom from cord compression. peace catheter was ordered

## 2022-10-13 NOTE — DIETITIAN NUTRITION RISK NOTIFICATION - TREATMENT: THE FOLLOWING DIET HAS BEEN RECOMMENDED
Diet, Regular:   DASH/TLC {Sodium & Cholesterol Restricted} (DASH) (10-11-22 @ 04:57) [Active]

## 2022-10-13 NOTE — DIETITIAN INITIAL EVALUATION ADULT - PERTINENT LABORATORY DATA
10-13    132<L>  |  94<L>  |  16  ----------------------------<  80  3.7   |  26  |  0.68    Ca    9.5      13 Oct 2022 07:06    TPro  6.6  /  Alb  3.1<L>  /  TBili  0.8  /  DBili  x   /  AST  34  /  ALT  15  /  AlkPhos  210<H>  10-13

## 2022-10-13 NOTE — DIETITIAN INITIAL EVALUATION ADULT - PHYSCIAL ASSESSMENT
Current Weight: 142 pounds/64.4 kg (dosing weight 10/10/22).    IBW:  125 pounds %IBW: 114% UBW: as per pt. her UBW has been about ~ 140 pounds for the past 1 year  Weight History:  As per [facility] HIE:  61 kg/134.2 pounds (6/1/22), 68.5 kg/150.7 pounds (4/13/22)   Weight trend: 8.7 pounds/ 5.8% wt loss noted X 6 months (clinically not significant)

## 2022-10-14 NOTE — PROGRESS NOTE ADULT - PROBLEM SELECTOR PLAN 5
Hx/o metastatic breast ca, Lumpectomy x2, RT, chemo, now w/ hepatic mets on imaging with spinal metastatic disease with epidural involvement    - MR cervical/thoracic/ lumbar -Extensive spinal metastatic disease with epidural involvement, markedly   progressed since comparison MRI from 10/14/2021  - Pain control: c/w home med -- Gabapentin & Dilaudid   - Palliative following   - Hospice consulted   - Heme/Onc consulted

## 2022-10-14 NOTE — PROGRESS NOTE ADULT - PROBLEM SELECTOR PLAN 1
MRI cervical/thoracic/lumbar: new dorsal epidural component at T8 resulting in cord compression. Intermittent urinary retention, denies other focal neurological deficits. Reduced ROM at baseline of LLE. Conservative management given extent of metastatic bone involvement of spine.    -Neurosurgery consulted, recs appreciated  -Heme/Onc consulted for possible radiation oncology evaluation

## 2022-10-14 NOTE — PROGRESS NOTE ADULT - ASSESSMENT
71 y/o F with PMhx metastatic breast ca, Lumpectomy x2, Rt, chemo, Ex smoker, Asthma, DVT/eliquis, HTN, Osteoarthritis recent L fem fx s/p repair p/w fall, found to have acute displaced right midshaft humerus fracture. ORIF planned for 10/14. New findings for T8 cord compression. Spine surgery following, given extent of metastatic bone disease, patient opted for conservative treatment. Heme/Onc and Hospice consulted.

## 2022-10-14 NOTE — PROGRESS NOTE ADULT - PROBLEM SELECTOR PLAN 1
Hx of recent L fem fx s/p repair p/w fall  - Imaging shows acute right midshaft humerus fracture and hepatic mets  - S/p Closed reduction w/ RUE coaptation splint placed by Ortho  - Pending ORIF w/ Ortho on 10/14  ISTOP Reference #: [id number], chart note on 10/12   Pain control:  - c/w 0.2 q6 dilaudid   - c/w prn 0.2q3 for moderate pain  - c/w prn 0.4 q3 for severe pain  - plan to transition to orals after surgery  -Has not used many doses. Would benefit from converting her to a different regiment such as oxycodone.   -Recommend to start Tylenol standing 950mg q8hrs  -If patient is in agreement may use Oxycodone 2.5mg q6hrs PRN instead of Dilaudid  -Recommend to touch base with oncology if Palliative radiation therapy to the spine is another option upon discharge. Hx of recent L fem fx s/p repair p/w fall  - Imaging shows acute right midshaft humerus fracture and hepatic mets  - S/p closed reduction w/ RUE coaptation splint placed by Ortho  - Pending ORIF w/ Ortho on 10/14  ISTOP Reference #: [id number], chart note on 10/12   Pain control:  - c/w 0.2 q6 dilaudid with holding parameters, continue for 2 more days.  - Add oral tylenol 500 mg q8 hours  - change IV dilaudid PRN to oral dilaudid 1-2 mg q4 prn mod-severe pain

## 2022-10-14 NOTE — PROGRESS NOTE ADULT - SUBJECTIVE AND OBJECTIVE BOX
SUBJECTIVE AND OBJECTIVE  Indication for Geriatrics and Palliative Care Services/INTERVAL HPI:    OVERNIGHT EVENTS: Patient examined at bedside. Reports pain 5/10 in the right arm and back. Patient currently on Dilaudid 0.2 q6hrs. Per conversation with staff patient becomes very sleepy with this dose. Denies constipation or diarrhea. Patient reports regular bowel movement. Whitehead in place. Discussed with patient the current pain regiment and recommended to make changes for better control and she refused. Patient is scheduled for orthopedic procedure of the right arm this afternoon.     DNR on chart:  Allergies    Pen-Vee K (Rash)  penicillin (Unknown)    Intolerances    MEDICATIONS  (STANDING):  amLODIPine   Tablet 5 milliGRAM(s) Oral daily  citalopram 10 milliGRAM(s) Oral daily  gabapentin 300 milliGRAM(s) Oral daily  hydrochlorothiazide 12.5 milliGRAM(s) Oral daily  HYDROmorphone  Injectable 0.2 milliGRAM(s) IV Push every 6 hours  influenza  Vaccine (HIGH DOSE) 0.7 milliLiter(s) IntraMuscular once  lactated ringers. 500 milliLiter(s) (75 mL/Hr) IV Continuous <Continuous>  losartan 50 milliGRAM(s) Oral daily  metoprolol tartrate 50 milliGRAM(s) Oral two times a day  polyethylene glycol 3350 17 Gram(s) Oral daily  senna 2 Tablet(s) Oral at bedtime  sodium chloride 0.9%. 1000 milliLiter(s) (100 mL/Hr) IV Continuous <Continuous>    MEDICATIONS  (PRN):  acetaminophen     Tablet .. 650 milliGRAM(s) Oral every 6 hours PRN Temp greater or equal to 38C (100.4F), Mild Pain (1 - 3)  aluminum hydroxide/magnesium hydroxide/simethicone Suspension 30 milliLiter(s) Oral every 4 hours PRN Dyspepsia  HYDROmorphone  Injectable 0.2 milliGRAM(s) IV Push every 3 hours PRN Moderate Pain (4 - 6)  HYDROmorphone  Injectable 0.4 milliGRAM(s) IV Push every 3 hours PRN Severe Pain (7 - 10)  melatonin 3 milliGRAM(s) Oral at bedtime PRN Insomnia  ondansetron Injectable 4 milliGRAM(s) IV Push every 8 hours PRN Nausea and/or Vomiting      ITEMS UNCHECKED ARE NOT PRESENT    PRESENT SYMPTOMS: [ ]Unable to self-report - see [ ] CPOT [ ] PAINADS [ ] RDOS  Source if other than patient:  [ ]Family   [ ]Team     Pain:  [ x]yes [ ]no  QOL impact -   Location -   Right upper extremity, back                 Aggravating factors - movement   Quality -achy/dull  Radiation -no  Timing-  Severity (0-10 scale): 5/10  Minimal acceptable level (0-10 scale):     Dyspnea:                           [ ]Mild [ ]Moderate [ ]Severe  Anxiety:                             [ ]Mild [ ]Moderate [ ]Severe  Fatigue:                             [ ]Mild [ ]Moderate [ ]Severe  Nausea:                             [ ]Mild [ ]Moderate [ ]Severe  Loss of appetite:              [ ]Mild [ ]Moderate [ ]Severe  Constipation:                    [ ]Mild [ ]Moderate [ ]Severe    PCSSQ[Palliative Care Spiritual Screening Question]   Severity (0-10):  Score of 4 or > indicate consideration of Chaplaincy referral.  Chaplaincy Referral: [ ] yes [ ] refused [ ] following [ ] Deferred     Caregiver [place]? : [ ] yes [ ] no [ ] Deferred [ ] Declined             Social work referral [ ] Patient & Family Centered Care Referral [ ]     Anticipatory Grief present?:  [ ] yes [ ] no  [ ] Deferred                  Social work referral [ ] Chaplaincy Referral[ ]    Other Symptoms:  [ x]All other review of systems negative     Palliative Performance Status Version 2: 40  %      http://npcrc.org/files/news/palliative_performance_scale_ppsv2.pdf    PHYSICAL EXAM:  Vital Signs Last 24 Hrs  T(C): 36.4 (14 Oct 2022 08:40), Max: 37.1 (13 Oct 2022 21:00)  T(F): 97.6 (14 Oct 2022 08:40), Max: 98.7 (13 Oct 2022 21:00)  HR: 85 (14 Oct 2022 08:40) (66 - 85)  BP: 148/90 (14 Oct 2022 08:40) (120/66 - 148/90)  BP(mean): --  RR: 18 (14 Oct 2022 08:40) (17 - 18)  SpO2: 98% (14 Oct 2022 08:40) (95% - 98%)    Parameters below as of 14 Oct 2022 08:40  Patient On (Oxygen Delivery Method): room air     I&O's Summary    13 Oct 2022 07:01  -  14 Oct 2022 07:00  --------------------------------------------------------  IN: 240 mL / OUT: 0 mL / NET: 240 mL       GENERAL: [ ]Cachexia  lethargic appearing laying in bed   [ x]Alert  [ x]Oriented x   [ x]Lethargic  [ ]Unarousable  [ x]Verbal  [ ]Non-Verbal  Behavioral:   [ ]Anxiety  [ ]Delirium [ ]Agitation [ ]Other  HEENT:  [ ]Normal   [ x]Dry mouth   [ ]ET Tube/Trach  [ ]Oral lesions  PULMONARY:   [x ]Clear [ ]Tachypnea  [ ]Audible excessive secretions   [ ]Rhonchi        [ ]Right [ ]Left [ ]Bilateral  [ ]Crackles        [ ]Right [ ]Left [ ]Bilateral  [ ]Wheezing     [ ]Right [ ]Left [ ]Bilateral  [ ]Diminished BS [ ] Right [ ]Left [ ]Bilateral  CARDIOVASCULAR:    [ x]Regular [ ]Irregular [x ]Tachy  [ ]Campbell [ ]Murmur [ ]Other  GASTROINTESTINAL:  [x ]Soft  [ ]Distended   [ ]+BS  [ x]Non tender [ ]Tender  [ ]Other [ ]PEG [ ]OGT/ NGT   Last BM:   GENITOURINARY:  [ ]Normal [ ]Incontinent   [ ]Oliguria/Anuria   [ ]Whitehead  MUSCULOSKELETAL:   [ ]Normal   [ x]Weakness  [x ]Bed/Wheelchair bound [ ]Edema  NEUROLOGIC: weakness in the left upper extremity due to pain, difficulty with movement   [ ]No focal deficits  [ ] Cognitive impairment  [ ] Dysphagia [ ]Dysarthria [ ] Paresis [ ]Other   SKIN:   [x ]Normal  [ ]Rash  [ ]Other  [ ]Pressure ulcer(s) [ ]y [ ]n present on admission    CRITICAL CARE:  [ ]Shock Present  [ ]Septic [ ]Cardiogenic [ ]Neurologic [ ]Hypovolemic  [ ]Vasopressors [ ]Inotropes  [ ]Respiratory failure present [ ]Mechanical Ventilation [ ]Non-invasive ventilatory support [ ]High-Flow   [ ]Acute  [ ]Chronic [ ]Hypoxic  [ ]Hypercarbic [ ]Other  [ ]Other organ failure     LABS:                        10.1   3.09  )-----------( 224      ( 14 Oct 2022 05:17 )             31.3   10-14    131<L>  |  94<L>  |  13  ----------------------------<  87  3.8   |  26  |  0.66    Ca    9.6      14 Oct 2022 05:17    TPro  7.1  /  Alb  3.3  /  TBili  1.0  /  DBili  x   /  AST  37  /  ALT  16  /  AlkPhos  234<H>  10-14  PT/INR - ( 14 Oct 2022 10:06 )   PT: 15.6 sec;   INR: 1.35 ratio         PTT - ( 14 Oct 2022 10:06 )  PTT:28.7 sec      RADIOLOGY & ADDITIONAL STUDIES:    Protein Calorie Malnutrition Present: [ ]mild [ ]moderate [ ]severe [ ]underweight [ ]morbid obesity  https://www.andeal.org/vault/2440/web/files/ONC/Table_Clinical%20Characteristics%20to%20Document%20Malnutrition-White%20JV%20et%20al%202012.pdf    Height (cm): 165.1 (10-10-22 @ 15:35), 162.5 (06-01-22 @ 15:00), 165.1 (05-06-22 @ 16:33)  Weight (kg): 64.4 (10-10-22 @ 15:35), 60.9991 (06-01-22 @ 15:00), 67.6 (05-06-22 @ 16:33)  BMI (kg/m2): 23.6 (10-10-22 @ 15:35), 23.1 (06-01-22 @ 15:00), 24.8 (05-06-22 @ 16:33)    [ ]PPSV2 < or = 30%  [ ]significant weight loss [ ]poor nutritional intake [ ]anasarca[ ]Artificial Nutrition    Other REFERRALS:  [ ]Hospice  [ ]Child Life  [ ]Social Work  [ ]Case management [ ]Holistic Therapy    SUBJECTIVE AND OBJECTIVE  Indication for Geriatrics and Palliative Care Services/INTERVAL HPI:    OVERNIGHT EVENTS: Patient examined at bedside. Reports pain 5/10 in the right arm and back. Patient currently on Dilaudid 0.2 q6hrs, she refused 2 doses over the past 24 hours. She states IV Dilaudid helps and she would like to maintain the current regimen for now. Advised for addition of PO tylenol q8 hours as adjunctive therapy. Advised to transition IV dilaudid PRN to orals in anticipation of discharge planning after surgery. Pt agrees.     Per staff patient did fine with IV dilaudid during the day, but at night became confused. Suspect hospital acquired delirium to be playing role.     DNR on chart:  Allergies    Pen-Vee K (Rash)  penicillin (Unknown)    Intolerances    MEDICATIONS  (STANDING):  amLODIPine   Tablet 5 milliGRAM(s) Oral daily  citalopram 10 milliGRAM(s) Oral daily  gabapentin 300 milliGRAM(s) Oral daily  hydrochlorothiazide 12.5 milliGRAM(s) Oral daily  HYDROmorphone  Injectable 0.2 milliGRAM(s) IV Push every 6 hours  influenza  Vaccine (HIGH DOSE) 0.7 milliLiter(s) IntraMuscular once  lactated ringers. 500 milliLiter(s) (75 mL/Hr) IV Continuous <Continuous>  losartan 50 milliGRAM(s) Oral daily  metoprolol tartrate 50 milliGRAM(s) Oral two times a day  polyethylene glycol 3350 17 Gram(s) Oral daily  senna 2 Tablet(s) Oral at bedtime  sodium chloride 0.9%. 1000 milliLiter(s) (100 mL/Hr) IV Continuous <Continuous>    MEDICATIONS  (PRN):  acetaminophen     Tablet .. 650 milliGRAM(s) Oral every 6 hours PRN Temp greater or equal to 38C (100.4F), Mild Pain (1 - 3)  aluminum hydroxide/magnesium hydroxide/simethicone Suspension 30 milliLiter(s) Oral every 4 hours PRN Dyspepsia  HYDROmorphone  Injectable 0.2 milliGRAM(s) IV Push every 3 hours PRN Moderate Pain (4 - 6)  HYDROmorphone  Injectable 0.4 milliGRAM(s) IV Push every 3 hours PRN Severe Pain (7 - 10)  melatonin 3 milliGRAM(s) Oral at bedtime PRN Insomnia  ondansetron Injectable 4 milliGRAM(s) IV Push every 8 hours PRN Nausea and/or Vomiting      ITEMS UNCHECKED ARE NOT PRESENT    PRESENT SYMPTOMS: [ ]Unable to self-report - see [ ] CPOT [ ] PAINADS [ ] RDOS  Source if other than patient:  [ ]Family   [ ]Team     Pain:  [ x]yes [ ]no  QOL impact -   Location -   Right upper extremity, back                 Aggravating factors - movement   Quality -achy/dull  Radiation -no  Timing-  Severity (0-10 scale): 5/10  Minimal acceptable level (0-10 scale):     Dyspnea:                           [ ]Mild [ ]Moderate [ ]Severe  Anxiety:                             [ ]Mild [ ]Moderate [ ]Severe  Fatigue:                             [ ]Mild [ ]Moderate [ ]Severe  Nausea:                             [ ]Mild [ ]Moderate [ ]Severe  Loss of appetite:              [ ]Mild [ ]Moderate [ ]Severe  Constipation:                    [ ]Mild [ ]Moderate [ ]Severe    PCSSQ[Palliative Care Spiritual Screening Question]   Severity (0-10):  Score of 4 or > indicate consideration of Chaplaincy referral.  Chaplaincy Referral: [ ] yes [ ] refused [ ] following [ ] Deferred     Caregiver [place]? : [ ] yes [ ] no [ ] Deferred [ ] Declined             Social work referral [ ] Patient & Family Centered Care Referral [ ]     Anticipatory Grief present?:  [ ] yes [ ] no  [ ] Deferred                  Social work referral [ ] Chaplaincy Referral[ ]    Other Symptoms:  [ x]All other review of systems negative     Palliative Performance Status Version 2: 40  %      http://npcrc.org/files/news/palliative_performance_scale_ppsv2.pdf    PHYSICAL EXAM:  Vital Signs Last 24 Hrs  T(C): 36.4 (14 Oct 2022 08:40), Max: 37.1 (13 Oct 2022 21:00)  T(F): 97.6 (14 Oct 2022 08:40), Max: 98.7 (13 Oct 2022 21:00)  HR: 85 (14 Oct 2022 08:40) (66 - 85)  BP: 148/90 (14 Oct 2022 08:40) (120/66 - 148/90)  BP(mean): --  RR: 18 (14 Oct 2022 08:40) (17 - 18)  SpO2: 98% (14 Oct 2022 08:40) (95% - 98%)    Parameters below as of 14 Oct 2022 08:40  Patient On (Oxygen Delivery Method): room air     I&O's Summary    13 Oct 2022 07:01  -  14 Oct 2022 07:00  --------------------------------------------------------  IN: 240 mL / OUT: 0 mL / NET: 240 mL       GENERAL: [ ]Cachexia  lethargic appearing laying in bed   [ x]Alert  [ x]Oriented x   [ x]Lethargic  [ ]Unarousable  [ x]Verbal  [ ]Non-Verbal  Behavioral:   [ ]Anxiety  [ ]Delirium [ ]Agitation [ ]Other  HEENT:  [ ]Normal   [ x]Dry mouth   [ ]ET Tube/Trach  [ ]Oral lesions  PULMONARY:   [x ]Clear [ ]Tachypnea  [ ]Audible excessive secretions   [ ]Rhonchi        [ ]Right [ ]Left [ ]Bilateral  [ ]Crackles        [ ]Right [ ]Left [ ]Bilateral  [ ]Wheezing     [ ]Right [ ]Left [ ]Bilateral  [ ]Diminished BS [ ] Right [ ]Left [ ]Bilateral  CARDIOVASCULAR:    [ x]Regular [ ]Irregular [x ]Tachy  [ ]Campbell [ ]Murmur [ ]Other  GASTROINTESTINAL:  [x ]Soft  [ ]Distended   [ ]+BS  [ x]Non tender [ ]Tender  [ ]Other [ ]PEG [ ]OGT/ NGT   Last BM:   GENITOURINARY:  [ ]Normal [ ]Incontinent   [ ]Oliguria/Anuria   [ ]Whitehead  MUSCULOSKELETAL:   [ ]Normal   [ x]Weakness  [x ]Bed/Wheelchair bound [ ]Edema  NEUROLOGIC: weakness in the left upper extremity due to pain, difficulty with movement   [ ]No focal deficits  [ ] Cognitive impairment  [ ] Dysphagia [ ]Dysarthria [ ] Paresis [ ]Other   SKIN:   [x ]Normal  [ ]Rash  [ ]Other  [ ]Pressure ulcer(s) [ ]y [ ]n present on admission    CRITICAL CARE:  [ ]Shock Present  [ ]Septic [ ]Cardiogenic [ ]Neurologic [ ]Hypovolemic  [ ]Vasopressors [ ]Inotropes  [ ]Respiratory failure present [ ]Mechanical Ventilation [ ]Non-invasive ventilatory support [ ]High-Flow   [ ]Acute  [ ]Chronic [ ]Hypoxic  [ ]Hypercarbic [ ]Other  [ ]Other organ failure     LABS:                        10.1   3.09  )-----------( 224      ( 14 Oct 2022 05:17 )             31.3   10-14    131<L>  |  94<L>  |  13  ----------------------------<  87  3.8   |  26  |  0.66    Ca    9.6      14 Oct 2022 05:17    TPro  7.1  /  Alb  3.3  /  TBili  1.0  /  DBili  x   /  AST  37  /  ALT  16  /  AlkPhos  234<H>  10-14  PT/INR - ( 14 Oct 2022 10:06 )   PT: 15.6 sec;   INR: 1.35 ratio         PTT - ( 14 Oct 2022 10:06 )  PTT:28.7 sec      RADIOLOGY & ADDITIONAL STUDIES:    Protein Calorie Malnutrition Present: [ ]mild [ ]moderate [ ]severe [ ]underweight [ ]morbid obesity  https://www.andeal.org/vault/2440/web/files/ONC/Table_Clinical%20Characteristics%20to%20Document%20Malnutrition-White%20JV%20et%20al%202012.pdf    Height (cm): 165.1 (10-10-22 @ 15:35), 162.5 (06-01-22 @ 15:00), 165.1 (05-06-22 @ 16:33)  Weight (kg): 64.4 (10-10-22 @ 15:35), 60.9991 (06-01-22 @ 15:00), 67.6 (05-06-22 @ 16:33)  BMI (kg/m2): 23.6 (10-10-22 @ 15:35), 23.1 (06-01-22 @ 15:00), 24.8 (05-06-22 @ 16:33)    [ ]PPSV2 < or = 30%  [ ]significant weight loss [ ]poor nutritional intake [ ]anasarca[ ]Artificial Nutrition    Other REFERRALS:  [ ]Hospice  [ ]Child Life  [ ]Social Work  [ ]Case management [ ]Holistic Therapy

## 2022-10-14 NOTE — PROGRESS NOTE ADULT - PROBLEM SELECTOR PLAN 3
Bowel regimen recommended w/ opoid use  - cont Miralax 17 g dailty, Senna, 2 tabs at bedtime  -No signs or symptoms of constipation Bowel regimen recommended w/ opoid use  - cont Miralax 17 g dailty, Senna, 2 tabs at bedtime  - Pt asking for enema today, ordered in EMR

## 2022-10-14 NOTE — PROGRESS NOTE ADULT - SUBJECTIVE AND OBJECTIVE BOX
[facility] Division of Hospital Medicine  Khadijah Mccoy MD  Available via MS Teams  Pager: [phone]    SUBJECTIVE / OVERNIGHT EVENTS: Patient seen and examined at bedside. No acute overnight events. Patient kept NPO for ORIF procedure today for R humeral fracture. Of note, patient states she had urinary retention yesterday, understands new findings for cord compression, followed up by neurosurgery team and would not like further intervention. Patient states she is slightly dehydrated and constipated. Otherwise denies chest pain, fever, sob, n/v/d, abd pain. Pain unchanged.     ADDITIONAL REVIEW OF SYSTEMS: as stated in HPI     MEDICATIONS  (STANDING):  acetaminophen     Tablet .. 975 milliGRAM(s) Oral every 8 hours  amLODIPine   Tablet 5 milliGRAM(s) Oral daily  citalopram 10 milliGRAM(s) Oral daily  gabapentin 300 milliGRAM(s) Oral daily  hydrochlorothiazide 12.5 milliGRAM(s) Oral daily  HYDROmorphone  Injectable 0.2 milliGRAM(s) IV Push every 6 hours  influenza  Vaccine (HIGH DOSE) 0.7 milliLiter(s) IntraMuscular once  lactated ringers. 500 milliLiter(s) (75 mL/Hr) IV Continuous <Continuous>  losartan 50 milliGRAM(s) Oral daily  metoprolol tartrate 50 milliGRAM(s) Oral two times a day  polyethylene glycol 3350 17 Gram(s) Oral daily  senna 2 Tablet(s) Oral at bedtime  sodium chloride 0.9%. 1000 milliLiter(s) (100 mL/Hr) IV Continuous <Continuous>    MEDICATIONS  (PRN):  aluminum hydroxide/magnesium hydroxide/simethicone Suspension 30 milliLiter(s) Oral every 4 hours PRN Dyspepsia  HYDROmorphone   Tablet 1 milliGRAM(s) Oral every 4 hours PRN Moderate Pain (4 - 6)  HYDROmorphone   Tablet 2 milliGRAM(s) Oral every 4 hours PRN Severe Pain (7 - 10)  melatonin 3 milliGRAM(s) Oral at bedtime PRN Insomnia  ondansetron Injectable 4 milliGRAM(s) IV Push every 8 hours PRN Nausea and/or Vomiting      I&O's Summary    13 Oct 2022 07:01  -  14 Oct 2022 07:00  --------------------------------------------------------  IN: 240 mL / OUT: 0 mL / NET: 240 mL        PHYSICAL EXAM:  Vital Signs Last 24 Hrs  T(C): 36.4 (14 Oct 2022 13:14), Max: 37.1 (13 Oct 2022 21:00)  T(F): 97.5 (14 Oct 2022 13:14), Max: 98.7 (13 Oct 2022 21:00)  HR: 91 (14 Oct 2022 13:14) (66 - 91)  BP: 144/85 (14 Oct 2022 13:14) (120/66 - 148/90)  BP(mean): --  RR: 18 (14 Oct 2022 13:14) (17 - 18)  SpO2: 98% (14 Oct 2022 13:14) (95% - 98%)    Parameters below as of 14 Oct 2022 13:14  Patient On (Oxygen Delivery Method): room air      CONSTITUTIONAL: frail elderly female with limited mobility   EYES: PERRLA; conjunctiva and sclera clear  ENMT: Moist oral mucosa, no pharyngeal injection or exudates; normal dentition  NECK: Supple  RESPIRATORY: Normal respiratory effort; lungs are clear to auscultation bilaterally  CARDIOVASCULAR: Regular rate and rhythm, normal S1 and S2, no murmur/rub/gallop; No lower extremity edema; Peripheral pulses are 2+ bilaterally  ABDOMEN: Nontender to palpation, normoactive bowel sounds, no rebound/guarding; No hepatosplenomegaly  MUSCULOSKELETAL:   +RUE cast with limited ROM, +LLE with decreased ROM at baseline   PSYCH: A+O to person, place, and time; affect appropriate  NEUROLOGY: CN 2-12 are intact and symmetric; no gross sensory deficits   SKIN: No rashes; no palpable lesions    LABS:                        10.1   3.09  )-----------( 224      ( 14 Oct 2022 05:17 )             31.3     10-14    131<L>  |  94<L>  |  13  ----------------------------<  87  3.8   |  26  |  0.66    Ca    9.6      14 Oct 2022 05:17    TPro  7.1  /  Alb  3.3  /  TBili  1.0  /  DBili  x   /  AST  37  /  ALT  16  /  AlkPhos  234<H>  10-14    PT/INR - ( 14 Oct 2022 10:06 )   PT: 15.6 sec;   INR: 1.35 ratio         PTT - ( 14 Oct 2022 10:06 )  PTT:28.7 sec          SARS-CoV-2: NotDetec (06 May 2022 18:33)      RADIOLOGY & ADDITIONAL TESTS:  New Results Reviewed Today:   < from: MR Lumbar Spine w/wo IV Cont (10.12.22 @ 21:37) >    -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    -Of note, there is new dorsal epidural component at T8 resulting in cord   compression. No abnormal cord signal identified. Findings discussed with   Dr. Mccoy at 11:06 AM 10/13/2022.    - L2-L3 and L4-L5 epidural metastatic disease and degenerative changes   contribute to severethecal sac stenosis, worsened since comparison MRI   10/14/2021.    -Grossly stable L3-L4 severe thecal sac stenosis.  < end of copied text >    New Imaging Personally Reviewed Today:  New Electrocardiogram Personally Reviewed Today:  Prior or Outpatient Records Reviewed Today:    COMMUNICATION:  Care Discussed with Consultants/Other Providers and Details of Discussion:  Will consult Heme/Onc for possible radiation oncology evaluation. Consulted Hospice.   Discussions with Patient/Family: Family at bedside, understands patient is going to OR today.  PCP Communication:

## 2022-10-14 NOTE — PROGRESS NOTE ADULT - PROBLEM SELECTOR PLAN 2
Acute displaced right midshaft humerus fracture     - ORIF 10/14  - Follow post-op for pain management, encourage PT/OT, monitor for post-op infections  - S/p Closed reduction w/ RUE coaptation splint placed by Ortho  post reduction film showing acceptable alignment   - Pain control: Gabapentin 300mg qd, Start 0.2 q6 Dilaudid, prn 0.2q3 for moderate pain, prn 0.4 q3 for severe pain  - HOLD Eliquis 5mg BID 48 hours before procedure, resume after procedure

## 2022-10-14 NOTE — CONSULT NOTE ADULT - ATTENDING COMMENTS
72F h/o metastatic breast cancer (hormone positive, HER2 neg), DVT on eliquis, recent L fem fx s/p repair p/w fall, found to have acute displaced right midshaft humerus fracture, pending OR for humeral fracture on 10/14. Patient going to OR for fracture surgery.  Patient had multiple lines of treatment for breast cancer. P atient with progressive disease, and PS 3.  Patient unlikely to benefit from more cancer directed therapies. Family also on board with palliative options. Will reach out to Dr. Dunaway prior to consulting palliative care.

## 2022-10-14 NOTE — PROGRESS NOTE ADULT - PROBLEM SELECTOR PLAN 4
Hx/o metastatic breast ca, DVT on Eliquis  - HOLD Eliquis 5mg BID 48 hours before procedure, resume after procedure   - Started compression stockings for DVT ppx

## 2022-10-14 NOTE — CHART NOTE - NSCHARTNOTEFT_GEN_A_CORE
Patient seen and imaging reviewed with Orthopedic spine attending Dr. Cassidy. He is recommending palliative radiation therapy to her spine due to her extensive metastatic disease. Extent of disease makes her a poor surgical candidate for spine surgery.    - Recommend radiation oncology consult  - No orthopedic surgical intervention for spine at this time    Benton Kelly, PGY3  Orthopedics  [phone]

## 2022-10-14 NOTE — PROGRESS NOTE ADULT - ASSESSMENT
A/P: 72yF w/ pathologic R humeral shaft fracture, plan for OR 10/14 for ORIF    - INR still above 1.3 threshold. Patient needs to be reversed (medicine team aware)  -NPO/IVF  -Pain control  -NWB RUE in coaptation splint  -DVT ppx per primary team  -Plan for OR Friday 10/14    Orthopaedic Surgery  [facility] [id number]  [facility]        [id number]  [facility]  [id number]/ [phone]

## 2022-10-14 NOTE — CHART NOTE - NSCHARTNOTEFT_GEN_A_CORE
Spoke with ortho about planned OR procedure for ORIF for humeral fracture. INR is currently 1.41. Ortho requires INR to be below 1.3 to be able to continue with planned surgery today and is requesting vitamin K reversal. Discussed with [facility]. Agreed to give oral vitamin k for INR reversal. Will continue to monitor.     Lori Boggs PA-C  Department of Medicine

## 2022-10-14 NOTE — CONSULT NOTE ADULT - SUBJECTIVE AND OBJECTIVE BOX
HPI:  72y f pmh metastatic breast ca, Lumpectomy x2, RT, chemo, Ex smoker, Asthma, DVT ( on Eliquis), HTN, Osteoarthritis recent L fem fx s/p repair p/w fall. Pt reports she was at home, her L leg gave out fell, she fell on bathroom floor hit R side of body.  Unable to get up x1hr.   Now reporting pain to R arm LLE and chest. Unable to ambulate.  Denies head trauma, LOC. no prodrome before fall.   Denies HA, CP, SOB, N/V,  vision change, loss of sensation, abd pain.       Allergies    Pen-Vee K (Rash)  penicillin (Unknown)    Intolerances        MEDICATIONS  (STANDING):  acetaminophen     Tablet .. 975 milliGRAM(s) Oral every 8 hours  amLODIPine   Tablet 5 milliGRAM(s) Oral daily  citalopram 10 milliGRAM(s) Oral daily  gabapentin 300 milliGRAM(s) Oral daily  hydrochlorothiazide 12.5 milliGRAM(s) Oral daily  HYDROmorphone  Injectable 0.2 milliGRAM(s) IV Push every 6 hours  influenza  Vaccine (HIGH DOSE) 0.7 milliLiter(s) IntraMuscular once  lactated ringers. 500 milliLiter(s) (75 mL/Hr) IV Continuous <Continuous>  losartan 50 milliGRAM(s) Oral daily  metoprolol tartrate 50 milliGRAM(s) Oral two times a day  polyethylene glycol 3350 17 Gram(s) Oral daily  senna 2 Tablet(s) Oral at bedtime  sodium chloride 0.9%. 1000 milliLiter(s) (100 mL/Hr) IV Continuous <Continuous>    MEDICATIONS  (PRN):  aluminum hydroxide/magnesium hydroxide/simethicone Suspension 30 milliLiter(s) Oral every 4 hours PRN Dyspepsia  HYDROmorphone   Solution 1 milliGRAM(s) Oral every 4 hours PRN Moderate Pain (4 - 6)  HYDROmorphone   Tablet 2 milliGRAM(s) Oral every 4 hours PRN Severe Pain (7 - 10)  melatonin 3 milliGRAM(s) Oral at bedtime PRN Insomnia  ondansetron Injectable 4 milliGRAM(s) IV Push every 8 hours PRN Nausea and/or Vomiting      PAST MEDICAL & SURGICAL HISTORY:  Breast cancer  LEFT (2003) _ - s/p lumpectomy, chemo &amp; radiation- Stage 2      Hearing loss of left ear      Asthma  on Advair      HTN (hypertension)      Portal vein thrombosis  May 2017- On Enoxaparin      Breast cancer, right  invasive ductal ca- treated with TAXol from 3/2017-9/2017      Obesity (BMI 30-39.9)      Sepsis, due to unspecified organism  June 2017      Osteoarthritis      DVT, lower extremity      COVID-19 vaccine series completed      S/P breast lumpectomy  left 2003      S/P tonsillectomy      Portacath in place  2017          FAMILY HISTORY:  Family history of breast cancer (Sibling)        SOCIAL HISTORY: No EtOH, no tobacco    REVIEW OF SYSTEMS:    CONSTITUTIONAL: no fever  EYES/ENT: No visual changes;  no throat pain   NECK: No pain or stiffness  RESPIRATORY: no sob  CARDIOVASCULAR: No chest pain   GASTROINTESTINAL: No abdominal or epigastric pain.  GENITOURINARY: No dysuria, change in frequency or hematuria  NEUROLOGICAL: No numbness or weakness  SKIN: No itching, burning, rashes, or lesions   MSK: see above hpi   Allergy: no urticaria         T(F): 97.5 (10-14-22 @ 13:14), Max: 98.7 (10-13-22 @ 21:00)  HR: 91 (10-14-22 @ 13:14)  BP: 144/85 (10-14-22 @ 13:14)  RR: 18 (10-14-22 @ 13:14)  SpO2: 98% (10-14-22 @ 13:14)  Wt(kg): --    GENERAL: NAD  HEENT: EOMI, MMM,  Pulm: no inc wob  CV: RRR  ABDOMEN: soft, nt, nd  MSK: limited ROM of right arm   EXTREMITIES:  no appreciable edema in b/l LE  Neuro: A&Ox3,  SKIN: warm and dry, no visible rash                          10.1   3.09  )-----------( 224      ( 14 Oct 2022 05:17 )             31.3       10-14    131<L>  |  94<L>  |  13  ----------------------------<  87  3.8   |  26  |  0.66    Ca    9.6      14 Oct 2022 05:17    TPro  7.1  /  Alb  3.3  /  TBili  1.0  /  DBili  x   /  AST  37  /  ALT  16  /  AlkPhos  234<H>  10-14

## 2022-10-14 NOTE — PROGRESS NOTE ADULT - SUBJECTIVE AND OBJECTIVE BOX
SUBJECTIVE:   Patient seen and examined at bedside. Pt doing generally well.    Aware of surgery this afternoon      OBJECTIVE:  Vital Signs Last 24 Hrs  T(C): 36.4 (14 Oct 2022 04:41), Max: 37.1 (13 Oct 2022 21:00)  T(F): 97.6 (14 Oct 2022 04:41), Max: 98.7 (13 Oct 2022 21:00)  HR: 78 (14 Oct 2022 04:41) (66 - 85)  BP: 131/75 (14 Oct 2022 04:41) (120/66 - 149/78)  BP(mean): --  RR: 18 (14 Oct 2022 04:41) (17 - 18)  SpO2: 95% (14 Oct 2022 04:41) (95% - 98%)          General: NAD  Resp: Non-labored breathing, no accessory muscle use  RUE  in coaptation splint c/d/i  +AIN/PIN/U  Palpable radial pulse    LABS:                        10.1   3.09  )-----------( 224      ( 14 Oct 2022 05:17 )             31.3     10-14    131<L>  |  94<L>  |  13  ----------------------------<  87  3.8   |  26  |  0.66    Ca    9.6      14 Oct 2022 05:17    TPro  7.1  /  Alb  3.3  /  TBili  1.0  /  DBili  x   /  AST  37  /  ALT  16  /  AlkPhos  234<H>  10-14    I&O's Summary    12 Oct 2022 07:01  -  13 Oct 2022 07:00  --------------------------------------------------------  IN: 490 mL / OUT: 1050 mL / NET: -560 mL    13 Oct 2022 07:01  -  14 Oct 2022 06:31  --------------------------------------------------------  IN: 240 mL / OUT: 0 mL / NET: 240 mL        MEDS:  MEDICATIONS  (STANDING):  amLODIPine   Tablet 5 milliGRAM(s) Oral daily  citalopram 10 milliGRAM(s) Oral daily  gabapentin 300 milliGRAM(s) Oral daily  hydrochlorothiazide 12.5 milliGRAM(s) Oral daily  HYDROmorphone  Injectable 0.2 milliGRAM(s) IV Push every 6 hours  influenza  Vaccine (HIGH DOSE) 0.7 milliLiter(s) IntraMuscular once  losartan 50 milliGRAM(s) Oral daily  metoprolol tartrate 50 milliGRAM(s) Oral two times a day  polyethylene glycol 3350 17 Gram(s) Oral daily  senna 2 Tablet(s) Oral at bedtime  sodium chloride 0.9%. 1000 milliLiter(s) (100 mL/Hr) IV Continuous <Continuous>    MEDICATIONS  (PRN):  acetaminophen     Tablet .. 650 milliGRAM(s) Oral every 6 hours PRN Temp greater or equal to 38C (100.4F), Mild Pain (1 - 3)  aluminum hydroxide/magnesium hydroxide/simethicone Suspension 30 milliLiter(s) Oral every 4 hours PRN Dyspepsia  HYDROmorphone  Injectable 0.2 milliGRAM(s) IV Push every 3 hours PRN Moderate Pain (4 - 6)  HYDROmorphone  Injectable 0.4 milliGRAM(s) IV Push every 3 hours PRN Severe Pain (7 - 10)  melatonin 3 milliGRAM(s) Oral at bedtime PRN Insomnia  ondansetron Injectable 4 milliGRAM(s) IV Push every 8 hours PRN Nausea and/or Vomiting

## 2022-10-14 NOTE — CONSULT NOTE ADULT - ASSESSMENT
72F h/o metastatic breast cancer (hormone positive, HER2 neg), DVT on eliquis, recent L fem fx s/p repair p/w fall, found to have acute displaced right midshaft humerus fracture, pending OR for humeral fracture on 10/14:     #metastatic breast cancer   -imaging this admission:   CT humerus: Acute displaced pathologic fracture through the mid diaphysis of the right humerus.  CT Cervical Spine No Cont: IMPRESSION: Diffuse lytic or blastic changes to the visualized cervical thoracic vertebral bodies and skull base. No evidence of acute fracture   or dislocation.  CT Head No Cont: mild periventricular and moderate bifrontal and biparietal subcortical white matter ischemia. Hypodensity is seen within the bert  and RIGHT midbrain which is nonspecific and may represent white matter ischemia, acute infarction or edema from underlying lesion. MRI with  gadolinium is recommended.  CT cervical spine:   No vertebral fracture is recognized.  Innumerable lytic lesions are seen throughout the cervical and visualized thoracic  spine consistent with metastatic disease. Mild compression fracture of C6   noted with loss of 50% of vertebral body height. Suspicion of RIGHT epidural neoplastic disease at this the 1-2 level and the RIGHT C7-T1 level.   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 Of note, there is new dorsal epidural component at T8 resulting in cord  compression. No abnormal cord signal identified. L2-L3 and L4-L5 epidural metastatic disease and degenerative changes contribute to severe thecal sac stenosis, worsened since comparison MRI   10/14/2021.  CT Chest: Chronic right sixth, seventh and ninth rib fractures. Extensive  destructive bony changes of the posterior right 10th rib. No associated  pneumothorax. Diffuse sclerotic and lytic bony metastatic disease of the visualized axial and appendicular skeleton, appears to have progressed since prior study on 7/22/2022. Irregularly-shaped 7 mm right apical nodule which appears more  well-defined when comparedto recent CT scan. New small right pleural  effusion. Hepatic metastases.    Recommendation:   -most recently treated with Vinorelbine Nov 2021>>Jan 2022, Eribulin Feb 2022> April 2022    -above findings overall concerning for POD with significant decline in PS. agree with surgical repair of humeral fracture and recommend rad onc eval for RT to spinal lesions  -patient has expressed interest in discussing transition to hospice. inpatient med onc will reach out to her outpt oncologist Dr. Dunaway prior to proceeding with any hospice discussions   -palliative on board and appreciate their assistance with helping with pain control and GOC   -further recs to follow     Harlan Smith Heme/onc PGY6 [phone]

## 2022-10-15 NOTE — PHYSICAL THERAPY INITIAL EVALUATION ADULT - MANUAL MUSCLE TESTING RESULTS, REHAB EVAL
Strength is grossly at least 3/5 throughout; RUE not assessed 2/2 sling, LLE 2/5 except L ankle 3/5/grossly assessed due to
RUE wrist and digits WFL, R shoulder and elbow n/t in splint, LUE 3/5, LLE 3-/5, RLE 3/5.

## 2022-10-15 NOTE — PHYSICAL THERAPY INITIAL EVALUATION ADULT - IMPAIRED TRANSFERS: SIT/STAND, REHAB EVAL
impaired balance/decreased flexibility/narrow base of support/impaired postural control/decreased strength
impaired balance/pain/decreased strength

## 2022-10-15 NOTE — PROGRESS NOTE ADULT - ASSESSMENT
A/P: 72yF POD1 s/p R humerus IMN    -FU AM labs  -Pain control  -WBAT RUE  -DVT ppx: Eliquis  -Incentive Spirometry  -Elevation to extremity  -Medical management per primary team appreciated A/P: 72yF POD1 s/p R humerus IMN    -FU AM labs  -Pain control  -NWB RUE  -DVT ppx: Eliquis  -Incentive Spirometry  -Elevation to extremity  -Medical management per primary team appreciated

## 2022-10-15 NOTE — PHYSICAL THERAPY INITIAL EVALUATION ADULT - TRANSFER TRAINING, PT EVAL
GOAL: Patient will perform all transfers min Ax1, in 4 weeks.
GOAL: Pt will perform ALL transfers with CGA, w/use of appropriate assistive device as needed, in 2 weeks.

## 2022-10-15 NOTE — PROGRESS NOTE ADULT - PROBLEM SELECTOR PLAN 2
Acute displaced right midshaft humerus fracture     - s/p ORIF 10/14  - Follow post-op for pain management, encourage PT/OT, monitor for post-op infections  - S/p Closed reduction w/ RUE coaptation splint placed by Ortho  post reduction film showing acceptable alignment   - Pain control: Gabapentin 300mg qd, tramadol 50 PRN for mild pain; oxy 5 PRN for moderate pain; oxy 10 PRN for severe pain   - Eliquis 5mg BID resumed on 10/15

## 2022-10-15 NOTE — PHYSICAL THERAPY INITIAL EVALUATION ADULT - IMPAIRMENTS CONTRIBUTING IMPAIRED BED MOBILITY, REHAB EVAL
impaired balance/pain/decreased ROM/decreased strength
impaired balance/decreased flexibility/narrow base of support/impaired postural control/decreased strength

## 2022-10-15 NOTE — OCCUPATIONAL THERAPY INITIAL EVALUATION ADULT - IMPAIRED TRANSFERS: SIT/STAND, REHAB EVAL
impaired balance/pain/impaired postural control/decreased ROM/decreased strength
impaired balance/decreased ROM/decreased strength

## 2022-10-15 NOTE — OCCUPATIONAL THERAPY INITIAL EVALUATION ADULT - GENERAL OBSERVATIONS, REHAB EVAL
Pt received semi-supine in bed, +purewick, +IV, +RUE sling, +[place].
Pt received semi-supine in bed in ED, +IVL, RUE coaptation splint

## 2022-10-15 NOTE — PHYSICAL THERAPY INITIAL EVALUATION ADULT - ACTIVE RANGE OF MOTION EXAMINATION, REHAB EVAL
RUE limited in splint, wrist and digits WFL/Left UE Active ROM was WFL (within functional limits)/bilateral  lower extremity Active ROM was WFL (within functional limits)
RUE not assessed 2/2 sling./Left UE Active ROM was WFL (within functional limits)/bilateral  lower extremity Active ROM was WFL (within functional limits)

## 2022-10-15 NOTE — OCCUPATIONAL THERAPY INITIAL EVALUATION ADULT - DIAGNOSIS, OT EVAL
Pt demonstrated decreased strength, balance, ROM and functional abilities limiting ADLs.
Patient with deficits in ADL status and functional mobility due to pain, impaired balance, strength, ROM, coordination

## 2022-10-15 NOTE — CHART NOTE - NSCHARTNOTEFT_GEN_A_CORE
Chart reviewed, no PRN usage  has multiple oral options available, in the event of new, acute or worsening symptoms not remedied by PRN doses, please page our on call team  [phone]

## 2022-10-15 NOTE — PHYSICAL THERAPY INITIAL EVALUATION ADULT - NSPTDISCHREC_GEN_A_CORE
Subacute Rehab; if home, pt would require assist with ALL ADL's and functional mobility and home PT. Pt would require assistance to get into home.
Sub-acute Rehab

## 2022-10-15 NOTE — PROGRESS NOTE ADULT - PROBLEM SELECTOR PLAN 5
Hx/o metastatic breast ca, Lumpectomy x2, RT, chemo, now w/ hepatic mets on imaging with spinal metastatic disease with epidural involvement    - MR cervical/thoracic/ lumbar -Extensive spinal metastatic disease with epidural involvement, markedly   progressed since comparison MRI from 10/14/2021  - Palliative following   - Hospice consulted   - Heme/Onc consulted

## 2022-10-15 NOTE — OCCUPATIONAL THERAPY INITIAL EVALUATION ADULT - BED MOBILITY TRAINING, PT EVAL
Pt will transfer sit <->sup with supervision in 4 weeks
GOAL: Pt will complete all bed mobility within [place] within 4 weeks.

## 2022-10-15 NOTE — PHYSICAL THERAPY INITIAL EVALUATION ADULT - PLANNED THERAPY INTERVENTIONS, PT EVAL
bed mobility training/gait training/strengthening/transfer training
GOAL: Stair Negotiation Training: Patient will be able to negotiate up & down 1 flight of stairs with unilateral rail, step to gait pattern, in 4 weeks./balance training/bed mobility training/gait training/strengthening/transfer training

## 2022-10-15 NOTE — OCCUPATIONAL THERAPY INITIAL EVALUATION ADULT - ADL RETRAINING, OT EVAL
Patient will dress upper body with minimal assistance in 4 weeks. Patient will dress lower body with minimal assistance, AE as needed in 4 weeks
GOAL: Pt will independently complete LB dressing within 4 weeks. Pt will don/doff sling with supervision within 4 weeks.

## 2022-10-15 NOTE — OCCUPATIONAL THERAPY INITIAL EVALUATION ADULT - BALANCE TRAINING, PT EVAL
Pt will increase dynamic standing balance 1/2 grade to increase safety/independence with functional transfers and ADLs within 4 weeks
GOAL: Pt will demonstrate fair static sitting at EOB within 4 weeks.

## 2022-10-15 NOTE — PROGRESS NOTE ADULT - SUBJECTIVE AND OBJECTIVE BOX
Orthopedics      Patient seen and examined at bedside. Feeling well. Pain controlled. No n/v. No acute events overnight.    Vital Signs Last 24 Hrs  T(C): 36.4 (10-15-22 @ 04:18), Max: 36.8 (10-15-22 @ 00:30)  T(F): 97.6 (10-15-22 @ 04:18), Max: 98.2 (10-15-22 @ 00:30)  HR: 109 (10-15-22 @ 04:18) (91 - 120)  BP: 111/73 (10-15-22 @ 04:18) (95/75 - 157/67)  BP(mean): 73 (10-14-22 @ 23:00) (73 - 98)  RR: 18 (10-15-22 @ 04:18) (14 - 18)  SpO2: 96% (10-15-22 @ 04:18) (94% - 98%)                        11.0   3.26  )-----------( 231      ( 15 Oct 2022 08:25 )             33.5     14 Oct 2022 22:55    134    |  94     |  16     ----------------------------<  113    3.8     |  21     |  0.57     Ca    8.9        14 Oct 2022 22:55    TPro  7.1    /  Alb  3.3    /  TBili  1.0    /  DBili  x      /  AST  37     /  ALT  16     /  AlkPhos  234    14 Oct 2022 05:17    PT/INR - ( 15 Oct 2022 08:25 )   PT: 15.6 sec;   INR: 1.34 ratio         PTT - ( 15 Oct 2022 08:25 )  PTT:26.2 sec    Exam:  Gen: NAD, resting comfortably  UE:  Dressing c/d/i  +AIN/PIN/M/R/U/Ax/Musc  SILT C4-T1  Radial pulses 2+  Compartments soft and compressible  Calves NTTP b/l

## 2022-10-15 NOTE — PHYSICAL THERAPY INITIAL EVALUATION ADULT - DIAGNOSIS, PT EVAL
Decrease functional mobility.
Pt with decreased strength, endurance and balance leading to moderate impairment in functional mobility.

## 2022-10-15 NOTE — OCCUPATIONAL THERAPY INITIAL EVALUATION ADULT - LIVES WITH, PROFILE
Pvt home, 3 steps to enter with handrail, 9 steps to main living, with B/L handrail. (I) ADLs and functional transfers with rolling walker, prior to rehab admission. Pt recently in [facility] due to L femur fx, s/p ORIF. Pt staying with friends after D/C from rehab/alone
PTA pt was at [facility] for ~6 weeks s/p fall. Pt recently d/c home, and was using RW for amb and functional mobility in house with 2 friends in handicap accessible house, while brushing teeth at sink pt s/p fall now admitted to [facility]. Pt reports prior to first fall, lives in private house, 2STE+9, tub shower, was independent with all ADLs/abilities, sister lives next door and checks in frequently. DME: Pt owns RW, shower chair, raised toilet seat, commode.

## 2022-10-15 NOTE — PROGRESS NOTE ADULT - SUBJECTIVE AND OBJECTIVE BOX
PROGRESS NOTE:     Patient is a 72y old  Female who presents with a chief complaint of Fall (15 Oct 2022 09:07)      SUBJECTIVE / OVERNIGHT EVENTS:  No acute events overnight. Patient seen and evaluated at bedside. No fever/chills.  Denies SOB at rest, chest pain, palpitations, abdominal pain, nausea/vomiting    ADDITIONAL REVIEW OF SYSTEMS:    MEDICATIONS  (STANDING):  acetaminophen     Tablet .. 650 milliGRAM(s) Oral every 6 hours  amLODIPine   Tablet 5 milliGRAM(s) Oral daily  apixaban 5 milliGRAM(s) Oral two times a day  chlorhexidine 2% Cloths 1 Application(s) Topical daily  citalopram 10 milliGRAM(s) Oral daily  gabapentin 300 milliGRAM(s) Oral daily  hydrochlorothiazide 12.5 milliGRAM(s) Oral daily  influenza  Vaccine (HIGH DOSE) 0.7 milliLiter(s) IntraMuscular once  lactated ringers. 1000 milliLiter(s) (75 mL/Hr) IV Continuous <Continuous>  losartan 50 milliGRAM(s) Oral daily  metoprolol tartrate 50 milliGRAM(s) Oral two times a day  polyethylene glycol 3350 17 Gram(s) Oral at bedtime  senna 2 Tablet(s) Oral at bedtime  sodium chloride 0.9%. 1000 milliLiter(s) (100 mL/Hr) IV Continuous <Continuous>    MEDICATIONS  (PRN):  ondansetron Injectable 4 milliGRAM(s) IV Push every 6 hours PRN Nausea and/or Vomiting  oxyCODONE    IR 5 milliGRAM(s) Oral every 3 hours PRN Moderate Pain (4 - 6)  oxyCODONE    IR 10 milliGRAM(s) Oral every 3 hours PRN Severe Pain (7 - 10)  traMADol 50 milliGRAM(s) Oral every 4 hours PRN Mild Pain (1 - 3)      CAPILLARY BLOOD GLUCOSE        I&O's Summary    14 Oct 2022 07:01  -  15 Oct 2022 07:00  --------------------------------------------------------  IN: 850 mL / OUT: 710 mL / NET: 140 mL    15 Oct 2022 07:01  -  15 Oct 2022 14:54  --------------------------------------------------------  IN: 240 mL / OUT: 0 mL / NET: 240 mL        PHYSICAL EXAM:  Vital Signs Last 24 Hrs  T(C): 36.9 (15 Oct 2022 11:50), Max: 36.9 (15 Oct 2022 11:50)  T(F): 98.4 (15 Oct 2022 11:50), Max: 98.4 (15 Oct 2022 11:50)  HR: 82 (15 Oct 2022 12:28) (82 - 120)  BP: 130/76 (15 Oct 2022 12:28) (95/75 - 157/67)  BP(mean): 73 (14 Oct 2022 23:00) (73 - 98)  RR: 18 (15 Oct 2022 11:50) (14 - 18)  SpO2: 96% (15 Oct 2022 12:28) (94% - 98%)    Parameters below as of 15 Oct 2022 12:28  Patient On (Oxygen Delivery Method): room air        CONSTITUTIONAL: NAD, well-developed  RESPIRATORY: Normal respiratory effort; lungs are clear to auscultation bilaterally  CARDIOVASCULAR: Regular rate and rhythm, normal S1 and S2, no murmur/rub/gallop; No lower extremity edema.  ABDOMEN: Nontender to palpation, normoactive bowel sounds, no rebound/guarding.  MUSCLOSKELETAL: R humerus IMN surgical site clean/dry, no signs of infection.   PSYCH: A+O to person, place, and time; affect appropriate    LABS:                        11.0   3.26  )-----------( 231      ( 15 Oct 2022 08:25 )             33.5     10-15    132<L>  |  94<L>  |  19  ----------------------------<  103<H>  4.0   |  20<L>  |  0.61    Ca    9.2      15 Oct 2022 08:25    TPro  7.1  /  Alb  3.3  /  TBili  1.0  /  DBili  x   /  AST  37  /  ALT  16  /  AlkPhos  234<H>  10-14    PT/INR - ( 15 Oct 2022 08:25 )   PT: 15.6 sec;   INR: 1.34 ratio         PTT - ( 15 Oct 2022 08:25 )  PTT:26.2 sec          Culture - Blood (collected 14 Oct 2022 05:00)  Source: .Blood Blood  Preliminary Report (15 Oct 2022 10:01):    No growth to date.        RADIOLOGY & ADDITIONAL TESTS:  Results Reviewed:   Imaging Personally Reviewed:  Electrocardiogram Personally Reviewed:    COORDINATION OF CARE:  Care Discussed with Consultants/Other Providers [Y/N]:  Prior or Outpatient Records Reviewed [Y/N]:

## 2022-10-15 NOTE — OCCUPATIONAL THERAPY INITIAL EVALUATION ADULT - IMPAIRMENTS CONTRIBUTING IMPAIRED BED MOBILITY, REHAB EVAL
impaired balance/cognition/impaired coordination/decreased ROM/decreased strength
impaired balance/pain/impaired postural control/decreased ROM/decreased strength

## 2022-10-15 NOTE — OCCUPATIONAL THERAPY INITIAL EVALUATION ADULT - NS ASR FOLLOW COMMAND OT EVAL
75% of the time/able to follow single-step instructions
100% of the time/able to follow single-step instructions

## 2022-10-15 NOTE — OCCUPATIONAL THERAPY INITIAL EVALUATION ADULT - BALANCE DISTURBANCE, IDENTIFIED IMPAIRMENT CONTRIBUTE, REHAB EVAL
pain/impaired postural control/decreased ROM/decreased strength
impaired coordination/pain/decreased ROM/decreased strength

## 2022-10-15 NOTE — OCCUPATIONAL THERAPY INITIAL EVALUATION ADULT - TRANSFER TRAINING, PT EVAL
GOAL: Pt will complete supine to sit transfer within ModA within 4 weeks.
Patient will transfer to toilet with DME as needed with (S) in 4 weeks. Pt will perform sit <-> stand transfers (S) within 4 weeks

## 2022-10-15 NOTE — PROGRESS NOTE ADULT - PROBLEM SELECTOR PLAN 4
Hx/o metastatic breast ca, DVT on Eliquis  - Started compression stockings for DVT ppx  - Eliquis 5mg BID resumed on 10/15

## 2022-10-15 NOTE — OCCUPATIONAL THERAPY INITIAL EVALUATION ADULT - RANGE OF MOTION EXAMINATION, LOWER EXTREMITY
bilateral LE Active ROM was WNL (within normal limits)
Left LE Active Assistive ROM was WFL (within functional limits)/Right LE Active ROM was WNL(within normal limits)

## 2022-10-15 NOTE — OCCUPATIONAL THERAPY INITIAL EVALUATION ADULT - RANGE OF MOTION EXAMINATION, UPPER EXTREMITY
RUE not assessed 2/2 sling, R wrist/hand AROM WFL. L AROM WFL.
RUE N/T, except hand and wrist AROM WNL/Left UE Active ROM was WNL (within normal limits)

## 2022-10-15 NOTE — PHYSICAL THERAPY INITIAL EVALUATION ADULT - PERTINENT HX OF CURRENT PROBLEM, REHAB EVAL
Pt is a 71 y/o F, PMH metastatic breast ca, Lumpectomy x2, RT, chemo, Ex smoker, Asthma, DVT ( on Eliquis), HTN, Osteoarthritis recent L fem fx s/p repair p/w fall. Pt reports she was at home, her L leg gave out fell, she fell on bathroom floor hit R side of body. Unable to get up x1hr. Patient states known history of diffuse metastatic breast ca for which she sees outpatient. She has a bone scan in May which demonstrated diffuse met dz. Including R humerus and spine. Denies HH/LOC. Denies any numbness or tingling. Reports some mild back pain at baseline. Hx of pseudogout diagnosed at [facility]. No other orthopedic concerns at this time. Pt with R path humerus fx, per ortho s/p closed reduction was performed and a well molded, well padded coaptation plaster splint was applied. COLIN SALEEM. Pt with metastatic breast ca to spine, per ortho WBAT. CT cervical spine: Diffuse lytic or blastic changes to the visualized cervical thoracic vertebral bodies and skull base. No evidence of acute fracture or dislocation. CT chest: Chronic right sixth, seventh and ninth rib fractures. Extensive destructive bony changes of the posterior right 10th rib. No associated pneumothorax. Diffuse sclerotic and lytic bony metastatic disease of the visualized axial and appendicular skeleton, appears to have progressed since prior study on 7/22/2022. Irregularly-shaped 7 mm right apical nodule which appears more well-defined when compared to recent CT scan. New small right pleural effusion. Hepatic metastases. Pt now s/p R humerus IMN on 10/14/22.

## 2022-10-15 NOTE — PROGRESS NOTE ADULT - PROBLEM SELECTOR PLAN 1
MRI cervical/thoracic/lumbar: new dorsal epidural component at T8 resulting in cord compression. Intermittent urinary retention, denies other focal neurological deficits. Reduced ROM at baseline of LLE. Conservative management given extent of metastatic bone involvement of spine.    - Consulted ortho spine team rec: Orthopedic spine attending Dr. Cassidy saw patient and revealed image who  is recommending palliative radiation therapy to her spine due to her extensive metastatic disease, and no orthopedic surgical intervention for spine at this time  -Heme/Onc consulted for possible radiation oncology evaluation

## 2022-10-15 NOTE — PHYSICAL THERAPY INITIAL EVALUATION ADULT - GAIT TRAINING, PT EVAL
GOAL: Patient will ambulate 100 feet with appropriate assistive device as needed with min Ax1, in 4 weeks.
GOAL: Pt will ambulate 25 feet w/ CGA, w/use of appropriate assistive device in 2 weeks

## 2022-10-15 NOTE — PHYSICAL THERAPY INITIAL EVALUATION ADULT - STRENGTHENING, PT EVAL
GOAL: Pt will improve bilateral LE strength to 4/5, for increased limb stability, to improve gait and facilitate stair negotiation in 2 weeks.
GOAL: Patient will improve bilateral UE/LE strength to 4/5, for increased limb stability, to improve gait and facilitate stair negotiation in 4 weeks.

## 2022-10-15 NOTE — PHYSICAL THERAPY INITIAL EVALUATION ADULT - BED MOBILITY TRAINING, PT EVAL
GOAL: Pt will perform bed mobility w/ CGA, in 2 weeks.
GOAL: Patient will perform bed mobility min Ax1, in 4 weeks.

## 2022-10-15 NOTE — PHYSICAL THERAPY INITIAL EVALUATION ADULT - ADDITIONAL COMMENTS
PTA pt was at [facility] for ~6 weeks s/p fall. Pt recently d/c home, and was using RW for amb and functional mobility in house with 2 friends in handicap accessible house, while brushing teeth at sink pt s/p fall now admitted to [facility].     Prior to all of this pt was independent residing in apt of private house, 3 steps to enter, 1 flight to bedroom. Pt owns RW, commode, raised toilet seat.
PTA pt was at [facility] for ~6 weeks s/p fall. Pt recently d/c home, and was using RW for amb and functional mobility in house with 2 friends in handicap accessible house, while brushing teeth at sink pt s/p fall now admitted to [facility]. Pt reports prior to first fall, she lived alone in a house on the upstairs level with 2 steps to enter, +HR, landing and another 9 steps, +HR. Pt owns raised toilet seat, shower chair and RW. Pt reports her sister lives next door.     Prior to all of this pt was independent residing in apt of private house, 3 steps to enter, 1 flight to bedroom. Pt owns RW, commode, raised toilet seat.

## 2022-10-15 NOTE — OCCUPATIONAL THERAPY INITIAL EVALUATION ADULT - PLANNED THERAPY INTERVENTIONS, OT EVAL
ADL retraining/balance training/bed mobility training/ROM/strengthening/transfer training
ADL retraining/balance training/bed mobility training/strengthening/transfer training

## 2022-10-16 NOTE — PROGRESS NOTE ADULT - PROBLEM SELECTOR PLAN 5
Hx/o metastatic breast ca, DVT on Eliquis  - Started compression stockings for DVT ppx  - hold eliquis on 10/16 due to acute hemoglobin drop per above

## 2022-10-16 NOTE — PROGRESS NOTE ADULT - ASSESSMENT
A/P: 72yF POD2 s/p R humerus IMN    -FU AM labs  -Pain control  -NWB RUE, ROM as tolerated, NO active abduction  -DVT ppx: Eliquis  -Incentive Spirometry  -Elevation to extremity  -Medical management per primary team appreciated

## 2022-10-16 NOTE — PROGRESS NOTE ADULT - PROBLEM SELECTOR PLAN 3
Acute displaced right midshaft humerus fracture     - s/p ORIF 10/14  - Follow post-op for pain management, encourage PT/OT, monitor for post-op infections  - S/p Closed reduction w/ RUE coaptation splint placed by Ortho  post reduction film showing acceptable alignment   - Pain control: Gabapentin 300mg qd, tramadol 50 PRN for mild pain; oxy 5 PRN for moderate pain; oxy 10 PRN for severe pain   - f/u CT angio A/P , lower extremities to evaluate for possible surgical site hematoma

## 2022-10-16 NOTE — PROGRESS NOTE ADULT - SUBJECTIVE AND OBJECTIVE BOX
PROGRESS NOTE:     Patient is a 72y old  Female who presents with a chief complaint of Fall (16 Oct 2022 09:38)      SUBJECTIVE / OVERNIGHT EVENTS:  Patient seen and evaluated at bedside. Patient reports multiple episodes of diarrhea, otherwise denies chest pain/SOB/abdominal pain/nausea/vomiting, denies bleeding/bruises.     ADDITIONAL REVIEW OF SYSTEMS:    MEDICATIONS  (STANDING):  acetaminophen     Tablet .. 650 milliGRAM(s) Oral every 6 hours  amLODIPine   Tablet 5 milliGRAM(s) Oral daily  chlorhexidine 2% Cloths 1 Application(s) Topical daily  citalopram 10 milliGRAM(s) Oral daily  gabapentin 300 milliGRAM(s) Oral daily  hydrochlorothiazide 12.5 milliGRAM(s) Oral daily  influenza  Vaccine (HIGH DOSE) 0.7 milliLiter(s) IntraMuscular once  lactated ringers. 1000 milliLiter(s) (75 mL/Hr) IV Continuous <Continuous>  losartan 50 milliGRAM(s) Oral daily  metoprolol tartrate 50 milliGRAM(s) Oral two times a day  polyethylene glycol 3350 17 Gram(s) Oral at bedtime  senna 2 Tablet(s) Oral at bedtime  sodium chloride 0.9%. 1000 milliLiter(s) (100 mL/Hr) IV Continuous <Continuous>    MEDICATIONS  (PRN):  ondansetron Injectable 4 milliGRAM(s) IV Push every 6 hours PRN Nausea and/or Vomiting  oxyCODONE    IR 5 milliGRAM(s) Oral every 3 hours PRN Moderate Pain (4 - 6)  oxyCODONE    IR 10 milliGRAM(s) Oral every 3 hours PRN Severe Pain (7 - 10)  traMADol 50 milliGRAM(s) Oral every 4 hours PRN Mild Pain (1 - 3)      CAPILLARY BLOOD GLUCOSE        I&O's Summary    15 Oct 2022 07:  -  16 Oct 2022 07:00  --------------------------------------------------------  IN: 240 mL / OUT: 951 mL / NET: -711 mL    16 Oct 2022 07:01  -  16 Oct 2022 14:08  --------------------------------------------------------  IN: 120 mL / OUT: 0 mL / NET: 120 mL        PHYSICAL EXAM:  Vital Signs Last 24 Hrs  T(C): 37.1 (16 Oct 2022 11:19), Max: 37.1 (16 Oct 2022 11:19)  T(F): 98.8 (16 Oct 2022 11:19), Max: 98.8 (16 Oct 2022 11:19)  HR: 93 (16 Oct 2022 11:19) (86 - 105)  BP: 108/69 (16 Oct 2022 11:19) (102/62 - 120/68)  BP(mean): --  RR: 18 (16 Oct 2022 11:19) (18 - 20)  SpO2: 98% (16 Oct 2022 11:19) (95% - 98%)    Parameters below as of 16 Oct 2022 11:19  Patient On (Oxygen Delivery Method): room air        CONSTITUTIONAL: NAD, frail appearance   RESPIRATORY: Normal respiratory effort; lungs are clear to auscultation bilaterally  CARDIOVASCULAR: Regular rate and rhythm, normal S1 and S2, no murmur/rub/gallop; No lower extremity edema.  ABDOMEN: Nontender to palpation, normoactive bowel sounds, no rebound/guarding.  PSYCH: A+O to person, place, and time; affect appropriate    LABS:                        9.1    2.95  )-----------( 211      ( 16 Oct 2022 09:58 )             27.3     10-16    135  |  98  |  20  ----------------------------<  75  3.8   |  23  |  0.54    Ca    8.5      16 Oct 2022 08:14      PT/INR - ( 16 Oct 2022 09:58 )   PT: 20.6 sec;   INR: 1.78 ratio         PTT - ( 16 Oct 2022 09:58 )  PTT:27.8 sec      Urinalysis Basic - ( 16 Oct 2022 07:36 )    Color: Light Yellow / Appearance: Clear / S.017 / pH: x  Gluc: x / Ketone: Negative  / Bili: Negative / Urobili: Negative   Blood: x / Protein: Negative / Nitrite: Negative   Leuk Esterase: Negative / RBC: x / WBC x   Sq Epi: x / Non Sq Epi: x / Bacteria: x        Culture - Blood (collected 14 Oct 2022 05:00)  Source: .Blood Blood  Preliminary Report (15 Oct 2022 10:01):    No growth to date.        RADIOLOGY & ADDITIONAL TESTS:  Results Reviewed:   Imaging Personally Reviewed:  Electrocardiogram Personally Reviewed:    COORDINATION OF CARE:  Care Discussed with Consultants/Other Providers [Y/N]:  Prior or Outpatient Records Reviewed [Y/N]:

## 2022-10-16 NOTE — PROGRESS NOTE ADULT - ASSESSMENT
73 y/o F with PMhx metastatic breast ca, Lumpectomy x2, Rt, chemo, Ex smoker, Asthma, DVT/eliquis, HTN, Osteoarthritis recent L fem fx s/p repair p/w fall, found to have acute displaced right midshaft humerus fracture. ORIF planned for 10/14. New findings for T8 cord compression. Spine surgery following, given extent of metastatic bone disease, patient opted for conservative treatment. Heme/Onc and Hospice consulted.

## 2022-10-16 NOTE — PROGRESS NOTE ADULT - PROBLEM SELECTOR PLAN 1
- Noted Hgb dropped from 11--> 7.8, repeat CBC stat Hgb 9.1  - patient on eliquis, concern for spontaneous RP bleed or RLE hematoma (s/p ORIF) --- f/u  CT angio A/P, lower extremities  - holding eliquis for now  - CBC q12h , transfuse > 7 ,active type and screen

## 2022-10-16 NOTE — PROGRESS NOTE ADULT - SUBJECTIVE AND OBJECTIVE BOX
Orthopedics      Patient seen and examined at bedside. Feeling well. Pain controlled. No n/v. No acute events overnight.    Vital Signs Last 24 Hrs  T(C): 36.8 (10-16-22 @ 05:20), Max: 36.9 (10-15-22 @ 11:50)  T(F): 98.2 (10-16-22 @ 05:20), Max: 98.4 (10-15-22 @ 11:50)  HR: 89 (10-16-22 @ 05:20) (82 - 105)  BP: 120/68 (10-16-22 @ 05:20) (102/62 - 130/76)  BP(mean): --  RR: 20 (10-16-22 @ 05:20) (18 - 20)  SpO2: 98% (10-16-22 @ 05:20) (94% - 98%)                        7.8    2.49  )-----------( 182      ( 16 Oct 2022 08:14 )             23.9     16 Oct 2022 08:14    135    |  98     |  20     ----------------------------<  75     3.8     |  23     |  0.54     Ca    8.5        16 Oct 2022 08:14      PT/INR - ( 16 Oct 2022 08:15 )   PT: 20.9 sec;   INR: 1.81 ratio         PTT - ( 16 Oct 2022 08:15 )  PTT:26.9 sec    Exam:  Gen: NAD, resting comfortably  UE:  Dressing c/d/i  +AIN/PIN/M/R/U/Ax/Musc  SILT C4-T1  Radial pulses 2+  Compartments soft and compressible  Calves NTTP b/l

## 2022-10-16 NOTE — CHART NOTE - NSCHARTNOTEFT_GEN_A_CORE
Chart reviewed, no PRN usage  c/w current regimen for pain management  please page if acute issues [phone]

## 2022-10-17 NOTE — PROGRESS NOTE ADULT - SUBJECTIVE AND OBJECTIVE BOX
[facility] Division of Hospital Medicine  Khadijah Mccoy MD  Available via MS Teams  Pager: [phone]    SUBJECTIVE / OVERNIGHT EVENTS: Patient seen and examined at bedside. No actives sites of bleeding. Patient states she is doing better but would like to touch base with her options with Rad/Onc. Otherwise, denies chest pain, sob, difficulty breathing, dizziness, abd pain. States she had a bowel movement. Pain well controlled.     ADDITIONAL REVIEW OF SYSTEMS: as stated in HPI     MEDICATIONS  (STANDING):  acetaminophen     Tablet .. 650 milliGRAM(s) Oral every 6 hours  amLODIPine   Tablet 5 milliGRAM(s) Oral daily  apixaban 5 milliGRAM(s) Oral every 12 hours  chlorhexidine 2% Cloths 1 Application(s) Topical daily  citalopram 10 milliGRAM(s) Oral daily  dexAMETHasone  Injectable 4 milliGRAM(s) IV Push every 6 hours  gabapentin 300 milliGRAM(s) Oral daily  hydrochlorothiazide 12.5 milliGRAM(s) Oral daily  influenza  Vaccine (HIGH DOSE) 0.7 milliLiter(s) IntraMuscular once  lactated ringers. 1000 milliLiter(s) (75 mL/Hr) IV Continuous <Continuous>  losartan 50 milliGRAM(s) Oral daily  metoprolol tartrate 50 milliGRAM(s) Oral two times a day  polyethylene glycol 3350 17 Gram(s) Oral at bedtime  senna 2 Tablet(s) Oral at bedtime  sodium chloride 0.9%. 1000 milliLiter(s) (100 mL/Hr) IV Continuous <Continuous>    MEDICATIONS  (PRN):  ondansetron Injectable 4 milliGRAM(s) IV Push every 6 hours PRN Nausea and/or Vomiting  oxyCODONE    IR 5 milliGRAM(s) Oral every 3 hours PRN Moderate Pain (4 - 6)  oxyCODONE    IR 10 milliGRAM(s) Oral every 3 hours PRN Severe Pain (7 - 10)  traMADol 50 milliGRAM(s) Oral every 4 hours PRN Mild Pain (1 - 3)      I&O's Summary    16 Oct 2022 07:01  -  17 Oct 2022 07:00  --------------------------------------------------------  IN: 1790 mL / OUT: 1050 mL / NET: 740 mL    17 Oct 2022 07:01  -  17 Oct 2022 16:33  --------------------------------------------------------  IN: 120 mL / OUT: 0 mL / NET: 120 mL        PHYSICAL EXAM:  Vital Signs Last 24 Hrs  T(C): 36.4 (17 Oct 2022 12:41), Max: 36.9 (16 Oct 2022 20:46)  T(F): 97.5 (17 Oct 2022 12:41), Max: 98.4 (16 Oct 2022 20:46)  HR: 82 (17 Oct 2022 12:41) (79 - 91)  BP: 122/74 (17 Oct 2022 12:41) (116/67 - 165/77)  BP(mean): --  RR: 18 (17 Oct 2022 12:41) (18 - 19)  SpO2: 97% (17 Oct 2022 12:41) (97% - 99%)    Parameters below as of 17 Oct 2022 12:41  Patient On (Oxygen Delivery Method): room air      CONSTITUTIONAL: NAD, well-developed  RESPIRATORY: Normal respiratory effort; lungs are clear to auscultation bilaterally  CARDIOVASCULAR: Regular rate and rhythm, normal S1 and S2, no murmur/rub/gallop; No lower extremity edema.  ABDOMEN: Nontender to palpation, normoactive bowel sounds, no rebound/guarding.  MUSCLOSKELETAL: R humerus IMN surgical site clean/dry, no signs of infection.   PSYCH: A+O to person, place, and time; affect appropriate    LABS:                        10.0   2.84  )-----------( 194      ( 17 Oct 2022 08:01 )             31.1     10-17    133<L>  |  97  |  17  ----------------------------<  85  3.4<L>   |  27  |  0.48<L>    Ca    9.0      17 Oct 2022 08:01      PT/INR - ( 17 Oct 2022 08:01 )   PT: 16.4 sec;   INR: 1.42 ratio         PTT - ( 17 Oct 2022 08:01 )  PTT:23.3 sec      Urinalysis Basic - ( 16 Oct 2022 07:36 )    Color: Light Yellow / Appearance: Clear / S.017 / pH: x  Gluc: x / Ketone: Negative  / Bili: Negative / Urobili: Negative   Blood: x / Protein: Negative / Nitrite: Negative   Leuk Esterase: Negative / RBC: x / WBC x   Sq Epi: x / Non Sq Epi: x / Bacteria: x        Culture - Blood (collected 16 Oct 2022 08:42)  Source: .Blood Blood  Preliminary Report (17 Oct 2022 13:01):    No growth to date.      COVID-19 PCR: NotDetec (17 Oct 2022 07:47)  SARS-CoV-2: NotDetec (06 May 2022 18:33)      RADIOLOGY & ADDITIONAL TESTS:  New Results Reviewed Today:   New Imaging Personally Reviewed Today:  New Electrocardiogram Personally Reviewed Today:  Prior or Outpatient Records Reviewed Today:    COMMUNICATION:  Care Discussed with Consultants/Other Providers and Details of Discussion: Consulted Rad/Onc for possible treatment option.   Discussions with Patient/Family:  PCP Communication:

## 2022-10-17 NOTE — PROGRESS NOTE ADULT - SUBJECTIVE AND OBJECTIVE BOX
INTERVAL HPI/OVERNIGHT EVENTS:  Patient S&E at bedside. no F/C, CP, SOB, abn pain, N/V      VITAL SIGNS:  T(F): 97.5 (10-17-22 @ 12:41)  HR: 82 (10-17-22 @ 12:41)  BP: 122/74 (10-17-22 @ 12:41)  RR: 18 (10-17-22 @ 12:41)  SpO2: 97% (10-17-22 @ 12:41)  Wt(kg): --    PHYSICAL EXAM:    Constitutional: NAD  Eyes: EOMI, sclera non-icteric  Neck: supple  Respiratory: no inc wob  Cardiovascular: RRR,   Gastrointestinal: soft, NTND  Extremities: no c/c/e  Neurological: AAOx3    MEDICATIONS  (STANDING):  acetaminophen     Tablet .. 650 milliGRAM(s) Oral every 6 hours  amLODIPine   Tablet 5 milliGRAM(s) Oral daily  apixaban 5 milliGRAM(s) Oral every 12 hours  chlorhexidine 2% Cloths 1 Application(s) Topical daily  citalopram 10 milliGRAM(s) Oral daily  dexAMETHasone  Injectable 4 milliGRAM(s) IV Push every 6 hours  gabapentin 300 milliGRAM(s) Oral daily  hydrochlorothiazide 12.5 milliGRAM(s) Oral daily  influenza  Vaccine (HIGH DOSE) 0.7 milliLiter(s) IntraMuscular once  lactated ringers. 1000 milliLiter(s) (75 mL/Hr) IV Continuous <Continuous>  losartan 50 milliGRAM(s) Oral daily  metoprolol tartrate 50 milliGRAM(s) Oral two times a day  polyethylene glycol 3350 17 Gram(s) Oral at bedtime  senna 2 Tablet(s) Oral at bedtime  sodium chloride 0.9%. 1000 milliLiter(s) (100 mL/Hr) IV Continuous <Continuous>    MEDICATIONS  (PRN):  ondansetron Injectable 4 milliGRAM(s) IV Push every 6 hours PRN Nausea and/or Vomiting  oxyCODONE    IR 5 milliGRAM(s) Oral every 3 hours PRN Moderate Pain (4 - 6)  oxyCODONE    IR 10 milliGRAM(s) Oral every 3 hours PRN Severe Pain (7 - 10)  traMADol 50 milliGRAM(s) Oral every 4 hours PRN Mild Pain (1 - 3)      LABS:                        10.0   2.84  )-----------( 194      ( 17 Oct 2022 08:01 )             31.1     10-17    133<L>  |  97  |  17  ----------------------------<  85  3.4<L>   |  27  |  0.48<L>    Ca    9.0      17 Oct 2022 08:01      PT/INR - ( 17 Oct 2022 08:01 )   PT: 16.4 sec;   INR: 1.42 ratio         PTT - ( 17 Oct 2022 08:01 )  PTT:23.3 sec  Urinalysis Basic - ( 16 Oct 2022 07:36 )    Color: Light Yellow / Appearance: Clear / S.017 / pH: x  Gluc: x / Ketone: Negative  / Bili: Negative / Urobili: Negative   Blood: x / Protein: Negative / Nitrite: Negative   Leuk Esterase: Negative / RBC: x / WBC x   Sq Epi: x / Non Sq Epi: x / Bacteria: x        RADIOLOGY & ADDITIONAL TESTS:   WDL

## 2022-10-17 NOTE — CONSULT NOTE ADULT - SUBJECTIVE AND OBJECTIVE BOX
HPI:  72y f pmh metastatic breast ca, Lumpectomy x2, RT, chemo, Ex smoker, Asthma, DVT ( on Eliquis), HTN, Osteoarthritis recent L fem fx s/p repair p/w fall. Pt reports she was at home, her L leg gave out fell, she fell on bathroom floor hit R side of body.  Unable to get up x1hr.   Now reporting pain to R arm LLE and chest. Unable to ambulate.  Denies head trauma, LOC. no prodrome before fall.   Denies HA, CP, SOB, N/V,  vision change, loss of sensation, abd pain.    (11 Oct 2022 01:44)      Allergies    Pen-Vee K (Rash)  penicillin (Unknown)    Intolerances        ROS: [  ] Fever  [  ] Chills  [  ]Chest Pain [  ] SOB  [  ]Cough [  ] N/V  [  ] Diarrhea [  ]Constipation [  ]Other ROS:  [  ] ROS otherwise negative    PAST MEDICAL & SURGICAL HISTORY:  Breast cancer  LEFT (2003) _ - s/p lumpectomy, chemo &amp; radiation- Stage 2    HTN (hypertension)    Hearing loss of left ear    Asthma  on Advair    HTN (hypertension)    Portal vein thrombosis  May 2017- On Enoxaparin    Breast cancer, right  invasive ductal ca- treated with TAXol from 3/2017-9/2017    Obesity (BMI 30-39.9)    Sepsis, due to unspecified organism  June 2017    Osteoarthritis    DVT, lower extremity    COVID-19 vaccine series completed    S/P breast lumpectomy  left 2003    S/P tonsillectomy    Portacath in place  2017        FAMILY HISTORY:  Family history of breast cancer (Sibling)        MEDICATIONS  (STANDING):  acetaminophen     Tablet .. 650 milliGRAM(s) Oral every 6 hours  amLODIPine   Tablet 5 milliGRAM(s) Oral daily  apixaban 5 milliGRAM(s) Oral every 12 hours  chlorhexidine 2% Cloths 1 Application(s) Topical daily  citalopram 10 milliGRAM(s) Oral daily  gabapentin 300 milliGRAM(s) Oral daily  hydrochlorothiazide 12.5 milliGRAM(s) Oral daily  influenza  Vaccine (HIGH DOSE) 0.7 milliLiter(s) IntraMuscular once  lactated ringers. 1000 milliLiter(s) (75 mL/Hr) IV Continuous <Continuous>  losartan 50 milliGRAM(s) Oral daily  metoprolol tartrate 50 milliGRAM(s) Oral two times a day  polyethylene glycol 3350 17 Gram(s) Oral at bedtime  senna 2 Tablet(s) Oral at bedtime  sodium chloride 0.9%. 1000 milliLiter(s) (100 mL/Hr) IV Continuous <Continuous>    MEDICATIONS  (PRN):  ondansetron Injectable 4 milliGRAM(s) IV Push every 6 hours PRN Nausea and/or Vomiting  oxyCODONE    IR 5 milliGRAM(s) Oral every 3 hours PRN Moderate Pain (4 - 6)  oxyCODONE    IR 10 milliGRAM(s) Oral every 3 hours PRN Severe Pain (7 - 10)  traMADol 50 milliGRAM(s) Oral every 4 hours PRN Mild Pain (1 - 3)      PHYSICAL EXAM  Vital Signs Last 24 Hrs  T(C): 36.4 (17 Oct 2022 12:41), Max: 36.9 (16 Oct 2022 20:46)  T(F): 97.5 (17 Oct 2022 12:41), Max: 98.4 (16 Oct 2022 20:46)  HR: 82 (17 Oct 2022 12:41) (79 - 91)  BP: 122/74 (17 Oct 2022 12:41) (116/67 - 165/77)  BP(mean): --  RR: 18 (17 Oct 2022 12:41) (18 - 19)  SpO2: 97% (17 Oct 2022 12:41) (97% - 99%)    Parameters below as of 17 Oct 2022 12:41  Patient On (Oxygen Delivery Method): room air        General: Well nourished, well developed, no acute distress  HEENT: NC/AT; EOMI, PERRL, sclera nonicteric; external ears normal; no rhinorrhea or epistaxis; mucous membranes moist; oropharynx clear and without erythema  CV: NR, RR; no appreciable r/m/g  Lungs: CTAB, no increased work of breathing  Abdomen: Bowel sounds present; soft, NTND  MSK: Vertebral spine non-tender to palpation  Neuro: AAOx3; cranial nerves II-XII intact; strength 5/5 in upper and lower extremities; sensation to light touch in tact bilaterally.  Psych: Full affect; mood congruent  Skin: no visible rashes on limited examination    IMAGING/LABS/PATHOLOGY: I have personally reviewed the relevant labs, pathology, and imaging as noted in the HPI.  In addition,                          10.0   2.84  )-----------( 194      ( 17 Oct 2022 08:01 )             31.1     10-17    133<L>  |  97  |  17  ----------------------------<  85  3.4<L>   |  27  |  0.48<L>    Ca    9.0      17 Oct 2022 08:01          ASSESSMENT/PLAN    SUNITHA SEVERINO is a 72y woman with     We discussed the use of palliative radiation in this setting, namely to improve quality of life through the reduction of symptoms.  We talked about the risks, benefits, acute and long term side effects, as well as expected treatment outcomes.  He/She was given the opportunity to ask questions, which were answered to his/her apparent satisfaction.  He/She provided written consent to proceed with radiation therapy. We will arrange for inpatient/outpatient treatment. HPI:  72y f pmh metastatic breast cancer first diagnosed 2005, had a left lumpectomy, in 2017 recurred and had right breast cancer and a right lumpectomy,  is now s/p IMN  for right humeral fracture and rad onc was called for the T8 cord compression seen on MRI.  Patient has 5/10 pain , only takes Tylenol.  She has not walked x 1 week now.   family at bedside.      other hx:  had chemo with Dr. Dunaway, prior breast/chest RT?,    Ex smoker, Asthma, DVT ( on Eliquis), HTN, Osteoarthritis recent L fem fx s/p repair p/w fall. Pt reports she was at home, her L leg gave out fell, she fell on bathroom floor hit R side of body.  Unable to get up x1hr.   Now reporting pain to R arm LLE and chest. Unable to ambulate.  Denies head trauma, LOC. no prodrome before fall.   Denies HA, CP, SOB, N/V,  vision change, loss of sensation, abd pain.       Hospice is also being discussed but the patient/family will decide on pall RT prior to possible palliative care or hospice.         < from: CT Abdomen and Pelvis w/ IV Cont (10.17.22 @ 11:03) >  IMPRESSION:  No evidence of active GI bleed. No evidence of retroperitoneal hematoma.    Findings suggestive of proctitis.    Enlarging hepatic metastases.    Redemonstration of diffuse osseous metastatic disease with epidural   spinal extension, better characterized on recent spine MRI.    3.6 cm fluid collection in the left hip subcutaneous tissues, nonspecific.      < end of copied text >      < from: MR Lumbar Spine w/wo IV Cont (10.12.22 @ 21:37) >  IMPRESSION:    -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    -Of note, there is new dorsal epidural component at T8 resulting in cord   compression. No abnormal cord signal identified. Findings discussed with   Dr. Mccoy at 11:06 AM 10/13/2022.    - L2-L3 and L4-L5 epidural metastatic disease and degenerative changes   contribute to severethecal sac stenosis, worsened since comparison MRI   10/14/2021.    -Grossly stable L3-L4 severe thecal sac stenosis.      < end of copied text >        Allergies    Pen-Vee K (Rash)  penicillin (Unknown)    Intolerances        ROS: [  ] Fever  [  ] Chills  [  ]Chest Pain [  ] SOB  [  ]Cough [  ] N/V  [  ] Diarrhea [  ]Constipation [  ]Other ROS:  [  ] ROS otherwise negative    PAST MEDICAL & SURGICAL HISTORY:  Breast cancer  LEFT (2003) _ - s/p lumpectomy, chemo &amp; radiation- Stage 2    HTN (hypertension)    Hearing loss of left ear    Asthma  on Advair    HTN (hypertension)    Portal vein thrombosis  May 2017- On Enoxaparin    Breast cancer, right  invasive ductal ca- treated with TAXol from 3/2017-9/2017    Obesity (BMI 30-39.9)    Sepsis, due to unspecified organism  June 2017    Osteoarthritis    DVT, lower extremity    COVID-19 vaccine series completed    S/P breast lumpectomy  left 2003    S/P tonsillectomy    Portacath in place  2017        FAMILY HISTORY:  Family history of breast cancer (Sibling)        MEDICATIONS  (STANDING):  acetaminophen     Tablet .. 650 milliGRAM(s) Oral every 6 hours  amLODIPine   Tablet 5 milliGRAM(s) Oral daily  apixaban 5 milliGRAM(s) Oral every 12 hours  chlorhexidine 2% Cloths 1 Application(s) Topical daily  citalopram 10 milliGRAM(s) Oral daily  gabapentin 300 milliGRAM(s) Oral daily  hydrochlorothiazide 12.5 milliGRAM(s) Oral daily  influenza  Vaccine (HIGH DOSE) 0.7 milliLiter(s) IntraMuscular once  lactated ringers. 1000 milliLiter(s) (75 mL/Hr) IV Continuous <Continuous>  losartan 50 milliGRAM(s) Oral daily  metoprolol tartrate 50 milliGRAM(s) Oral two times a day  polyethylene glycol 3350 17 Gram(s) Oral at bedtime  senna 2 Tablet(s) Oral at bedtime  sodium chloride 0.9%. 1000 milliLiter(s) (100 mL/Hr) IV Continuous <Continuous>    MEDICATIONS  (PRN):  ondansetron Injectable 4 milliGRAM(s) IV Push every 6 hours PRN Nausea and/or Vomiting  oxyCODONE    IR 5 milliGRAM(s) Oral every 3 hours PRN Moderate Pain (4 - 6)  oxyCODONE    IR 10 milliGRAM(s) Oral every 3 hours PRN Severe Pain (7 - 10)  traMADol 50 milliGRAM(s) Oral every 4 hours PRN Mild Pain (1 - 3)      PHYSICAL EXAM  Vital Signs Last 24 Hrs  T(C): 36.4 (17 Oct 2022 12:41), Max: 36.9 (16 Oct 2022 20:46)  T(F): 97.5 (17 Oct 2022 12:41), Max: 98.4 (16 Oct 2022 20:46)  HR: 82 (17 Oct 2022 12:41) (79 - 91)  BP: 122/74 (17 Oct 2022 12:41) (116/67 - 165/77)  BP(mean): --  RR: 18 (17 Oct 2022 12:41) (18 - 19)  SpO2: 97% (17 Oct 2022 12:41) (97% - 99%)    Parameters below as of 17 Oct 2022 12:41  Patient On (Oxygen Delivery Method): room air      KPS-  General: Well nourished, well developed, no acute distress  HEENT: NC/AT; EOMI, PERRL, sclera nonicteric; external ears normal; no rhinorrhea or epistaxis; mucous membranes moist; oropharynx clear and without erythema  CV: NR, RR; no appreciable r/m/g  Lungs: CTAB, no increased work of breathing  Abdomen: Bowel sounds present; soft, NTND  MSK: Vertebral spine non-tender to palpation  Neuro: AAOx3; cranial nerves II-XII intact; strength 5/5 in upper and lower extremities; sensation to light touch in tact bilaterally.  Psych: Full affect; mood congruent  Skin: no visible rashes on limited examination    IMAGING/LABS/PATHOLOGY: I have personally reviewed the relevant labs, pathology, and imaging as noted in the HPI.  In addition,                          10.0   2.84  )-----------( 194      ( 17 Oct 2022 08:01 )             31.1     10-17    133<L>  |  97  |  17  ----------------------------<  85  3.4<L>   |  27  |  0.48<L>    Ca    9.0      17 Oct 2022 08:01          ASSESSMENT/PLAN    SUNITHA SEVERINO is a 72y woman with h/o metastatic breast ca to bone/liver diagnosed 2005, s/p left lumpectomy, recurred to left breast in 2017 and had left lumpectomy,  is now s/p IMN for right humeral fx, and we are consulted about T8 cord compression but no real acute back pain present now, takes tylenol pain is 5/10.  Per family, surgery deferred due to high risk of spine surgery.  Options are transfer to [facility] for spine palliative RT vs. hospice care.  Family will let us know.    HPI:  72y f pmh metastatic breast cancer first diagnosed 2005, had a left lumpectomy, in 2017 recurred and had right breast cancer and a right lumpectomy,  is now s/p IMN  for right humeral fracture and rad onc was called for the T8 cord compression seen on MRI.  Patient has 5/10 pain , only takes Tylenol.  She has not walked x 1 week now.   family at bedside.      other hx:  had chemo with Dr. Dunaway, prior breast/chest RT?,    Ex smoker, Asthma, DVT ( on Eliquis), HTN, Osteoarthritis recent L fem fx s/p repair p/w fall. Pt reports she was at home, her L leg gave out fell, she fell on bathroom floor hit R side of body.  Unable to get up x1hr.   Now reporting pain to R arm LLE and chest. Unable to ambulate.  Denies head trauma, LOC. no prodrome before fall.   Denies HA, CP, SOB, N/V,  vision change, loss of sensation, abd pain.       Hospice is also being discussed but the patient/family will decide on pall RT prior to possible palliative care or hospice.         < from: CT Abdomen and Pelvis w/ IV Cont (10.17.22 @ 11:03) >  IMPRESSION:  No evidence of active GI bleed. No evidence of retroperitoneal hematoma.    Findings suggestive of proctitis.    Enlarging hepatic metastases.    Redemonstration of diffuse osseous metastatic disease with epidural   spinal extension, better characterized on recent spine MRI.    3.6 cm fluid collection in the left hip subcutaneous tissues, nonspecific.      < end of copied text >      < from: MR Lumbar Spine w/wo IV Cont (10.12.22 @ 21:37) >  IMPRESSION:    -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    -Of note, there is new dorsal epidural component at T8 resulting in cord   compression. No abnormal cord signal identified. Findings discussed with   Dr. Mccoy at 11:06 AM 10/13/2022.    - L2-L3 and L4-L5 epidural metastatic disease and degenerative changes   contribute to severethecal sac stenosis, worsened since comparison MRI   10/14/2021.    -Grossly stable L3-L4 severe thecal sac stenosis.      < end of copied text >        Allergies    Pen-Vee K (Rash)  penicillin (Unknown)    Intolerances        ROS: [  ] Fever  [  ] Chills  [  ]Chest Pain [  ] SOB  [  ]Cough [  ] N/V  [  ] Diarrhea [  ]Constipation [  ]Other ROS:  [  ] ROS otherwise negative    PAST MEDICAL & SURGICAL HISTORY:  Breast cancer  LEFT (2003) _ - s/p lumpectomy, chemo &amp; radiation- Stage 2    HTN (hypertension)    Hearing loss of left ear    Asthma  on Advair    HTN (hypertension)    Portal vein thrombosis  May 2017- On Enoxaparin    Breast cancer, right  invasive ductal ca- treated with TAXol from 3/2017-9/2017    Obesity (BMI 30-39.9)    Sepsis, due to unspecified organism  June 2017    Osteoarthritis    DVT, lower extremity    COVID-19 vaccine series completed    S/P breast lumpectomy  left 2003    S/P tonsillectomy    Portacath in place  2017        FAMILY HISTORY:  Family history of breast cancer (Sibling)        MEDICATIONS  (STANDING):  acetaminophen     Tablet .. 650 milliGRAM(s) Oral every 6 hours  amLODIPine   Tablet 5 milliGRAM(s) Oral daily  apixaban 5 milliGRAM(s) Oral every 12 hours  chlorhexidine 2% Cloths 1 Application(s) Topical daily  citalopram 10 milliGRAM(s) Oral daily  gabapentin 300 milliGRAM(s) Oral daily  hydrochlorothiazide 12.5 milliGRAM(s) Oral daily  influenza  Vaccine (HIGH DOSE) 0.7 milliLiter(s) IntraMuscular once  lactated ringers. 1000 milliLiter(s) (75 mL/Hr) IV Continuous <Continuous>  losartan 50 milliGRAM(s) Oral daily  metoprolol tartrate 50 milliGRAM(s) Oral two times a day  polyethylene glycol 3350 17 Gram(s) Oral at bedtime  senna 2 Tablet(s) Oral at bedtime  sodium chloride 0.9%. 1000 milliLiter(s) (100 mL/Hr) IV Continuous <Continuous>    MEDICATIONS  (PRN):  ondansetron Injectable 4 milliGRAM(s) IV Push every 6 hours PRN Nausea and/or Vomiting  oxyCODONE    IR 5 milliGRAM(s) Oral every 3 hours PRN Moderate Pain (4 - 6)  oxyCODONE    IR 10 milliGRAM(s) Oral every 3 hours PRN Severe Pain (7 - 10)  traMADol 50 milliGRAM(s) Oral every 4 hours PRN Mild Pain (1 - 3)      PHYSICAL EXAM  Vital Signs Last 24 Hrs  T(C): 36.4 (17 Oct 2022 12:41), Max: 36.9 (16 Oct 2022 20:46)  T(F): 97.5 (17 Oct 2022 12:41), Max: 98.4 (16 Oct 2022 20:46)  HR: 82 (17 Oct 2022 12:41) (79 - 91)  BP: 122/74 (17 Oct 2022 12:41) (116/67 - 165/77)  BP(mean): --  RR: 18 (17 Oct 2022 12:41) (18 - 19)  SpO2: 97% (17 Oct 2022 12:41) (97% - 99%)    Parameters below as of 17 Oct 2022 12:41  Patient On (Oxygen Delivery Method): room air      KPS-  General: Well nourished, well developed, no acute distress  HEENT: NC/AT; EOMI, PERRL, sclera nonicteric; external ears normal; no rhinorrhea or epistaxis; mucous membranes moist; oropharynx clear and without erythema  CV: NR, RR; no appreciable r/m/g  Lungs: CTAB, no increased work of breathing  Abdomen: Bowel sounds present; soft, NTND  MSK: Vertebral spine non-tender to palpation  Neuro: AAOx3; cranial nerves II-XII intact; strength 5/5 in upper and lower extremities 4/5 b/l.   Psych: Full affect; mood congruent  Skin: no visible rashes on limited examination    IMAGING/LABS/PATHOLOGY: I have personally reviewed the relevant labs, pathology, and imaging as noted in the HPI.  In addition,                          10.0   2.84  )-----------( 194      ( 17 Oct 2022 08:01 )             31.1     10-17    133<L>  |  97  |  17  ----------------------------<  85  3.4<L>   |  27  |  0.48<L>    Ca    9.0      17 Oct 2022 08:01          ASSESSMENT/PLAN    SUNITHA SEVERINO is a 72y woman with h/o metastatic breast ca to bone/liver diagnosed 2005, s/p left lumpectomy, recurred to left breast in 2017 and had left lumpectomy,  is now s/p IMN for right humeral fx, and we are consulted about T8 cord compression but no real acute back pain present now, takes tylenol pain is 5/10.  Per family, surgery deferred due to high risk of spine surgery.  Options are transfer [facility] for spine palliative RT vs. hospice care.  Family will let us know.    HPI:  72y F with pmh metastatic breast cancer first diagnosed 2005, had a left lumpectomy, in 2017 recurred and had right breast cancer and a right lumpectomy,  is now s/p IMN  for right humeral fracture and rad onc was called for the T8 cord compression seen on MRI.  Patient has 5/10 pain in the fracture site, minimal back pain, only takes Tylenol.  She has not walked x 1 week now 2/2 to falls.   family at bedside.    other hx:  multiple lines chemo therapy with Dr. Dunaway, prior breast/chest RT,     Ex smoker, Asthma, DVT ( on Eliquis), HTN, Osteoarthritis recent L fem fx s/p repair p/w fall. Pt reports she was at home, her L leg gave out fell, she fell on bathroom floor hit R side of body.  Unable to get up x1hr.   Now reporting pain to R arm LLE and chest. Unable to ambulate.  Denies head trauma, LOC. no prodrome before fall.   Denies HA, CP, SOB, N/V,  vision change, loss of sensation, abd pain.     Hospice is also being discussed but the patient/family will decide on pall RT prior to possible palliative care or hospice.     < from: CT Abdomen and Pelvis w/ IV Cont (10.17.22 @ 11:03) >  IMPRESSION:  No evidence of active GI bleed. No evidence of retroperitoneal hematoma.  Findings suggestive of proctitis.  Enlarging hepatic metastases.  Redemonstration of diffuse osseous metastatic disease with epidural spinal extension, better characterized on recent spine MRI.  3.6 cm fluid collection in the left hip subcutaneous tissues, nonspecific.    < from: MR Lumbar Spine w/wo IV Cont (10.12.22 @ 21:37) >  IMPRESSION:    -Extensive spinal metastatic disease with epidural involvement, markedly progressed since comparison MRI from 10/14/2021.  -Of note, there is new dorsal epidural component at T8 resulting in cord compression. No abnormal cord signal identified.   - L2-L3 and L4-L5 epidural metastatic disease and degenerative changes contribute to severethecal sac stenosis, worsened since comparison MRI 10/14/2021.  -Grossly stable L3-L4 severe thecal sac stenosis.      Allergies  - Pen-Vee K (Rash)  - penicillin (Unknown)    Intolerances  - none    ROS: [  ] Fever  [  ] Chills  [  ]Chest Pain [  ] SOB  [  ]Cough [  ] N/V  [  ] Diarrhea [  ]Constipation [ x ]Other ROS: as per HPI  [ x ] ROS otherwise negative    PAST MEDICAL & SURGICAL HISTORY:  Breast cancer  LEFT (2003) _ - s/p lumpectomy, chemo &amp; radiation- Stage 2  HTN (hypertension)  Hearing loss of left ear  Asthma -on Advair  Portal vein thrombosis  - May 2017- On Enoxaparin  Breast cancer, right  - invasive ductal ca- treated with TAXol from 3/2017-9/2017  Obesity (BMI 30-39.9)  Sepsis, due to unspecified organism  - June 2017  Osteoarthritis  DVT, lower extremity  COVID-19 vaccine series completed  S/P breast lumpectomy  - left 2003  S/P tonsillectomy  Portacath in place  - 2017    FAMILY HISTORY:  Family history of breast cancer (Sibling)    MEDICATIONS  (STANDING):  acetaminophen     Tablet .. 650 milliGRAM(s) Oral every 6 hours  amLODIPine   Tablet 5 milliGRAM(s) Oral daily  apixaban 5 milliGRAM(s) Oral every 12 hours  chlorhexidine 2% Cloths 1 Application(s) Topical daily  citalopram 10 milliGRAM(s) Oral daily  gabapentin 300 milliGRAM(s) Oral daily  hydrochlorothiazide 12.5 milliGRAM(s) Oral daily  influenza  Vaccine (HIGH DOSE) 0.7 milliLiter(s) IntraMuscular once  lactated ringers. 1000 milliLiter(s) (75 mL/Hr) IV Continuous <Continuous>  losartan 50 milliGRAM(s) Oral daily  metoprolol tartrate 50 milliGRAM(s) Oral two times a day  polyethylene glycol 3350 17 Gram(s) Oral at bedtime  senna 2 Tablet(s) Oral at bedtime  sodium chloride 0.9%. 1000 milliLiter(s) (100 mL/Hr) IV Continuous <Continuous>    MEDICATIONS  (PRN):  ondansetron Injectable 4 milliGRAM(s) IV Push every 6 hours PRN Nausea and/or Vomiting  oxyCODONE    IR 5 milliGRAM(s) Oral every 3 hours PRN Moderate Pain (4 - 6)  oxyCODONE    IR 10 milliGRAM(s) Oral every 3 hours PRN Severe Pain (7 - 10)  traMADol 50 milliGRAM(s) Oral every 4 hours PRN Mild Pain (1 - 3)    PHYSICAL EXAM  Vital Signs Last 24 Hrs  T(C): 36.4 (17 Oct 2022 12:41), Max: 36.9 (16 Oct 2022 20:46)  T(F): 97.5 (17 Oct 2022 12:41), Max: 98.4 (16 Oct 2022 20:46)  HR: 82 (17 Oct 2022 12:41) (79 - 91)  BP: 122/74 (17 Oct 2022 12:41) (116/67 - 165/77)  BP(mean): --  RR: 18 (17 Oct 2022 12:41) (18 - 19)  SpO2: 97% (17 Oct 2022 12:41) (97% - 99%)    Parameters below as of 17 Oct 2022 12:41  Patient On (Oxygen Delivery Method): room air    KPS- 50  General: Well nourished, well developed, no acute distress  HEENT: NC/AT; EOMI, PERRL, sclera nonicteric; external ears normal; no rhinorrhea or epistaxis; mucous membranes moist; oropharynx clear and without erythema  CV: NR, RR; no appreciable r/m/g  Lungs: CTAB, no increased work of breathing  Abdomen: Bowel sounds present; soft, NTND  MSK: Vertebral spine non-tender to palpation  Neuro: AAOx3; cranial nerves II-XII intact; strength 5/5 in upper and lower extremities 4/5 b/l.   Psych: Full affect; mood congruent  Skin: no visible rashes on limited examination    IMAGING/LABS/PATHOLOGY: I have personally reviewed the relevant labs, pathology, and imaging as noted in the HPI.  In addition,                          10.0   2.84  )-----------( 194      ( 17 Oct 2022 08:01 )             31.1     10-17    133<L>  |  97  |  17  ----------------------------<  85  3.4<L>   |  27  |  0.48<L>    Ca    9.0      17 Oct 2022 08:01

## 2022-10-17 NOTE — PROGRESS NOTE ADULT - SUBJECTIVE AND OBJECTIVE BOX
Orthopedics    Patient seen and examined this morning. No acute events overnight. Pain well controlled. Tolerating diet. Denies N/V/D/F/C.     Vital Signs Last 24 Hrs  T(C): 36.7 (17 Oct 2022 05:01), Max: 37.1 (16 Oct 2022 11:19)  T(F): 98 (17 Oct 2022 05:01), Max: 98.8 (16 Oct 2022 11:19)  HR: 87 (17 Oct 2022 05:01) (79 - 93)  BP: 127/75 (17 Oct 2022 05:01) (108/69 - 165/77)  BP(mean): --  RR: 18 (17 Oct 2022 05:01) (18 - 19)  SpO2: 98% (17 Oct 2022 05:01) (98% - 99%)    Parameters below as of 17 Oct 2022 05:01  Patient On (Oxygen Delivery Method): room air    Exam:  Gen: NAD, resting comfortably  UE:  Dressing c/d/i  +AIN/PIN/M/R/U/Ax/Musc  SILT C4-T1  Radial pulses 2+  Compartments soft and compressible  Calves NTTP b/l

## 2022-10-17 NOTE — PROGRESS NOTE ADULT - SUBJECTIVE AND OBJECTIVE BOX
SUBJECTIVE AND OBJECTIVE  Indication for Geriatrics and Palliative Care Services/INTERVAL HPI:    OVERNIGHT EVENTS: Patient examined at bedside. She is awake, alert and speaking with her sister Brigida on the phone. States pain is okay if she's keeping herself immobilized. She is hesitant to take opioids for fear of constipation. Advised for her to try it while she's here so we can better control her pain, and we can always increase her bowel regimen to treat constipation side effect.     Pt is hoping to talk to radiation oncology to see if radiation would offer benefit to her spine lesions. She asked about comfort measures with hospice care. Advised that pending rad/onc recs (if she wants to pursue radiation treatments), we can discuss transition to hospice as a potential next step. Code status discussed and pt clearly explained her wishes for DNR and DNI. MOLST was completed to document her wishes today. Pt states a HCP was previously established where she put her nephew Keron and sister Birdie on the document. She has discussed her wishes/plan of care of them and they are all supportive of her decisions.     DNR on chart:  Allergies    Pen-Vee K (Rash)  penicillin (Unknown)    Intolerances    MEDICATIONS  (STANDING):  acetaminophen     Tablet .. 650 milliGRAM(s) Oral every 6 hours  amLODIPine   Tablet 5 milliGRAM(s) Oral daily  apixaban 5 milliGRAM(s) Oral every 12 hours  chlorhexidine 2% Cloths 1 Application(s) Topical daily  citalopram 10 milliGRAM(s) Oral daily  gabapentin 300 milliGRAM(s) Oral daily  hydrochlorothiazide 12.5 milliGRAM(s) Oral daily  influenza  Vaccine (HIGH DOSE) 0.7 milliLiter(s) IntraMuscular once  lactated ringers. 1000 milliLiter(s) (75 mL/Hr) IV Continuous <Continuous>  losartan 50 milliGRAM(s) Oral daily  metoprolol tartrate 50 milliGRAM(s) Oral two times a day  polyethylene glycol 3350 17 Gram(s) Oral at bedtime  senna 2 Tablet(s) Oral at bedtime  sodium chloride 0.9%. 1000 milliLiter(s) (100 mL/Hr) IV Continuous <Continuous>    MEDICATIONS  (PRN):  ondansetron Injectable 4 milliGRAM(s) IV Push every 6 hours PRN Nausea and/or Vomiting  oxyCODONE    IR 5 milliGRAM(s) Oral every 3 hours PRN Moderate Pain (4 - 6)  oxyCODONE    IR 10 milliGRAM(s) Oral every 3 hours PRN Severe Pain (7 - 10)  traMADol 50 milliGRAM(s) Oral every 4 hours PRN Mild Pain (1 - 3)      ITEMS UNCHECKED ARE NOT PRESENT    PRESENT SYMPTOMS: [ ]Unable to self-report - see [ ] CPOT [ ] PAINADS [ ] RDOS  Source if other than patient:  [ ]Family   [ ]Team     Pain:  [ x]yes [ ]no  QOL impact -   Location -   Right upper extremity, back                 Aggravating factors - movement   Quality -achy/dull  Radiation -no  Timing-  Severity (0-10 scale): 5/10  Minimal acceptable level (0-10 scale):     Dyspnea:                           [ ]Mild [ ]Moderate [ ]Severe  Anxiety:                             [ ]Mild [ ]Moderate [ ]Severe  Fatigue:                             [ ]Mild [ ]Moderate [ ]Severe  Nausea:                             [ ]Mild [ ]Moderate [ ]Severe  Loss of appetite:              [ ]Mild [ ]Moderate [ ]Severe  Constipation:                    [ ]Mild [ ]Moderate [ ]Severe    PCSSQ[Palliative Care Spiritual Screening Question]   Severity (0-10):  Score of 4 or > indicate consideration of Chaplaincy referral.  Chaplaincy Referral: [x ] yes [ ] refused [ ] following [ ] Deferred     Caregiver [employer]? : [ ] yes [x ] no [ ] Deferred [ ] Declined             Social work referral [ ] Patient & Family Centered Care Referral [ ]     Anticipatory Grief present?:  [ ] yes [ x] no  [ ] Deferred                  Social work referral [ ] Chaplaincy Referral[ ]    Other Symptoms:  [ x]All other review of systems negative     Palliative Performance Status Version 2:  40  %      http://npcrc.org/files/news/palliative_performance_scale_ppsv2.pdf    PHYSICAL EXAM:  Vital Signs Last 24 Hrs  T(C): 36.4 (17 Oct 2022 12:41), Max: 36.9 (16 Oct 2022 20:46)  T(F): 97.5 (17 Oct 2022 12:41), Max: 98.4 (16 Oct 2022 20:46)  HR: 82 (17 Oct 2022 12:41) (79 - 91)  BP: 122/74 (17 Oct 2022 12:41) (116/67 - 165/77)  BP(mean): --  RR: 18 (17 Oct 2022 12:41) (18 - 19)  SpO2: 97% (17 Oct 2022 12:41) (97% - 99%)    Parameters below as of 17 Oct 2022 12:41  Patient On (Oxygen Delivery Method): room air    I&O's Summary    16 Oct 2022 07:01  -  17 Oct 2022 07:00  --------------------------------------------------------  IN: 1790 mL / OUT: 1050 mL / NET: 740 mL    17 Oct 2022 07:01  -  17 Oct 2022 14:57  --------------------------------------------------------  IN: 120 mL / OUT: 0 mL / NET: 120 mL       GENERAL: [ ]Cachexia  lethargic appearing laying in bed   [ x]Alert  [ x]Oriented x3  [ ]Lethargic  [ ]Unarousable  [ x]Verbal  [ ]Non-Verbal  Behavioral:   [ ]Anxiety  [ ]Delirium [ ]Agitation [ ]Other  HEENT:  [ ]Normal   [ x]Dry mouth   [ ]ET Tube/Trach  [ ]Oral lesions  PULMONARY:   [x ]Clear [ ]Tachypnea  [ ]Audible excessive secretions   [ ]Rhonchi        [ ]Right [ ]Left [ ]Bilateral  [ ]Crackles        [ ]Right [ ]Left [ ]Bilateral  [ ]Wheezing     [ ]Right [ ]Left [ ]Bilateral  [ ]Diminished BS [ ] Right [ ]Left [ ]Bilateral  CARDIOVASCULAR:    [ x]Regular [ ]Irregular [x ]Tachy  [ ]Campbell [ ]Murmur [ ]Other  GASTROINTESTINAL:  [x ]Soft  [ ]Distended   [ ]+BS  [ x]Non tender [ ]Tender  [ ]Other [ ]PEG [ ]OGT/ NGT   Last BM:   GENITOURINARY:  [ ]Normal [ ]Incontinent   [ ]Oliguria/Anuria   [ ]Whitehead  MUSCULOSKELETAL:   [ ]Normal   [ x]Weakness  [x ]Bed/Wheelchair bound [ ]Edema  NEUROLOGIC: weakness in the left upper extremity due to pain, difficulty with movement   [ ]No focal deficits  [ ] Cognitive impairment  [ ] Dysphagia [ ]Dysarthria [ ] Paresis [ ]Other   SKIN:   [x ]Normal  [ ]Rash  [ ]Other  [ ]Pressure ulcer(s) [ ]y [ ]n present on admission    CRITICAL CARE:  [ ]Shock Present  [ ]Septic [ ]Cardiogenic [ ]Neurologic [ ]Hypovolemic  [ ]Vasopressors [ ]Inotropes  [ ]Respiratory failure present [ ]Mechanical Ventilation [ ]Non-invasive ventilatory support [ ]High-Flow   [ ]Acute  [ ]Chronic [ ]Hypoxic  [ ]Hypercarbic [ ]Other  [ ]Other organ failure     LABS:                                   10.0   2.84  )-----------( 194      ( 17 Oct 2022 08:01 )             31.1     10-17    133<L>  |  97  |  17  ----------------------------<  85  3.4<L>   |  27  |  0.48<L>    Ca    9.0      17 Oct 2022 08:01    RADIOLOGY & ADDITIONAL STUDIES: no new imaging    Protein Calorie Malnutrition Present: [ ]mild [ ]moderate [ ]severe [ ]underweight [ ]morbid obesity  https://www.andeal.org/vault/2440/web/files/ONC/Table_Clinical%20Characteristics%20to%20Document%20Malnutrition-White%20JV%20et%20al%202012.pdf    Height (cm): 165.1 (10-10-22 @ 15:35), 162.5 (06-01-22 @ 15:00), 165.1 (05-06-22 @ 16:33)  Weight (kg): 64.4 (10-10-22 @ 15:35), 60.9991 (06-01-22 @ 15:00), 67.6 (05-06-22 @ 16:33)  BMI (kg/m2): 23.6 (10-10-22 @ 15:35), 23.1 (06-01-22 @ 15:00), 24.8 (05-06-22 @ 16:33)    [ ]PPSV2 < or = 30%  [ ]significant weight loss [ ]poor nutritional intake [ ]anasarca[ ]Artificial Nutrition    Other REFERRALS:  [ ]Hospice  [ ]Child Life  [ ]Social Work  [ ]Case management [ ]Holistic Therapy

## 2022-10-17 NOTE — PROGRESS NOTE ADULT - PROBLEM SELECTOR PLAN 1
- H/H stable with no signs of active bleeding   - Restarted home med Eliquis 5mg BID   - CT angio A/P negative for retroperitoneal hematoma/GI bleed   - Continue to monitor

## 2022-10-17 NOTE — PROGRESS NOTE ADULT - PROBLEM SELECTOR PLAN 1
Hx of recent L fem fx s/p repair p/w fall  - Imaging shows acute right midshaft humerus fracture and hepatic mets  - S/p ORIF w/ Ortho on 10/14  ISTOP Reference #: [id number], chart note on 10/12   Pain control:  - agree with scheduled tylenol, PRN oxy5-10 mg q4 prn for mod-severe pain  - miralax/senna for bowel regimen

## 2022-10-17 NOTE — PROGRESS NOTE ADULT - ATTENDING COMMENTS
Metastatic breast cancer s/p multiple lines of treatment admitted with pain. Scans show progressive bony disease.  Patient's oncologist agrees with palliative care.  Appreciate RT consult. Patient may agree for palliative RT if it will decrease pain.
agree with above.    We again discussed risks, benefits and alternatives. Will proceed with R humerus IMN. Rationale of care was reviewed and all questions were answered. Surgical risks include but are not limited to infection, bleeding, nerve damage, chronic pain, VTE, fracture nonunion/malunion, limited ROM, weakness, cuff dysfunction, local recurrence and need for further surgical procedures.  The patient understood all of this and would like to proceed.
n/a
72yF with metastatic breast ca, Lumpectomy x2, RT, chemo, Ex smoker, Asthma, DVT ( on Eliquis), HTN, Osteoarthritis recent L fem fx s/p repair p/w fall, found to have L humeral fracture with plans for ORIF, Geriatrics and Palliative Medicine Team is consulted for assistance with pain control     Patient with episodes of confusion in the evening/night likely hospital acquired delirium. She refused 2 doses of IV Dilaudid 0.2 mg ATC but asks that it remains scheduled for now. Advised that we will transition her PRNs to orals as detailed above. Encouraged her to try oral dilaudid to see how it works for pain. Add tylenol 500 mg q8 ATC for adjunctive therapy.     Note MRI findings of diffuse spine lesions and cord compression at T8, pt confirms no plans for surgery. Radiation therapy is being considered. Pt wishes to return to rehab after ORIF.     Yanet Espinoza MD  [facility] Team Consults  Please call if we can be of assistance, [phone]
72yF with metastatic breast ca, Lumpectomy x2, RT, chemo, Ex smoker, Asthma, DVT ( on Eliquis), HTN, Osteoarthritis recent L fem fx s/p repair p/w fall, found to have L humeral fracture with plans for ORIF, Geriatrics and Palliative Medicine Team is consulted for assistance with pain control     Patient feels like pain is controlled and understands she can use additional PRN IV dilaudid if needed for L arm pain. We will likely transition to oral regimen after surgery on Friday. Adding bowel regimen for constipation prevention in setting of opioids use.     An MD Alexis  [facility] Team Consults  Please call if we can be of assistance, [phone]

## 2022-10-17 NOTE — PROGRESS NOTE ADULT - PROBLEM SELECTOR PLAN 5
- Geriatrics and Palliative Medicine Team will follow to assist with GOC, pt/family to discuss XRT vs hospice     Yanet Espinoza MD  [facility] Team Consults  Please call if we can be of assistance, [phone]

## 2022-10-17 NOTE — PROGRESS NOTE ADULT - PROBLEM SELECTOR PLAN 2
Hx/o metastatic breast ca, Lumpectomy x2, RT, chemo, now w/ hepatic mets on imaging   - Pain regimen as above  - F/up rad/onc recs, pt and family to decide if she should pursue XRT at [facility]  - At pt request we also spoke about comfort measures with hospice briefly today, we can provide more information if pt decides against radiation therapy as next step

## 2022-10-17 NOTE — PROGRESS NOTE ADULT - PROBLEM SELECTOR PLAN 5
Hx/o metastatic breast ca, DVT on Eliquis  - Re-started Eliquis as CTA negative for retroperitoneal hematoma, H/H is table

## 2022-10-17 NOTE — CONSULT NOTE ADULT - ASSESSMENT
SUNITHA SEVERINO is a 72y woman with h/o metastatic breast ca to bone/liver diagnosed 2005, s/p left lumpectomy, recurred to left breast in 2017 and had left lumpectomy,  is now s/p IMN for right humeral fx, and we are consulted about T8 cord compression but no real acute back pain present now, takes tylenol pain is 5/10.  Per family, surgery deferred due to high risk of spine surgery.  Options are transfer to [facility] for spine palliative RT vs. hospice care.  Family will let us know how they wish to proceed.    SUNITHA SEVERINO is a 72y woman with h/o metastatic breast ca to bone/liver diagnosed 2005, s/p left lumpectomy, recurred to right breast in 2017 and had right lumpectomy,  is now s/p IMN for right humeral fx, and we are consulted about T8 cord compression but no real acute back pain present now, takes tylenol pain is 5/10.  Per family, surgery deferred due to high risk of spine surgery.  Options are transfer to [facility] for spine palliative RT vs. hospice care.  Family will let us know how they wish to proceed.    SUNITHA SEVERINO is a 72y woman with h/o metastatic breast ca to bone/liver diagnosed 2005, s/p left lumpectomy, recurred to right breast in 2017 and had right lumpectomy,  is now s/p IMN for right humeral fx, and we are consulted about T8 cord compression but no real acute back pain present now, takes tylenol pain is 5/10.  Per family, surgery deferred due to high risk of spine surgery.  Seen by Dr. Cassidy, poor candidate for spine surgery, no plans for surgery.   Options are transfer to [facility] for spine palliative RT vs. hospice care.  Family will let us know how they wish to proceed.

## 2022-10-17 NOTE — PROGRESS NOTE ADULT - ASSESSMENT
72F h/o metastatic breast cancer (hormone positive, HER2 neg), DVT on eliquis, recent L fem fx s/p repair p/w fall, found to have acute displaced right midshaft humerus fracture, pending OR for humeral fracture on 10/14:     #metastatic breast cancer   -imaging this admission:   CT humerus: Acute displaced pathologic fracture through the mid diaphysis of the right humerus.  CT Cervical Spine No Cont: IMPRESSION: Diffuse lytic or blastic changes to the visualized cervical thoracic vertebral bodies and skull base. No evidence of acute fracture   or dislocation.  CT Head No Cont: mild periventricular and moderate bifrontal and biparietal subcortical white matter ischemia. Hypodensity is seen within the bert  and RIGHT midbrain which is nonspecific and may represent white matter ischemia, acute infarction or edema from underlying lesion. MRI with  gadolinium is recommended.  CT cervical spine:   No vertebral fracture is recognized.  Innumerable lytic lesions are seen throughout the cervical and visualized thoracic  spine consistent with metastatic disease. Mild compression fracture of C6   noted with loss of 50% of vertebral body height. Suspicion of RIGHT epidural neoplastic disease at this the 1-2 level and the RIGHT C7-T1 level.   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 Of note, there is new dorsal epidural component at T8 resulting in cord  compression. No abnormal cord signal identified. L2-L3 and L4-L5 epidural metastatic disease and degenerative changes contribute to severe thecal sac stenosis, worsened since comparison MRI   10/14/2021.  CT Chest: Chronic right sixth, seventh and ninth rib fractures. Extensive  destructive bony changes of the posterior right 10th rib. No associated  pneumothorax. Diffuse sclerotic and lytic bony metastatic disease of the visualized axial and appendicular skeleton, appears to have progressed since prior study on 7/22/2022. Irregularly-shaped 7 mm right apical nodule which appears more  well-defined when comparedto recent CT scan. New small right pleural  effusion. Hepatic metastases.    Recommendation:   -most recently treated with Vinorelbine Nov 2021>>Jan 2022, Eribulin Feb 2022> April 2022    -above findings overall concerning for POD with significant decline in PS. s/p repair of humeral fracture  -inpatient med onc reached out to her outpt oncologist Dr. Dunaway, who is in agreement with palliative RT to spine, especially given high risk T8 lesion with cord compression. given her overall decline and poor PS, she will not be a candidate for systemic DMT and we recommend focusing on symptoms/transitioning to hospice after any palliative RT   -rad onc consulted and recs appreciated. patient pending decision on purusing RT at [facility]   -palliative on board and appreciate their assistance with helping with pain control and GOC     Harlan Smith Heme/onc PGY6 [phone]

## 2022-10-17 NOTE — PROGRESS NOTE ADULT - ASSESSMENT
73 y/o F with PMhx metastatic breast ca, Lumpectomy x2, Rt, chemo, Ex smoker, Asthma, DVT/eliquis, HTN, Osteoarthritis recent L fem fx s/p repair p/w fall, found to have acute displaced right midshaft humerus fracture s/p POD3 s/p R humerus IMN. Also new findings for extensive metastatic disease, with cord compression. Conservative management. Palliative following, possibly hospice vs. spine palliative RT.

## 2022-10-17 NOTE — CONSULT NOTE ADULT - NS ATTEND AMEND GEN_ALL_CORE FT
72F w/ metastatic breast cancer, widespread metastatic disease. Prior RT for breast primary. Recent pathologic right humeral fracture, s/p IMN 10/14/22. On MRI 10/13/22 cord compression from posterior element disease at T8, epidural disease in lumbar spine; no clear neurological changes. Patient and family deferring surgical intervention for spine.     The patient is a reasonable candidate for palliative radiation therapy to the spine; she does not have clear neurological compromise or significant pain but left untreated the thoracic and lumbar disease is almost sure to progress to that. The role for radiation and potential adverse effects were reviewed with her. She is deciding if she wishes to proceed with treatment, which would require transfer to [facility]. Initial (On Arrival)

## 2022-10-17 NOTE — PROGRESS NOTE ADULT - PROBLEM SELECTOR PLAN 4
- HCP: reported pt's nephew Keron and sister Bidrie on a previously established document. Pt is alert and oriented with full capacity to make her own decisions. She states she has discussed her wishes and plan of care with her family  - Code status changed to DNR/DNI today, DANK in chart  - GOC: rad/onc consult, pt/family to consider XRT vs hospice as best next step

## 2022-10-17 NOTE — PROGRESS NOTE ADULT - ASSESSMENT
A/P: 72yF POD3 s/p R humerus IMN  -Pain control  -NWB RUE, ROM as tolerated, NO active abduction  -DVT ppx: Eliquis  -Incentive Spirometry  -Elevation to extremity  -Medical management per primary team appreciated

## 2022-10-17 NOTE — PROGRESS NOTE ADULT - CONVERSATION DETAILS
Pt is hoping to talk to radiation oncology to see if radiation would offer benefit to her spine lesions. She asked about comfort measures with hospice care. Advised that pending rad/onc recs (if she wants to pursue radiation treatments), we can discuss transition to hospice as a potential next step. Code status discussed and pt clearly explained her wishes for DNR and DNI. MOLST was completed to document this today. Pt states her HCP was previously established where she put her nephew Keron and sister Birdie on the document. She has discussed her wishes/plan of care of them and they are all supportive of her decisions.

## 2022-10-17 NOTE — PROGRESS NOTE ADULT - PROBLEM SELECTOR PLAN 3
Acute displaced right midshaft humerus fracture s/p POD3 s/p R humerus IMN    - s/p ORIF 10/14  - Post-op for pain management, encourage PT/OT, monitor for post-op infections  - Pain control: Gabapentin 300mg qd, tramadol 50 PRN for mild pain; oxymg Q3 PRN for moderate pain; oxy 10mg Q3 PRN for severe pain   - Encourage incentive spirometry  - Whitehead catheter for retention, can trial for void  - Bowel regimen

## 2022-10-17 NOTE — PROGRESS NOTE ADULT - PROBLEM SELECTOR PLAN 2
MRI cervical/thoracic/lumbar: new dorsal epidural component at T8 resulting in cord compression. Intermittent urinary retention, denies other focal neurological deficits. Reduced ROM at baseline of LLE. Conservative management given extent of metastatic bone involvement of spine.    - Consulted ortho spine team rec: Orthopedic spine attending Dr. Cassidy saw patient and revealed image who  is recommending palliative radiation therapy to her spine due to her extensive metastatic disease, and no orthopedic surgical intervention for spine at this time  -Rad/Onc following, family/patient is yet to confirm decision on hospice vs. spine palliative RT

## 2022-10-18 NOTE — PROGRESS NOTE ADULT - SUBJECTIVE AND OBJECTIVE BOX
[facility] Division of Hospital Medicine  Khadijah Mccoy MD  Available via MS Teams  Pager: [phone]    SUBJECTIVE / OVERNIGHT EVENTS: Patient seen and examined at bedside. No acute overnight events. Patient states her pain is minimal, states she is having diarrhea but improving. Otherwise, she denies chest pain, sob, abd pain, n/v/f. Patient also states she would like more time to think about palliative vs. palliative radiation therapy and would like to talk to her family in length before arriving to a decision; she is requesting more time.     ADDITIONAL REVIEW OF SYSTEMS: as stated in HPI     MEDICATIONS  (STANDING):  acetaminophen     Tablet .. 650 milliGRAM(s) Oral every 6 hours  amLODIPine   Tablet 5 milliGRAM(s) Oral daily  apixaban 5 milliGRAM(s) Oral every 12 hours  chlorhexidine 2% Cloths 1 Application(s) Topical daily  citalopram 10 milliGRAM(s) Oral daily  dexAMETHasone  Injectable 4 milliGRAM(s) IV Push every 6 hours  gabapentin 300 milliGRAM(s) Oral daily  hydrochlorothiazide 12.5 milliGRAM(s) Oral daily  influenza  Vaccine (HIGH DOSE) 0.7 milliLiter(s) IntraMuscular once  lactated ringers. 1000 milliLiter(s) (75 mL/Hr) IV Continuous <Continuous>  losartan 50 milliGRAM(s) Oral daily  metoprolol tartrate 50 milliGRAM(s) Oral two times a day  polyethylene glycol 3350 17 Gram(s) Oral at bedtime  senna 2 Tablet(s) Oral at bedtime  sodium chloride 0.9%. 1000 milliLiter(s) (100 mL/Hr) IV Continuous <Continuous>    MEDICATIONS  (PRN):  ondansetron Injectable 4 milliGRAM(s) IV Push every 6 hours PRN Nausea and/or Vomiting  oxyCODONE    IR 5 milliGRAM(s) Oral every 3 hours PRN Moderate Pain (4 - 6)  oxyCODONE    IR 10 milliGRAM(s) Oral every 3 hours PRN Severe Pain (7 - 10)  traMADol 50 milliGRAM(s) Oral every 4 hours PRN Mild Pain (1 - 3)      I&O's Summary    17 Oct 2022 07:01  -  18 Oct 2022 07:00  --------------------------------------------------------  IN: 120 mL / OUT: 400 mL / NET: -280 mL    18 Oct 2022 07:01  -  18 Oct 2022 12:50  --------------------------------------------------------  IN: 240 mL / OUT: 0 mL / NET: 240 mL        PHYSICAL EXAM:  Vital Signs Last 24 Hrs  T(C): 36.5 (18 Oct 2022 11:40), Max: 36.5 (17 Oct 2022 21:19)  T(F): 97.7 (18 Oct 2022 11:40), Max: 97.7 (17 Oct 2022 21:19)  HR: 96 (18 Oct 2022 11:40) (96 - 100)  BP: 116/80 (18 Oct 2022 11:40) (104/68 - 141/86)  BP(mean): --  RR: 18 (18 Oct 2022 11:40) (18 - 18)  SpO2: 99% (18 Oct 2022 11:40) (96% - 99%)    Parameters below as of 18 Oct 2022 11:40  Patient On (Oxygen Delivery Method): room air      CONSTITUTIONAL: NAD, well-developed  RESPIRATORY: Normal respiratory effort; lungs are clear to auscultation bilaterally  CARDIOVASCULAR: Regular rate and rhythm, normal S1 and S2, no murmur/rub/gallop; No lower extremity edema.  ABDOMEN: Nontender to palpation, normoactive bowel sounds, no rebound/guarding.  MUSCLOSKELETAL: R humerus IMN surgical site clean/dry, no signs of infection.   PSYCH: A+O to person, place, and time; affect appropriate  LABS:                        10.0   2.84  )-----------( 194      ( 17 Oct 2022 08:01 )             31.1     10-17    133<L>  |  97  |  17  ----------------------------<  85  3.4<L>   |  27  |  0.48<L>    Ca    9.0      17 Oct 2022 08:01      PT/INR - ( 17 Oct 2022 08:01 )   PT: 16.4 sec;   INR: 1.42 ratio         PTT - ( 17 Oct 2022 08:01 )  PTT:23.3 sec          Culture - Blood (collected 16 Oct 2022 08:42)  Source: .Blood Blood  Preliminary Report (17 Oct 2022 13:01):    No growth to date.      COVID-19 PCR: NotDetec (17 Oct 2022 07:47)  SARS-CoV-2: NotDetec (06 May 2022 18:33)      RADIOLOGY & ADDITIONAL TESTS:  New Results Reviewed Today:   New Imaging Personally Reviewed Today:  New Electrocardiogram Personally Reviewed Today:  Prior or Outpatient Records Reviewed Today:    COMMUNICATION:  Care Discussed with Consultants/Other Providers and Details of Discussion:  Discussions with Patient/Family:  PCP Communication:

## 2022-10-18 NOTE — CHART NOTE - NSCHARTNOTEFT_GEN_A_CORE
Nutrition Follow Up Note  Patient seen for: nutrition follow-up    Chart reviewed, events noted. This is a "71 y/o F with PMhx metastatic breast ca, Lumpectomy x2, Rt, chemo, Ex smoker, Asthma, DVT/eliquis, HTN, Osteoarthritis recent L fem fx s/p repair p/w fall, found to have acute displaced right midshaft humerus fracture s/p POD3 s/p R humerus IMN. Also new findings for extensive metastatic disease, with cord compression. Conservative management. Palliative following, possibly hospice vs. spine palliative RT."    Source: [x] Patient       [x] EMR        [] RN        [x] Family at bedside       [] Other:    -If unable to interview patient: [] Trach/Vent/BiPAP  [] Disoriented/confused/inappropriate to interview    Diet Order:   Diet, Regular (10-14-22)    - Is current order appropriate/adequate? [] Yes  [x]  No: recommending nutrition supplement     - PO intake :   [] >75%  Adequate    [] 50-75%  Fair       [x] <50%  Poor    - Nutrition-related concerns:  -pt reports fair to poor PO intake due to lack of appetite. Reports consuming < 50% of mac n cheese for lunch today.   -States she is trying to force herself to eat what she can. Pt noted with Ensure Max shakes at bedside, family/friend bringing in from home. Pt reports taste is ok, discussed available protein shakes, pt agreeable to trying Ensure Plus High Protein shake (strawberry) which is more calorically dense and higher in protein then Ensure Max.   -Offered to obtain additional food preferences but pt with none at this time.       GI:  Last BM 10/16___.   Bowel Regimen? [] Yes   [x] No      Weights:   no new weights to assess     Nutritionally Pertinent MEDICATIONS  (STANDING):  amLODIPine   Tablet  dexAMETHasone  Injectable  hydrochlorothiazide  lactated ringers.  losartan  metoprolol tartrate  sodium chloride 0.9%.    Pertinent Labs: 10-18 @ 13:35: Na 132<L>, BUN 18, Cr 0.50, <H>, K+ 4.3, Phos --, Mg --, Alk Phos --, ALT/SGPT --, AST/SGOT --, HbA1c --      Finger Sticks:      Skin per nursing documentation: stage 1 sacrum   Edema: +1 right arm     Estimated Needs:   [x] no change since previous assessment  [] recalculated:     Previous Nutrition Diagnosis:   1) Malnutrition (Severe Acute)  2) Increased Nutrient  Needs   Nutrition Diagnosis is: [x] ongoing  [] resolved [] not applicable     New Nutrition Diagnosis: [x] Not applicable    Nutrition Care Plan:  [x] In Progress  [] Achieved  [] Not applicable    Nutrition Interventions:     Education Provided:       [] Yes:  [x] No:        Recommendations:         [x] Continue current regular diet order as tolerated.      [x] Add oral nutrition supplement: Ensure Plus High Protein Shakes 1x daily, monitor intake and pt willingness to increase      [x] Consider addition of multivitamin if no contraindications      [x] RD to remain available and follow-up as medically appropriate.     Monitoring and Evaluation:   Continue to monitor nutritional intake, tolerance to diet prescription, weights, labs, skin integrity      RD remains available upon request and will follow up per protocol  Awa Lemon RD, CDN, Pager # [phone]

## 2022-10-18 NOTE — PROGRESS NOTE ADULT - ASSESSMENT
A/P: 72yF POD4 s/p R humerus IMN    -Pain control  -NWB RUE, ROM as tolerated, NO active abduction  -DVT ppx: Eliquis  -Incentive Spirometry  -Elevation to extremity  -FU decision re: radiation to spine vs palliative care  -Medical management per primary team appreciated

## 2022-10-18 NOTE — PROGRESS NOTE ADULT - PROBLEM SELECTOR PLAN 1
Acute displaced right midshaft humerus fracture s/p POD3 s/p R humerus IMN    - s/p ORIF 10/14  - Post-op for pain management, encourage PT/OT, monitor for post-op infections  - Pain control: Gabapentin 300mg qd, tramadol 50 PRN for mild pain; oxymg Q3 PRN for moderate pain; oxy 10mg Q3 PRN for severe pain   - Encourage incentive spirometry  - Whitehead catheter for retention, can trial for void  - Bowel regimen, diet as tolerated   - Ortho following

## 2022-10-18 NOTE — CHART NOTE - NSCHARTNOTEFT_GEN_A_CORE
Brief f/u from Geriatrics and Palliative Medicine Team. Pt seen this morning, she is awake and alert, trying to order lunch. States she has spoken with radiation oncology and plans to have a group discussion with her family tomorrow. She asks for me to participate with the group discussion to help with decision making. Discussed that if radiation is being offered, it may be easier to pursue this before transitioning to hospice. Patient is contemplative; we will discuss as a group tomorrow.     Yanet Espinoza MD  [facility] Team Consults  Please call if we can be of assistance, [phone]

## 2022-10-18 NOTE — PROGRESS NOTE ADULT - PROBLEM SELECTOR PLAN 2
- F/u with CBC today, pending collection   - H/H stable with no signs of active bleeding   - Restarted home med Eliquis 5mg BID   - CT angio A/P negative for retroperitoneal hematoma/GI bleed   - Continue to monitor

## 2022-10-18 NOTE — PROGRESS NOTE ADULT - SUBJECTIVE AND OBJECTIVE BOX
Orthopedics      Patient seen and examined at bedside. Feeling well. Pain controlled. No n/v. No acute events overnight.    Vital Signs Last 24 Hrs  T(C): 36.4 (10-18-22 @ 05:02), Max: 36.6 (10-17-22 @ 08:45)  T(F): 97.5 (10-18-22 @ 05:02), Max: 97.9 (10-17-22 @ 08:45)  HR: 98 (10-18-22 @ 05:02) (82 - 100)  BP: 141/86 (10-18-22 @ 05:02) (104/68 - 141/86)  BP(mean): --  RR: 18 (10-18-22 @ 05:02) (18 - 18)  SpO2: 99% (10-18-22 @ 05:02) (96% - 99%)                        10.0   2.84  )-----------( 194      ( 17 Oct 2022 08:01 )             31.1     17 Oct 2022 08:01    133    |  97     |  17     ----------------------------<  85     3.4     |  27     |  0.48     Ca    9.0        17 Oct 2022 08:01      PT/INR - ( 17 Oct 2022 08:01 )   PT: 16.4 sec;   INR: 1.42 ratio         PTT - ( 17 Oct 2022 08:01 )  PTT:23.3 sec    Exam:  Gen: NAD, resting comfortably  UE:  Dressing c/d/i  +AIN/PIN/M/R/U/Ax/Musc  SILT C4-T1  Radial pulses 2+  Compartments soft and compressible  Calves NTTP b/l

## 2022-10-19 NOTE — PROGRESS NOTE ADULT - SUBJECTIVE AND OBJECTIVE BOX
SUBJECTIVE AND OBJECTIVE  Indication for Geriatrics and Palliative Care Services/INTERVAL HPI:    OVERNIGHT EVENTS: Patient seen this morning, denies any discomfort, asking for a medication to help with sleep. She request that I return at 2pm for family meeting with her and her 2 sisters. Please see a separate [facility] note for discussion details.     In summary, pt wishes to pursue hospice at a nursing home. She does not want to go through [facility] tx/XRT as her pain is not severe, and would be ok with using opioids under hospice care for any pain management. Sisters, on the other hand, wish for her to pursue XRT and then [facility] for rehab. Patient was clear in advocating for what she wants and what's appropriate for her quality of life.  Hospice services discussed in detail and pt agrees to a hospice referral today.     DNR on chart:  Allergies  MEDICATIONS  (STANDING):  acetaminophen     Tablet .. 650 milliGRAM(s) Oral every 6 hours  amLODIPine   Tablet 5 milliGRAM(s) Oral daily  apixaban 5 milliGRAM(s) Oral every 12 hours  chlorhexidine 2% Cloths 1 Application(s) Topical daily  citalopram 10 milliGRAM(s) Oral daily  dexAMETHasone  Injectable 4 milliGRAM(s) IV Push every 6 hours  gabapentin 300 milliGRAM(s) Oral daily  hydrochlorothiazide 12.5 milliGRAM(s) Oral daily  influenza  Vaccine (HIGH DOSE) 0.7 milliLiter(s) IntraMuscular once  lactated ringers. 1000 milliLiter(s) (75 mL/Hr) IV Continuous <Continuous>  losartan 50 milliGRAM(s) Oral daily  melatonin 3 milliGRAM(s) Oral at bedtime  metoprolol tartrate 50 milliGRAM(s) Oral two times a day  sodium chloride 0.9%. 1000 milliLiter(s) (100 mL/Hr) IV Continuous <Continuous>    MEDICATIONS  (PRN):  ondansetron Injectable 4 milliGRAM(s) IV Push every 6 hours PRN Nausea and/or Vomiting  oxyCODONE    IR 5 milliGRAM(s) Oral every 3 hours PRN Moderate Pain (4 - 6)  oxyCODONE    IR 10 milliGRAM(s) Oral every 3 hours PRN Severe Pain (7 - 10)  senna 2 Tablet(s) Oral at bedtime PRN Constipation  traMADol 50 milliGRAM(s) Oral every 4 hours PRN Mild Pain (1 - 3)    Pen-Vee K (Rash)  penicillin (Unknown)    Intolerances    ITEMS UNCHECKED ARE NOT PRESENT    PRESENT SYMPTOMS: [ ]Unable to self-report - see [ ] CPOT [ ] PAINADS [ ] RDOS  Source if other than patient:  [ ]Family   [ ]Team     Pain:  [ x]yes [ ]no  QOL impact -   Location -   Right upper extremity, back                 Aggravating factors - movement   Quality -achy/dull  Radiation -no  Timing-  Severity (0-10 scale): 5/10  Minimal acceptable level (0-10 scale):     Dyspnea:                           [ ]Mild [ ]Moderate [ ]Severe  Anxiety:                             [ ]Mild [ ]Moderate [ ]Severe  Fatigue:                             [ ]Mild [ ]Moderate [ ]Severe  Nausea:                             [ ]Mild [ ]Moderate [ ]Severe  Loss of appetite:              [ ]Mild [ ]Moderate [ ]Severe  Constipation:                    [ ]Mild [ ]Moderate [ ]Severe    PCSSQ[Palliative Care Spiritual Screening Question]   Severity (0-10):  Score of 4 or > indicate consideration of Chaplaincy referral.  Chaplaincy Referral: [x ] yes [ ] refused [ ] following [ ] Deferred     Caregiver Edison? : [ ] yes [x ] no [ ] Deferred [ ] Declined             Social work referral [ ] Patient & Family Centered Care Referral [ ]     Anticipatory Grief present?:  [ ] yes [ x] no  [ ] Deferred                  Social work referral [ ] Chaplaincy Referral[ ]    Other Symptoms:  [ x]All other review of systems negative     Palliative Performance Status Version 2:  40  %      http://npcrc.org/files/news/palliative_performance_scale_ppsv2.pdf    PHYSICAL EXAM:  Vital Signs Last 24 Hrs  T(C): 36.8 (19 Oct 2022 12:59), Max: 36.8 (18 Oct 2022 21:26)  T(F): 98.2 (19 Oct 2022 12:59), Max: 98.2 (18 Oct 2022 21:26)  HR: 80 (19 Oct 2022 12:59) (78 - 87)  BP: 129/77 (19 Oct 2022 12:59) (125/69 - 134/82)  BP(mean): --  RR: 19 (19 Oct 2022 12:59) (18 - 20)  SpO2: 97% (19 Oct 2022 12:59) (97% - 99%)    Parameters below as of 19 Oct 2022 12:59  Patient On (Oxygen Delivery Method): room air    I&O's Summary    18 Oct 2022 07:01  -  19 Oct 2022 07:00  --------------------------------------------------------  IN: 480 mL / OUT: 1500 mL / NET: -1020 mL      GENERAL: [ ]Cachexia  lethargic appearing laying in bed   [ x]Alert  [ x]Oriented x3  [ ]Lethargic  [ ]Unarousable  [ x]Verbal  [ ]Non-Verbal  Behavioral:   [ ]Anxiety  [ ]Delirium [ ]Agitation [ ]Other  HEENT:  [ ]Normal   [ x]Dry mouth   [ ]ET Tube/Trach  [ ]Oral lesions  PULMONARY:   [x ]Clear [ ]Tachypnea  [ ]Audible excessive secretions   [ ]Rhonchi        [ ]Right [ ]Left [ ]Bilateral  [ ]Crackles        [ ]Right [ ]Left [ ]Bilateral  [ ]Wheezing     [ ]Right [ ]Left [ ]Bilateral  [ ]Diminished BS [ ] Right [ ]Left [ ]Bilateral  CARDIOVASCULAR:    [ x]Regular [ ]Irregular [x ]Tachy  [ ]Campbell [ ]Murmur [ ]Other  GASTROINTESTINAL:  [x ]Soft  [ ]Distended   [ ]+BS  [ x]Non tender [ ]Tender  [ ]Other [ ]PEG [ ]OGT/ NGT   Last BM:   GENITOURINARY:  [ ]Normal [ ]Incontinent   [ ]Oliguria/Anuria   [ ]Whitehead  MUSCULOSKELETAL:   [ ]Normal   [ x]Weakness  [x ]Bed/Wheelchair bound [ ]Edema  NEUROLOGIC: weakness in the left upper extremity due to pain, difficulty with movement   [ ]No focal deficits  [ ] Cognitive impairment  [ ] Dysphagia [ ]Dysarthria [ ] Paresis [ ]Other   SKIN:   [x ]Normal  [ ]Rash  [ ]Other  [ ]Pressure ulcer(s) [ ]y [ ]n present on admission    CRITICAL CARE:  [ ]Shock Present  [ ]Septic [ ]Cardiogenic [ ]Neurologic [ ]Hypovolemic  [ ]Vasopressors [ ]Inotropes  [ ]Respiratory failure present [ ]Mechanical Ventilation [ ]Non-invasive ventilatory support [ ]High-Flow   [ ]Acute  [ ]Chronic [ ]Hypoxic  [ ]Hypercarbic [ ]Other  [ ]Other organ failure     LABS:                                   9.9    5.22  )-----------( 242      ( 19 Oct 2022 10:20 )             29.2     10-19    131<L>  |  95<L>  |  23  ----------------------------<  122<H>  3.8   |  23  |  0.51    Ca    9.0      19 Oct 2022 10:20    RADIOLOGY & ADDITIONAL STUDIES: no new imaging    Protein Calorie Malnutrition Present: [ ]mild [ ]moderate [ ]severe [ ]underweight [ ]morbid obesity  https://www.andeal.org/vault/2440/web/files/ONC/Table_Clinical%20Characteristics%20to%20Document%20Malnutrition-White%20JV%20et%20al%202012.pdf    Height (cm): 165.1 (10-10-22 @ 15:35), 162.5 (06-01-22 @ 15:00), 165.1 (05-06-22 @ 16:33)  Weight (kg): 64.4 (10-10-22 @ 15:35), 60.9991 (06-01-22 @ 15:00), 67.6 (05-06-22 @ 16:33)  BMI (kg/m2): 23.6 (10-10-22 @ 15:35), 23.1 (06-01-22 @ 15:00), 24.8 (05-06-22 @ 16:33)    [ ]PPSV2 < or = 30%  [ ]significant weight loss [ ]poor nutritional intake [ ]anasarca[ ]Artificial Nutrition    Other REFERRALS:  [ ]Hospice  [ ]Child Life  [ ]Social Work  [ ]Case management [ ]Holistic Therapy

## 2022-10-19 NOTE — PROGRESS NOTE ADULT - SUBJECTIVE AND OBJECTIVE BOX
Patient seen and examined at bedside. Feeling well. Pain controlled. denies rue numbness/tingling.     Vital Signs Last 24 Hrs  T(C): 36.4 (19 Oct 2022 05:32), Max: 36.8 (18 Oct 2022 21:26)  T(F): 97.5 (19 Oct 2022 05:32), Max: 98.2 (18 Oct 2022 21:26)  HR: 87 (19 Oct 2022 05:32) (78 - 96)  BP: 134/82 (19 Oct 2022 05:32) (116/80 - 134/82)  BP(mean): --  RR: 18 (19 Oct 2022 05:32) (18 - 20)  SpO2: 97% (19 Oct 2022 05:32) (97% - 99%)    Parameters below as of 19 Oct 2022 05:32  Patient On (Oxygen Delivery Method): room air        Exam:  Gen: NAD, resting comfortably  UE:  Dressing c/d/i  +AIN/PIN/M/R/U/Ax/Musc  SILT ax/radial/median/ulnar  Radial pulses 2+  Compartments soft and compressible  Calves NTTP b/l

## 2022-10-19 NOTE — PROGRESS NOTE ADULT - SUBJECTIVE AND OBJECTIVE BOX
[facility] Division of Hospital Medicine  Khadijah Mccoy MD  Available via MS Teams  Pager: [phone]    SUBJECTIVE / OVERNIGHT EVENTS: patient seen and examined at bedside. No acute overnight events. No new symptoms. Pending palliative discussion with family and palliative team tomorrow.     ADDITIONAL REVIEW OF SYSTEMS: as stated in HPI     MEDICATIONS  (STANDING):  acetaminophen     Tablet .. 650 milliGRAM(s) Oral every 6 hours  amLODIPine   Tablet 5 milliGRAM(s) Oral daily  apixaban 5 milliGRAM(s) Oral every 12 hours  chlorhexidine 2% Cloths 1 Application(s) Topical daily  citalopram 10 milliGRAM(s) Oral daily  dexAMETHasone  Injectable 4 milliGRAM(s) IV Push every 6 hours  gabapentin 300 milliGRAM(s) Oral daily  hydrochlorothiazide 12.5 milliGRAM(s) Oral daily  influenza  Vaccine (HIGH DOSE) 0.7 milliLiter(s) IntraMuscular once  lactated ringers. 1000 milliLiter(s) (75 mL/Hr) IV Continuous <Continuous>  losartan 50 milliGRAM(s) Oral daily  melatonin 3 milliGRAM(s) Oral at bedtime  metoprolol tartrate 50 milliGRAM(s) Oral two times a day  sodium chloride 0.9%. 1000 milliLiter(s) (100 mL/Hr) IV Continuous <Continuous>    MEDICATIONS  (PRN):  ondansetron Injectable 4 milliGRAM(s) IV Push every 6 hours PRN Nausea and/or Vomiting  oxyCODONE    IR 5 milliGRAM(s) Oral every 3 hours PRN Moderate Pain (4 - 6)  oxyCODONE    IR 10 milliGRAM(s) Oral every 3 hours PRN Severe Pain (7 - 10)  senna 2 Tablet(s) Oral at bedtime PRN Constipation  traMADol 50 milliGRAM(s) Oral every 4 hours PRN Mild Pain (1 - 3)      I&O's Summary    18 Oct 2022 07:01  -  19 Oct 2022 07:00  --------------------------------------------------------  IN: 480 mL / OUT: 1500 mL / NET: -1020 mL        PHYSICAL EXAM:  Vital Signs Last 24 Hrs  T(C): 36.8 (19 Oct 2022 12:59), Max: 36.8 (18 Oct 2022 21:26)  T(F): 98.2 (19 Oct 2022 12:59), Max: 98.2 (18 Oct 2022 21:26)  HR: 80 (19 Oct 2022 12:59) (78 - 87)  BP: 129/77 (19 Oct 2022 12:59) (125/69 - 134/82)  BP(mean): --  RR: 19 (19 Oct 2022 12:59) (18 - 20)  SpO2: 97% (19 Oct 2022 12:59) (97% - 99%)    Parameters below as of 19 Oct 2022 12:59  Patient On (Oxygen Delivery Method): room air      CONSTITUTIONAL: NAD, well-developed  RESPIRATORY: Normal respiratory effort; lungs are clear to auscultation bilaterally  CARDIOVASCULAR: Regular rate and rhythm, normal S1 and S2, no murmur/rub/gallop; No lower extremity edema.  ABDOMEN: Nontender to palpation, normoactive bowel sounds, no rebound/guarding.  MUSCLOSKELETAL: R humerus IMN surgical site clean/dry, no signs of infection.   PSYCH: A+O to person, place, and time; affect appropriate    LABS:                        9.9    5.22  )-----------( 242      ( 19 Oct 2022 10:20 )             29.2     10-19    131<L>  |  95<L>  |  23  ----------------------------<  122<H>  3.8   |  23  |  0.51    Ca    9.0      19 Oct 2022 10:20                Culture - Stool (collected 17 Oct 2022 08:05)  Source: .Stool Feces  Preliminary Report (18 Oct 2022 17:05):    No enteric pathogens to date: Final culture pending      COVID-19 PCR: Doe (17 Oct 2022 07:47)  SARS-CoV-2: Doe (06 May 2022 18:33)      RADIOLOGY & ADDITIONAL TESTS:  New Results Reviewed Today:   New Imaging Personally Reviewed Today:  New Electrocardiogram Personally Reviewed Today:  Prior or Outpatient Records Reviewed Today:    COMMUNICATION:  Care Discussed with Consultants/Other Providers and Details of Discussion:  Discussions with Patient/Family:  PCP Communication:

## 2022-10-19 NOTE — PROGRESS NOTE ADULT - PROBLEM SELECTOR PLAN 4
- HCP: reported pt's nephew Keron and sister Birdie on a previously established document. Pt is alert and oriented with full capacity to make her own decisions   - Code status DNR/DNI, MOLST in chart  - GOC: pt wishes for hospice at nursing home, she is not in favor of XRT but she will privately discuss with family before making a final decision

## 2022-10-19 NOTE — PROGRESS NOTE ADULT - ASSESSMENT
71 y/o F with PMhx metastatic breast ca, Lumpectomy x2, Rt, chemo, Ex smoker, Asthma, DVT/eliquis, HTN, Osteoarthritis recent L fem fx s/p repair p/w fall, found to have acute displaced right midshaft humerus fracture s/p R humerus IMN. Also new findings for extensive metastatic disease, with cord compression. Conservative management. Palliative following, possibly hospice vs. spine palliative RT.

## 2022-10-19 NOTE — PROGRESS NOTE ADULT - PROBLEM SELECTOR PLAN 1
Hx of recent L fem fx s/p repair p/w fall  - Imaging shows acute right midshaft humerus fracture and hepatic mets  - S/p ORIF w/ Ortho on 10/14  - agree with scheduled tylenol, PRN oxy5-10 mg q4 prn for mod-severe pain

## 2022-10-19 NOTE — PROGRESS NOTE ADULT - PROBLEM SELECTOR PLAN 5
- Melatonin 3 mg qhs added for insomnia   - d/w CM to make referral for hospice and to see if pt has facility Medicaid  - D/w primary team to f/u with pt tomorrow on final decision re: XRT prior to hospice transition    Yanet Espinoza MD  [facility] Team Consults  Please call if we can be of assistance, [phone] Awake/Alert

## 2022-10-19 NOTE — PROGRESS NOTE ADULT - TIME BILLING
Symptom assessment and management, supportive counseling, coordination of care

## 2022-10-19 NOTE — PROGRESS NOTE ADULT - PROBLEM SELECTOR PLAN 2
Hx/o metastatic breast ca, Lumpectomy x2, RT, chemo, now w/ hepatic mets on imaging   - Pain regimen as above  - Pt not interested in [facility] transfer for XRT, sister more in favor and they will discuss privately to make a final decision  - Hospice referral sent today as pt ultimately wants to pursue hospice at a nursing home

## 2022-10-19 NOTE — PROGRESS NOTE ADULT - ASSESSMENT
A/P: 72yF POD4 s/p R humerus IMN    -Pain control  -NWB RUE, ROM as tolerated, NO active abduction  -DVT ppx: Eliquis  -Incentive Spirometry  -Elevation to extremity/ice   -Medical management per primary team appreciated  -Stable for discharge from orthopedic perspective

## 2022-10-19 NOTE — PROGRESS NOTE ADULT - PROBLEM SELECTOR PLAN 2
- H/H stable with no signs of active bleeding   - C/w  home med Eliquis 5mg BID   - CT angio A/P negative for retroperitoneal hematoma/GI bleed   - Continue to monitor

## 2022-10-19 NOTE — GOALS OF CARE CONVERSATION - ADVANCED CARE PLANNING - CONVERSATION DETAILS
ALISON consulted to assist in active pain/symptom management and GOC discussion in the setting of patient with advanced illness. LCSW and Dr. Espinoza of [facility] met with patient and patient's sisters at bedside today.     Team first introduced and reintroduced selves and roles in patient care. Patient and family verbalized understanding and were receptive to meeting today. Team next provided overview of patient's current medical status, and discussed options available in care moving forward. Team discussed option of transfer to [facility] for palliative radiation vs d/c planning likely to [facility]/SNF. Team spent much time discussed option of radiation today as a means of reducing possible future pain, but noted that the treatment would be palliative in nature, not curative. Patient verbalized understanding today and notes that at present time, she does not wish to pursue radiation. Patient states that she is aware that her time is limited, and notes wishing to have meaningful time receiving hospice services and being surrounded by family. Patient states that transfer to [facility] would delay this plan from initiation, and states that she feels her current symptoms do not warrant radiation. Patient very clear in goals today and discussed her acceptance and peace with the EOL process, but patient's sisters demonstrate much anticipatory grief and note struggling to accept this decision.     Team explored concerns and hesitations further with patient's sisters today, and patient's sisters note feeling that patient should receive radiation and attempt rehab in order to possibly regain some strength and conditioning. Patient's sisters discussed patient's time spent in [facility] this past summer, and note the benefits they witnessed from that program and plan of care. Patient again very clear today, and states that her initial cancer diagnosis occurred in 2004, and states that she is "tired" and wishes to prioritize her symptom management and quality of life. Patient again declining radiation, and team encouraged patient and family to discuss overall goals further, whilst providing much validation to patient regarding her autonomy and rights. Patient verbalized appreciation.    Team next discussed d/c planning should patient continue to refuse tx for radiation. Patient notes her interest in [facility] for LT placement, and LCSW provided overview of admissions process. LCSW discussed d/c to [facility] with plan for LT conversion and eventual hospice services. Patient states that, upon d/c from [facility] this summer, the [facility] SW assisted in applying patient for medicaid, which has been approved. Patient unclear if medicaid is community or facility medicaid, and LCSW provided overview of admissions process with either insurance coverage. LCSW discussed that, if patient with facility medicaid, patient would return to [facility] and can receive hospice services upon admission through her medicare insurance. LCSW additionally discussed that, if patient with community medicaid, the finance department at [facility] would need to work with patient and family to convert community to facility medicaid, and only after completion of that process could patient receive hospice services. LCSW discussed hospice requiring two payor sources, and discussed that conversion of medicaid may take some time, possibly weeks or months. LCSW additionally discussed the risk of hospital readmission prior to patient receiving hospice services, and patient and family verbalized understanding to both plans, as well as agreement. Overview of hospice services provided today, and choice in hospice agency provided as well. Patient identifies [facility] as preference. LCSW discussed submitting referral to both [facility] and to inquire further into patient's medicaid, and patient verbalized agreement. Primary floor SW Korina made aware for follow up. Patient's level of medicaid unclear at this time, but plan for d/c to SNF LT and eventual hospice services confirmed today.    Team inquired further into patient's coping today, and patient notes coping appropriately. Patient states that she feels she is "ready to go", and discussed how she envisions her passing today. Patient states she hopes that she will be in a comfortable bed, feeling no discomfort or pain, and wishes to be surrounded by her family. Team validated this today, and discussed hospice services as most in line with that goal. Patient notes feeling very depressed lately, and states that she feels that she has lost much hope as her illness process has progressed. Team discussed reframing hope today, and hoping for comfort, quality, and family support if hope for recovery or improvement may not be feasible. Patient verbalized appreciation of this perspective today. Team inquired into further methods to support patient, and patient states that she would like additional support through hospital staff until her d/c. LCSW assured ongoing support and availability, and team discussed referral to holistic RN for further support and mindful activity. Patient receptive to these supports today, but declined other resources including adult coloring books, etc. Patient does note attending a zoom meeting at night for support which she has found helpful, and team encouraged further utilization of these resources.    [facility] will continue to follow this case.

## 2022-10-19 NOTE — PROGRESS NOTE ADULT - PROBLEM SELECTOR PLAN 5
Hx/o metastatic breast ca, DVT on Eliquis  - Re-started Eliquis as CTA negative for retroperitoneal hematoma, H/H is stable

## 2022-10-20 NOTE — PROVIDER CONTACT NOTE (OTHER) - BACKGROUND
Pt admitted with left humerus dislocation. Pt returned from MRI for cervical, lumbar and thoracic spine. at 1830. Pt did not voided for 12 hrs.
Pt admitted s/p fall, closed fracture of humerus. Pt has cancer that has metastasized. Pt wants hospice care.

## 2022-10-20 NOTE — PROGRESS NOTE ADULT - ASSESSMENT
73 y/o F with PMhx metastatic breast ca, Lumpectomy x2, Rt, chemo, Ex smoker, Asthma, DVT/eliquis, HTN, Osteoarthritis recent L fem fx s/p repair p/w fall, found to have acute displaced right midshaft humerus fracture s/p R humerus IMN. Also new findings for extensive metastatic disease, with cord compression. Conservative management. Hospice referral, discharge pending placement.

## 2022-10-20 NOTE — PROGRESS NOTE ADULT - PROBLEM SELECTOR PROBLEM 7
Palliative care encounter
Palliative care encounter
Metastatic breast carcinoma
Palliative care encounter
Palliative care encounter

## 2022-10-20 NOTE — PROGRESS NOTE ADULT - SUBJECTIVE AND OBJECTIVE BOX
[facility] Division of Hospital Medicine  Khadijah Mccoy MD  Available via MS Teams  Pager: [phone]    SUBJECTIVE / OVERNIGHT EVENTS: Patient seen and examined at bedside. No acute overnight events. Currently reports pain is well-controlled, denies chest pain, SOB, abd pain, n/v/d/f. States she wants [place] with Hospice and further states she does not want any further treatment, work-up or blood-draws. Patient understands her family/friends want her to continue with treatment but patient states she has decision making capacity and would want hospice.     ADDITIONAL REVIEW OF SYSTEMS: as stated in HPI.    MEDICATIONS  (STANDING):  acetaminophen     Tablet .. 650 milliGRAM(s) Oral every 6 hours  amLODIPine   Tablet 5 milliGRAM(s) Oral daily  apixaban 5 milliGRAM(s) Oral every 12 hours  chlorhexidine 2% Cloths 1 Application(s) Topical daily  citalopram 10 milliGRAM(s) Oral daily  dexAMETHasone  Injectable 4 milliGRAM(s) IV Push every 6 hours  gabapentin 300 milliGRAM(s) Oral daily  hydrochlorothiazide 12.5 milliGRAM(s) Oral daily  influenza  Vaccine (HIGH DOSE) 0.7 milliLiter(s) IntraMuscular once  lactated ringers. 1000 milliLiter(s) (75 mL/Hr) IV Continuous <Continuous>  losartan 50 milliGRAM(s) Oral daily  melatonin 3 milliGRAM(s) Oral at bedtime  metoprolol tartrate 50 milliGRAM(s) Oral two times a day  sodium chloride 0.9%. 1000 milliLiter(s) (100 mL/Hr) IV Continuous <Continuous>    MEDICATIONS  (PRN):  ondansetron Injectable 4 milliGRAM(s) IV Push every 6 hours PRN Nausea and/or Vomiting  oxyCODONE    IR 5 milliGRAM(s) Oral every 3 hours PRN Moderate Pain (4 - 6)  oxyCODONE    IR 10 milliGRAM(s) Oral every 3 hours PRN Severe Pain (7 - 10)  senna 2 Tablet(s) Oral at bedtime PRN Constipation  traMADol 50 milliGRAM(s) Oral every 4 hours PRN Mild Pain (1 - 3)      I&O's Summary      PHYSICAL EXAM:  Vital Signs Last 24 Hrs  T(C): 36.9 (20 Oct 2022 11:35), Max: 36.9 (20 Oct 2022 11:35)  T(F): 98.4 (20 Oct 2022 11:35), Max: 98.4 (20 Oct 2022 11:35)  HR: 71 (20 Oct 2022 11:35) (71 - 84)  BP: 110/77 (20 Oct 2022 11:35) (109/69 - 144/72)  BP(mean): --  RR: 18 (20 Oct 2022 11:35) (18 - 19)  SpO2: 97% (20 Oct 2022 11:35) (95% - 97%)    Parameters below as of 20 Oct 2022 11:35  Patient On (Oxygen Delivery Method): room air      CONSTITUTIONAL: NAD, well-developed  RESPIRATORY: Normal respiratory effort; lungs are clear to auscultation bilaterally  CARDIOVASCULAR: Regular rate and rhythm, normal S1 and S2, no murmur/rub/gallop; No lower extremity edema.  ABDOMEN: Nontender to palpation, normoactive bowel sounds, no rebound/guarding.  MUSCULOSKELETAL: R humerus IMN surgical site clean/dry, no signs of infection.   PSYCH: A+O to person, place, and time; affect appropriate    LABS:                        9.9    5.22  )-----------( 242      ( 19 Oct 2022 10:20 )             29.2     10-19    131<L>  |  95<L>  |  23  ----------------------------<  122<H>  3.8   |  23  |  0.51    Ca    9.0      19 Oct 2022 10:20                COVID-19 PCR: NotDetec (17 Oct 2022 07:47)  SARS-CoV-2: NotDetec (06 May 2022 18:33)      RADIOLOGY & ADDITIONAL TESTS:  New Results Reviewed Today:   New Imaging Personally Reviewed Today:  New Electrocardiogram Personally Reviewed Today:  Prior or Outpatient Records Reviewed Today:    COMMUNICATION:  Care Discussed with Consultants/Other Providers and Details of Discussion: Briefly discussed with Palliative team and BIRDIE Alicea) regarding patient's decision on hospice.   Discussions with Patient/Family:  PCP Communication:

## 2022-10-20 NOTE — PROGRESS NOTE ADULT - PROBLEM SELECTOR PLAN 5
-C/w Metoprolol tart 50mg bid, Amlodipine 10mg qd, Losartan 50mg qd, HTZ 12.5mg qd -C/w Metoprolol tart 50mg bid, Amlodipine 5mg qd, Losartan 50mg qd, HTZ 12.5mg qd

## 2022-10-20 NOTE — PROGRESS NOTE ADULT - ASSESSMENT
A/P: 72yF POD4 s/p R humerus IMN    -Pain control  -NWB RUE, ROM as tolerated, NO active abduction  -DVT ppx: Eliquis  -Incentive Spirometry  -Elevation to extremity/ice   -Medical management per primary team appreciated  -Stable for discharge from orthopedic perspective - Follow up with Dr. Boyd 2 weeks after discharge    Orthopaedic Surgery  [facility] [id number]  [facility]        [id number]  [facility]  [id number]/ [phone]

## 2022-10-20 NOTE — PROGRESS NOTE ADULT - PROBLEM SELECTOR PLAN 7
Hx/o metastatic breast ca, Lumpectomy x2, RT, chemo, now w/ hepatic mets on imaging with spinal metastatic disease with epidural involvement    - MR cervical/thoracic/ lumbar -Extensive spinal metastatic disease with epidural involvement, markedly   progressed since comparison MRI from 10/14/2021  - Palliative following, possible hospice?   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

## 2022-10-20 NOTE — PROGRESS NOTE ADULT - NSPROGADDITIONALINFOA_GEN_ALL_CORE
Code: full  Diet: NPO for now, resume after procedure   DVT PPx: SCD, resume Eliquis after procedure
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     Patient is a HIGH risk for surgery.   -METS 1-2, German perioperative risk 2.0%, RCRI score: Class I   -ECG: 10/12/22: NSR  with non-specific T wave changes, no acute ST changes.  LVH. Qtc 472
Code: DNR   Diet: regular   VTE PPx: Eliquis  Dispo: pending GOC discussion
Code: DNR  Diet: Regular   VTE PPx: Eliquis  Dispo: Hospice pending placement
Code: DNR   Diet: Regular   VTE PPx: Eliquis (home dose)   Dispo: pending patient/family decision for hospice vs. spine palliative RT.
D/w YARIEL Guy  D/w daughter at bedside, all questions answered
Code: DNR   VTE PPx: Eliquis   Diet: Regular   Dispo: pending patient's decision on GOC

## 2022-10-20 NOTE — PROGRESS NOTE ADULT - PROBLEM SELECTOR PLAN 4
MRI cervical/thoracic/lumbar: new dorsal epidural component at T8 resulting in cord compression. Intermittent urinary retention, denies other focal neurological deficits. Reduced ROM at baseline of LLE. Conservative management given extent of metastatic bone involvement of spine.    - Consulted ortho spine team rec: Orthopedic spine attending Dr. Cassidy saw patient and revealed image who  is recommending palliative radiation therapy to her spine due to her extensive metastatic disease, and no orthopedic surgical intervention for spine at this time  -Rad/Onc following, patient confirmed decision on hospice MRI cervical/thoracic/lumbar: new dorsal epidural component at T8 resulting in cord compression. Intermittent urinary retention, denies other focal neurological deficits. Reduced ROM at baseline of LLE. Conservative management given extent of metastatic bone involvement of spine.      -s/p Dexamethasone 4mg IVP Q6 for 3 days, will taper to Dexamethasone 4mg BID for 2 days, then 2mg BID for 2 days, etc   - Consulted ortho spine team rec: Orthopedic spine attending Dr. Cassidy saw patient and revealed image who  is recommending palliative radiation therapy to her spine due to her extensive metastatic disease, and no orthopedic surgical intervention for spine at this time  -Rad/Onc following, patient confirmed decision on hospice

## 2022-10-20 NOTE — PROGRESS NOTE ADULT - PROBLEM SELECTOR PLAN 2
Acute displaced right midshaft humerus fracture s/p POD3 s/p R humerus IMN    - s/p ORIF 10/14  - Post-op for pain management, encourage PT/OT, monitor for post-op infections  - Pain control: Gabapentin 300mg qd, tramadol 50 PRN for mild pain; oxymg Q3 PRN for moderate pain; oxy 10mg Q3 PRN for severe pain   - Encourage incentive spirometry  - Bowel regimen, diet as tolerated   - Ortho following, f/u with Dr. Boyd 2 weeks after discharge Acute displaced right midshaft humerus fracture s/p POD3 s/p R humerus IMN    - s/p ORIF 10/14  - Post-op for pain management, encourage PT/OT, monitor for post-op infections  - Pain control: Gabapentin 300mg qd, tramadol 50 PRN for mild pain; oxy 5mg Q3 PRN for moderate pain; oxy 10mg Q3 PRN for severe pain   - Encourage incentive spirometry  - Bowel regimen, diet as tolerated   - Ortho following, f/u with Dr. Boyd 2 weeks after discharge

## 2022-10-20 NOTE — PROGRESS NOTE ADULT - PROBLEM SELECTOR PLAN 1
73 y/o F with PMhx metastatic breast ca, Lumpectomy x2, Rt, chemo, Ex smoker, Asthma, DVT/eliquis, HTN, Osteoarthritis recent L fem fx s/p repair p/w fall, found to have acute displaced right midshaft humerus fracture s/p R humerus IMN. Also new findings for extensive metastatic disease, with cord compression. Conservative management.     -Patient has decision-making capacity, wants Hospice   -Hospice referral, discharge pending placement  -Palliative following

## 2022-10-20 NOTE — PROGRESS NOTE ADULT - SUBJECTIVE AND OBJECTIVE BOX
Patient seen and examined at bedside. Feeling okay this morning  Discussed removing  sling from time to time for ROM      Vital Signs Last 24 Hrs  T(C): 36.4 (20 Oct 2022 04:50), Max: 36.8 (19 Oct 2022 12:59)  T(F): 97.5 (20 Oct 2022 04:50), Max: 98.2 (19 Oct 2022 12:59)  HR: 76 (20 Oct 2022 04:50) (76 - 84)  BP: 144/72 (20 Oct 2022 04:50) (109/69 - 144/72)  BP(mean): --  RR: 18 (20 Oct 2022 04:50) (18 - 19)  SpO2: 95% (20 Oct 2022 04:50) (95% - 97%)    Parameters below as of 20 Oct 2022 04:50  Patient On (Oxygen Delivery Method): room air      Exam:  Gen: NAD, resting comfortably  UE:  Dressing c/d/i  +AIN/PIN/M/R/U/Ax/Musc  SILT ax/radial/median/ulnar  Radial pulses 2+  Compartments soft and compressible  Calves NTTP b/l

## 2022-10-21 NOTE — DISCHARGE NOTE PROVIDER - DETAILS OF MALNUTRITION DIAGNOSIS/DIAGNOSES
This patient has been assessed with a concern for Malnutrition and was treated during this hospitalization for the following Nutrition diagnosis/diagnoses:     -  10/13/2022: Severe protein-calorie malnutrition

## 2022-10-21 NOTE — PROGRESS NOTE ADULT - ASSESSMENT
A/P: 72yF POD4 s/p R humerus IMN    -Pain control  -NWB RUE, ROM as tolerated, NO active abduction  - PT/OT  -DVT ppx: Eliquis  -Incentive Spirometry  -Elevation to extremity/ice   -Medical management per primary team appreciated  -Stable for discharge from orthopedic perspective - Follow up with Dr. Boyd 2 weeks after discharge    Orthopaedic Surgery  [facility] [id number]  [facility]        [id number]  [facility]  [id number]/ [phone]

## 2022-10-21 NOTE — DISCHARGE NOTE NURSING/CASE MANAGEMENT/SOCIAL WORK - NSDCVIVACCINE_GEN_ALL_CORE_FT
Influenza, injectable,quadrivalent, preservative free, pediatric; 02-Dec-2019 11:01; Behrens, Richard (RN); [employer]; VIS (VIS Presented: 02-Dec-2019);   influenza, high-dose, quadrivalent; 21-Oct-2022 17:10; Yue Dean (RN); [employer]; [id number] (Exp. Date: 30-Jun-2023); IntraMuscular; Deltoid Left.; 0.7 milliLiter(s); VIS (VIS Published: 06-Aug-2021, VIS Presented: 21-Oct-2022);

## 2022-10-21 NOTE — PROGRESS NOTE ADULT - PROBLEM SELECTOR PLAN 6
- Celexa 10mg qd for depression
Hx/o metastatic breast ca, Lumpectomy x2, RT, chemo, now w/ hepatic mets on imaging with spinal metastatic disease with epidural involvement    - MR cervical/thoracic/ lumbar -Extensive spinal metastatic disease with epidural involvement, markedly   progressed since comparison MRI from 10/14/2021  - Palliative following, possible hospice?   - Heme/Onc following   - Rad/Onc following
Hx/o metastatic breast ca, Lumpectomy x2, RT, chemo, now w/ hepatic mets on imaging with spinal metastatic disease with epidural involvement    - MR cervical/thoracic/ lumbar -Extensive spinal metastatic disease with epidural involvement, markedly   progressed since comparison MRI from 10/14/2021  - Palliative following, possible hospice?   - Heme/Onc following   - Rad/Onc following
D/C to [facility] with eventual transition [facility] hosp[facility] ([facility]  45 minutes spent discharging the patient
- Celexa 10mg qd for depression
Hx/o metastatic breast ca, Lumpectomy x2, RT, chemo, now w/ hepatic mets on imaging with spinal metastatic disease with epidural involvement    - MR cervical/thoracic/ lumbar -Extensive spinal metastatic disease with epidural involvement, markedly   progressed since comparison MRI from 10/14/2021  - Palliative following   - Hospice consulted   - Heme/Onc consulted
- Celexa 10mg qd for depression
Hx/o metastatic breast ca, Lumpectomy x2, RT, chemo, now w/ hepatic mets on imaging with spinal metastatic disease with epidural involvement    - MR cervical/thoracic/ lumbar -Extensive spinal metastatic disease with epidural involvement, markedly   progressed since comparison MRI from 10/14/2021  - Palliative following, possible hospice?   - Heme/Onc following   - Rad/Onc following
Hx/o metastatic breast ca, DVT on Eliquis  - Re-started Eliquis as CTA negative for retroperitoneal hematoma, H/H is stable

## 2022-10-21 NOTE — CHART NOTE - NSCHARTNOTEFT_GEN_A_CORE
[facility] continues to follow this case. LCSW and Dr. Espinoza met with patient at bedside today.    Team inquired into patient's status today, and patient notes feeling well and coping appropriately. Team inquired into patient thoughts regarding discussion held earlier this week, and patient notes feeling the same about her goals. Patient states that she continues to wish for hospice services and d/c to [place], and has continued to decline transfer to [facility] for radiation. Team validated this with patient today, and emotional support and active listening provided. At patient request, LCSW wrote out d/c planning to [facility] with [facility] medicaid vs with [facility] medicaid conversion to [facility], and overview once again provided regarding this process. Patient verbalized much appreciation.    Team inquired into patient's family coping with her decision-making, and patient states that her family continues to wish for her to receive PT, but notes that she does not feel that this would be appropriate or beneficial at this point. Team again provided much validation today, and encouraged patient to continue to advocate for herself and continue to inform her family of her thought process regarding these decisions. Patient verbalized agreement.     Patient for likely d/c to [facility] today with eventual hospice services, goals clearly established. [facility] remains available to patient.

## 2022-10-21 NOTE — DISCHARGE NOTE PROVIDER - CARE PROVIDERS DIRECT ADDRESSES
,[email or web address]

## 2022-10-21 NOTE — PROGRESS NOTE ADULT - PROVIDER SPECIALTY LIST ADULT
Orthopedics
Palliative Care
Heme/Onc
Orthopedics
Orthopedics
Palliative Care
Palliative Care
Hospitalist
Internal Medicine
Orthopedics
Internal Medicine
Palliative Care
Hospitalist
Internal Medicine
Palliative Care

## 2022-10-21 NOTE — DISCHARGE NOTE PROVIDER - NSDCCPCAREPLAN_GEN_ALL_CORE_FT
PRINCIPAL DISCHARGE DIAGNOSIS  Diagnosis: Closed fracture of shaft of humerus  Assessment and Plan of Treatment: Acute displaced right midshaft humerus fracture s/p ORIF 10/14  - Pain control, bowel regimen.  - NWB RUE, ROM as tolerated, NO active abduction  - PT/OT  -DVT ppx: Eliquis  -Incentive Spirometry  -Elevation to extremity  - Follow up with Dr. Boyd 2 weeks after discharge        SECONDARY DISCHARGE DIAGNOSES  Diagnosis: Metastatic breast carcinoma  Assessment and Plan of Treatment: Metastatic breast carcinoma with hepatic mets., spinal metastatic disease with epidural involvement   Continue decadron.  Oncology with outpt oncologist Dr. Dunawya, who is in agreement with palliative RT to spine, especially given high risk T8 lesion with cord compression.   Given her overall decline and poor prognosis she will not be a candidate for systemic DMT and recommends focusing on symptoms/transitioning to hospice after any palliative RT   Rad onc consulted.  Patient declining further intervention, opting for hospice.  Discharged to [facility] with eventual transition to hospice ([facility])    Diagnosis: Hypertension  Assessment and Plan of Treatment: Continue Metoprolol, Amlodipine, Losartan, and HCTZ.      Diagnosis: DVT (deep venous thrombosis)  Assessment and Plan of Treatment: Continue Eliquis.

## 2022-10-21 NOTE — PROGRESS NOTE ADULT - ASSESSMENT
72F with metastatic breast ca, ex-smoker, asthma, DVT on Eliquis, HTN, osteoarthritis recent L fem fx s/p repair p/w fall, found to have acute displaced right midshaft humerus fracture s/p R humerus IMN. Also found to have extensive metastatic disease with cord compression- opting for conservative management

## 2022-10-21 NOTE — DISCHARGE NOTE NURSING/CASE MANAGEMENT/SOCIAL WORK - PATIENT PORTAL LINK FT
You can access the FollowMyHealth Patient Portal offered by [facility] by registering at the following website: [email or web address]. By joining [employer]’s FollowMyHealth portal, you will also be able to view your health information using other applications (apps) compatible with our system.

## 2022-10-21 NOTE — CHART NOTE - NSCHARTNOTEFT_GEN_A_CORE
72F h/o metastatic breast cancer (hormone positive, HER2 neg), DVT on eliquis, recent L fem fx s/p repair p/w fall, found to have acute displaced right midshaft humerus fracture, pending OR for humeral fracture on 10/14:     #metastatic breast cancer   -imaging this admission:   CT humerus: Acute displaced pathologic fracture through the mid diaphysis of the right humerus.  CT Cervical Spine No Cont: IMPRESSION: Diffuse lytic or blastic changes to the visualized cervical thoracic vertebral bodies and skull base. No evidence of acute fracture   or dislocation.  CT Head No Cont: mild periventricular and moderate bifrontal and biparietal subcortical white matter ischemia. Hypodensity is seen within the bert  and RIGHT midbrain which is nonspecific and may represent white matter ischemia, acute infarction or edema from underlying lesion. MRI with  gadolinium is recommended.  CT cervical spine:   No vertebral fracture is recognized.  Innumerable lytic lesions are seen throughout the cervical and visualized thoracic  spine consistent with metastatic disease. Mild compression fracture of C6   noted with loss of 50% of vertebral body height. Suspicion of RIGHT epidural neoplastic disease at this the 1-2 level and the RIGHT C7-T1 level.   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 Of note, there is new dorsal epidural component at T8 resulting in cord  compression. No abnormal cord signal identified. L2-L3 and L4-L5 epidural metastatic disease and degenerative changes contribute to severe thecal sac stenosis, worsened since comparison MRI   10/14/2021.  CT Chest: Chronic right sixth, seventh and ninth rib fractures. Extensive  destructive bony changes of the posterior right 10th rib. No associated  pneumothorax. Diffuse sclerotic and lytic bony metastatic disease of the visualized axial and appendicular skeleton, appears to have progressed since prior study on 7/22/2022. Irregularly-shaped 7 mm right apical nodule which appears more  well-defined when comparedto recent CT scan. New small right pleural  effusion. Hepatic metastases.    Recommendation:   -most recently treated with Vinorelbine Nov 2021>>Jan 2022, Eribulin Feb 2022> April 2022    -above findings overall concerning for POD with significant decline in PS. s/p repair of humeral fracture  -inpatient med onc reached out to her outpt oncologist Dr. Dunaway, who is in agreement with palliative RT to spine, especially given high risk T8 lesion with cord compression. given her overall decline and poor PS, she will not be a candidate for systemic DMT and we recommend focusing on symptoms/transitioning to hospice after any palliative RT   -rad onc consulted and recs appreciated. patient has decided to transition to hospice. pending hospice evaluation. Dr. Dunaway notified. oncology to sign off. please call back with any questions       Harlan Navarrete/onc PGY6 [phone]

## 2022-10-21 NOTE — PROGRESS NOTE ADULT - PROBLEM SELECTOR PROBLEM 1
Acute anemia
Closed fracture of shaft of humerus
Closed fracture of shaft of humerus
Cord compression
Advance care planning
Closed fracture of shaft of humerus
Cord compression
Closed fracture of shaft of humerus
Acute anemia
Closed fracture of shaft of humerus
Closed fracture of shaft of humerus
Cord compression
Closed fracture of shaft of humerus

## 2022-10-21 NOTE — PROGRESS NOTE ADULT - NUTRITIONAL ASSESSMENT
This patient has been assessed with a concern for Malnutrition and has been determined to have a diagnosis/diagnoses of Severe protein-calorie malnutrition.    This patient is being managed with:   Diet NPO after Midnight-     NPO Start Date: 13-Oct-2022   NPO Start Time: 23:59  Entered: Oct 13 2022  4:30PM    Diet Regular-  Low Sodium  Supplement Feeding Modality:  Oral  Ensure Plus High Protein Cans or Servings Per Day:  1       Frequency:  Daily  Entered: Oct 13 2022 11:16AM    Diet Regular-  DASH/TLC {Sodium & Cholesterol Restricted} (DASH)  Entered: Oct 11 2022  4:53AM    The following pending diet order is being considered for treatment of Severe protein-calorie malnutrition:null
This patient has been assessed with a concern for Malnutrition and has been determined to have a diagnosis/diagnoses of Severe protein-calorie malnutrition.    This patient is being managed with:   Diet Regular-  Supplement Feeding Modality:  Oral  Ensure Plus High Protein Cans or Servings Per Day:  1       Frequency:  Daily  Entered: Oct 18 2022  2:44PM    
This patient has been assessed with a concern for Malnutrition and has been determined to have a diagnosis/diagnoses of Severe protein-calorie malnutrition.    This patient is being managed with:   Diet Regular-  Low Sodium  Supplement Feeding Modality:  Oral  Ensure Plus High Protein Cans or Servings Per Day:  1       Frequency:  Daily  Entered: Oct 13 2022 11:16AM    Diet Regular-  DASH/TLC {Sodium & Cholesterol Restricted} (DASH)  Entered: Oct 11 2022  4:53AM    The following pending diet order is being considered for treatment of Severe protein-calorie malnutrition:null
This patient has been assessed with a concern for Malnutrition and has been determined to have a diagnosis/diagnoses of Severe protein-calorie malnutrition.    This patient is being managed with:   Diet Regular-  Supplement Feeding Modality:  Oral  Ensure Plus High Protein Cans or Servings Per Day:  1       Frequency:  Daily  Entered: Oct 18 2022  2:44PM    
This patient has been assessed with a concern for Malnutrition and has been determined to have a diagnosis/diagnoses of Severe protein-calorie malnutrition.    This patient is being managed with:   Diet Regular-  Entered: Oct 14 2022 10:13PM    
This patient has been assessed with a concern for Malnutrition and has been determined to have a diagnosis/diagnoses of Severe protein-calorie malnutrition.    This patient is being managed with:   Diet Regular-  Entered: Oct 14 2022 10:13PM    
This patient has been assessed with a concern for Malnutrition and has been determined to have a diagnosis/diagnoses of Severe protein-calorie malnutrition.    This patient is being managed with:   Diet NPO after Midnight-     NPO Start Date: 13-Oct-2022   NPO Start Time: 23:59  Entered: Oct 13 2022  4:30PM    Diet Regular-  Low Sodium  Supplement Feeding Modality:  Oral  Ensure Plus High Protein Cans or Servings Per Day:  1       Frequency:  Daily  Entered: Oct 13 2022 11:16AM    Diet Regular-  DASH/TLC {Sodium & Cholesterol Restricted} (DASH)  Entered: Oct 11 2022  4:53AM    The following pending diet order is being considered for treatment of Severe protein-calorie malnutrition:null
This patient has been assessed with a concern for Malnutrition and has been determined to have a diagnosis/diagnoses of Severe protein-calorie malnutrition.    This patient is being managed with:   Diet Regular-  Entered: Oct 14 2022 10:13PM    
This patient has been assessed with a concern for Malnutrition and has been determined to have a diagnosis/diagnoses of Severe protein-calorie malnutrition.    This patient is being managed with:   Diet Regular-  Entered: Oct 14 2022 10:13PM    
This patient has been assessed with a concern for Malnutrition and has been determined to have a diagnosis/diagnoses of Severe protein-calorie malnutrition.    This patient is being managed with:   Diet Regular-  Supplement Feeding Modality:  Oral  Ensure Plus High Protein Cans or Servings Per Day:  1       Frequency:  Daily  Entered: Oct 18 2022  2:44PM    
This patient has been assessed with a concern for Malnutrition and has been determined to have a diagnosis/diagnoses of Severe protein-calorie malnutrition.    This patient is being managed with:   Diet Regular-  Entered: Oct 14 2022 10:13PM

## 2022-10-21 NOTE — PROGRESS NOTE ADULT - SUBJECTIVE AND OBJECTIVE BOX
Patient seen and examined at bedside. Feeling okay this morning      Vital Signs Last 24 Hrs  T(C): 36.4 (21 Oct 2022 04:50), Max: 36.9 (20 Oct 2022 11:35)  T(F): 97.5 (21 Oct 2022 04:50), Max: 98.4 (20 Oct 2022 11:35)  HR: 78 (21 Oct 2022 04:50) (71 - 78)  BP: 145/85 (21 Oct 2022 04:50) (110/77 - 145/85)  BP(mean): --  RR: 18 (21 Oct 2022 04:50) (18 - 18)  SpO2: 97% (21 Oct 2022 04:50) (97% - 98%)    Parameters below as of 21 Oct 2022 04:50  Patient On (Oxygen Delivery Method): room air      Exam:  Gen: NAD, resting comfortably  UE:  Dressing c/d/i  +AIN/PIN/M/R/U/Ax/Musc  SILT ax/radial/median/ulnar  Radial pulses 2+  Compartments soft and compressible  Calves NTTP b/l

## 2022-10-21 NOTE — CHART NOTE - NSCHARTNOTESELECT_GEN_ALL_CORE
Event Note
Event Note
GAP 
GAP team/Event Note
GAP team/Event Note
ISTOP/Event Note
Nutrition Services
Oncology/Event Note
Ortho Recs/Event Note
Palliative Care/Event Note
ptn with urinary retention/Event Note

## 2022-10-21 NOTE — PROGRESS NOTE ADULT - REASON FOR ADMISSION
Fall

## 2022-10-21 NOTE — DISCHARGE NOTE PROVIDER - HOSPITAL COURSE
72F with metastatic breast ca (hormone positive, HER2 neg), ex-smoker, asthma, DVT on Eliquis, HTN, osteoarthritis recent L fem fx s/p repair p/w fall, found to have acute displaced right midshaft humerus fracture s/p R humerus IMN. Also found to have extensive metastatic disease with cord compression- opting for conservative management.  CT humerus: Acute displaced pathologic fracture through the mid diaphysis of the right humerus.     Problem/Plan - 1:  ·  Problem: Closed fracture of shaft of humerus.    Acute displaced right midshaft humerus fracture s/p ORIF 10/14  - Pain control, bowel regimen.  -NWB RUE, ROM as tolerated, NO active abduction  - PT/OT  -DVT ppx: Eliquis  -Incentive Spirometry  -Elevation to extremity  -Stable for discharge from orthopedic perspective - Follow up with Dr. Boyd 2 weeks after discharge     Problem/Plan - 2:  ·  Problem: Metastatic breast carcinoma with Hepatic mets and spinal metastatic disease with epidural involvement with hepatic mets.  Continue decadron.    CT Cervical Spine No Cont - Diffuse lytic or blastic changes to the visualized cervical thoracic vertebral bodies and skull base. No evidence of acute fracture or dislocation.  CT Head No Cont: mild periventricular and moderate bifrontal and biparietal subcortical white matter ischemia. Hypodensity is seen within the bert  and RIGHT midbrain which is nonspecific and may represent white matter ischemia, acute infarction or edema from underlying lesion. MRI with  gadolinium is recommended.  CT cervical spine:   No vertebral fracture is recognized.  Innumerable lytic lesions are seen throughout the cervical and visualized thoracic  spine consistent with metastatic disease. Mild compression fracture of C6   noted with loss of 50% of vertebral body height. Suspicion of RIGHT epidural neoplastic disease at this the 1-2 level and the RIGHT C7-T1 level.   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 Of note, there is new dorsal epidural component at T8 resulting in cord  compression. No abnormal cord signal identified. L2-L3 and L4-L5 epidural metastatic disease and degenerative changes contribute to severe thecal sac stenosis, worsened since comparison MRI   10/14/2021.  CT Chest: Chronic right sixth, seventh and ninth rib fractures. Extensive  destructive bony changes of the posterior right 10th rib. No associated  pneumothorax. Diffuse sclerotic and lytic bony metastatic disease of the visualized axial and appendicular skeleton, appears to have progressed since prior study on 7/22/2022. Irregularly-shaped 7 mm right apical nodule which appears more  well-defined when comparedto recent CT scan. New small right pleural  effusion. Hepatic metastases.    Oncology with outpt oncologist Dr. Dunaway, who is in agreement with palliative RT to spine, especially given high risk T8 lesion with cord compression.   Given her overall decline and poor prognosis she will not be a candidate for systemic DMT and recommends focusing on symptoms/transitioning to hospice after any palliative RT   Rad onc consulted.  Patient declining further intervention, opting for hospice.     Problem/Plan - 3:  ·  Problem: Hypertension.   Continue Metoprolol, Amlodipine, Losartan, and HCTZ.     Problem/Plan - 4:  ·  Problem: DVT (deep venous thrombosis).   Continue Eliquis.     Problem/Plan - 5:  ·  Problem: Palliative care encounter.   ·  Plan: Celexa 10 mg PO daily for depression.     Problem/Plan - 6:  ·  Problem: Advance care planning.   D/C to [facility] with eventual transition to hospice ([facility])

## 2022-10-21 NOTE — PROGRESS NOTE ADULT - PROBLEM SELECTOR PLAN 2
With hepatic mets on imaging and spinal metastatic disease with epidural involvement  Continue decadron  Patient declining further intervention, opting for hospice

## 2022-10-21 NOTE — PROGRESS NOTE ADULT - PROBLEM SELECTOR PROBLEM 3
Hypertension
Cord compression
Hypertension
Prophylactic measure
Closed fracture of shaft of humerus
Prophylactic measure
Cord compression
Closed fracture of shaft of humerus
Prophylactic measure
Acute anemia

## 2022-10-21 NOTE — PROGRESS NOTE ADULT - SUBJECTIVE AND OBJECTIVE BOX
Otilio Dior MD    Patient is a 72y old  Female who presents with a chief complaint of Fall (21 Oct 2022 06:53)        SUBJECTIVE / OVERNIGHT EVENTS: No new events. Patient states her pain is adequately controlled with Tylenol      MEDICATIONS  (STANDING):  acetaminophen     Tablet .. 650 milliGRAM(s) Oral every 6 hours  amLODIPine   Tablet 5 milliGRAM(s) Oral daily  apixaban 5 milliGRAM(s) Oral every 12 hours  chlorhexidine 2% Cloths 1 Application(s) Topical daily  citalopram 10 milliGRAM(s) Oral daily  dexAMETHasone     Tablet 4 milliGRAM(s) Oral every 12 hours  gabapentin 300 milliGRAM(s) Oral daily  hydrochlorothiazide 12.5 milliGRAM(s) Oral daily  influenza  Vaccine (HIGH DOSE) 0.7 milliLiter(s) IntraMuscular once  losartan 50 milliGRAM(s) Oral daily  melatonin 3 milliGRAM(s) Oral at bedtime  metoprolol tartrate 50 milliGRAM(s) Oral two times a day    MEDICATIONS  (PRN):  ondansetron Injectable 4 milliGRAM(s) IV Push every 6 hours PRN Nausea and/or Vomiting  oxyCODONE    IR 5 milliGRAM(s) Oral every 3 hours PRN Moderate Pain (4 - 6)  oxyCODONE    IR 10 milliGRAM(s) Oral every 3 hours PRN Severe Pain (7 - 10)  polyethylene glycol 3350 17 Gram(s) Oral two times a day PRN Constipation  senna 2 Tablet(s) Oral at bedtime PRN Constipation  traMADol 50 milliGRAM(s) Oral every 4 hours PRN Mild Pain (1 - 3)      Vital Signs Last 24 Hrs  T(C): 36.4 (21 Oct 2022 04:50), Max: 36.6 (20 Oct 2022 20:36)  T(F): 97.5 (21 Oct 2022 04:50), Max: 97.8 (20 Oct 2022 20:36)  HR: 94 (21 Oct 2022 11:22) (72 - 94)  BP: 120/82 (21 Oct 2022 11:22) (120/82 - 145/85)  BP(mean): --  RR: 18 (21 Oct 2022 04:50) (18 - 18)  SpO2: 97% (21 Oct 2022 11:22) (97% - 98%)    Parameters below as of 21 Oct 2022 11:22  Patient On (Oxygen Delivery Method): room air      CAPILLARY BLOOD GLUCOSE        I&O's Summary    20 Oct 2022 07:01  -  21 Oct 2022 07:00  --------------------------------------------------------  IN: 0 mL / OUT: 950 mL / NET: -950 mL    21 Oct 2022 07:01  -  21 Oct 2022 13:56  --------------------------------------------------------  IN: 120 mL / OUT: 0 mL / NET: 120 mL          PHYSICAL EXAM  GENERAL: NAD, well-developed  HEAD:  Atraumatic, normocephalic  EYES: EOMI, PERRLA, conjunctiva and sclera clear  CHEST/LUNG: Clear to auscultation bilaterally; no wheezes  HEART: +S1+S2, regular rate and rhythm  ABDOMEN: Soft, nontender, nondistended; bowel sounds present  EXTREMITIES: R humerus IMN surgical site clean/dry, arm sling in place; 2+ peripheral pulses; no clubbing, cyanosis, or edema  PSYCH: AAOx3        LABS:                      RADIOLOGY & ADDITIONAL TESTS:    Imaging Personally Reviewed:  Consultant(s) Notes Reviewed:    Care Discussed with Consultants/Other Providers:

## 2022-10-21 NOTE — DISCHARGE NOTE PROVIDER - NSDCMRMEDTOKEN_GEN_ALL_CORE_FT
acetaminophen 325 mg oral tablet: 2 tab(s) orally every 6 hours  amLODIPine 5 mg oral tablet: 1 tab(s) orally once a day  CeleXA 10 mg oral tablet: 1 tab(s) orally once a day  Eliquis 5 mg oral tablet: 1 tab(s) orally 2 times a day  gabapentin 300 mg oral capsule: 1 cap(s) orally once a day  hydroCHLOROthiazide 12.5 mg oral capsule: 1 cap(s) orally once a day  losartan 50 mg oral tablet: 1 tab(s) orally once a day  metoprolol tartrate 50 mg oral tablet: 1 tab(s) orally 2 times a day  oxyCODONE 10 mg oral tablet: 1 tab(s) orally every 3 hours, As needed, Severe Pain (7 - 10)  oxyCODONE 5 mg oral tablet: 1 tab(s) orally every 3 hours, As needed, Moderate Pain (4 - 6)  polyethylene glycol 3350 oral powder for reconstitution: 17 gram(s) orally 2 times a day, As needed, Constipation  senna leaf extract oral tablet: 2 tab(s) orally once a day (at bedtime), As needed, Constipation  traMADol 50 mg oral tablet: 1 tab(s) orally every 4 hours, As needed, Mild Pain (1 - 3)   acetaminophen 325 mg oral tablet: 2 tab(s) orally every 6 hours  amLODIPine 5 mg oral tablet: 1 tab(s) orally once a day  CeleXA 10 mg oral tablet: 1 tab(s) orally once a day  dexamethasone 4 mg oral tablet: 1 tab(s) orally every 12 hours x 2 DAYS  THEN 2mg Q12 hours  Eliquis 5 mg oral tablet: 1 tab(s) orally 2 times a day  gabapentin 300 mg oral capsule: 1 cap(s) orally once a day  hydroCHLOROthiazide 12.5 mg oral capsule: 1 cap(s) orally once a day  losartan 50 mg oral tablet: 1 tab(s) orally once a day  metoprolol tartrate 50 mg oral tablet: 1 tab(s) orally 2 times a day  oxyCODONE 10 mg oral tablet: 1 tab(s) orally every 3 hours, As needed, Severe Pain (7 - 10)  oxyCODONE 5 mg oral tablet: 1 tab(s) orally every 3 hours, As needed, Moderate Pain (4 - 6)  polyethylene glycol 3350 oral powder for reconstitution: 17 gram(s) orally 2 times a day, As needed, Constipation  senna leaf extract oral tablet: 2 tab(s) orally once a day (at bedtime), As needed, Constipation  traMADol 50 mg oral tablet: 1 tab(s) orally every 4 hours, As needed, Mild Pain (1 - 3)

## 2022-10-21 NOTE — PROGRESS NOTE ADULT - PROBLEM SELECTOR PROBLEM 2
Closed fracture of shaft of humerus
Metastatic breast carcinoma
Metastatic breast carcinoma
Closed fracture of shaft of humerus
Cord compression
Acute anemia
Metastatic breast carcinoma
Acute anemia
Cord compression

## 2022-10-21 NOTE — PROGRESS NOTE ADULT - PROBLEM SELECTOR PROBLEM 5
Metastatic breast carcinoma
DVT (deep venous thrombosis)
Palliative care encounter
Palliative care encounter
DVT (deep venous thrombosis)
Depression with anxiety
Palliative care encounter
DVT (deep venous thrombosis)
Metastatic breast carcinoma
Metastatic breast carcinoma
DVT (deep venous thrombosis)
Hypertension

## 2022-10-21 NOTE — DISCHARGE NOTE PROVIDER - CARE PROVIDER_API CALL
Usama Boyd)  Orthopedics  [address], [address]  [place] [address]  Phone: [phone]  Fax: [phone]  Follow Up Time: 2 weeks    Severo Dunaway)  Hematology; Internal Medicine; Medical Oncology  [address] [place] [address]  Phone: [phone]  Fax: [phone]  Follow Up Time:

## 2022-10-21 NOTE — PROGRESS NOTE ADULT - PROBLEM SELECTOR PROBLEM 4
Palliative care encounter
Cord compression
DVT (deep venous thrombosis)
Palliative care encounter
Advance care planning
Hypertension
Advance care planning
DVT (deep venous thrombosis)
DVT (deep venous thrombosis)
Palliative care encounter
Hypertension

## 2022-10-21 NOTE — DISCHARGE NOTE NURSING/CASE MANAGEMENT/SOCIAL WORK - NSDCPEFALRISK_GEN_ALL_CORE
For information on Fall & Injury Prevention, visit: [email or web address] OR  [email or web address] OR  https://www.cdc.gov/steadi/patient.html

## 2022-11-03 PROBLEM — Z92.29 PERSONAL HISTORY OF OTHER DRUG THERAPY: Chronic | Status: ACTIVE | Noted: 2022-01-01

## 2022-11-10 NOTE — ASU PREOP CHECKLIST - ALLERGY BAND ON
HR=61 bpm, PDCN=799/71 mmhg, DoH2=052.0 %, Resp=16 B/min, EtCO2=34 mmHg, Apnea=3 Seconds, Pain=0, Owens=3 done

## 2022-12-15 PROBLEM — F43.21 ADJUSTMENT DISORDER WITH DEPRESSED MOOD: Status: ACTIVE | Noted: 2022-01-01

## 2022-12-19 NOTE — HISTORY OF PRESENT ILLNESS
[Medical Office: ([facility])___] : at the medical office located in  [FreeTextEntry1] : 72y F with PMH of metastatic breast cancer. Status post left lumpectomy and chemo and radiation (2003), followed by repeat chemotherapy (2017), status post left femur intramedullary nail (06/2022, [facility]). She sustained a mechanical fall resulting in a left pathologic humeral shaft fracture, s/p IMN (10/14/22). She has known diffuse metastatic skeletal disease and has recently stopped all systemic treatments. \par \par She presents today via telehealth to discuss radiation to right humerus and spine mets. \par \par \par

## 2023-01-03 ENCOUNTER — APPOINTMENT (OUTPATIENT)
Dept: RADIATION ONCOLOGY | Facility: CLINIC | Age: 73
End: 2023-01-03

## 2023-01-19 ENCOUNTER — APPOINTMENT (OUTPATIENT)
Dept: HEMATOLOGY ONCOLOGY | Facility: CLINIC | Age: 73
End: 2023-01-19

## 2023-02-02 ENCOUNTER — APPOINTMENT (OUTPATIENT)
Dept: HEMATOLOGY ONCOLOGY | Facility: CLINIC | Age: 73
End: 2023-02-02

## 2023-05-04 NOTE — PHYSICAL THERAPY INITIAL EVALUATION ADULT - PERSONAL SAFETY AND JUDGMENT, REHAB EVAL
PAST SURGICAL HISTORY:   delivery NOS     Ectopic pregnancy     H/O aortic valve replacement     H/O ascending aorta repair     H/O bilateral inguinal hernia repair age 4    H/O carpal tunnel repair     S/P cataract surgery      intact

## 2023-08-19 NOTE — PHYSICAL THERAPY INITIAL EVALUATION ADULT - LEVEL OF INDEPENDENCE: GAIT, REHAB EVAL
08/19/23                            Renny Cortes  [address] [facility] [address]    To Whom It May Concern:    This is to certify Renny Cortes was evaluated with GEORGINA Hernández on 08/19/23 symptoms started on 8/17/2023. Can return to regular work on 8/22/2023, or sooner if better.     RESTRICTIONS: none            Electronically signed by:  GEORGINA Hernández  [address] [address]  Dept Phone: [phone]       
minimum assist (75% patients effort)

## 2023-08-23 NOTE — PHYSICAL THERAPY INITIAL EVALUATION ADULT - PATIENT/FAMILY/SIGNIFICANT OTHER GOALS STATEMENT, PT EVAL
to get better
Pt wants to get better.
Bactrim Counseling:  I discussed with the patient the risks of sulfa antibiotics including but not limited to GI upset, allergic reaction, drug rash, diarrhea, dizziness, photosensitivity, and yeast infections.  Rarely, more serious reactions can occur including but not limited to aplastic anemia, agranulocytosis, methemoglobinemia, blood dyscrasias, liver or kidney failure, lung infiltrates or desquamative/blistering drug rashes.

## 2024-03-15 NOTE — PHYSICAL THERAPY INITIAL EVALUATION ADULT - LEVEL OF INDEPENDENCE: SIT/STAND, REHAB EVAL
Laurence scheduled patient to start at  on 4/22 based off preferences. Message sent to Laurence to see if patient can be seen sooner than 4/22 at  due to patient being scheduled too far out. Per Laurence she will call patient to get patient scheduled at  sooner. Laurence sent message via teams to notify me that patient is scheduled for 3/25.     Patient has no showed more than x3 with infusion at  and .  
Please Schedule Infusions  
While we try to accommodate patient requests, our priority is to schedule treatment according to Doctor's orders and site availability.    Does the Provider use the intake sheet or checkout note?  What would be a preferred day of the week that would work best for your infusion appointment? MONDAYS   Do you prefer mornings or afternoons for your appointments? ANYTIME AFTER 830  Are there any days or dates that do not work for your schedule, including any upcoming vacations?   We are going to try our best to schedule you at the infusion center closest to your home.  In the event that we are unable to what would be your next preferred infusion site or sites?     1. [facility]   2. [facility]    Do you have transportation to take you to all of your appointments? YES  
maximum assist (25% patients effort)
moderate assist (50% patients effort)

## 2024-10-22 NOTE — H&P ADULT. - ASSESSMENT
in no acute distress
67 y/o female with h/o HTN and R breast cancer, s/p lumpectomy and radiation in 2003 and in remission until 12/2016, recently started on chemo (on cycle 3 of adriamycin/cytoxan, last infusion 5/10, q2wks on Wednesdays) presented to [facility] ED from home with fevers x 1 day (Tmax 101F at home) and fatigue. Admitted with neutropenic fever

## 2024-11-14 NOTE — DISCHARGE NOTE ADULT - FUNCTIONAL SCREEN CURRENT LEVEL: BATHING, MLM
(0) independent
[de-identified] : Examination of the left knee is as follows: INSPECTION: mild effusion, but no ecchymosis, no erythema, no atrophy, no deformities of quad tendon or of patellar tendon PALPATION: medial joint line tenderness, but no palpable mass, no obvious defects, no increased warmth, no calf tenderness ROM: flexion 110 degrees, but extension 0 degrees STRENGTH: quadriceps 5/5, hamstring 5/5 TESTING: ligamentously stable NEURO: light touch is intact throughout GAIT: mildly antalgic, but patient ambulates without assistive device  X-rays of the left knee is as follows: [facility] Knee 3 view in the anteroposterior, lateral, skyline views: moderate tricompartmental OA with medial joint space narrowing

## 2025-01-13 NOTE — PATIENT PROFILE ADULT - FUNCTIONAL ASSESSMENT - BASIC MOBILITY SCORE.
Alert-The patient is alert, awake and responds to voice. The patient is oriented to time, place, and person. The triage nurse is able to obtain subjective information.
23

## 2025-04-03 NOTE — DISCHARGE NOTE PROVIDER - NSDCFUSCHEDAPPT_GEN_ALL_CORE_FT
Population Health Chart Review & Patient Outreach Details      Additional [facility] Health Notes:               Updates Requested / Reviewed:      Updated Care Coordination Note, Care Everywhere, and Immunizations Reconciliation Completed or Queried: [place]         Health Maintenance Topics Overdue:      [facility] Score: 1     Colon Cancer Screening                       Health Maintenance Topic(s) Outreach Outcomes & Actions Taken:    Breast Cancer Screening - Outreach Outcomes & Actions Taken  : Mammogram Screening Scheduled    Colorectal Cancer Screening - Outreach Outcomes & Actions Taken  : Reminded Patient to Complete Already Received Home Test    
[facility]  [facility]  Scheduled Appointment: 05/11/2022    [facility]  Scheduled Appointment: 05/18/2022    [place]  [facility]  Scheduled Appointment: 06/01/2022    [facility]  Scheduled Appointment: 06/01/2022    [facility]  [facility]  Scheduled Appointment: 06/08/2022    
No

## (undated) DEVICE — GLV 7 PROTEXIS (WHITE)

## (undated) DEVICE — GLV 7.5 PROTEXIS (WHITE)

## (undated) DEVICE — DRAPE U 47X51" NON STERILE

## (undated) DEVICE — DRAPE STERI-DRAPE INCISE 23X23"

## (undated) DEVICE — STAPLER SKIN VISI-STAT 35 WIDE

## (undated) DEVICE — SUT POLYSORB 3-0 30" P-12 UNDYED

## (undated) DEVICE — BLADE SCALPEL SAFETYLOCK #10

## (undated) DEVICE — GLV 6.5 PROTEXIS (WHITE)

## (undated) DEVICE — SUT VICRYL 2-0 36" CT UNDYED

## (undated) DEVICE — DRSG KLING 4"

## (undated) DEVICE — BLADE SCALPEL SAFETYLOCK #15

## (undated) DEVICE — STRYKER INTERPULSE HANDPIECE W IRR SUCTION TUBE

## (undated) DEVICE — PACK EXTREMITY

## (undated) DEVICE — SOL IRR POUR NS 0.9% 500ML

## (undated) DEVICE — DRILL BIT STRYKER 3.5X130MM STERILE

## (undated) DEVICE — LAP PAD 18 X 18"

## (undated) DEVICE — VENODYNE/SCD SLEEVE CALF LARGE

## (undated) DEVICE — PREP BETADINE KIT

## (undated) DEVICE — BIPOLAR FORCEP SYMMETRY BAYONET 7" X 1.5MM SMOOTH (SILVER)

## (undated) DEVICE — SOL IRR POUR H2O 250ML

## (undated) DEVICE — PREP BETADINE SPONGE STICKS

## (undated) DEVICE — SYR ASEPTO

## (undated) DEVICE — DRAPE 3/4 SHEET W REINFORCEMENT 56X77"

## (undated) DEVICE — TAPE SILK 3"

## (undated) DEVICE — ELCTR BIPOLAR CORD J&J 12FT DISP

## (undated) DEVICE — SLING ARM CHIEFTAIN MESH SMALL

## (undated) DEVICE — POSITIONER FOAM EGG CRATE ULNAR 2PCS (PINK)

## (undated) DEVICE — MEDICATION LABELS W MARKER

## (undated) DEVICE — DRSG XEROFORM 5 X 9"

## (undated) DEVICE — DRAPE C ARM UNIVERSAL

## (undated) DEVICE — SUT RETRIEVER S&N LAVENDER

## (undated) DEVICE — DRAPE IOBAN 23" X 23"

## (undated) DEVICE — SOL IRR BAG NS 0.9% 3000ML

## (undated) DEVICE — GLV 8 PROTEXIS (WHITE)

## (undated) DEVICE — DRAPE TOWEL BLUE 17" X 24"

## (undated) DEVICE — SLING SHOULDER IMMOBILIZER CLINIC LARGE

## (undated) DEVICE — DRAPE SPLIT SHEET 77" X 108"

## (undated) DEVICE — DRAPE INSTRUMENT POUCH 6.75" X 11"

## (undated) DEVICE — DRSG DERMABOND 0.7ML

## (undated) DEVICE — MARKING PEN W RULER

## (undated) DEVICE — PREP CHLORAPREP HI-LITE ORANGE 26ML

## (undated) DEVICE — Device

## (undated) DEVICE — WARMING BLANKET LOWER ADULT

## (undated) DEVICE — FOLEY TRAY 16FR 5CC LTX UMETER CLOSED

## (undated) DEVICE — GLV 8.5 PROTEXIS (WHITE)

## (undated) DEVICE — DRAPE MAYO STAND 30"

## (undated) DEVICE — DRSG STOCKINETTE IMPERVIOUS MED

## (undated) DEVICE — GOWN TRIMAX LG

## (undated) DEVICE — DRSG STERISTRIPS 0.5 X 4"

## (undated) DEVICE — DRILL 3.5MM

## (undated) DEVICE — GLV 9 DERMAPRENE ULTRA

## (undated) DEVICE — SPECIMEN CONTAINER 100ML